# Patient Record
Sex: FEMALE | Race: WHITE | NOT HISPANIC OR LATINO | Employment: FULL TIME | URBAN - METROPOLITAN AREA
[De-identification: names, ages, dates, MRNs, and addresses within clinical notes are randomized per-mention and may not be internally consistent; named-entity substitution may affect disease eponyms.]

---

## 2017-01-26 ENCOUNTER — OFFICE VISIT (OUTPATIENT)
Dept: ORTHOPEDICS | Facility: CLINIC | Age: 42
End: 2017-01-26
Payer: MEDICAID

## 2017-01-26 VITALS — BODY MASS INDEX: 22.45 KG/M2 | WEIGHT: 122 LBS | HEIGHT: 62 IN

## 2017-01-26 DIAGNOSIS — S69.91XA HAND INJURY, RIGHT, INITIAL ENCOUNTER: Primary | ICD-10-CM

## 2017-01-26 DIAGNOSIS — S62.668A: ICD-10-CM

## 2017-01-26 PROCEDURE — 26750 TREAT FINGER FRACTURE EACH: CPT | Mod: PBBFAC,PO,RT | Performed by: ORTHOPAEDIC SURGERY

## 2017-01-26 PROCEDURE — 99214 OFFICE O/P EST MOD 30 MIN: CPT | Mod: 25,S$PBB,, | Performed by: ORTHOPAEDIC SURGERY

## 2017-01-26 PROCEDURE — 99999 PR PBB SHADOW E&M-EST. PATIENT-LVL II: CPT | Mod: PBBFAC,,, | Performed by: ORTHOPAEDIC SURGERY

## 2017-01-26 PROCEDURE — 26750 TREAT FINGER FRACTURE EACH: CPT | Mod: S$PBB,F8,, | Performed by: ORTHOPAEDIC SURGERY

## 2017-01-26 PROCEDURE — 99212 OFFICE O/P EST SF 10 MIN: CPT | Mod: PBBFAC,PO | Performed by: ORTHOPAEDIC SURGERY
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
## 2017-02-17 ENCOUNTER — PATIENT MESSAGE (OUTPATIENT)
Dept: ORTHOPEDICS | Facility: CLINIC | Age: 42
End: 2017-02-17

## 2017-02-17 DIAGNOSIS — Z09 FRACTURE FOLLOW-UP: Primary | ICD-10-CM

## 2017-02-20 ENCOUNTER — HOSPITAL ENCOUNTER (OUTPATIENT)
Dept: RADIOLOGY | Facility: HOSPITAL | Age: 42
Discharge: HOME | End: 2017-02-20
Attending: ORTHOPAEDIC SURGERY
Payer: MEDICAID

## 2017-02-20 ENCOUNTER — OFFICE VISIT (OUTPATIENT)
Dept: ORTHOPEDICS | Facility: CLINIC | Age: 42
End: 2017-02-20
Payer: MEDICAID

## 2017-02-20 VITALS — HEIGHT: 62 IN | WEIGHT: 122 LBS | BODY MASS INDEX: 22.45 KG/M2

## 2017-02-20 DIAGNOSIS — Z09 FRACTURE FOLLOW-UP: ICD-10-CM

## 2017-02-20 DIAGNOSIS — S62.668G: Primary | ICD-10-CM

## 2017-02-20 PROCEDURE — 99212 OFFICE O/P EST SF 10 MIN: CPT | Mod: PBBFAC,PO | Performed by: ORTHOPAEDIC SURGERY

## 2017-02-20 PROCEDURE — 99999 PR PBB SHADOW E&M-EST. PATIENT-LVL II: CPT | Mod: PBBFAC,,, | Performed by: ORTHOPAEDIC SURGERY

## 2017-02-20 PROCEDURE — 73140 X-RAY EXAM OF FINGER(S): CPT | Mod: 26,,, | Performed by: RADIOLOGY

## 2017-02-20 PROCEDURE — 99024 POSTOP FOLLOW-UP VISIT: CPT | Mod: ,,, | Performed by: ORTHOPAEDIC SURGERY
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
## 2017-02-20 NOTE — PROGRESS NOTES
Chey Mcneill, 41 years old for followup of her ring finger fracture, slight   improvement, still has discomfort, tender at the fracture site.    X-rays show acceptable position.    PLAN:  Continue with the AlumaFoam splint, implement gentle daily range of   motion.  Follow up in a month's time as a postoperative visit with x-rays of her   right ring finger.      PBB/PN  dd: 02/20/2017 09:23:28 (CST)  td: 02/20/2017 10:00:29 (CST)  Doc ID   #[id number]  Job ID #[id number]    CC:

## 2017-03-14 ENCOUNTER — PATIENT MESSAGE (OUTPATIENT)
Dept: ORTHOPEDICS | Facility: CLINIC | Age: 42
End: 2017-03-14

## 2017-03-15 DIAGNOSIS — Z09 FRACTURE FOLLOW-UP: Primary | ICD-10-CM

## 2017-03-16 ENCOUNTER — PATIENT MESSAGE (OUTPATIENT)
Dept: SPORTS MEDICINE | Facility: CLINIC | Age: 42
End: 2017-03-16

## 2017-03-17 ENCOUNTER — HOSPITAL ENCOUNTER (OUTPATIENT)
Dept: RADIOLOGY | Facility: HOSPITAL | Age: 42
Discharge: HOME | End: 2017-03-17
Attending: ORTHOPAEDIC SURGERY
Payer: MEDICAID

## 2017-03-17 ENCOUNTER — OFFICE VISIT (OUTPATIENT)
Dept: ORTHOPEDICS | Facility: CLINIC | Age: 42
End: 2017-03-17
Payer: MEDICAID

## 2017-03-17 VITALS — BODY MASS INDEX: 22.45 KG/M2 | HEIGHT: 62 IN | WEIGHT: 122 LBS

## 2017-03-17 DIAGNOSIS — Z09 FRACTURE FOLLOW-UP: ICD-10-CM

## 2017-03-17 DIAGNOSIS — S62.668G: Primary | ICD-10-CM

## 2017-03-17 PROCEDURE — 99212 OFFICE O/P EST SF 10 MIN: CPT | Mod: PBBFAC,PO | Performed by: ORTHOPAEDIC SURGERY

## 2017-03-17 PROCEDURE — 73130 X-RAY EXAM OF HAND: CPT | Mod: 26,LT,, | Performed by: RADIOLOGY

## 2017-03-17 PROCEDURE — 99024 POSTOP FOLLOW-UP VISIT: CPT | Mod: ,,, | Performed by: ORTHOPAEDIC SURGERY

## 2017-03-17 PROCEDURE — 99999 PR PBB SHADOW E&M-EST. PATIENT-LVL II: CPT | Mod: PBBFAC,,, | Performed by: ORTHOPAEDIC SURGERY

## 2017-03-17 RX ORDER — IBUPROFEN 800 MG/1
800 TABLET ORAL 2 TIMES DAILY PRN
Refills: 0 | COMMUNITY
Start: 2017-02-15 | End: 2017-04-24

## 2017-03-17 RX ORDER — HYDROCODONE BITARTRATE AND ACETAMINOPHEN 10; 325 MG/1; MG/1
1 TABLET ORAL EVERY 6 HOURS PRN
Refills: 0 | COMMUNITY
Start: 2017-02-15 | End: 2017-03-18
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
## 2017-03-29 ENCOUNTER — HOSPITAL ENCOUNTER (OUTPATIENT)
Dept: RADIOLOGY | Facility: HOSPITAL | Age: 42
Discharge: HOME | End: 2017-03-29
Attending: ORTHOPAEDIC SURGERY
Payer: COMMERCIAL

## 2017-03-29 ENCOUNTER — OFFICE VISIT (OUTPATIENT)
Dept: SPORTS MEDICINE | Facility: CLINIC | Age: 42
End: 2017-03-29
Payer: COMMERCIAL

## 2017-03-29 VITALS
HEART RATE: 103 BPM | WEIGHT: 125 LBS | HEIGHT: 62 IN | SYSTOLIC BLOOD PRESSURE: 116 MMHG | DIASTOLIC BLOOD PRESSURE: 76 MMHG | BODY MASS INDEX: 23 KG/M2

## 2017-03-29 DIAGNOSIS — M25.561 RIGHT KNEE PAIN, UNSPECIFIED CHRONICITY: ICD-10-CM

## 2017-03-29 DIAGNOSIS — M25.561 RIGHT KNEE PAIN, UNSPECIFIED CHRONICITY: Primary | ICD-10-CM

## 2017-03-29 PROCEDURE — 73564 X-RAY EXAM KNEE 4 OR MORE: CPT | Mod: 26,50,, | Performed by: RADIOLOGY

## 2017-03-29 PROCEDURE — 99999 PR PBB SHADOW E&M-EST. PATIENT-LVL IV: CPT | Mod: PBBFAC,,, | Performed by: PHYSICIAN ASSISTANT

## 2017-03-29 PROCEDURE — 99213 OFFICE O/P EST LOW 20 MIN: CPT | Mod: S$GLB,,, | Performed by: PHYSICIAN ASSISTANT

## 2017-03-29 PROCEDURE — 73564 X-RAY EXAM KNEE 4 OR MORE: CPT | Mod: TC,50,PO

## 2017-03-29 PROCEDURE — 1160F RVW MEDS BY RX/DR IN RCRD: CPT | Mod: S$GLB,,, | Performed by: PHYSICIAN ASSISTANT

## 2017-03-29 RX ORDER — MELOXICAM 15 MG/1
15 TABLET ORAL DAILY
Qty: 30 TABLET | Refills: 0 | Status: SHIPPED | OUTPATIENT
Start: 2017-03-29 | End: 2017-04-24

## 2017-03-29 NOTE — PROGRESS NOTES
Subjective:          Chief Complaint: Chey Mcneill is a 42 y.o. female who had concerns including Follow-up of the Right Knee.    HPI   Patient presents to clinic with right knee pain x 1 month. Pt initially noticed swelling, followed by burning pain at night. No mechanism of injury. Denies any change in activities. She has been taking NSAIDS as needed for pain without relief. Pain is located over medial aspect of knee. Denies mechanical symptoms or instability. Pain is 8/10 today and 9/10 with walking and at its worst.    Hx of multiple bilateral knee surgeries   Left knee- ACI with Dr. Ramos in 1/26/2012  Right knee- multiple scopes, lateral releases, and anterior tibial tubercleplasty by outside physician. Hardware removal by Dr. Ramos in 2010    Review of Systems   Constitution: Negative. Negative for chills, fever, weight gain and weight loss.   HENT: Negative for congestion, headaches and sore throat.    Eyes: Negative for blurred vision and double vision.   Cardiovascular: Negative for chest pain, leg swelling and palpitations.   Respiratory: Negative for cough and shortness of breath.    Hematologic/Lymphatic: Does not bruise/bleed easily.   Skin: Negative for itching, poor wound healing and rash.   Musculoskeletal: Positive for joint pain, joint swelling and stiffness. Negative for back pain, muscle weakness and myalgias.   Gastrointestinal: Negative for abdominal pain, constipation, diarrhea, nausea and vomiting.   Genitourinary: Negative.  Negative for frequency and hematuria.   Neurological: Negative for dizziness, numbness, paresthesias and sensory change.   Psychiatric/Behavioral: Negative for altered mental status and depression. The patient is not nervous/anxious.    Allergic/Immunologic: Negative for hives.       Pain Related Questions  Over the past 3 days, what was your average pain during activity? (I.e. running, jogging, walking, climbing stairs, getting dressed, ect.): 10  Over the past  3 days, what was your highest pain level?: 8  Over the past 3 days, what was your lowest pain level? : 8    Other  How many nights a week are you awakened by your affected body part?: 7  Was the patient's HEIGHT measured or patient reported?: Patient Reported  Was the patient's WEIGHT measured or patient reported?: Measured      Objective:        General: Chey is well-developed, well-nourished, appears stated age, in no acute distress, alert and oriented to time, place and person. Endorses instability    General    Vitals reviewed.  Constitutional: She is oriented to person, place, and time. She appears well-developed and well-nourished. No distress.   Neck: Normal range of motion.   Cardiovascular: Normal rate and regular rhythm.    Pulmonary/Chest: Effort normal. No respiratory distress.   Neurological: She is alert and oriented to person, place, and time.   Psychiatric: She has a normal mood and affect. Her behavior is normal. Thought content normal.     General Musculoskeletal Exam   Gait: antalgic       Right Knee Exam     Inspection   Erythema: absent  Scars: present  Swelling: absent  Effusion: effusion  Deformity: deformity  Bruising: absent    Tenderness   The patient is tender to palpation of the medial joint line and MCL (medial patella).    Range of Motion   Extension: 0   Right knee flexion: 95.     Tests   Meniscus   Leela:  Medial - positive   Ligament Examination Lachman: normal (-1 to 2mm) PCL-Posterior Drawer: normal (0 to 2mm)     MCL - Valgus: abnormal - grade I  LCL - Varus: normalPivot Shift: normal (Equal)Reverse Pivot Shift: normal (Equal)Dial Test at 30 degrees: normal (< 5 degrees)Dial Test at 90 degrees: normal (< 5 degrees)  Patella   Passive Patellar Tilt: neutral  Patellar Tracking: normal  Patellar Glide (quadrants): Lateral - 1   Medial - 1  Patellar Grind: positive    Other   Popliteal (Baker's) Cyst: absent  Sensation: normal    Left Knee Exam     Inspection   Erythema:  absent  Scars: present  Swelling: absent  Effusion: absent  Deformity: deformity  Bruising: absent    Tenderness   The patient is experiencing no tenderness.         Range of Motion   Extension: 0   Left knee flexion: 135.     Tests   Meniscus   Leela:  Medial - negative Lateral - negative  Stability Lachman: normal (-1 to 2mm) PCL-Posterior Drawer: normal (0 to 2mm)  MCL - Valgus: normal (0 to 2mm)  LCL - Varus: normal (0 to 2mm)Pivot Shift: normal (Equal)Reverse Pivot Shift: normal (Equal)Dial Test at 30 degrees: normal (< 5 degrees)Dial Test at 90 degrees: normal (< 5 degrees)  Patella   Passive Patellar Tilt: neutral  Patellar Tracking: normal  Patellar Glide (Quadrants): Lateral - 1 Medial - 2  Patellar Grind: negative    Other   Popliteal (Baker's) Cyst: absent  Sensation: normal    Muscle Strength   Right Lower Extremity   Hip Abduction: 3/5   Quadriceps:  4/5   Hamstrin/5 and 3/5   Left Lower Extremity   Hip Abduction: 5/5   Quadriceps:  5/5   Hamstrin/5     Reflexes     Left Side  Quadriceps:  2+  Achilles:  2+    Right Side   Quadriceps:  2+  Achilles:  2+    Vascular Exam     Right Pulses  Dorsalis Pedis:      2+  Posterior Tibial:      2+        Left Pulses  Dorsalis Pedis:      2+  Posterior Tibial:      2+        Edema  Right Lower Leg: absent  Left Lower Leg: absent      RADIOGRAPHS:  4 views both knees.  Comparison .  Postop changes anterior tibial tubercle regions stable.  No new fracture dislocation, joint effusion.  Additional DJD changes patellofemoral especially right.        Assessment:       Encounter Diagnosis   Name Primary?    Right knee pain, unspecified chronicity Yes          Plan:       1. MRI of right knee to rule out MCL sprain/ medial meniscus tear  2. Mobic 15mg once a day  3. IKDC, SF-12 and KOOS was filled out today in clinic.     RTC in 1 weeks with Dr. Cooper Ramos Patient will not fill out IKDC, SF-12 and KOOS on return.  4. [id number] - I, performed a custom  orthotic / brace adjustment, fitting and training with the patient. The patient demonstrated understanding and proper care. This was performed for 12 minutes. Placed in long runner brace                        Patient questionnaires may have been collected.

## 2017-03-29 NOTE — MR AVS SNAPSHOT
[facility]  [address]  [facility] [address]  Phone: [phone]                  Chey Mcneill   3/29/2017 3:30 PM   Appointment    Description:  Female : 1975   Provider:  Jennifer Dunlap PA-C   Department:  [facility]                To Do List           Future Appointments        Provider Department Dept Phone    4/3/2017 4:15 PM Cooper Ramos MD [facility] [phone]    2017 8:00 AM Felipe Daly MD [facility] [phone]      Goals (5 Years of Data)     None      [facility]     [facility] -  Assistance  Registered nurses in the [facility] Center provide clinical advisement, health education, appointment booking, and other advisory services.  Call for this free service at [phone].             Medications           Message regarding Medications     Verify the changes and/or additions to your medication regime listed below are the same as discussed with your clinician today.  If any of these changes or additions are incorrect, please notify your healthcare provider.             Verify that the below list of medications is an accurate representation of the medications you are currently taking.  If none reported, the list may be blank. If incorrect, please contact your healthcare provider. Carry this list with you in case of emergency.           Current Medications     clonazePAM (KLONOPIN) 1 MG tablet Take 1 mg by mouth 2 (two) times daily as needed for Anxiety.    hydrocodone-acetaminophen 5-325mg (NORCO) 5-325 mg per tablet Take 1 tablet by mouth every 4 (four) hours as needed for Pain.    IBUPROFEN (ADVIL ORAL) Take 800 mg by mouth 3 (three) times daily as needed.    ibuprofen (ADVIL,MOTRIN) 800 MG tablet Take 800 mg by mouth 2 (two) times daily as needed. for pain    NAPROXEN (NAPROSYN ORAL) Take 500 mg by mouth 2 (two) times daily as needed.    ondansetron  (ZOFRAN) 4 MG tablet Take 1 tablet (4 mg total) by mouth every 6 (six) hours as needed for Nausea.           Clinical Reference Information           Allergies as of 3/29/2017     Adhesive    Benzoin Tincture Plain    Morphine    Oxycontin [Oxycodone]    Sulfa (Sulfonamide Antibiotics)      Immunizations Administered on Date of Encounter - 3/29/2017     None      Language Assistance Services     ATTENTION: Language assistance services are available, free of charge. Please call [phone].      ATENCIÓN: Si habla joseph, tiene a bradford disposición servicios gratuitos de asistencia lingüística. Llame al [phone].     CHÚ Ý: N?u b?n nói Ti?ng Vi?t, có các d?ch v? h? tr? ngôn ng? mi?n phí dành cho b?n. G?i s? [phone].         [facility] complies with applicable Federal civil rights laws and does not discriminate on the basis of race, color, national origin, age, disability, or sex.

## 2017-04-04 ENCOUNTER — HOSPITAL ENCOUNTER (OUTPATIENT)
Dept: RADIOLOGY | Facility: HOSPITAL | Age: 42
Discharge: HOME | End: 2017-04-04
Attending: ORTHOPAEDIC SURGERY
Payer: COMMERCIAL

## 2017-04-04 DIAGNOSIS — M25.561 RIGHT KNEE PAIN, UNSPECIFIED CHRONICITY: ICD-10-CM

## 2017-04-04 PROCEDURE — 73721 MRI JNT OF LWR EXTRE W/O DYE: CPT | Mod: TC,PO,RT

## 2017-04-04 PROCEDURE — 73721 MRI JNT OF LWR EXTRE W/O DYE: CPT | Mod: 26,RT,, | Performed by: RADIOLOGY

## 2017-04-05 ENCOUNTER — OFFICE VISIT (OUTPATIENT)
Dept: SPORTS MEDICINE | Facility: CLINIC | Age: 42
End: 2017-04-05
Payer: COMMERCIAL

## 2017-04-05 VITALS
SYSTOLIC BLOOD PRESSURE: 113 MMHG | DIASTOLIC BLOOD PRESSURE: 74 MMHG | WEIGHT: 125 LBS | HEIGHT: 62 IN | HEART RATE: 95 BPM | BODY MASS INDEX: 23 KG/M2

## 2017-04-05 DIAGNOSIS — M22.41 CHONDROMALACIA OF PATELLA, RIGHT: ICD-10-CM

## 2017-04-05 DIAGNOSIS — M94.261 CHONDROMALACIA, KNEE, RIGHT: ICD-10-CM

## 2017-04-05 DIAGNOSIS — M23.91 KNEE INTERNAL DERANGEMENT, RIGHT: Primary | ICD-10-CM

## 2017-04-05 DIAGNOSIS — M23.91 DERANGEMENT, KNEE INTERNAL, RIGHT: Primary | ICD-10-CM

## 2017-04-05 DIAGNOSIS — M94.261 CHONDROMALACIA OF RIGHT KNEE: ICD-10-CM

## 2017-04-05 PROCEDURE — 99999 PR PBB SHADOW E&M-EST. PATIENT-LVL IV: CPT | Mod: PBBFAC,,, | Performed by: ORTHOPAEDIC SURGERY

## 2017-04-05 PROCEDURE — 99214 OFFICE O/P EST MOD 30 MIN: CPT | Mod: S$GLB,,, | Performed by: ORTHOPAEDIC SURGERY

## 2017-04-05 PROCEDURE — 1160F RVW MEDS BY RX/DR IN RCRD: CPT | Mod: S$GLB,,, | Performed by: ORTHOPAEDIC SURGERY

## 2017-04-05 NOTE — PATIENT INSTRUCTIONS
The knee is the most frequently injured joint in the body. Most injuries are due to the extreme stresses during twisting or turning activities or sports. The knee joint is made up of three bones, four major ligaments and two types of cartilage.    FEMUR (Thigh Bone)  The femoral condyles are the two rounded prominences at the end of the femur. The motion of the condyles include rocking, gliding and rotating. Any abnormal surface structure or cartilage damage can lead to cartilage breakdown and arthritis (loss of cartilage padding).    TIBIA (Shin Bone)  The Tibia meets the Femur at the knee in two areas on which the Femur rides. This area is called the Tibial Plateau.    PATELLA (Knee Cap)  The Patella is a bone that lies within the quadriceps tendon. It rides in the shallow groove over the front part if the Femur called the Trochlea. The Patella acts as a lever arm to help the quadriceps muscle extend the knee.    Articular Cartilage covers the ends of these bones at the knee joint. This glistening white substance has the consistency of firm rubber but is actually a mixture of collagen and special large sponge-like molecules all maintained by living cartilage cells (chondrocytes). With normal joint fluid for lubrication, the surface is more slippery than ice on ice and allows smooth and easy knee joint motion.      MENISCUS  The other type of knee cartilage is the Meniscal Cartilage (fibrocartilage). These C-shaped pads are found between the thigh bone and shin bone -- one on each side -- thus called the Medial Meniscus (inner thigh aspect) and Lateral Meniscus (outer aspect). The menisci are attached to the Tibial Plateaus, and serve to cushion and transfer joint force more evenly to the tibia. They accomplish this by distributing joint forces over a larger area of the joint -- transferring force from the curved femoral condylar margins to the flatter tibial plateaus. Damage to the meniscal cushion may result  "in increased stress on the articular cartilage of the tibia and femur and early arthritis.      The smooth, white shiny covering of the bones in the joint is known as Articular Cartilage, and is made of a material called "hyaline cartilage". Damage to this articular cartilage can occur from trauma (such as a fall or car accident), but more often, it is the result of repetitive injuries over a long period of time.    Articular cartilage injuries are common, occurring in 20- 70% of knee injuries. The function of articular cartilage is to optimize joint function by reducing friction and increasing shock absorption. Articular cartilage is similar to the "tread on a car tire".    Injuries to the articular cartilage have a low capacity for healing. Both superficial and full-thickness cartilage injuries may progress to the mechanical wear and breakdown of the cartilage matrix.  The breakdown of articular cartilage will eventually cause osteoarthritis, which is a very serious painful condition. With a full-thickness cartilage injury, continued activity may cause the articular cartilage injury to progress rapidly to traumatic arthritis. The larger the initial cartilage injury, the faster the potential progression of arthritis. Articular cartilage injury or wear leads to joint pain.      Common symptoms include pain when the joint is moved or loaded (like walking or running). Catching, locking, or creaking (crepitation) may occur with joint motion or weight bearing (like twisting or standing  from a seated position). Articular cartilage damage may be superficial (partial), deep (complete full-thickness), or osteochondral (bone and cartilage).    Superficial cartilage injuries extend into the upper 50% of the depth of the cartilage. These injuries typically do not heal, but may not progress to arthritis unless they are large and located in a weight-bearing area of the knee.  Deep or full-thickness lesions extend down to the " "bone, but not through it. These injuries have very little healing potential and tend to progress to osteoarthritis if located in a weight-bearing area.    Osteochondral (bone plus cartilage) injuries extend down through the subchondral bone (deep to the cartilage). These injuries may heal by allowing blood vessel ingrowth with fibrous cells to produce a fibrous (scar-like) tissue repair.      Treatment Options For Smaller Defects  Most studies suggest the repair tissue formed from bone marrow stimulation techniques is fibrocartilage (scar tissue -not hyaline cartilage), which is less resistant to wear with the forces in the knee joint and often deteriorates over time Bone marrow stimulation techniques can be successful in eliminating symptoms in many patients, especially young patients with small lesions.   Without early intervention, cartilage degeneration may proceed, and prevent a chance for successful pain- free function.    Arthroscopic Debridement  This is an arthroscopic procedure, often known as a "knee scope". The surgeon uses minimally invasive techniques with a small fiberoptic light source attached to a video camera to locate damaged cartilage and trim the loose edges away to smooth the surface. The surgeon may trim meniscus tears and remove loose cartilage that causes catching or locking symptoms and attempts to prevent any further flaking off that can irritate the knee joint lining and cause swelling.  Arthroscopic debridement is most effective for small lesions, less than 1 cm2 (3/8 inch). It is not as effective for larger lesions because it does not fill the lesion with any repair tissue, leaving the edges exposed to high forces in the knee and continued wear. Despite relieving some symptoms from cartilage tears or loss, arthroscopic cleaning does not prevent the progression of arthritis, but may relieve mechanical symptoms.    Bone Marrow Stimulation Techniques  Three different techniques are " "available to create bleeding and clot formation at the cartilage defect. Blood vessels and fibrous cells migrate to the area to form a fibrous scar-tissue repair tissue to fill the hole in the articular cartilage. Drilling creates multiple small holes through the subchondral bone to allow bleeding into the defect.   Microfracture or "picking" creates small fractures in the subchondral bone to encourage bleeding into the defect.      Abrasion arthroplasty is a superficial shaving of the subchondral bone surface to create bleeding into the defect. Bone marrow stimulation techniques are successful in eliminating symptoms in many patients, especially younger patients with smaller lesions well contained lesions.       For patients with more extensive cartilage damage there are several techniques available    Osteochondral Autograft  This technique is analogous to a hair-plug transfer. The surgeon removes a small section of the patient's own cartilage along with the underlying bone plug, hence the name osteochondral (bone and cartilage) graft.      Bone and cartilage cylinders are harvested from a minor weight bearing area of the knee joint and transplanted into prepared holes in the damage area. This process works much like a hair transplant. Unfortunately, a limited amount of normal osteochondral tissue is available for harvest. The typical size of defect treatable with this method is between 1 to 2 cm2.      Treatment Options For Larger Lesions    Autologous Chondrocyte Implantation (ACI) Carticel  For cartilage defects greater than 2 square centimeters or if there are multiple defects in the knee, one of the newer techniques for the cartilage regeneration, is ACI. ACI is FDA indicated for full-thickness cartilage or osteochondral lesions located in the knee.    ACI is performed in two stages. The first stage is performed when initially assessing the joint arthroscopically. A small amount of cartilage is harvested and " sent to a laboratory for cell culture and growth.    The patient's own cartilage cells (chondrocytes) are grown in culture increasing the number of cells by 10 fold.    Twelve million chondrocytes are then re-implanted into the cartilage defect and covered with a ceiling of native tissue (periosteum).    The cells then grow to fill the defect and resurface areas of cartilage loss with hyaline-like cartilage.    The long-term outcomes (of 14 years) are encouraging for treating medium and large cartilage lesions. ACI is the only procedure with a formal patient outcomes registry.    Osteochondral Allograft     For larger defects that involve both cartilage and bone loss, surgeons may custom fit the defect with a cadaver implant of donated cartilage and bone. The transplanted tissue is matched to the patient based on size of the knee, but tissue typing (similar to organ transplantation) is not necessary. Osteochondral transplantation may allow restoration of the joint surface. The long-term outcomes with fresh allograft (cadaver) tissue have been very encouraging.      RESTORING THE MENISCUS  Our goal is to maintain normal anatomy, if possible. In the case of meniscal tears, the first line of treatment is attempt at repair. Historically, in the days of open cartilage surgery, the entire meniscus was removed. Unfortunately, long term follow-up studies of these patients after removal of the meniscus have found that many go on to degenerative arthritis. Today, cartilage surgeons recognize the protective value of the meniscal cartilage and make every attempt to conserve this valuable tissue.    To maximally preserve function after a meniscus tear, surgeons may repair the meniscus using a variety of techniques. Options include using special sutures or absorbable implants to staple, francisco, or otherwise fix and secure the torn meniscal cushion.    Replacing The Meniscus  For patients who have had the meniscus removed, an  innovative solution called a meniscal transplant may be an option. The indications for transplant need to be assessed by your cartilage surgeon. Unlike some other forms of tissue transplantation, this procedure does not require patients to be on medications to prevent organ rejection. Intermediate term outcome studies are encouraging.        RESTORING KNEE ALIGNMENT    Osteotomy  When the bones of the knee do not align properly, joint forces are not evenly distributed and may overload one side of the knee causing pain and cartilage degeneration. This overload problem is made worse when there has been a traumatic cartilage injury or the meniscus has been removed.    An osteotomy is designed to shift the stress from the damaged part of the knee to a more normal area in the knee. An osteotomy requires the surgeon to cut the bone in order to remove a wedge of bone in order to adjust the angle of the knee. For individuals with medial compartment narrowing (the most common situation), there are three options for high tibial osteotomy (HTO).      One involves removing a wedge shape piece of bone from the lateral side of the tibia and then closing the remaining surfaces together and holding it with a plate and screws (Fig A).    The second technique involves making one cut partially across the top of the tibia and opening the bone to establish the correct alignment. This is held in place with a plate and screws and a bone graft. (Fig B)    The final technique is called medial hemicallotasis, which means stretching healing bone. This technique requires a single cut partially across the bone. The bone is then gradually opened on the medial side by an external fixation frame. This technique is attractive because it allows adjustments to be made in the angle of correction and the hardware is removed three months after the procedure (Fig C).      Each of these techniques require a period of non-weightbearing or partial  "weightbearing crutch ambulation. These techniques may be necessary at the same time or prior to other reconstructive procedures such as cartilage replacement or meniscus replacement surgery in order to remove excessive forces off the repair site.      OSTEOARTHRITIS    Partial (Unicompartmental) Joint Replacement  This procedure replaces only the damaged portion of the joint with metal and/or plastic, leaving the remaining portion of the joint intact. It is often known as a partial knee replacement. New techniques allow replacement of a portion of the knee joint through smaller incisions with fewer days of hospitalization required.    Total Joint Replacement  If there is extensive damage with bone-on-bone apposition, many of the above procedures are not indicated. In these cases of end-stage arthritis, the entire joint surface is replaced with artificial metal and plastic components, allowing most patients to return to pain-free activities.    Future Trends  Investigators are now examining the potential of using collagen or other biologic tissues to serve as a bridge or scaffold for the body's own healing/repair mechanism to use in reestablishing meniscal form and function. In the future, biological "healing glues" may become available to allow repair without sutures. Even with the newest techniques available, certain tears are not repairable and a portion of the meniscus is removed using the arthroscope (partial meniscectomy).    The term osteoarthritis indicates the degeneration and excessive wear of articular cartilage with gradual changes occurring in the underlying bone.    Initially the articular cartilage softens, the surface becomes uneven and the cartilage frays and develops cracks, which can extend down to bone.  In advanced osteoarthritis the cartilage is worn away to reveal the underlying bone and nerve endings causing pain.  The bone hardens, cysts begin to form, and new cartilage cells laid down around " the worn cartilage ossify and form bony projections (bone spurs).  Untreated articular cartilage defects can progress to osteoarthritis with both mechanical and enzymatic breakdown resulting in permanent dysfunction of the joint. Our goal is to diagnose and prevent or halt the progression of arthritis      Many patients suffer with end-stage arthritis that limits even the simplest activities of daily living, significantly altering the patient's quality of fife. For these patient's, current cartilage surgical options DO NOT apply. There are no curative therapies for end-stage arthritis. A wide range of methods may be used to relieve pain and restore function. Conventional treatment methods include exercise, physical therapy and medications, such as anti-inflammatories. Recently, new biological compounds have been shown to be effective and safe for treating arthritis. These compounds include lubricants (hyaluronic acid) and building blocks of cartilage (Chondroitin Sulfate and Glucosamine).    Medications  Oral medications such as non-steroidal anti-inflammatories (NSAID's) tend to have a beneficial effect on the degenerative conditions described above. Immediately following an injury, these medications help to decrease pain and swelling. On a more long term nature, these medications help to relieve the pain and swelling associated with arthritic joints. Newer specific anti-inflammatories medications have been developed to block the enzymes necessary for production of inflammation (cyclooxygenase-2 or ROSA-2). These medications, such as Ceibrex or Bextra tend to have less adverse effects on the stomach and gastrointestinal tract than older, more traditional NSAID's. These prescription-strength NSAID medications are taken by mouth once or twice per day. Other non-prescription over-the-counter NSAID's are available.    Cartilage Supplements  Other oral medications which can be purchased without a prescription include  Glucosamine and Chondroitin Sulfate. These two compounds are normally found in the substance of articular cartilage. Several recent studies using Cosamin®DS have demonstrated a pain relieving effect with the combination of Glucosamine Hydrochloride, highly purified low molecular weight Chondroitin Sulfate and Manganese Ascorbate available only in this product. The [employer] ([employer]) has selected the exact same Glucosamine and Chondroitin Sulfate used in CosaminDS for a large multi-center clinical trial currently in progress.      Oral administration of these compounds does not have a cartilage building effect, but rather a cartilage sparing effect. Laboratory studies have confirmed a stimulatory action on cartilage cells as well as an inhibition of cartilage degeneration with CosaminDS, which can improve the health of existing cartilage. Few negative side-effects have been demonstrated in patients taking these compounds, and many patients with arthritis benefit from the addition of this supplement to their arthritis treatment regimen.    Cortisone (Steroid) Injections  Injection of steroids into joints have been performed for well over 100 years with good results. Typically, steroid injection is utilized in a patient with degenerative arthritic changes to treat acute inflammation.  Steroids are powerful anti-inflammatory substances. When injected into a joint, the steroid has primarily a local effect, not a whole-body or systemic effect. These medications tend to decrease pain and inflammation when used appropriately. If overused, local steroid injection can have a negative effect on the tissues, such as weakening of bone and tendons. These injections typically are not permanent in their beneficial effect.    Injectable Viscosupplementation  The space between the cartilage surfaces in the knee joint contains synovial fluid that acts as a lubricant for the joint. With the progression of arthritis,  "the synovial fluid becomes thinner and is ineffective as a shock absorber. As a result simple movements become painful. Hyaluronic acid injections supplement the existing synovial fluid and act as a shock absorber and lubricant for the knee.      Synvisc is a hyluronan based, naturally occurring lubricant with similar properties to synovial fluid found in healthy joints. Synvisc or Euflexxa are injected over a 3 week series to provide lubrication to the knee joint. For some patients, Synvisc One may be an option, this is a single-shot injection.    Braces  In some cases of arthritis, only a portion of the knee is degenerative and it may be advantageous to "unload" the affected area and force more weight towards the "good" part of the knee. Special braces called "unloaders" are used for this purpose. Typically the brace is worn for work or activity and tends to decrease the pain caused by single compartment arthritis.        Physical Therapy  Exercise is a vital component of the treatment of cartilage injury. If the knee becomes stiff after an injury or over the course of time, it may be difficult to regain the lost motion. In most cases, early range of motion exercises are instituted in order to prevent this problem. In some cases, a loss of strength in certain muscle groups can lead to increased stress on the already degenerative articular surfaces. If muscles are strong and working properly they will take up some of the stress and divert it away from the cartilage surfaces. As symptoms decrease exercise is increased, but always below the pain threshold. Muscle strength is never harmful to a joint. It is therefore common to have a doctor prescribe strengthening exercises as an integral part of the treatment program for arthritis.    TECHNIQUES  Biologic tissue engineering is continuing to bring exciting new approaches from the lab to the clinical arena. It may be possible to induce primitive cells from the bone " "marrow called pluripotential cells or mesenchymal stem cells to transform into hyaline articular cartilage with the use of a variety of growth factors or hormones. Genetic reprogram¬nereida and tissue engineering may eventually replace surgery - but not at this stage in the new millennium.  Other biological patches may act as a temporary home for chondrocytes or "prechondrocytes." This exciting field will offer many new surgical techniques, which will hopefully lead to opportunities to restore function with less invasive means.      "
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
## 2017-04-05 NOTE — MR AVS SNAPSHOT
[facility]  [address]  [facility] [address]  Phone: [phone]                  Chey Mcneill   2017 7:15 AM   Office Visit    Description:  Female : 1975   Provider:  Cooper Ramos MD   Department:  [facility]           Reason for Visit     Right Knee - Follow-up           Diagnoses this Visit        Comments    Knee internal derangement, right    -  Primary     Chondromalacia of right knee                To Do List           Future Appointments        Provider Department Dept Phone    2017 8:00 AM Felipe Daly MD [facility] [phone]      Goals (5 Years of Data)     None      Follow-Up and Disposition     Return in about 6 weeks (around 2017), or RTC in 6 weeks with Dr. Cooper Ramos for preoperative history and physical. Patient will not fill out, for RTC in 6 weeks with Dr. Cooper Ramos for preoperative history and physical. Patient will not fill out.      [facility]     [facility] Nurse Care Line -  Assistance  Unless otherwise directed by your provider, please contact [facility], our nurse care line that is available for  assistance.     Registered nurses in the [facility] Call Center provide: appointment scheduling, clinical advisement, health education, and other advisory services.  Call: [phone] (toll free)               Medications           Message regarding Medications     Verify the changes and/or additions to your medication regime listed below are the same as discussed with your clinician today.  If any of these changes or additions are incorrect, please notify your healthcare provider.             Verify that the below list of medications is an accurate representation of the medications you are currently taking.  If none reported, the list may be blank. If incorrect, please contact your healthcare provider. Carry this list with you in case of emergency.           Current  "Medications     clonazePAM (KLONOPIN) 1 MG tablet Take 1 mg by mouth 2 (two) times daily as needed for Anxiety.    hydrocodone-acetaminophen 5-325mg (NORCO) 5-325 mg per tablet Take 1 tablet by mouth every 4 (four) hours as needed for Pain.    IBUPROFEN (ADVIL ORAL) Take 800 mg by mouth 3 (three) times daily as needed.    ibuprofen (ADVIL,MOTRIN) 800 MG tablet Take 800 mg by mouth 2 (two) times daily as needed. for pain    meloxicam (MOBIC) 15 MG tablet Take 1 tablet (15 mg total) by mouth once daily.    NAPROXEN (NAPROSYN ORAL) Take 500 mg by mouth 2 (two) times daily as needed.    ondansetron (ZOFRAN) 4 MG tablet Take 1 tablet (4 mg total) by mouth every 6 (six) hours as needed for Nausea.           Clinical Reference Information           Your Vitals Were     BP Pulse Height Weight BMI    113/74 95 5' 2" (1.575 m) 56.7 kg (125 lb) 22.86 kg/m2      Blood Pressure          Most Recent Value    BP  113/74      Allergies as of 4/5/2017     Adhesive    Benzoin Tincture Plain    Morphine    Oxycontin [Oxycodone]    Sulfa (Sulfonamide Antibiotics)      Immunizations Administered on Date of Encounter - 4/5/2017     None      Orders Placed During Today's Visit      Normal Orders This Visit    Ambulatory Referral to Physical/Occupational Therapy       Instructions        The knee is the most frequently injured joint in the body. Most injuries are due to the extreme stresses during twisting or turning activities or sports. The knee joint is made up of three bones, four major ligaments and two types of cartilage.    FEMUR (Thigh Bone)  The femoral condyles are the two rounded prominences at the end of the femur. The motion of the condyles include rocking, gliding and rotating. Any abnormal surface structure or cartilage damage can lead to cartilage breakdown and arthritis (loss of cartilage padding).    TIBIA (Shin Bone)  The Tibia meets the Femur at the knee in two areas on which the Femur rides. This area is called the " "Tibial Plateau.    PATELLA (Knee Cap)  The Patella is a bone that lies within the quadriceps tendon. It rides in the shallow groove over the front part if the Femur called the Trochlea. The Patella acts as a lever arm to help the quadriceps muscle extend the knee.    Articular Cartilage covers the ends of these bones at the knee joint. This glistening white substance has the consistency of firm rubber but is actually a mixture of collagen and special large sponge-like molecules all maintained by living cartilage cells (chondrocytes). With normal joint fluid for lubrication, the surface is more slippery than ice on ice and allows smooth and easy knee joint motion.      MENISCUS  The other type of knee cartilage is the Meniscal Cartilage (fibrocartilage). These C-shaped pads are found between the thigh bone and shin bone -- one on each side -- thus called the Medial Meniscus (inner thigh aspect) and Lateral Meniscus (outer aspect). The menisci are attached to the Tibial Plateaus, and serve to cushion and transfer joint force more evenly to the tibia. They accomplish this by distributing joint forces over a larger area of the joint -- transferring force from the curved femoral condylar margins to the flatter tibial plateaus. Damage to the meniscal cushion may result in increased stress on the articular cartilage of the tibia and femur and early arthritis.      The smooth, white shiny covering of the bones in the joint is known as Articular Cartilage, and is made of a material called "hyaline cartilage". Damage to this articular cartilage can occur from trauma (such as a fall or car accident), but more often, it is the result of repetitive injuries over a long period of time.    Articular cartilage injuries are common, occurring in 20- 70% of knee injuries. The function of articular cartilage is to optimize joint function by reducing friction and increasing shock absorption. Articular cartilage is similar to the "tread " "on a car tire".    Injuries to the articular cartilage have a low capacity for healing. Both superficial and full-thickness cartilage injuries may progress to the mechanical wear and breakdown of the cartilage matrix.  The breakdown of articular cartilage will eventually cause osteoarthritis, which is a very serious painful condition. With a full-thickness cartilage injury, continued activity may cause the articular cartilage injury to progress rapidly to traumatic arthritis. The larger the initial cartilage injury, the faster the potential progression of arthritis. Articular cartilage injury or wear leads to joint pain.      Common symptoms include pain when the joint is moved or loaded (like walking or running). Catching, locking, or creaking (crepitation) may occur with joint motion or weight bearing (like twisting or standing  from a seated position). Articular cartilage damage may be superficial (partial), deep (complete full-thickness), or osteochondral (bone and cartilage).    Superficial cartilage injuries extend into the upper 50% of the depth of the cartilage. These injuries typically do not heal, but may not progress to arthritis unless they are large and located in a weight-bearing area of the knee.  Deep or full-thickness lesions extend down to the bone, but not through it. These injuries have very little healing potential and tend to progress to osteoarthritis if located in a weight-bearing area.    Osteochondral (bone plus cartilage) injuries extend down through the subchondral bone (deep to the cartilage). These injuries may heal by allowing blood vessel ingrowth with fibrous cells to produce a fibrous (scar-like) tissue repair.      Treatment Options For Smaller Defects  Most studies suggest the repair tissue formed from bone marrow stimulation techniques is fibrocartilage (scar tissue -not hyaline cartilage), which is less resistant to wear with the forces in the knee joint and often deteriorates " "over time Bone marrow stimulation techniques can be successful in eliminating symptoms in many patients, especially young patients with small lesions.   Without early intervention, cartilage degeneration may proceed, and prevent a chance for successful pain- free function.    Arthroscopic Debridement  This is an arthroscopic procedure, often known as a "knee scope". The surgeon uses minimally invasive techniques with a small fiberoptic light source attached to a video camera to locate damaged cartilage and trim the loose edges away to smooth the surface. The surgeon may trim meniscus tears and remove loose cartilage that causes catching or locking symptoms and attempts to prevent any further flaking off that can irritate the knee joint lining and cause swelling.  Arthroscopic debridement is most effective for small lesions, less than 1 cm2 (3/8 inch). It is not as effective for larger lesions because it does not fill the lesion with any repair tissue, leaving the edges exposed to high forces in the knee and continued wear. Despite relieving some symptoms from cartilage tears or loss, arthroscopic cleaning does not prevent the progression of arthritis, but may relieve mechanical symptoms.    Bone Marrow Stimulation Techniques  Three different techniques are available to create bleeding and clot formation at the cartilage defect. Blood vessels and fibrous cells migrate to the area to form a fibrous scar-tissue repair tissue to fill the hole in the articular cartilage. Drilling creates multiple small holes through the subchondral bone to allow bleeding into the defect.   Microfracture or "picking" creates small fractures in the subchondral bone to encourage bleeding into the defect.      Abrasion arthroplasty is a superficial shaving of the subchondral bone surface to create bleeding into the defect. Bone marrow stimulation techniques are successful in eliminating symptoms in many patients, especially younger patients " with smaller lesions well contained lesions.       For patients with more extensive cartilage damage there are several techniques available    Osteochondral Autograft  This technique is analogous to a hair-plug transfer. The surgeon removes a small section of the patient's own cartilage along with the underlying bone plug, hence the name osteochondral (bone and cartilage) graft.      Bone and cartilage cylinders are harvested from a minor weight bearing area of the knee joint and transplanted into prepared holes in the damage area. This process works much like a hair transplant. Unfortunately, a limited amount of normal osteochondral tissue is available for harvest. The typical size of defect treatable with this method is between 1 to 2 cm2.      Treatment Options For Larger Lesions    Autologous Chondrocyte Implantation (ACI) Carticel  For cartilage defects greater than 2 square centimeters or if there are multiple defects in the knee, one of the newer techniques for the cartilage regeneration, is ACI. ACI is FDA indicated for full-thickness cartilage or osteochondral lesions located in the knee.    ACI is performed in two stages. The first stage is performed when initially assessing the joint arthroscopically. A small amount of cartilage is harvested and sent to a laboratory for cell culture and growth.    The patient's own cartilage cells (chondrocytes) are grown in culture increasing the number of cells by 10 fold.    Twelve million chondrocytes are then re-implanted into the cartilage defect and covered with a ceiling of native tissue (periosteum).    The cells then grow to fill the defect and resurface areas of cartilage loss with hyaline-like cartilage.    The long-term outcomes (of 14 years) are encouraging for treating medium and large cartilage lesions. ACI is the only procedure with a formal patient outcomes registry.    Osteochondral Allograft     For larger defects that involve both cartilage and bone  loss, surgeons may custom fit the defect with a cadaver implant of donated cartilage and bone. The transplanted tissue is matched to the patient based on size of the knee, but tissue typing (similar to organ transplantation) is not necessary. Osteochondral transplantation may allow restoration of the joint surface. The long-term outcomes with fresh allograft (cadaver) tissue have been very encouraging.      RESTORING THE MENISCUS  Our goal is to maintain normal anatomy, if possible. In the case of meniscal tears, the first line of treatment is attempt at repair. Historically, in the days of open cartilage surgery, the entire meniscus was removed. Unfortunately, long term follow-up studies of these patients after removal of the meniscus have found that many go on to degenerative arthritis. Today, cartilage surgeons recognize the protective value of the meniscal cartilage and make every attempt to conserve this valuable tissue.    To maximally preserve function after a meniscus tear, surgeons may repair the meniscus using a variety of techniques. Options include using special sutures or absorbable implants to staple, francisco, or otherwise fix and secure the torn meniscal cushion.    Replacing The Meniscus  For patients who have had the meniscus removed, an innovative solution called a meniscal transplant may be an option. The indications for transplant need to be assessed by your cartilage surgeon. Unlike some other forms of tissue transplantation, this procedure does not require patients to be on medications to prevent organ rejection. Intermediate term outcome studies are encouraging.        RESTORING KNEE ALIGNMENT    Osteotomy  When the bones of the knee do not align properly, joint forces are not evenly distributed and may overload one side of the knee causing pain and cartilage degeneration. This overload problem is made worse when there has been a traumatic cartilage injury or the meniscus has been removed.    An  osteotomy is designed to shift the stress from the damaged part of the knee to a more normal area in the knee. An osteotomy requires the surgeon to cut the bone in order to remove a wedge of bone in order to adjust the angle of the knee. For individuals with medial compartment narrowing (the most common situation), there are three options for high tibial osteotomy (HTO).      One involves removing a wedge shape piece of bone from the lateral side of the tibia and then closing the remaining surfaces together and holding it with a plate and screws (Fig A).    The second technique involves making one cut partially across the top of the tibia and opening the bone to establish the correct alignment. This is held in place with a plate and screws and a bone graft. (Fig B)    The final technique is called medial hemicallotasis, which means stretching healing bone. This technique requires a single cut partially across the bone. The bone is then gradually opened on the medial side by an external fixation frame. This technique is attractive because it allows adjustments to be made in the angle of correction and the hardware is removed three months after the procedure (Fig C).      Each of these techniques require a period of non-weightbearing or partial weightbearing crutch ambulation. These techniques may be necessary at the same time or prior to other reconstructive procedures such as cartilage replacement or meniscus replacement surgery in order to remove excessive forces off the repair site.      OSTEOARTHRITIS    Partial (Unicompartmental) Joint Replacement  This procedure replaces only the damaged portion of the joint with metal and/or plastic, leaving the remaining portion of the joint intact. It is often known as a partial knee replacement. New techniques allow replacement of a portion of the knee joint through smaller incisions with fewer days of hospitalization required.    Total Joint Replacement  If there is  "extensive damage with bone-on-bone apposition, many of the above procedures are not indicated. In these cases of end-stage arthritis, the entire joint surface is replaced with artificial metal and plastic components, allowing most patients to return to pain-free activities.    Future Trends  Investigators are now examining the potential of using collagen or other biologic tissues to serve as a bridge or scaffold for the body's own healing/repair mechanism to use in reestablishing meniscal form and function. In the future, biological "healing glues" may become available to allow repair without sutures. Even with the newest techniques available, certain tears are not repairable and a portion of the meniscus is removed using the arthroscope (partial meniscectomy).    The term osteoarthritis indicates the degeneration and excessive wear of articular cartilage with gradual changes occurring in the underlying bone.    Initially the articular cartilage softens, the surface becomes uneven and the cartilage frays and develops cracks, which can extend down to bone.  In advanced osteoarthritis the cartilage is worn away to reveal the underlying bone and nerve endings causing pain.  The bone hardens, cysts begin to form, and new cartilage cells laid down around the worn cartilage ossify and form bony projections (bone spurs).  Untreated articular cartilage defects can progress to osteoarthritis with both mechanical and enzymatic breakdown resulting in permanent dysfunction of the joint. Our goal is to diagnose and prevent or halt the progression of arthritis      Many patients suffer with end-stage arthritis that limits even the simplest activities of daily living, significantly altering the patient's quality of fife. For these patient's, current cartilage surgical options DO NOT apply. There are no curative therapies for end-stage arthritis. A wide range of methods may be used to relieve pain and restore function. Conventional " treatment methods include exercise, physical therapy and medications, such as anti-inflammatories. Recently, new biological compounds have been shown to be effective and safe for treating arthritis. These compounds include lubricants (hyaluronic acid) and building blocks of cartilage (Chondroitin Sulfate and Glucosamine).    Medications  Oral medications such as non-steroidal anti-inflammatories (NSAID's) tend to have a beneficial effect on the degenerative conditions described above. Immediately following an injury, these medications help to decrease pain and swelling. On a more long term nature, these medications help to relieve the pain and swelling associated with arthritic joints. Newer specific anti-inflammatories medications have been developed to block the enzymes necessary for production of inflammation (cyclooxygenase-2 or ROSA-2). These medications, such as Ceibrex or Bextra tend to have less adverse effects on the stomach and gastrointestinal tract than older, more traditional NSAID's. These prescription-strength NSAID medications are taken by mouth once or twice per day. Other non-prescription over-the-counter NSAID's are available.    Cartilage Supplements  Other oral medications which can be purchased without a prescription include Glucosamine and Chondroitin Sulfate. These two compounds are normally found in the substance of articular cartilage. Several recent studies using Cosamin®DS have demonstrated a pain relieving effect with the combination of Glucosamine Hydrochloride, highly purified low molecular weight Chondroitin Sulfate and Manganese Ascorbate available only in this product. The National Institutes of Health ([facility]) has selected the exact same Glucosamine and Chondroitin Sulfate used in CosaminDS for a large multi-center clinical trial currently in progress.      Oral administration of these compounds does not have a cartilage building effect, but rather a cartilage sparing effect.  "Laboratory studies have confirmed a stimulatory action on cartilage cells as well as an inhibition of cartilage degeneration with CosaminDS, which can improve the health of existing cartilage. Few negative side-effects have been demonstrated in patients taking these compounds, and many patients with arthritis benefit from the addition of this supplement to their arthritis treatment regimen.    Cortisone (Steroid) Injections  Injection of steroids into joints have been performed for well over 100 years with good results. Typically, steroid injection is utilized in a patient with degenerative arthritic changes to treat acute inflammation.  Steroids are powerful anti-inflammatory substances. When injected into a joint, the steroid has primarily a local effect, not a whole-body or systemic effect. These medications tend to decrease pain and inflammation when used appropriately. If overused, local steroid injection can have a negative effect on the tissues, such as weakening of bone and tendons. These injections typically are not permanent in their beneficial effect.    Injectable Viscosupplementation  The space between the cartilage surfaces in the knee joint contains synovial fluid that acts as a lubricant for the joint. With the progression of arthritis, the synovial fluid becomes thinner and is ineffective as a shock absorber. As a result simple movements become painful. Hyaluronic acid injections supplement the existing synovial fluid and act as a shock absorber and lubricant for the knee.      Synvisc is a hyluronan based, naturally occurring lubricant with similar properties to synovial fluid found in healthy joints. Synvisc or Euflexxa are injected over a 3 week series to provide lubrication to the knee joint. For some patients, Synvisc One may be an option, this is a single-shot injection.    Braces  In some cases of arthritis, only a portion of the knee is degenerative and it may be advantageous to "unload" the " "affected area and force more weight towards the "good" part of the knee. Special braces called "unloaders" are used for this purpose. Typically the brace is worn for work or activity and tends to decrease the pain caused by single compartment arthritis.        Physical Therapy  Exercise is a vital component of the treatment of cartilage injury. If the knee becomes stiff after an injury or over the course of time, it may be difficult to regain the lost motion. In most cases, early range of motion exercises are instituted in order to prevent this problem. In some cases, a loss of strength in certain muscle groups can lead to increased stress on the already degenerative articular surfaces. If muscles are strong and working properly they will take up some of the stress and divert it away from the cartilage surfaces. As symptoms decrease exercise is increased, but always below the pain threshold. Muscle strength is never harmful to a joint. It is therefore common to have a doctor prescribe strengthening exercises as an integral part of the treatment program for arthritis.    TECHNIQUES  Biologic tissue engineering is continuing to bring exciting new approaches from the lab to the clinical arena. It may be possible to induce primitive cells from the bone marrow called pluripotential cells or mesenchymal stem cells to transform into hyaline articular cartilage with the use of a variety of growth factors or hormones. Genetic reprogram¬nereida and tissue engineering may eventually replace surgery - but not at this stage in the new millennium.  Other biological patches may act as a temporary home for chondrocytes or "prechondrocytes." This exciting field will offer many new surgical techniques, which will hopefully lead to opportunities to restore function with less invasive means.           Language Assistance Services     ATTENTION: Language assistance services are available, free of charge. Please call [phone].  "     ATENCIÓN: Si habla español, tiene a bradford disposición servicios gratuitos de asistencia lingüística. Mable al [phone].     CHÚ Ý: N?u b?n nói Ti?ng Vi?t, có các d?ch v? h? tr? ngôn ng? mi?n phí dành cho b?n. G?i s? [phone].         [facility] complies with applicable Federal civil rights laws and does not discriminate on the basis of race, color, national origin, age, disability, or sex.

## 2017-04-11 ENCOUNTER — CLINICAL SUPPORT (OUTPATIENT)
Dept: REHABILITATION | Facility: HOSPITAL | Age: 42
End: 2017-04-11
Attending: ORTHOPAEDIC SURGERY
Payer: MEDICAID

## 2017-04-11 DIAGNOSIS — M25.561 RIGHT ANTERIOR KNEE PAIN: Primary | ICD-10-CM

## 2017-04-11 PROCEDURE — 97162 PT EVAL MOD COMPLEX 30 MIN: CPT | Mod: PN | Performed by: PHYSICAL THERAPIST

## 2017-04-11 PROCEDURE — 97110 THERAPEUTIC EXERCISES: CPT | Mod: PN | Performed by: PHYSICAL THERAPIST

## 2017-04-11 NOTE — PROGRESS NOTES
[facility]   PATIENT EVALUATION    Date: 04/11/2017  Start Time: 7:00  Stop Time: 8:00    Patient Name: Chey Mcneill  Clinic Number: [id number]  Age: 42 y.o.  Gender: female    Diagnosis:   Encounter Diagnosis   Name Primary?    Right anterior knee pain Yes       Referring Physician: Cooper Ramos MD  Treatment Orders: PT Eval and Treat      History     Past Medical History:   Diagnosis Date    Ruptured triceps tendon 7/25/2012       Current Outpatient Prescriptions   Medication Sig    clonazePAM (KLONOPIN) 1 MG tablet Take 1 mg by mouth 2 (two) times daily as needed for Anxiety.    hydrocodone-acetaminophen 5-325mg (NORCO) 5-325 mg per tablet Take 1 tablet by mouth every 4 (four) hours as needed for Pain.    IBUPROFEN (ADVIL ORAL) Take 800 mg by mouth 3 (three) times daily as needed.    ibuprofen (ADVIL,MOTRIN) 800 MG tablet Take 800 mg by mouth 2 (two) times daily as needed. for pain    meloxicam (MOBIC) 15 MG tablet Take 1 tablet (15 mg total) by mouth once daily.    NAPROXEN (NAPROSYN ORAL) Take 500 mg by mouth 2 (two) times daily as needed.    ondansetron (ZOFRAN) 4 MG tablet Take 1 tablet (4 mg total) by mouth every 6 (six) hours as needed for Nausea.     No current facility-administered medications for this visit.        Review of patient's allergies indicates:   Allergen Reactions    Adhesive Dermatitis     dermabond caused severe blistering    Benzoin tincture plain Hives, Itching and Other (See Comments)     Significant blistering    Morphine Nausea And Vomiting and Other (See Comments)     headaches    Oxycontin [oxycodone] Hives and Itching    Sulfa (sulfonamide antibiotics) Hives         Subjective     History of Present Condition: Pt reports the right knee has been in pain recently - I have had 4 surgeries. Valeri had surgeries on both knees with a complicated surgery history on the left knee. I had a history of bilateral knee dislocations. It has  been hurting worse the past 6 months The cartilage is worn out on the medial side with a possible meniscus tear. I will have surgery next month and I cant wait.     Onset Date: 6 mo.  Date of Surgery: pending 5/11/17  Precautions: none, prehab    Mechanism of Injury: gradual    Pain Location: right knee   Pain Description: Aching, Dull and Burning  Current Pain: 6/10  Least Pain: 6/10  Worst Pain: 8/10  Aggravating Factors: movement, walking, bending  Relieving Factors: nothing    Diagnostic Tests: MRI  Prior Therapy: with good success    Occupation: medical billing    Sports/Recreational Activities: working, mom  Extremity Dominance: R    Prior Level of Function: Independent  Functional Deficits Leading to Referral/Nature of Injury: none  Patient Therapy Goals: To get ready for surgery, to have surgery and gain full function  Cultural/Environmental/Spiritual Barriers to Treatment or Learning: none      Objective       Gait: increased stance on right knee, bilateral Trendelenbeg        Palpation: TTP over medial joint line, MPFL    Range of Motion:  Right knee: 0-121  Left knee:0-4-132      Joint Mobility: hypermobile medial patella gliding  Flexibility: increased tension along right hamstring, gastroc/soleus    Strength:   Diminished right quad control    Bilateral hip abd: 3/5    Special Tests:   Right: = McMurrays, + Valgus/varus stress test      Treatment:   Heel slides x 30  Quad set hyperextension 5 sec hold x 30  SL hip abd 3x10      Functional Limitations Reports - G Codes  Category: Mobility  Tool: FOTO  Score: 34    History  Co-morbidities and personal factors that may impact the plan of care Moderate    Evolving Clinical Presentation   Co-morbidities:       Personal Factors:     Body regions    Body systems    Activity Limitations    Participation Restrictons     1- 2  Personal factors/ combordities:         Address  3 + elements:           Assessment     This is a 42 y.o. female referred to outpatient  physical therapy and presents with a medical diagnosis of right knee pain and demonstrates limitations as described in the problem list. Pt demonstrates good rehab potential. Pt will benefit from physcial therapy services in order to maximize pain free and/or functional use of right LE. The following goals were discussed with the patient and patient is in agreement with them as to be addressed in the treatment plan. Pt was given a HEP consisting of functional knee ROM and hip PREs. Pt verbally understood the instructions as they were given and demonstrated proper form and technique during therapy. Pt was advised to perform these exercises free of pain, and to stop performing them if pain occurs.     Medical necessity is demonstrated by the following problem list:   - Pain limits function of effected part for all activities  - Unable to participate in daily activities   - Requires skilled supervision to complete and progress HEP  - Fall risk - impaired balance   - Continued inability to participate in vocational pursuits    Short Term Goals (4 Weeks): pending surgery  - Pt will increase ROM of right knee to 2-0-130  - Pt will increase strength of bilateral hip abd =5/5;  Improved quad firing to hyperextension.  - Decrease Pain to 2/10 with ADLs  - Pt to self correct posture independently.   - Pt independent with HEP with progressions.     Long Term Goals: To be determined after surgery.       Plan     Pt will be treated by physical therapy 1-2 times a week for 12 months  for manual therapy, therapeutic exercise, home exercise program, patient education, and modalities PRN to achieve established goals. Chey may at times be seen by a PTA as part of the Rehab Team.     Therapist: ALEJANDRO MAI, PT    I CERTIFY THE NEED FOR THESE SERVICES FURNISHED UNDER THIS PLAN OF TREATMENT AND WHILE UNDER MY CARE    Physician's comments:  ________________________________________________________________________________________________________________________________________________    Physician's Name: ___________________________________

## 2017-04-13 ENCOUNTER — TELEPHONE (OUTPATIENT)
Dept: SPORTS MEDICINE | Facility: CLINIC | Age: 42
End: 2017-04-13

## 2017-04-13 NOTE — TELEPHONE ENCOUNTER
----- Message from Geri Bustos sent at 4/12/2017  4:46 PM CDT -----  Contact: self   Pt is requesting a call regarding medication prior to surgery and also had questions about her surgery. She stated that she did send a detailed email as well. Please call her at [phone]

## 2017-04-13 NOTE — TELEPHONE ENCOUNTER
s/w the pt and she wanted to verify if she will be getting the TENS unit and CPM machine-told her she will when she leaves the hospital and medications will be giving at her preop. She stated she wanted to know if she can get pain meds now told her I will have to ask Dr. Ramos on Monday since we do not give pain meds before surgery. She stated she doesn't like to take pain meds but she is having pain with walking and going up the stairs.

## 2017-04-20 ENCOUNTER — CLINICAL SUPPORT (OUTPATIENT)
Dept: REHABILITATION | Facility: HOSPITAL | Age: 42
End: 2017-04-20
Attending: ORTHOPAEDIC SURGERY
Payer: COMMERCIAL

## 2017-04-20 DIAGNOSIS — M25.561 RIGHT ANTERIOR KNEE PAIN: Primary | ICD-10-CM

## 2017-04-20 PROCEDURE — 97110 THERAPEUTIC EXERCISES: CPT | Mod: PN | Performed by: PHYSICAL THERAPIST

## 2017-04-20 NOTE — PROGRESS NOTES
Physical Therapy Daily Note     Date: 04/20/2017  Name: Chey Mcneill  Clinic Number: [id number]  Diagnosis:   Encounter Diagnosis   Name Primary?    Right anterior knee pain Yes     Physician: Cooper Ramos MD    Evaluation Date: 4/11/17  Date of Surgery: pending  Visit #: 2   Start Time:  7:00  Stop Time:  8:00  Total Treatment Time: 60    Precautions: none    Subjective     Pt reports the knee has been sore. My back is bothering me as well.     Pain: 2/10    Objective     Measurements taken:  none    Patient received individual therapy to increase strength, endurance and ROM with activities as follows:     Chey received therapeutic exercises to develop strength, endurance and ROM for 40 minutes including:   Quad set towel x 20  Seated extension hangs x 5 min  Quad set towel x 20  Quad set flat x 20   SL hip abd 3x10  6 in step ups 3x10    Chey received the following manual therapy techniques: Joint mobilizations and Soft tissue Mobilization were applied to the: right knee for 5 minutes.   PROM: ext, patellar mobs      Written Home Exercises Provided: updated as per therex list  Pt demo good understanding of the education provided. Chey demonstrated good return demonstration of activities.     Education provided:  Chey verbalized good understanding of education provided.   No spiritual or educational barriers to learning identified.    Assessment     Pt with initial loss of extension improved with patellar mobility and passive extension work. Continue to progress quad firing in preparation for surgery.     This is a 42 y.o. female referred to outpatient physical therapy and presents with a medical diagnosis of right knee pain and demonstrates limitations as described in the problem list Pt prognosis is Good. Pt will continue to benefit from skilled outpatient physical therapy to address the deficits listed below in the problem list, provide  pt/family education and to maximize pt's level of independence in the home and community environment.    Will the patient continue to benefit from skilled PT intervention? Updated as per therex list      Medical necessity is demonstrated by:   - Pain limits function of effected part for all activities  - Unable to participate in daily activities   - Requires skilled supervision to complete and progress HEP  - Fall risk - impaired balance   - Continued inability to participate in vocational pursuits    Short Term Goals (4 Weeks): pending surgery  - Pt will increase ROM of right knee to 2-0-130  - Pt will increase strength of bilateral hip abd =5/5; Improved quad firing to hyperextension.  - Decrease Pain to 2/10 with ADLs  - Pt to self correct posture independently.   - Pt independent with HEP with progressions.      Long Term Goals: To be determined after surgery.        Plan   Continue with established Plan of Care towards PT goals.      Therapist: ALEJANDRO MAI, PT

## 2017-04-24 ENCOUNTER — TELEPHONE (OUTPATIENT)
Dept: REHABILITATION | Facility: HOSPITAL | Age: 42
End: 2017-04-24

## 2017-04-24 ENCOUNTER — OFFICE VISIT (OUTPATIENT)
Dept: SPORTS MEDICINE | Facility: CLINIC | Age: 42
End: 2017-04-24
Payer: COMMERCIAL

## 2017-04-24 VITALS
DIASTOLIC BLOOD PRESSURE: 78 MMHG | BODY MASS INDEX: 23 KG/M2 | WEIGHT: 125 LBS | HEART RATE: 83 BPM | SYSTOLIC BLOOD PRESSURE: 121 MMHG | HEIGHT: 62 IN

## 2017-04-24 DIAGNOSIS — M94.261 CHONDROMALACIA OF RIGHT KNEE: ICD-10-CM

## 2017-04-24 DIAGNOSIS — M23.91 INTERNAL DERANGEMENT OF RIGHT KNEE: Primary | ICD-10-CM

## 2017-04-24 PROCEDURE — 99214 OFFICE O/P EST MOD 30 MIN: CPT | Mod: S$GLB,,, | Performed by: ORTHOPAEDIC SURGERY

## 2017-04-24 PROCEDURE — 99999 PR PBB SHADOW E&M-EST. PATIENT-LVL III: CPT | Mod: PBBFAC,,, | Performed by: ORTHOPAEDIC SURGERY

## 2017-04-24 PROCEDURE — 1160F RVW MEDS BY RX/DR IN RCRD: CPT | Mod: S$GLB,,, | Performed by: ORTHOPAEDIC SURGERY

## 2017-04-24 RX ORDER — INDOMETHACIN 75 MG/1
75 CAPSULE, EXTENDED RELEASE ORAL 2 TIMES DAILY
Qty: 30 CAPSULE | Refills: 0 | Status: SHIPPED | OUTPATIENT
Start: 2017-04-24 | End: 2017-05-05 | Stop reason: SDUPTHER

## 2017-04-24 RX ORDER — ESOMEPRAZOLE MAGNESIUM 40 MG/1
40 CAPSULE, DELAYED RELEASE ORAL
Qty: 30 CAPSULE | Refills: 1 | Status: SHIPPED | OUTPATIENT
Start: 2017-04-24 | End: 2017-08-07

## 2017-04-24 RX ORDER — HYDROCODONE BITARTRATE AND ACETAMINOPHEN 5; 325 MG/1; MG/1
1 TABLET ORAL EVERY 6 HOURS PRN
Qty: 30 TABLET | Refills: 0 | Status: SHIPPED | OUTPATIENT
Start: 2017-04-24 | End: 2017-05-22
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
## 2017-04-24 NOTE — TELEPHONE ENCOUNTER
Contacted Bj Mason via e-mail to let him know of the situation. He responded that he will contact the patient and she should see PA.

## 2017-04-24 NOTE — MR AVS SNAPSHOT
[facility]  [address]  [facility] [address]  Phone: [phone]                  Chey Mcneill   2017 3:45 PM   Office Visit    Description:  Female : 1975   Provider:  Cooper Ramos MD   Department:  [facility]           Reason for Visit     Right Knee - Follow-up                To Do List           Future Appointments        Provider Department Dept Phone    2017 7:00 AM [facility] Castellanos - Outpatient Rehab [phone]    2017 8:00 AM Felipe Daly MD [facility] [phone]    2017 7:00 AM [facility] Cast[facility] - Outpatient Rehab [phone]    2017 8:00 AM Jennifer Dunlap PA-C [facility] [phone]    2017 10:00 AM PRE-ADMIT, [facility] [phone]      Your Future Surgeries/Procedures     May 11, 2017   Surgery with Cooper Ramos MD   [facility]    [address]  [facility] [id number] [phone]              Goals (5 Years of Data)     None      [facility]     [facility] Care [facility] -  Assistance  Unless otherwise directed by your provider, please contact [facility], our nurse care line that is available for  assistance.     Registered nurses in the [facility] provide: appointment scheduling, clinical advisement, health education, and other advisory services.  Call: [phone] (toll free)               Medications           Message regarding Medications     Verify the changes and/or additions to your medication regime listed below are the same as discussed with your clinician today.  If any of these changes or additions are incorrect, please notify your healthcare provider.             Verify that the below list of medications is an accurate representation of the medications you are currently taking.  If none reported,  "the list may be blank. If incorrect, please contact your healthcare provider. Carry this list with you in case of emergency.           Current Medications     clonazePAM (KLONOPIN) 1 MG tablet Take 1 mg by mouth 2 (two) times daily as needed for Anxiety.    hydrocodone-acetaminophen 5-325mg (NORCO) 5-325 mg per tablet Take 1 tablet by mouth every 4 (four) hours as needed for Pain.    IBUPROFEN (ADVIL ORAL) Take 800 mg by mouth 3 (three) times daily as needed.    ibuprofen (ADVIL,MOTRIN) 800 MG tablet Take 800 mg by mouth 2 (two) times daily as needed. for pain    meloxicam (MOBIC) 15 MG tablet Take 1 tablet (15 mg total) by mouth once daily.    NAPROXEN (NAPROSYN ORAL) Take 500 mg by mouth 2 (two) times daily as needed.    ondansetron (ZOFRAN) 4 MG tablet Take 1 tablet (4 mg total) by mouth every 6 (six) hours as needed for Nausea.           Clinical Reference Information           Your Vitals Were     BP Pulse Height Weight BMI    121/78 83 5' 2" (1.575 m) 56.7 kg (125 lb) 22.86 kg/m2      Blood Pressure          Most Recent Value    BP  121/78      Allergies as of 4/24/2017     Adhesive    Benzoin Tincture Plain    Morphine    Oxycontin [Oxycodone]    Sulfa (Sulfonamide Antibiotics)      Immunizations Administered on Date of Encounter - 4/24/2017     None      Language Assistance Services     ATTENTION: Language assistance services are available, free of charge. Please call [phone].      ATENCIÓN: Si habla joseph, tiene a bradford disposición servicios gratuitos de asistencia lingüística. Llame al [phone].     [facility] Ý: N?u b?n nói Ti?ng Vi?t, có các d?ch v? h? tr? ngôn ng? mi?n phí dành cho b?n. G?i s? [phone].         [facility] complies with applicable Federal civil rights laws and does not discriminate on the basis of race, color, national origin, age, disability, or sex.        "

## 2017-04-25 ENCOUNTER — CLINICAL SUPPORT (OUTPATIENT)
Dept: REHABILITATION | Facility: HOSPITAL | Age: 42
End: 2017-04-25
Attending: ORTHOPAEDIC SURGERY
Payer: MEDICAID

## 2017-04-25 DIAGNOSIS — M25.561 RIGHT ANTERIOR KNEE PAIN: Primary | ICD-10-CM

## 2017-04-25 DIAGNOSIS — Z09 FRACTURE FOLLOW-UP: Primary | ICD-10-CM

## 2017-04-25 PROCEDURE — 97140 MANUAL THERAPY 1/> REGIONS: CPT | Mod: PN | Performed by: PHYSICAL THERAPIST

## 2017-04-25 NOTE — PROGRESS NOTES
Physical Therapy Daily Note     Date: 04/25/2017  Name: Chey Mcneill  Clinic Number: [id number]  Diagnosis:   Encounter Diagnosis   Name Primary?    Right anterior knee pain Yes     Physician: Cooper Ramos MD    Evaluation Date: 4/11/17  Date of Surgery: pending  Visit #: 3  Start Time:  7:00  Stop Time:  7:20  Total Treatment Time: 20    Precautions: none    Subjective     Pt reports the knee is hurting. I just want stim and ice.     Pain: 2/10    Objective     Measurements taken:  none    Patient received individual therapy to increase strength, endurance and ROM with activities as follows:     Chey received therapeutic exercises to develop strength, endurance and ROM for 0 minutes including:   Quad set towel x 20  Seated extension hangs x 5 min  Quad set towel x 20  Quad set flat x 20   SL hip abd 3x10  6 in step ups 3x10    Chey received the following manual therapy techniques: Joint mobilizations and Soft tissue Mobilization were applied to the: right knee for 10 minutes.   PROM: ext, patellar mobs  STM quad, IT band    CP seated x 10 min      Written Home Exercises Provided: updated as per therex list  Pt demo good understanding of the education provided. Chey demonstrated good return demonstration of activities.     Education provided:  Chey verbalized good understanding of education provided.   No spiritual or educational barriers to learning identified.    Assessment     Pt with increased tenderness along lateral quad and IT band. Pt wishes not to perform therex. Mild pain relief with soft tissue work and CP.    This is a 42 y.o. female referred to outpatient physical therapy and presents with a medical diagnosis of right knee pain and demonstrates limitations as described in the problem list Pt prognosis is Good. Pt will continue to benefit from skilled outpatient physical therapy to address the deficits listed below in the problem  list, provide pt/family education and to maximize pt's level of independence in the home and community environment.    Will the patient continue to benefit from skilled PT intervention? Updated as per therex list      Medical necessity is demonstrated by:   - Pain limits function of effected part for all activities  - Unable to participate in daily activities   - Requires skilled supervision to complete and progress HEP  - Fall risk - impaired balance   - Continued inability to participate in vocational pursuits    Short Term Goals (4 Weeks): pending surgery  - Pt will increase ROM of right knee to 2-0-130  - Pt will increase strength of bilateral hip abd =5/5; Improved quad firing to hyperextension.  - Decrease Pain to 2/10 with ADLs  - Pt to self correct posture independently.   - Pt independent with HEP with progressions.      Long Term Goals: To be determined after surgery.        Plan   Continue with established Plan of Care towards PT goals.      Therapist: ALEJANDRO MAI, PT

## 2017-05-01 ENCOUNTER — HOSPITAL ENCOUNTER (OUTPATIENT)
Dept: RADIOLOGY | Facility: HOSPITAL | Age: 42
Discharge: HOME | End: 2017-05-01
Attending: ORTHOPAEDIC SURGERY
Payer: MEDICAID

## 2017-05-01 ENCOUNTER — OFFICE VISIT (OUTPATIENT)
Dept: ORTHOPEDICS | Facility: CLINIC | Age: 42
End: 2017-05-01
Payer: MEDICAID

## 2017-05-01 VITALS — WEIGHT: 125 LBS | HEIGHT: 62 IN | BODY MASS INDEX: 23 KG/M2

## 2017-05-01 DIAGNOSIS — Z09 FRACTURE FOLLOW-UP: ICD-10-CM

## 2017-05-01 DIAGNOSIS — S62.609D FINGER FRACTURE, LEFT, WITH ROUTINE HEALING, SUBSEQUENT ENCOUNTER: Primary | ICD-10-CM

## 2017-05-01 PROCEDURE — 99213 OFFICE O/P EST LOW 20 MIN: CPT | Mod: S$PBB,,, | Performed by: ORTHOPAEDIC SURGERY

## 2017-05-01 PROCEDURE — 73140 X-RAY EXAM OF FINGER(S): CPT | Mod: TC,PO

## 2017-05-01 PROCEDURE — 99999 PR PBB SHADOW E&M-EST. PATIENT-LVL II: CPT | Mod: PBBFAC,,, | Performed by: ORTHOPAEDIC SURGERY

## 2017-05-01 PROCEDURE — 99212 OFFICE O/P EST SF 10 MIN: CPT | Mod: PBBFAC,25,PO | Performed by: ORTHOPAEDIC SURGERY

## 2017-05-01 PROCEDURE — 1160F RVW MEDS BY RX/DR IN RCRD: CPT | Mod: ,,, | Performed by: ORTHOPAEDIC SURGERY

## 2017-05-01 PROCEDURE — 73140 X-RAY EXAM OF FINGER(S): CPT | Mod: 26,,, | Performed by: RADIOLOGY

## 2017-05-01 RX ORDER — DEXTROAMPHETAMINE SACCHARATE, AMPHETAMINE ASPARTATE MONOHYDRATE, DEXTROAMPHETAMINE SULFATE AND AMPHETAMINE SULFATE 7.5; 7.5; 7.5; 7.5 MG/1; MG/1; MG/1; MG/1
CAPSULE, EXTENDED RELEASE ORAL
COMMUNITY
Start: 2017-03-16 | End: 2017-05-05 | Stop reason: SDUPTHER

## 2017-05-01 RX ORDER — DEXTROAMPHETAMINE SACCHARATE, AMPHETAMINE ASPARTATE, DEXTROAMPHETAMINE SULFATE AND AMPHETAMINE SULFATE 5; 5; 5; 5 MG/1; MG/1; MG/1; MG/1
TABLET ORAL
COMMUNITY
Start: 2017-04-21 | End: 2017-05-05 | Stop reason: SDUPTHER

## 2017-05-01 RX ORDER — BUPRENORPHINE HYDROCHLORIDE, NALOXONE HYDROCHLORIDE 8; 2 MG/1; MG/1
FILM, SOLUBLE BUCCAL; SUBLINGUAL
Refills: 0 | COMMUNITY
Start: 2017-03-30 | End: 2017-05-05 | Stop reason: SDUPTHER

## 2017-05-01 NOTE — PROGRESS NOTES
Judy Mcneill is about three and a half months out from her ring finger injury.    She is able to extend fully.  No lag is detected.  She still has some   discomfort.    X-rays showed it appears that she has feeling at the seen chondrosis.    At this point, we will treat basically symptomatically, allow her to gradually   return into activities to tolerance and we will wish her well on her upcoming   knee surgeries.  Lucency still remains present.    ASSESSMENT:  Distal phalanx bony mallet injury.    PLAN:  Splint, occasionally gentle range of motion.  Follow up in a few month's   time.      CORWIN/TURNER  dd: 05/01/2017 09:26:21 (CDT)  td: 05/01/2017 11:29:55 (CDT)  Doc ID   #[id number]  Job ID #[id number]    CC:

## 2017-05-02 ENCOUNTER — CLINICAL SUPPORT (OUTPATIENT)
Dept: REHABILITATION | Facility: HOSPITAL | Age: 42
End: 2017-05-02
Attending: ORTHOPAEDIC SURGERY
Payer: MEDICAID

## 2017-05-02 DIAGNOSIS — M25.561 RIGHT ANTERIOR KNEE PAIN: Primary | ICD-10-CM

## 2017-05-02 PROCEDURE — 97110 THERAPEUTIC EXERCISES: CPT | Mod: PN | Performed by: PHYSICAL THERAPIST

## 2017-05-02 PROCEDURE — 97140 MANUAL THERAPY 1/> REGIONS: CPT | Mod: PN | Performed by: PHYSICAL THERAPIST

## 2017-05-02 NOTE — PROGRESS NOTES
Physical Therapy Daily Note     Date: 05/02/2017  Name: Chey Mcneill  Clinic Number: [id number]  Diagnosis:   Encounter Diagnosis   Name Primary?    Right anterior knee pain Yes     Physician: Cooper Ramos MD    Evaluation Date: 4/11/17  Date of Surgery: pending  Visit #: 4  Start Time:  7:00  Stop Time:  7:45  Total Treatment Time: 45    Precautions: none    Subjective     Pt reports the knee is hurting. It is not as bad as last week. I am just ready for surgery.     Pain: 2/10    Objective     Measurements taken:  none    Patient received individual therapy to increase strength, endurance and ROM with activities as follows:     Chey received therapeutic exercises to develop strength, endurance and ROM for 25 minutes including:   Quad set towel x 20  Quad set towel x 20  Quad set flat x 20   SL hip abd 3x10  Bridges 3x10    Chey received the following manual therapy techniques: Joint mobilizations and Soft tissue Mobilization were applied to the: right knee for 10 minutes.   PROM: ext, patellar mobs  STM quad, IT band    CP seated x 10 min      Written Home Exercises Provided: updated as per therex list  Pt demo good understanding of the education provided. Chey demonstrated good return demonstration of activities.     Education provided:  Chey verbalized good understanding of education provided.   No spiritual or educational barriers to learning identified.    Assessment     Improved quad and hip activation with rx. Pt to have surgery at end of next week. Continue to maintain right knee ROM, quad, and hip strength pre-operatively.     This is a 42 y.o. female referred to outpatient physical therapy and presents with a medical diagnosis of right knee pain and demonstrates limitations as described in the problem list Pt prognosis is Good. Pt will continue to benefit from skilled outpatient physical therapy to address the deficits listed below in  the problem list, provide pt/family education and to maximize pt's level of independence in the home and community environment.    Will the patient continue to benefit from skilled PT intervention? Updated as per therex list      Medical necessity is demonstrated by:   - Pain limits function of effected part for all activities  - Unable to participate in daily activities   - Requires skilled supervision to complete and progress HEP  - Fall risk - impaired balance   - Continued inability to participate in vocational pursuits    Short Term Goals (4 Weeks): pending surgery  - Pt will increase ROM of right knee to 2-0-130  - Pt will increase strength of bilateral hip abd =5/5; Improved quad firing to hyperextension.  - Decrease Pain to 2/10 with ADLs  - Pt to self correct posture independently.   - Pt independent with HEP with progressions.      Long Term Goals: To be determined after surgery.        Plan   Continue with established Plan of Care towards PT goals.      Therapist: ALEJANDRO MAI, PT

## 2017-05-03 ENCOUNTER — TELEPHONE (OUTPATIENT)
Dept: SPORTS MEDICINE | Facility: CLINIC | Age: 42
End: 2017-05-03

## 2017-05-03 DIAGNOSIS — M79.642 LEFT HAND PAIN: Primary | ICD-10-CM

## 2017-05-05 ENCOUNTER — ANESTHESIA EVENT (OUTPATIENT)
Dept: SURGERY | Facility: OTHER | Age: 42
End: 2017-05-05
Payer: MEDICAID

## 2017-05-05 ENCOUNTER — HOSPITAL ENCOUNTER (OUTPATIENT)
Dept: PREADMISSION TESTING | Facility: OTHER | Age: 42
Discharge: HOME | End: 2017-05-05
Attending: ORTHOPAEDIC SURGERY
Payer: COMMERCIAL

## 2017-05-05 ENCOUNTER — OFFICE VISIT (OUTPATIENT)
Dept: SPORTS MEDICINE | Facility: CLINIC | Age: 42
End: 2017-05-05
Payer: COMMERCIAL

## 2017-05-05 VITALS
WEIGHT: 123 LBS | HEART RATE: 75 BPM | HEIGHT: 62 IN | BODY MASS INDEX: 22.63 KG/M2 | DIASTOLIC BLOOD PRESSURE: 66 MMHG | SYSTOLIC BLOOD PRESSURE: 109 MMHG | TEMPERATURE: 98 F | OXYGEN SATURATION: 97 %

## 2017-05-05 VITALS
WEIGHT: 125 LBS | HEIGHT: 62 IN | HEART RATE: 75 BPM | BODY MASS INDEX: 23 KG/M2 | SYSTOLIC BLOOD PRESSURE: 105 MMHG | DIASTOLIC BLOOD PRESSURE: 68 MMHG

## 2017-05-05 DIAGNOSIS — M94.261 CHONDROMALACIA, RIGHT KNEE: ICD-10-CM

## 2017-05-05 DIAGNOSIS — M22.41 CHONDROMALACIA OF RIGHT PATELLA: ICD-10-CM

## 2017-05-05 DIAGNOSIS — M23.91 DERANGEMENT, KNEE INTERNAL, RIGHT: Primary | ICD-10-CM

## 2017-05-05 PROCEDURE — 99499 UNLISTED E&M SERVICE: CPT | Mod: S$GLB,,, | Performed by: PHYSICIAN ASSISTANT

## 2017-05-05 PROCEDURE — 99999 PR PBB SHADOW E&M-EST. PATIENT-LVL III: CPT | Mod: PBBFAC,,, | Performed by: PHYSICIAN ASSISTANT

## 2017-05-05 RX ORDER — MIDAZOLAM HYDROCHLORIDE 1 MG/ML
5 INJECTION INTRAMUSCULAR; INTRAVENOUS
Status: ACTIVE | OUTPATIENT
Start: 2017-05-05 | End: 2017-05-06

## 2017-05-05 RX ORDER — OXYCODONE AND ACETAMINOPHEN 10; 325 MG/1; MG/1
1 TABLET ORAL EVERY 6 HOURS PRN
Qty: 60 TABLET | Refills: 0 | Status: SHIPPED | OUTPATIENT
Start: 2017-05-05 | End: 2017-05-22 | Stop reason: SDUPTHER

## 2017-05-05 RX ORDER — SODIUM CHLORIDE, SODIUM LACTATE, POTASSIUM CHLORIDE, CALCIUM CHLORIDE 600; 310; 30; 20 MG/100ML; MG/100ML; MG/100ML; MG/100ML
INJECTION, SOLUTION INTRAVENOUS CONTINUOUS
Status: CANCELLED | OUTPATIENT
Start: 2017-05-05

## 2017-05-05 RX ORDER — TRAMADOL HYDROCHLORIDE 50 MG/1
50 TABLET ORAL
Qty: 40 TABLET | Refills: 0 | Status: SHIPPED | OUTPATIENT
Start: 2017-05-05 | End: 2017-05-22 | Stop reason: SDUPTHER

## 2017-05-05 RX ORDER — PROMETHAZINE HYDROCHLORIDE 25 MG/1
25 TABLET ORAL EVERY 6 HOURS PRN
Qty: 30 TABLET | Refills: 0 | Status: SHIPPED | OUTPATIENT
Start: 2017-05-05 | End: 2017-06-04

## 2017-05-05 RX ORDER — HYDROCODONE BITARTRATE AND ACETAMINOPHEN 5; 325 MG/1; MG/1
1 TABLET ORAL EVERY 6 HOURS PRN
Qty: 20 TABLET | Refills: 0 | Status: SHIPPED | OUTPATIENT
Start: 2017-05-05 | End: 2017-05-22

## 2017-05-05 RX ORDER — LIDOCAINE HYDROCHLORIDE 10 MG/ML
1 INJECTION, SOLUTION EPIDURAL; INFILTRATION; INTRACAUDAL; PERINEURAL ONCE
Status: CANCELLED | OUTPATIENT
Start: 2017-05-05 | End: 2017-05-05

## 2017-05-05 RX ORDER — SCOLOPAMINE TRANSDERMAL SYSTEM 1 MG/1
1 PATCH, EXTENDED RELEASE TRANSDERMAL
Status: CANCELLED | OUTPATIENT
Start: 2017-05-05

## 2017-05-05 NOTE — IP AVS SNAPSHOT
[facility] Location ([facility])  [address] [place], [place] [address]  Phone: [phone]           Patient Discharge Instructions  Our goal is to set you up for success. This packet includes information on your condition, medications, and your home care. It will help you care for yourself to prevent having to return to the hospital.     Please ask your nurse if you have any questions.      There are many details to remember when preparing for your surgery. Here is what you will need to do, please ask your nurse if there are more specific instructions and if you have any questions:    1. Before procedure Do not smoke or drink alcoholic beverages 24 hours prior to your procedure. Do not eat or drink anything 8 hours before your procedure - this includes gum, mints, and candy.     2. Day of procedure Please remove all jewelry for the procedure. If you wear contact lenses, dentures, hearing aids or glasses, bring a container to put them in during your surgery and give to a family member.  If your doctor has scheduled you for an overnight stay, bring a small overnight bag with any personal items that you need.      3. After procedure  Make arrangements in advance for transportation home by a responsible adult. It is not safe to drive a vehicle during the 24 hours following surgery.     PLEASE NOTE: You may be contacted the day before your surgery to confirm your surgery date and arrival time. The Surgery schedule has many variables which may affect the time of your surgery case. Family members should be available if your surgery time changes.           [facility]  Unless otherwise directed by your provider, please   contact [facility], our nurse care line   that is available for 24/7 assistance.     [phone] (toll-free)     Registered nurses in the [facility]   provide: appointment scheduling, clinical advisement, health education, and other advisory services.                  **  Verify the list of medication(s) below is accurate and up to date. Carry this with you in case of emergency. If your medications have changed, please notify your healthcare provider.             Medication List      TAKE these medications        Additional Info                      esomeprazole 40 MG capsule   Commonly known as:  NEXIUM   Quantity:  30 capsule   Refills:  1   Dose:  40 mg    Instructions:  Take 1 capsule (40 mg total) by mouth before breakfast.     Begin Date    AM    Noon    PM    Bedtime       * hydrocodone-acetaminophen 5-325mg 5-325 mg per tablet   Commonly known as:  NORCO   Quantity:  30 tablet   Refills:  0   Dose:  1 tablet    Instructions:  Take 1 tablet by mouth every 6 (six) hours as needed for Pain.     Begin Date    AM    Noon    PM    Bedtime       * hydrocodone-acetaminophen 5-325mg 5-325 mg per tablet   Commonly known as:  NORCO   Quantity:  20 tablet   Refills:  0   Dose:  1 tablet    Instructions:  Take 1 tablet by mouth every 6 (six) hours as needed for Pain.     Begin Date    AM    Noon    PM    Bedtime       oxycodone-acetaminophen  mg per tablet   Commonly known as:  PERCOCET   Quantity:  60 tablet   Refills:  0   Dose:  1 tablet    Instructions:  Take 1 tablet by mouth every 6 (six) hours as needed for Pain.     Begin Date    AM    Noon    PM    Bedtime       promethazine 25 MG tablet   Commonly known as:  PHENERGAN   Quantity:  30 tablet   Refills:  0   Dose:  25 mg    Instructions:  Take 1 tablet (25 mg total) by mouth every 6 (six) hours as needed for Nausea.     Begin Date    AM    Noon    PM    Bedtime       tramadol 50 mg tablet   Commonly known as:  ULTRAM   Quantity:  40 tablet   Refills:  0   Dose:  50 mg    Instructions:  Take 1 tablet (50 mg total) by mouth every 4 to 6 hours as needed.     Begin Date    AM    Noon    PM    Bedtime       * Notice:  This list has 2 medication(s) that are the same as other medications prescribed for you. Read the directions  carefully, and ask your doctor or other care provider to review them with you.               Please bring to all follow up appointments:    1. A copy of your discharge instructions.  2. All medicines you are currently taking in their original bottles.  3. Identification and insurance card.    Please arrive 15 minutes ahead of scheduled appointment time.    Please call 24 hours in advance if you must reschedule your appointment and/or time.        Your Scheduled Appointments     May 09, 2017  7:00 AM CDT   Established Physical Therapy with ODELL Castellanos - Outpatient Rehab ([facility])    [address]  [place] [address]   [phone]            May 09, 2017 12:00 PM CDT   New Occupational Therapy Patient with Paco Langston OT   [place] - Outpatient Rehab ([facility])    [address]  [place] [address]   [phone]            May 17, 2017  4:30 PM CDT   New Physical Therapy Patient with Bj Mason, PT   [place] - Outpatient Rehab ([facility])    [address]  [place] [address]   [phone]            May 22, 2017  1:00 PM CDT   Post OP with Jennifer Dunlap PA-C   [place] [facility] ([facility])    [address]  [place] [address]   [phone]            Jun 19, 2017  1:15 PM CDT   Post OP with Cooper Ramos MD   [facility] ([facility])    [address]  [place] [address]   [phone]              Your Future Surgeries/Procedures     May 11, 2017   Surgery with Cooper Ramos MD   [facility] ([facility])    [address] [place] [address]   [phone]                  Discharge Instructions       PRE-ADMIT TESTING -  [phone]    [address] [place] [address]        OUTPATIENT SURGERY UNIT - [phone]    Your surgery has been scheduled at [facility]. We are pleased to have the opportunity to serve you. For Further Information please call [phone].    On the day of surgery please report to the Information Desk on the 1st floor.    CONTACT YOUR PHYSICIAN'S OFFICE THE DAY PRIOR TO YOUR SURGERY TO OBTAIN YOUR ARRIVAL TIME.     The evening before surgery do not eat anything after 9 p.m. ( this includes hard candy, chewing gum and mints).  You may only have GATORADE, POWERADE AND WATER  from 9 p.m. until you leave your home.   DO NOT DRINK ANY LIQUIDS ON THE WAY TO THE HOSPITAL.      SPECIAL MEDICATION INSTRUCTIONS: TAKE medications checked off by the Anesthesiologist on your Medication List.    Angiogram Patients: Take medications as instructed by your physician, including aspirin.     Surgery Patients:    If you take ASPIRIN - Your PHYSICIAN/SURGEON will need to inform you IF/OR when you need to stop taking aspirin prior to your surgery.     Do Not take any medications containing IBUPROFEN.  Do Not Wear any make-up or dark nail polish   (especially eye make-up) to surgery. If you come to surgery with makeup on you will be required to remove the makeup or nail polish.    Do not shave your surgical area at least 5 days prior to your surgery. The surgical prep will be performed at the hospital according to Infection Control regulations.    Leave all valuables at home.   Do Not wear any jewelry or watches, including any metal in body piercings.  Contact Lens must be removed before surgery. Either do not wear the contact lens or bring a case and solution for storage.  Please bring a container for eyeglasses or dentures as required.  Bring any paperwork your physician has provided, such as consent forms,  history and physicals, doctor's orders, etc.   Bring comfortable clothes that are loose fitting to wear upon discharge. Take into consideration the type of surgery being performed.  Maintain your diet as advised per your physician the day prior to  "surgery.      Adequate rest the night before surgery is advised.   Park in the Parking lot behind the hospital or in the [facility] across the street from the parking lot. Parking is complimentary.  If you will be discharged the same day as your procedure, please arrange for a responsible adult to drive you home or to accompany you if traveling by taxi.   YOU WILL NOT BE PERMITTED TO DRIVE OR TO LEAVE THE HOSPITAL ALONE AFTER SURGERY.   It is strongly recommended that you arrange for someone to remain with you for the first 24 hrs following your surgery.       Thank you for your cooperation.  The Staff of [facility].        Bathing Instructions                                                                 Please shower the evening before and morning of your procedure with    ANTIBACTERIAL SOAP. ( DIAL, etc )  Concentrate on the surgical area   for at least 3 minutes and rinse completely. Dry off as usual.   Do not use any deodorant, powder, body lotions, perfume, after shave or    cologne.                                                Admission Information     Date & Time Provider Department CSN    5/5/2017 10:00 AM Cooper Ramos MD [facility] [id number]      Care Providers     Provider Role Specialty Primary office phone    Cooper Ramos MD Attending Provider Sports Medicine [phone]      Your Vitals Were     BP Pulse Temp Height Weight SpO2    109/66 75 98.3 °F (36.8 °C) (Oral) 5' 2" (1.575 m) 55.8 kg (123 lb) 97%    BMI                22.5 kg/m2          Recent Lab Values     No lab values to display.      Allergies as of 5/5/2017        Reactions    Adhesive Dermatitis    dermabond caused severe blistering    Benzoin Tincture Plain Hives, Itching, Other (See Comments)    Significant blistering    Morphine Nausea And Vomiting, Other (See Comments)    headaches    Oxycontin [Oxycodone] Hives, Itching    Sulfa (Sulfonamide Antibiotics) Hives      Advance " Directives     An advance directive is a document which, in the event you are no longer able to make decisions for yourself, tells your healthcare team what kind of treatment you do or do not want to receive, or who you would like to make those decisions for you.  If you do not currently have an advance directive, [facility] encourages you to create one.  For more information call:  [phone]), [phone] ([phone]),  or log on to [email or web address].        Language Assistance Services     ATTENTION: Language assistance services are available, free of charge. Please call [phone].      ATENCIÓN: Si habla joseph, tiene a bradford disposición servicios gratuitos de asistencia lingüística. Llame al [phone].     CHÚ Ý: N?u b?n nói Ti?ng Vi?t, có các d?ch v? h? tr? ngôn ng? mi?n phí dành cho b?n. G?i s? [phone].         [facility] complies with applicable Federal civil rights laws and does not discriminate on the basis of race, color, national origin, age, disability, or sex.

## 2017-05-05 NOTE — DISCHARGE INSTRUCTIONS
PRE-ADMIT TESTING -  [phone]    [address]  [facility]        OUTPATIENT SURGERY UNIT - [phone]    Your surgery has been scheduled at [facility]. We are pleased to have the opportunity to serve you. For Further Information please call [phone].    On the day of surgery please report to the Information Desk on the 1st floor.    CONTACT YOUR PHYSICIAN'S OFFICE THE DAY PRIOR TO YOUR SURGERY TO OBTAIN YOUR ARRIVAL TIME.     The evening before surgery do not eat anything after 9 p.m. ( this includes hard candy, chewing gum and mints).  You may only have GATORADE, POWERADE AND WATER  from 9 p.m. until you leave your home.   DO NOT DRINK ANY LIQUIDS ON THE WAY TO THE HOSPITAL.      SPECIAL MEDICATION INSTRUCTIONS: TAKE medications checked off by the Anesthesiologist on your Medication List.    Angiogram Patients: Take medications as instructed by your physician, including aspirin.     Surgery Patients:    If you take ASPIRIN - Your PHYSICIAN/SURGEON will need to inform you IF/OR when you need to stop taking aspirin prior to your surgery.     Do Not take any medications containing IBUPROFEN.  Do Not Wear any make-up or dark nail polish   (especially eye make-up) to surgery. If you come to surgery with makeup on you will be required to remove the makeup or nail polish.    Do not shave your surgical area at least 5 days prior to your surgery. The surgical prep will be performed at the hospital according to Infection Control regulations.    Leave all valuables at home.   Do Not wear any jewelry or watches, including any metal in body piercings.  Contact Lens must be removed before surgery. Either do not wear the contact lens or bring a case and solution for storage.  Please bring a container for eyeglasses or dentures as required.  Bring any paperwork your physician has provided, such as consent forms,  history and physicals, doctor's orders, etc.   Bring comfortable clothes that  are loose fitting to wear upon discharge. Take into consideration the type of surgery being performed.  Maintain your diet as advised per your physician the day prior to surgery.      Adequate rest the night before surgery is advised.   Park in the Parking lot behind the hospital or in the [facility] across the street from the parking lot. Parking is complimentary.  If you will be discharged the same day as your procedure, please arrange for a responsible adult to drive you home or to accompany you if traveling by taxi.   YOU WILL NOT BE PERMITTED TO DRIVE OR TO LEAVE THE HOSPITAL ALONE AFTER SURGERY.   It is strongly recommended that you arrange for someone to remain with you for the first 24 hrs following your surgery.       Thank you for your cooperation.  The Staff of [facility].        Bathing Instructions                                                                 Please shower the evening before and morning of your procedure with    ANTIBACTERIAL SOAP. ( DIAL, etc )  Concentrate on the surgical area   for at least 3 minutes and rinse completely. Dry off as usual.   Do not use any deodorant, powder, body lotions, perfume, after shave or    cologne.

## 2017-05-05 NOTE — MR AVS SNAPSHOT
[facility]  [address]  [facility] [address]  Phone: [phone]                  Chey cMneill   2017 8:00 AM   Appointment    Description:  Female : 1975   Provider:  Jennifer Dunlap PA-C   Department:  [facility]                To Do List           Future Appointments        Provider Department Dept Phone    2017 10:00 AM PRE-ADMIT, [facility] [phone]    2017 7:00 AM Bj Mason, PT [facility] [phone]    2017 12:00 PM Paco Langston, OT [facility] [phone]    2017 1:00 PM Jennifer Dunlap PA-C [facility] [phone]    2017 1:15 PM Cooper Ramos MD [facility] [phone]      Your Future Surgeries/Procedures     May 11, 2017   Surgery with Cooper Ramos MD   [facility]    [address] [facility] [id number] [phone]              Goals (5 Years of Data)     None      [facility]     [facility] Nurse Care Line -  Assistance  Unless otherwise directed by your provider, please contact [facility], our nurse care line that is available for  assistance.     Registered nurses in the [facility] provide: appointment scheduling, clinical advisement, health education, and other advisory services.  Call: [phone] (toll free)               Medications           Message regarding Medications     Verify the changes and/or additions to your medication regime listed below are the same as discussed with your clinician today.  If any of these changes or additions are incorrect, please notify your healthcare provider.             Verify that the below list of medications is an accurate representation of the medications you are currently taking.  If none reported, the list may be blank. If incorrect, please  contact your healthcare provider. Carry this list with you in case of emergency.           Current Medications     dextroamphetamine-amphetamine (ADDERALL XR) 30 MG 24 hr capsule     dextroamphetamine-amphetamine (ADDERALL) 20 mg tablet     esomeprazole (NEXIUM) 40 MG capsule Take 1 capsule (40 mg total) by mouth before breakfast.    hydrocodone-acetaminophen 5-325mg (NORCO) 5-325 mg per tablet Take 1 tablet by mouth every 6 (six) hours as needed for Pain.    indomethacin (INDOCIN SR) 75 mg CpSR CR capsule Take 1 capsule (75 mg total) by mouth 2 (two) times daily.    SUBOXONE 8-2 mg Film PLACE 1 UNDER THE TONGUE TWICE DAILY AS DIRECTED.           Clinical Reference Information           Allergies as of 5/5/2017     Adhesive    Benzoin Tincture Plain    Morphine    Oxycontin [Oxycodone]    Sulfa (Sulfonamide Antibiotics)      Immunizations Administered on Date of Encounter - 5/5/2017     None      Language Assistance Services     ATTENTION: Language assistance services are available, free of charge. Please call [phone].      ATENCIÓN: Si mac ruiz, tiene a bradford disposición servicios gratuitos de asistencia lingüística. Llame al [phone].     FLORIDALMA Ý: N?u b?n nói Ti?ng Vi?t, có các d?ch v? h? tr? ngôn ng? mi?n phí dành cho b?n. G?i s? [phone].         [facility] complies with applicable Federal civil rights laws and does not discriminate on the basis of race, color, national origin, age, disability, or sex.

## 2017-05-05 NOTE — H&P
Chey Mcneill  is here for a completion of her perioperative paperwork. she  Is scheduled to undergo 1. Right knee osteochondral allograft Patella (medial facet), MFC and possibly trochlear region  2. Right knee arthroscopic loose body removal  3. Right knee arthroscopic chondroplasty  4. Amniox Arthrocentesis, [id number] on 5/11/2017.  She is a healthy individual and does not need clearance for this procedure.     DO NOT USE DERMABOND PRINEO OVER INCISION! ALLERGY- burn    Risks, indications and benefits of the surgical procedure were discussed with the patient. All questions with regard to surgery, rehab, expected return to functional activities, activities of daily living and recreational endeavors were answered to her satisfaction.    Once no other questions were asked, a brief history and physical exam was then performed.      PHYSICAL EXAM:  GEN: A&Ox3, WD WN NAD  HEENT: WNL  CHEST: CTAB, no W/R/R  HEART: RRR, no M/R/G  ABD: Soft, NT ND, BS x4 QUADS  MS; See Epic  NEURO: CN II-XII intact       The surgical consent was then reviewed with the patient, who agreed with all the contents of the consent form and it was signed. she was then given the [facility] surgery packet to bring with her to [facility] for the anesthesia portion of her perioperative paperwork.     PHYSICAL THERAPY:  She was also instructed regarding physical therapy and will begin on  POD #1 at [facility] with Bj Mason.    POST OP CARE:instructions were reviewed including care of the wound and dressing after surgery and when she can shower.     PAIN MANAGEMENT: Chey Mcneill was also given her pain management regime, which includes the TENS unit given to her by Alex Gudino along with the education required for its use. She was also instructed regarding the Polar ice unit that will be in place after surgery and her postoperative pain medications.     PAIN MEDICATION:  Percocet 10/325mg 1 po q 4-6 hours prn pain  Ultram 50 mg one p.o.  q.4-6 hours p.r.n. breakthrough pain,   Phenergan 25 mg one p.o. q.4-6 hours p.r.n. nausea and vomiting.  ASA 325mg once a day x 6 weeks  Norco 5mg once every 6 hours for pain ( leading up to surgery)    As there were no other questions to be asked, she was given my business card along with Cooper Ramos MD business card if she has any questions or concerns prior to surgery or in the postop period.

## 2017-05-05 NOTE — ANESTHESIA PREPROCEDURE EVALUATION
05/05/2017  Chey Mcneill is a 42 y.o., female.    Anesthesia Evaluation    I have reviewed the Patient Summary Reports.    I have reviewed the Nursing Notes.   I have reviewed the Medications.     Review of Systems  Anesthesia Hx:  Denies Family Hx of Anesthesia complications.  Personal Hx of Anesthesia complications, Post-Operative Nausea/Vomiting   Social:  Non-Smoker    Hematology/Oncology:  Hematology Normal   Oncology Normal     EENT/Dental:EENT/Dental Normal   Cardiovascular:  Cardiovascular Normal     Pulmonary:  Pulmonary Normal    Renal/:  Renal/ Normal     Hepatic/GI:  Hepatic/GI Normal    Musculoskeletal:   Arthritis     Neurological:  Neurology Normal    Endocrine:  Endocrine Normal    Dermatological:  Skin Normal    Psych:  Psychiatric Normal           Physical Exam  General:  Well nourished    Airway/Jaw/Neck:  Airway Findings: Mouth Opening: Small, but > 3cm Tongue: Normal  General Airway Assessment: Adult, Possible difficult intubation  Mallampati: II  TM Distance: Normal, at least 6 cm      Dental:  Dental Findings: In tact         Mental Status:  Mental Status Findings:  Alert and Oriented, Cooperative         Anesthesia Plan  Type of Anesthesia, risks & benefits discussed:  Anesthesia Type:  general  Patient's Preference:   Intra-op Monitoring Plan:   Intra-op Monitoring Plan Comments:   Post Op Pain Control Plan: single-shot nerve block  Post Op Pain Control Plan Comments:   Induction:   IV  Beta Blocker:         Informed Consent: Patient understands risks and agrees with Anesthesia plan.  Questions answered. Anesthesia consent signed with patient.  ASA Score: 1     Day of Surgery Review of History & Physical:    H&P update referred to the surgeon.         Ready For Surgery From Anesthesia Perspective.

## 2017-05-09 ENCOUNTER — CLINICAL SUPPORT (OUTPATIENT)
Dept: REHABILITATION | Facility: HOSPITAL | Age: 42
End: 2017-05-09
Attending: ORTHOPAEDIC SURGERY
Payer: MEDICAID

## 2017-05-09 DIAGNOSIS — M25.649 DECREASED RANGE OF MOTION OF FINGER: ICD-10-CM

## 2017-05-09 DIAGNOSIS — M79.645 PAIN IN FINGER OF LEFT HAND: ICD-10-CM

## 2017-05-09 DIAGNOSIS — M79.89 SWELLING OF LEFT RING FINGER: ICD-10-CM

## 2017-05-09 DIAGNOSIS — M25.561 RIGHT ANTERIOR KNEE PAIN: Primary | ICD-10-CM

## 2017-05-09 PROCEDURE — 97110 THERAPEUTIC EXERCISES: CPT | Mod: PN | Performed by: PHYSICAL THERAPIST

## 2017-05-09 PROCEDURE — 97165 OT EVAL LOW COMPLEX 30 MIN: CPT | Mod: PN

## 2017-05-09 PROCEDURE — 97110 THERAPEUTIC EXERCISES: CPT | Mod: PN
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
## 2017-05-09 NOTE — PROGRESS NOTES
Physical Therapy Daily Note     Date: 05/09/2017  Name: Chey Mcneill  Clinic Number: [id number]  Diagnosis:   Encounter Diagnosis   Name Primary?    Right anterior knee pain Yes     Physician: Cooper Ramos MD    Evaluation Date: 4/11/17  Date of Surgery: pending  Visit #: 5  Start Time:  7:00  Stop Time:  7:25  Total Treatment Time: 25    Precautions: none    Subjective     Pt reports the knee is the same. I am ready to have the surgery.    Pain: 2/10    Objective     Measurements taken:  none    Patient received individual therapy to increase strength, endurance and ROM with activities as follows:     Chey received therapeutic exercises to develop strength, endurance and ROM for 25 minutes including:   Quad set towel x 20  Seated hangs 5# x 5 min  Quad set towel x 20  Quad set flat x 20       Chey received the following manual therapy techniques: Joint mobilizations and Soft tissue Mobilization were applied to the: right knee for 5 minutes.   PROM: ext, patellar mobs  STM quad, IT band    CP seated x 10 min      Written Home Exercises Provided: updated as per therex list  Pt demo good understanding of the education provided. Chey demonstrated good return demonstration of activities.     Education provided:  Chey verbalized good understanding of education provided.   No spiritual or educational barriers to learning identified.    Assessment     Reviewed therex as HEP for immediately after surgery (unless Dr. Ramos changes therex/surgery). Improved quad strength and end range extension.     This is a 42 y.o. female referred to outpatient physical therapy and presents with a medical diagnosis of right knee pain and demonstrates limitations as described in the problem list Pt prognosis is Good. Pt will continue to benefit from skilled outpatient physical therapy to address the deficits listed below in the problem list, provide pt/family education  and to maximize pt's level of independence in the home and community environment.    Will the patient continue to benefit from skilled PT intervention? Updated as per therex list      Medical necessity is demonstrated by:   - Pain limits function of effected part for all activities  - Unable to participate in daily activities   - Requires skilled supervision to complete and progress HEP  - Fall risk - impaired balance   - Continued inability to participate in vocational pursuits    Short Term Goals (4 Weeks): pending surgery  - Pt will increase ROM of right knee to 2-0-130  - Pt will increase strength of bilateral hip abd =5/5; Improved quad firing to hyperextension.  - Decrease Pain to 2/10 with ADLs  - Pt to self correct posture independently.   - Pt independent with HEP with progressions.      Long Term Goals: To be determined after surgery.        Plan   Continue with established Plan of Care towards PT goals.      Therapist: ALEJANDRO MAI, PT

## 2017-05-10 PROBLEM — M79.645 PAIN IN FINGER OF LEFT HAND: Status: ACTIVE | Noted: 2017-05-10

## 2017-05-10 PROBLEM — M25.649 DECREASED RANGE OF MOTION OF FINGER: Status: ACTIVE | Noted: 2017-05-10

## 2017-05-10 PROBLEM — M79.89 SWELLING OF LEFT RING FINGER: Status: ACTIVE | Noted: 2017-05-10
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
## 2017-05-11 ENCOUNTER — ANESTHESIA (OUTPATIENT)
Dept: SURGERY | Facility: OTHER | Age: 42
End: 2017-05-11
Payer: MEDICAID

## 2017-05-11 ENCOUNTER — SURGERY (OUTPATIENT)
Age: 42
End: 2017-05-11

## 2017-05-11 ENCOUNTER — HOSPITAL ENCOUNTER (OUTPATIENT)
Facility: OTHER | Age: 42
Discharge: HOME | End: 2017-05-12
Attending: ORTHOPAEDIC SURGERY | Admitting: ORTHOPAEDIC SURGERY
Payer: MEDICAID

## 2017-05-11 DIAGNOSIS — M23.91 DERANGEMENT, KNEE INTERNAL, RIGHT: ICD-10-CM

## 2017-05-11 DIAGNOSIS — M94.261 CHONDROMALACIA, RIGHT KNEE: ICD-10-CM

## 2017-05-11 DIAGNOSIS — M23.90 DERANGEMENT, KNEE INTERNAL, UNSPECIFIED LATERALITY: Primary | ICD-10-CM

## 2017-05-11 DIAGNOSIS — M23.90: ICD-10-CM

## 2017-05-11 DIAGNOSIS — M22.41 CHONDROMALACIA OF RIGHT PATELLA: ICD-10-CM

## 2017-05-11 PROCEDURE — 25000003 PHARM REV CODE 250: Performed by: ORTHOPAEDIC SURGERY

## 2017-05-11 PROCEDURE — 63600175 PHARM REV CODE 636 W HCPCS: Performed by: ANESTHESIOLOGY

## 2017-05-11 PROCEDURE — 25000003 PHARM REV CODE 250: Performed by: PHYSICIAN ASSISTANT

## 2017-05-11 PROCEDURE — 63600175 PHARM REV CODE 636 W HCPCS: Performed by: PHYSICIAN ASSISTANT

## 2017-05-11 PROCEDURE — 37000008 HC ANESTHESIA 1ST 15 MINUTES: Performed by: ORTHOPAEDIC SURGERY

## 2017-05-11 PROCEDURE — 37000009 HC ANESTHESIA EA ADD 15 MINS: Performed by: ORTHOPAEDIC SURGERY

## 2017-05-11 PROCEDURE — 36000711: Performed by: ORTHOPAEDIC SURGERY

## 2017-05-11 PROCEDURE — 25000003 PHARM REV CODE 250: Performed by: ANESTHESIOLOGY

## 2017-05-11 PROCEDURE — 29881 ARTHRS KNE SRG MNISECTMY M/L: CPT | Mod: 51,RT,, | Performed by: ORTHOPAEDIC SURGERY

## 2017-05-11 PROCEDURE — 99900035 HC TECH TIME PER 15 MIN (STAT)

## 2017-05-11 PROCEDURE — 63600175 PHARM REV CODE 636 W HCPCS: Performed by: NURSE ANESTHETIST, CERTIFIED REGISTERED

## 2017-05-11 PROCEDURE — V2790 AMNIOTIC MEMBRANE: HCPCS | Performed by: ORTHOPAEDIC SURGERY

## 2017-05-11 PROCEDURE — 27415 OSTEOCHONDRAL KNEE ALLOGRAFT: CPT | Mod: 22,RT,, | Performed by: ORTHOPAEDIC SURGERY

## 2017-05-11 PROCEDURE — 71000039 HC RECOVERY, EACH ADD'L HOUR: Performed by: ORTHOPAEDIC SURGERY

## 2017-05-11 PROCEDURE — 27422 REVISION OF UNSTABLE KNEECAP: CPT | Mod: 51,22,RT, | Performed by: ORTHOPAEDIC SURGERY

## 2017-05-11 PROCEDURE — 36000710: Performed by: ORTHOPAEDIC SURGERY

## 2017-05-11 PROCEDURE — 27800903 OPTIME MED/SURG SUP & DEVICES OTHER IMPLANTS: Performed by: ORTHOPAEDIC SURGERY

## 2017-05-11 PROCEDURE — 71000033 HC RECOVERY, INTIAL HOUR: Performed by: ORTHOPAEDIC SURGERY

## 2017-05-11 PROCEDURE — 25000003 PHARM REV CODE 250: Performed by: NURSE ANESTHETIST, CERTIFIED REGISTERED

## 2017-05-11 PROCEDURE — 27201423 OPTIME MED/SURG SUP & DEVICES STERILE SUPPLY: Performed by: ORTHOPAEDIC SURGERY

## 2017-05-11 DEVICE — PATELLA LEFT OC: Type: IMPLANTABLE DEVICE | Site: KNEE | Status: FUNCTIONAL

## 2017-05-11 DEVICE — MATRIX REGENERATIVE CLARIX FLO: Type: IMPLANTABLE DEVICE | Site: KNEE | Status: FUNCTIONAL

## 2017-05-11 DEVICE — IMPLANTABLE DEVICE: Type: IMPLANTABLE DEVICE | Site: KNEE | Status: FUNCTIONAL

## 2017-05-11 RX ORDER — FENTANYL CITRATE 50 UG/ML
INJECTION, SOLUTION INTRAMUSCULAR; INTRAVENOUS
Status: DISCONTINUED | OUTPATIENT
Start: 2017-05-11 | End: 2017-05-11

## 2017-05-11 RX ORDER — HYDROMORPHONE HYDROCHLORIDE 2 MG/ML
0.4 INJECTION, SOLUTION INTRAMUSCULAR; INTRAVENOUS; SUBCUTANEOUS EVERY 5 MIN PRN
Status: COMPLETED | OUTPATIENT
Start: 2017-05-11 | End: 2017-05-11

## 2017-05-11 RX ORDER — PROPOFOL 10 MG/ML
VIAL (ML) INTRAVENOUS
Status: DISCONTINUED | OUTPATIENT
Start: 2017-05-11 | End: 2017-05-11

## 2017-05-11 RX ORDER — HYDROCODONE BITARTRATE AND ACETAMINOPHEN 10; 325 MG/1; MG/1
1 TABLET ORAL EVERY 4 HOURS PRN
Status: DISCONTINUED | OUTPATIENT
Start: 2017-05-11 | End: 2017-05-11 | Stop reason: HOSPADM

## 2017-05-11 RX ORDER — ONDANSETRON 2 MG/ML
4 INJECTION INTRAMUSCULAR; INTRAVENOUS ONCE AS NEEDED
Status: DISCONTINUED | OUTPATIENT
Start: 2017-05-11 | End: 2017-05-11 | Stop reason: HOSPADM

## 2017-05-11 RX ORDER — MIDAZOLAM HYDROCHLORIDE 1 MG/ML
INJECTION INTRAMUSCULAR; INTRAVENOUS
Status: DISCONTINUED | OUTPATIENT
Start: 2017-05-11 | End: 2017-05-11

## 2017-05-11 RX ORDER — KETAMINE HYDROCHLORIDE 50 MG/ML
INJECTION, SOLUTION INTRAMUSCULAR; INTRAVENOUS
Status: DISCONTINUED | OUTPATIENT
Start: 2017-05-11 | End: 2017-05-11

## 2017-05-11 RX ORDER — CEFAZOLIN SODIUM 2 G/50ML
2 SOLUTION INTRAVENOUS
Status: COMPLETED | OUTPATIENT
Start: 2017-05-11 | End: 2017-05-11

## 2017-05-11 RX ORDER — FENTANYL CITRATE 50 UG/ML
25 INJECTION, SOLUTION INTRAMUSCULAR; INTRAVENOUS EVERY 5 MIN PRN
Status: DISCONTINUED | OUTPATIENT
Start: 2017-05-11 | End: 2017-05-11 | Stop reason: HOSPADM

## 2017-05-11 RX ORDER — HYDROCODONE BITARTRATE AND ACETAMINOPHEN 10; 325 MG/1; MG/1
2 TABLET ORAL EVERY 6 HOURS PRN
Status: DISCONTINUED | OUTPATIENT
Start: 2017-05-11 | End: 2017-05-12 | Stop reason: HOSPADM

## 2017-05-11 RX ORDER — MIDAZOLAM HYDROCHLORIDE 1 MG/ML
2 INJECTION INTRAMUSCULAR; INTRAVENOUS ONCE
Status: DISCONTINUED | OUTPATIENT
Start: 2017-05-11 | End: 2017-05-11

## 2017-05-11 RX ORDER — SODIUM CHLORIDE 9 MG/ML
INJECTION, SOLUTION INTRAVENOUS ONCE
Status: COMPLETED | OUTPATIENT
Start: 2017-05-11 | End: 2017-05-11

## 2017-05-11 RX ORDER — MEPERIDINE HYDROCHLORIDE 50 MG/ML
12.5 INJECTION INTRAMUSCULAR; INTRAVENOUS; SUBCUTANEOUS ONCE AS NEEDED
Status: DISCONTINUED | OUTPATIENT
Start: 2017-05-11 | End: 2017-05-11 | Stop reason: HOSPADM

## 2017-05-11 RX ORDER — OXYCODONE HYDROCHLORIDE 5 MG/1
5 TABLET ORAL
Status: DISCONTINUED | OUTPATIENT
Start: 2017-05-11 | End: 2017-05-11 | Stop reason: HOSPADM

## 2017-05-11 RX ORDER — DEXAMETHASONE SODIUM PHOSPHATE 4 MG/ML
INJECTION, SOLUTION INTRA-ARTICULAR; INTRALESIONAL; INTRAMUSCULAR; INTRAVENOUS; SOFT TISSUE
Status: DISCONTINUED | OUTPATIENT
Start: 2017-05-11 | End: 2017-05-11

## 2017-05-11 RX ORDER — FENTANYL CITRATE 50 UG/ML
100 INJECTION, SOLUTION INTRAMUSCULAR; INTRAVENOUS EVERY 5 MIN PRN
Status: DISCONTINUED | OUTPATIENT
Start: 2017-05-11 | End: 2017-05-11

## 2017-05-11 RX ORDER — CEFAZOLIN SODIUM 2 G/50ML
2 SOLUTION INTRAVENOUS
Status: DISCONTINUED | OUTPATIENT
Start: 2017-05-11 | End: 2017-05-11 | Stop reason: HOSPADM

## 2017-05-11 RX ORDER — ONDANSETRON 2 MG/ML
INJECTION INTRAMUSCULAR; INTRAVENOUS
Status: DISCONTINUED | OUTPATIENT
Start: 2017-05-11 | End: 2017-05-11

## 2017-05-11 RX ORDER — SODIUM CHLORIDE, SODIUM LACTATE, POTASSIUM CHLORIDE, CALCIUM CHLORIDE 600; 310; 30; 20 MG/100ML; MG/100ML; MG/100ML; MG/100ML
INJECTION, SOLUTION INTRAVENOUS CONTINUOUS
Status: DISCONTINUED | OUTPATIENT
Start: 2017-05-11 | End: 2017-05-11

## 2017-05-11 RX ORDER — ROPIVACAINE HYDROCHLORIDE 5 MG/ML
INJECTION, SOLUTION EPIDURAL; INFILTRATION; PERINEURAL
Status: DISCONTINUED | OUTPATIENT
Start: 2017-05-11 | End: 2017-05-11

## 2017-05-11 RX ORDER — GLYCOPYRROLATE 0.2 MG/ML
INJECTION INTRAMUSCULAR; INTRAVENOUS
Status: DISCONTINUED | OUTPATIENT
Start: 2017-05-11 | End: 2017-05-11

## 2017-05-11 RX ORDER — DOCUSATE SODIUM 100 MG/1
100 CAPSULE, LIQUID FILLED ORAL 2 TIMES DAILY
Status: DISCONTINUED | OUTPATIENT
Start: 2017-05-11 | End: 2017-05-12 | Stop reason: HOSPADM

## 2017-05-11 RX ORDER — OXYCODONE AND ACETAMINOPHEN 10; 325 MG/1; MG/1
2 TABLET ORAL EVERY 6 HOURS PRN
Status: CANCELLED | OUTPATIENT
Start: 2017-05-11

## 2017-05-11 RX ORDER — BUPIVACAINE HYDROCHLORIDE AND EPINEPHRINE 5; 5 MG/ML; UG/ML
INJECTION, SOLUTION EPIDURAL; INTRACAUDAL; PERINEURAL
Status: DISCONTINUED | OUTPATIENT
Start: 2017-05-11 | End: 2017-05-11 | Stop reason: HOSPADM

## 2017-05-11 RX ORDER — SODIUM CHLORIDE 0.9 % (FLUSH) 0.9 %
3 SYRINGE (ML) INJECTION EVERY 8 HOURS
Status: DISCONTINUED | OUTPATIENT
Start: 2017-05-11 | End: 2017-05-11 | Stop reason: HOSPADM

## 2017-05-11 RX ORDER — LIDOCAINE HYDROCHLORIDE 10 MG/ML
1 INJECTION, SOLUTION EPIDURAL; INFILTRATION; INTRACAUDAL; PERINEURAL ONCE
Status: DISCONTINUED | OUTPATIENT
Start: 2017-05-11 | End: 2017-05-11 | Stop reason: HOSPADM

## 2017-05-11 RX ORDER — SCOLOPAMINE TRANSDERMAL SYSTEM 1 MG/1
1 PATCH, EXTENDED RELEASE TRANSDERMAL
Status: DISCONTINUED | OUTPATIENT
Start: 2017-05-11 | End: 2017-05-11 | Stop reason: HOSPADM

## 2017-05-11 RX ORDER — HYDROMORPHONE HYDROCHLORIDE 1 MG/ML
0.5 INJECTION, SOLUTION INTRAMUSCULAR; INTRAVENOUS; SUBCUTANEOUS
Status: DISCONTINUED | OUTPATIENT
Start: 2017-05-11 | End: 2017-05-12 | Stop reason: HOSPADM

## 2017-05-11 RX ORDER — HYDROMORPHONE HYDROCHLORIDE 1 MG/ML
1 INJECTION, SOLUTION INTRAMUSCULAR; INTRAVENOUS; SUBCUTANEOUS
Status: DISCONTINUED | OUTPATIENT
Start: 2017-05-11 | End: 2017-05-12 | Stop reason: HOSPADM

## 2017-05-11 RX ORDER — LIDOCAINE HCL/PF 100 MG/5ML
SYRINGE (ML) INTRAVENOUS
Status: DISCONTINUED | OUTPATIENT
Start: 2017-05-11 | End: 2017-05-11

## 2017-05-11 RX ORDER — SODIUM CHLORIDE 0.9 % (FLUSH) 0.9 %
3 SYRINGE (ML) INJECTION
Status: DISCONTINUED | OUTPATIENT
Start: 2017-05-11 | End: 2017-05-11

## 2017-05-11 RX ORDER — PROMETHAZINE HYDROCHLORIDE 25 MG/1
25 TABLET ORAL EVERY 6 HOURS PRN
Status: DISCONTINUED | OUTPATIENT
Start: 2017-05-11 | End: 2017-05-12 | Stop reason: HOSPADM

## 2017-05-11 RX ADMIN — HYDROMORPHONE HYDROCHLORIDE 0.4 MG: 2 INJECTION, SOLUTION INTRAMUSCULAR; INTRAVENOUS; SUBCUTANEOUS at 01:05

## 2017-05-11 RX ADMIN — ROPIVACAINE HYDROCHLORIDE 25 ML: 5 INJECTION, SOLUTION EPIDURAL; INFILTRATION; PERINEURAL at 06:05

## 2017-05-11 RX ADMIN — ROPIVACAINE HYDROCHLORIDE: 5 INJECTION, SOLUTION EPIDURAL; INFILTRATION; PERINEURAL at 10:05

## 2017-05-11 RX ADMIN — CEFAZOLIN SODIUM 2 G: 2 SOLUTION INTRAVENOUS at 02:05

## 2017-05-11 RX ADMIN — FENTANYL CITRATE 50 MCG: 50 INJECTION, SOLUTION INTRAMUSCULAR; INTRAVENOUS at 10:05

## 2017-05-11 RX ADMIN — GLYCOPYRROLATE 0.2 MG: 0.2 INJECTION, SOLUTION INTRAMUSCULAR; INTRAVENOUS at 07:05

## 2017-05-11 RX ADMIN — SODIUM CHLORIDE, SODIUM LACTATE, POTASSIUM CHLORIDE, AND CALCIUM CHLORIDE: 600; 310; 30; 20 INJECTION, SOLUTION INTRAVENOUS at 06:05

## 2017-05-11 RX ADMIN — CEFAZOLIN SODIUM 2 G: 2 SOLUTION INTRAVENOUS at 07:05

## 2017-05-11 RX ADMIN — PROPOFOL 200 MG: 10 INJECTION, EMULSION INTRAVENOUS at 07:05

## 2017-05-11 RX ADMIN — SODIUM CHLORIDE, SODIUM LACTATE, POTASSIUM CHLORIDE, AND CALCIUM CHLORIDE: 600; 310; 30; 20 INJECTION, SOLUTION INTRAVENOUS at 08:05

## 2017-05-11 RX ADMIN — VANCOMYCIN HYDROCHLORIDE 1000 MG: 1 INJECTION, POWDER, LYOPHILIZED, FOR SOLUTION INTRAVENOUS at 06:05

## 2017-05-11 RX ADMIN — HYDROMORPHONE HYDROCHLORIDE 0.4 MG: 2 INJECTION, SOLUTION INTRAMUSCULAR; INTRAVENOUS; SUBCUTANEOUS at 11:05

## 2017-05-11 RX ADMIN — SCOPALAMINE 1.5 MG: 1 PATCH, EXTENDED RELEASE TRANSDERMAL at 06:05

## 2017-05-11 RX ADMIN — LIDOCAINE HYDROCHLORIDE 30 MG: 20 INJECTION, SOLUTION INTRAVENOUS at 07:05

## 2017-05-11 RX ADMIN — CEFAZOLIN SODIUM 2 G: 2 SOLUTION INTRAVENOUS at 09:05

## 2017-05-11 RX ADMIN — HYDROMORPHONE HYDROCHLORIDE 0.4 MG: 2 INJECTION, SOLUTION INTRAMUSCULAR; INTRAVENOUS; SUBCUTANEOUS at 12:05

## 2017-05-11 RX ADMIN — DOCUSATE SODIUM 100 MG: 100 CAPSULE, LIQUID FILLED ORAL at 09:05

## 2017-05-11 RX ADMIN — ONDANSETRON 4 MG: 2 INJECTION INTRAMUSCULAR; INTRAVENOUS at 10:05

## 2017-05-11 RX ADMIN — HYDROMORPHONE HYDROCHLORIDE 1 MG: 1 INJECTION, SOLUTION INTRAMUSCULAR; INTRAVENOUS; SUBCUTANEOUS at 05:05

## 2017-05-11 RX ADMIN — BUPIVACAINE HYDROCHLORIDE AND EPINEPHRINE BITARTRATE 30 ML: 5; .005 INJECTION, SOLUTION EPIDURAL; INTRACAUDAL; PERINEURAL at 10:05

## 2017-05-11 RX ADMIN — DEXAMETHASONE SODIUM PHOSPHATE 8 MG: 4 INJECTION, SOLUTION INTRAMUSCULAR; INTRAVENOUS at 07:05

## 2017-05-11 RX ADMIN — HYDROCODONE BITARTRATE AND ACETAMINOPHEN 2 TABLET: 10; 325 TABLET ORAL at 09:05

## 2017-05-11 RX ADMIN — HYDROCODONE BITARTRATE AND ACETAMINOPHEN 2 TABLET: 10; 325 TABLET ORAL at 05:05

## 2017-05-11 RX ADMIN — FENTANYL CITRATE 100 MCG: 50 INJECTION, SOLUTION INTRAMUSCULAR; INTRAVENOUS at 06:05

## 2017-05-11 RX ADMIN — BUPIVACAINE 20 ML: 13.3 INJECTION, SUSPENSION, LIPOSOMAL INFILTRATION at 10:05

## 2017-05-11 RX ADMIN — FENTANYL CITRATE 100 MCG: 50 INJECTION, SOLUTION INTRAMUSCULAR; INTRAVENOUS at 07:05

## 2017-05-11 RX ADMIN — CARBOXYMETHYLCELLULOSE SODIUM 2 DROP: 2.5 SOLUTION/ DROPS OPHTHALMIC at 07:05

## 2017-05-11 RX ADMIN — SODIUM CHLORIDE: 0.9 INJECTION, SOLUTION INTRAVENOUS at 02:05

## 2017-05-11 RX ADMIN — MIDAZOLAM HYDROCHLORIDE 2 MG: 1 INJECTION, SOLUTION INTRAMUSCULAR; INTRAVENOUS at 06:05

## 2017-05-11 RX ADMIN — KETAMINE HYDROCHLORIDE 15 MG: 50 INJECTION INTRAMUSCULAR; INTRAVENOUS at 08:05

## 2017-05-11 RX ADMIN — HYDROCODONE BITARTRATE AND ACETAMINOPHEN 1 TABLET: 10; 325 TABLET ORAL at 11:05

## 2017-05-11 NOTE — DISCHARGE SUMMARY
[facility]    Brief Operative Note     SUMMARY     Surgery Date: 5/11/2017     Surgeon(s) and Role:     * Cooper Ramos MD - Primary    Assisting Surgeon: None    Pre-op Diagnosis:  Chondromalacia of patella, right [M22.41]  Chondromalacia, knee, right [M94.261]  Derangement, knee internal, right [M23.91]    Post-op Diagnosis:  Post-Op Diagnosis Codes:     * Chondromalacia of patella, right [M22.41]     * Chondromalacia, knee, right [M94.261]     * Derangement, knee internal, right [M23.91]    Procedure: Procedure(s) (LRB):  GRAFT; OSTEOCHONDRAL ALLOGRAFT (Right)  ARTHROSCOPY-KNEE (Right)  SYNOVECTOMY-KNEE (Right)  ARTHROCENTESIS; AMNIOX (Right)    Anesthesia: General    Description of the findings of the procedure: See Dictation    Findings/Key Components: see op note    Estimated Blood Loss: 50 mL         Specimens:   Specimen     None          Disposition: Patient tolerated the procedure well and was transferred to PACU in stable condition.      Discharge Note    SUMMARY     Admit Date: 5/11/2017    Discharge Date and Time:   05/11/2017 10:32 AM    Pre-op Diagnosis:  Chondromalacia of patella, right [M22.41]  Chondromalacia, knee, right [M94.261]  Derangement, knee internal, right [M23.91]    Post-op Diagnosis:  Post-Op Diagnosis Codes:     * Chondromalacia of patella, right [M22.41]     * Chondromalacia, knee, right [M94.261]     * Derangement, knee internal, right [M23.91]    Procedure: Procedure(s) (LRB):  GRAFT; OSTEOCHONDRAL ALLOGRAFT (Right)  ARTHROSCOPY-KNEE (Right)  SYNOVECTOMY-KNEE (Right)  ARTHROCENTESIS; AMNIOX (Right)    Hospital Course (synopsis of major diagnoses, care, treatment, and services provided during the course of the hospital stay): Patient underwent outpatient knee surgery and was transferred to PACU in stable condition.  In PACU, patient received appropriate post-operative care and discharged home with plans for physical therapy and follow-up with the operative  surgeon.    Diet: Regular       Final Diagnosis: Post-Op Diagnosis Codes:     * Chondromalacia of patella, right [M22.41]     * Chondromalacia, knee, right [M94.261]     * Derangement, knee internal, right [M23.91]    Disposition: Home or Self Care    Follow Up/Patient Instructions:     Medications:  Reconciled Home Medications:   Current Discharge Medication List      CONTINUE these medications which have NOT CHANGED    Details   esomeprazole (NEXIUM) 40 MG capsule Take 1 capsule (40 mg total) by mouth before breakfast.  Qty: 30 capsule, Refills: 1      !! hydrocodone-acetaminophen 5-325mg (NORCO) 5-325 mg per tablet Take 1 tablet by mouth every 6 (six) hours as needed for Pain.  Qty: 30 tablet, Refills: 0      !! hydrocodone-acetaminophen 5-325mg (NORCO) 5-325 mg per tablet Take 1 tablet by mouth every 6 (six) hours as needed for Pain.  Qty: 20 tablet, Refills: 0    Associated Diagnoses: Derangement, knee internal, right; Chondromalacia, right knee; Chondromalacia of right patella      oxycodone-acetaminophen (PERCOCET)  mg per tablet Take 1 tablet by mouth every 6 (six) hours as needed for Pain.  Qty: 60 tablet, Refills: 0    Associated Diagnoses: Derangement, knee internal, right; Chondromalacia, right knee; Chondromalacia of right patella      promethazine (PHENERGAN) 25 MG tablet Take 1 tablet (25 mg total) by mouth every 6 (six) hours as needed for Nausea.  Qty: 30 tablet, Refills: 0    Associated Diagnoses: Derangement, knee internal, right; Chondromalacia, right knee; Chondromalacia of right patella      tramadol (ULTRAM) 50 mg tablet Take 1 tablet (50 mg total) by mouth every 4 to 6 hours as needed.  Qty: 40 tablet, Refills: 0    Associated Diagnoses: Derangement, knee internal, right; Chondromalacia, right knee; Chondromalacia of right patella       !! - Potential duplicate medications found. Please discuss with provider.          Discharge Procedure Orders  CRUTCHES FOR HOME USE   Order Specific  "Question Answer Comments   Type: Axillary    Height: 5' 2" (1.575 m)    Weight: 55.8 kg (123 lb)    Length of need (1-99 months): 2      Diet general     Weight bearing restrictions (specify)   Order Comments: NWB RLE     Call MD for:  temperature >100.4     Call MD for:  persistent nausea and vomiting or diarrhea     Call MD for:  severe uncontrolled pain     Call MD for:  redness, tenderness, or signs of infection (pain, swelling, redness, odor or green/yellow discharge around incision site)     Remove dressing in 72 hours       Follow-up Information     Follow up with Cooper Ramos MD In 2 weeks.    Specialties:  Sports Medicine, Orthopedic Surgery    Contact information:    [address]  [facility] [address]  [phone]            "

## 2017-05-11 NOTE — ANESTHESIA POSTPROCEDURE EVALUATION
"Anesthesia Post Evaluation    Patient: Chey Mcneill    Procedure(s) Performed: Procedure(s) (LRB):  GRAFT; OSTEOCHONDRAL ALLOGRAFT (Right)  ARTHROSCOPY-KNEE (Right)  SYNOVECTOMY-KNEE (Right)  ARTHROCENTESIS; AMNIOX (Right)    Final Anesthesia Type: general                Visit Vitals    /66    Pulse 75    Temp 36.5 °C (97.7 °F) (Oral)    Resp 16    Ht 5' 2" (1.575 m)    Wt 55.8 kg (123 lb)    SpO2 97%    Breastfeeding No    BMI 22.5 kg/m2       Pain/Annie Score: Pain Assessment Performed: Yes (5/11/2017 11:50 AM)  Presence of Pain: complains of pain/discomfort (5/11/2017 11:50 AM)  Pain Rating Prior to Med Admin: 6 (5/11/2017 11:50 AM)  Annie Score: 10 (5/11/2017 11:50 AM)      "

## 2017-05-11 NOTE — INTERVAL H&P NOTE
The patient has been examined and the H&P has been reviewed:    I concur with the findings and no changes have occurred since H&P was written.    Anesthesia/Surgery risks, benefits and alternative options discussed and understood by patient/family.          Active Hospital Problems    Diagnosis  POA    Derangement, knee internal [M23.90]  Yes      Resolved Hospital Problems    Diagnosis Date Resolved POA   No resolved problems to display.

## 2017-05-11 NOTE — H&P (VIEW-ONLY)
Chey Mcneill  is here for a completion of her perioperative paperwork. she  Is scheduled to undergo 1. Right knee osteochondral allograft Patella (medial facet), MFC and possibly trochlear region  2. Right knee arthroscopic loose body removal  3. Right knee arthroscopic chondroplasty  4. Amniox Arthrocentesis, [id number] on 5/11/2017.  She is a healthy individual and does not need clearance for this procedure.     DO NOT USE DERMABOND PRINEO OVER INCISION! ALLERGY- burn    Risks, indications and benefits of the surgical procedure were discussed with the patient. All questions with regard to surgery, rehab, expected return to functional activities, activities of daily living and recreational endeavors were answered to her satisfaction.    Once no other questions were asked, a brief history and physical exam was then performed.      PHYSICAL EXAM:  GEN: A&Ox3, WD WN NAD  HEENT: WNL  CHEST: CTAB, no W/R/R  HEART: RRR, no M/R/G  ABD: Soft, NT ND, BS x4 QUADS  MS; See Epic  NEURO: CN II-XII intact       The surgical consent was then reviewed with the patient, who agreed with all the contents of the consent form and it was signed. she was then given the [facility] surgery packet to bring with her to [facility] for the anesthesia portion of her perioperative paperwork.     PHYSICAL THERAPY:  She was also instructed regarding physical therapy and will begin on  POD #1 at [facility] with Bj Mason.    POST OP CARE:instructions were reviewed including care of the wound and dressing after surgery and when she can shower.     PAIN MANAGEMENT: Chey Mcneill was also given her pain management regime, which includes the TENS unit given to her by Alex Gudino along with the education required for its use. She was also instructed regarding the Polar ice unit that will be in place after surgery and her postoperative pain medications.     PAIN MEDICATION:  Percocet 10/325mg 1 po q 4-6 hours prn pain  Ultram 50 mg one p.o.  q.4-6 hours p.r.n. breakthrough pain,   Phenergan 25 mg one p.o. q.4-6 hours p.r.n. nausea and vomiting.  ASA 325mg once a day x 6 weeks  Norco 5mg once every 6 hours for pain ( leading up to surgery)    As there were no other questions to be asked, she was given my business card along with Cooper Ramos MD business card if she has any questions or concerns prior to surgery or in the postop period.

## 2017-05-11 NOTE — TRANSFER OF CARE
"Anesthesia Transfer of Care Note    Patient: Chey Mcneill    Procedure(s) Performed: Procedure(s) (LRB):  GRAFT; OSTEOCHONDRAL ALLOGRAFT (Right)  ARTHROSCOPY-KNEE (Right)  SYNOVECTOMY-KNEE (Right)  ARTHROCENTESIS; AMNIOX (Right)    Patient location: PACU    Anesthesia Type: general    Transport from OR: Transported from OR on 2-3 L/min O2 by NC with adequate spontaneous ventilation    Post pain: adequate analgesia    Post assessment: no apparent anesthetic complications    Post vital signs: stable    Level of consciousness: awake    Nausea/Vomiting: no nausea/vomiting    Complications: none    Transfer of care protocol was followed      Last vitals:   Visit Vitals    /80    Pulse 72    Temp 36.5 °C (97.7 °F) (Oral)    Resp 18    Ht 5' 2" (1.575 m)    Wt 55.8 kg (123 lb)    SpO2 100%    Breastfeeding No    BMI 22.5 kg/m2     "

## 2017-05-11 NOTE — IP AVS SNAPSHOT
[facility] Location ([facility])  [address] [place] [address]  Phone: [phone]           Patient Discharge Instructions   Our goal is to set you up for success. This packet includes information on your condition, medications, and your home care.  It will help you care for yourself to prevent having to return to the hospital.     Please ask your nurse if you have any questions.      There are many details to remember when preparing to leave the hospital. Here is what you will need to do:    1. Take your medicine. If you are prescribed medications, review your Medication List on the following pages. You may have new medications to  at the pharmacy and others that you'll need to stop taking. Review the instructions for how and when to take your medications. Talk with your doctor or nurses if you are unsure of what to do.     2. Go to your follow-up appointments. Specific follow-up information is listed in the following pages. Your may be contacted by a nurse or clinical provider about future appointments. Be sure we have all of the phone numbers to reach you. Please contact your provider's office if you are unable to make an appointment.     3. Watch for warning signs. Your doctor or nurse will give you detailed warning signs to watch for and when to call for assistance. These instructions may also include educational information about your condition. If you experience any of warning signs to your health, call your doctor.               ** Verify the list of medication(s) below is accurate and up to date. Carry this with you in case of emergency. If your medications have changed, please notify your healthcare provider.             Medication List      CONTINUE taking these medications        Additional Info                      esomeprazole 40 MG capsule   Commonly known as:  NEXIUM   Quantity:  30 capsule   Refills:  1   Dose:  40 mg    Instructions:  Take 1 capsule (40 mg total) by mouth  before breakfast.     Begin Date    AM    Noon    PM    Bedtime       * hydrocodone-acetaminophen 5-325mg 5-325 mg per tablet   Commonly known as:  NORCO   Quantity:  30 tablet   Refills:  0   Dose:  1 tablet    Instructions:  Take 1 tablet by mouth every 6 (six) hours as needed for Pain.     Begin Date    AM    Noon    PM    Bedtime       * hydrocodone-acetaminophen 5-325mg 5-325 mg per tablet   Commonly known as:  NORCO   Quantity:  20 tablet   Refills:  0   Dose:  1 tablet    Instructions:  Take 1 tablet by mouth every 6 (six) hours as needed for Pain.     Begin Date    AM    Noon    PM    Bedtime       oxycodone-acetaminophen  mg per tablet   Commonly known as:  PERCOCET   Quantity:  60 tablet   Refills:  0   Dose:  1 tablet    Instructions:  Take 1 tablet by mouth every 6 (six) hours as needed for Pain.     Begin Date    AM    Noon    PM    Bedtime       promethazine 25 MG tablet   Commonly known as:  PHENERGAN   Quantity:  30 tablet   Refills:  0   Dose:  25 mg    Instructions:  Take 1 tablet (25 mg total) by mouth every 6 (six) hours as needed for Nausea.     Begin Date    AM    Noon    PM    Bedtime       tramadol 50 mg tablet   Commonly known as:  ULTRAM   Quantity:  40 tablet   Refills:  0   Dose:  50 mg    Instructions:  Take 1 tablet (50 mg total) by mouth every 4 to 6 hours as needed.     Begin Date    AM    Noon    PM    Bedtime       * Notice:  This list has 2 medication(s) that are the same as other medications prescribed for you. Read the directions carefully, and ask your doctor or other care provider to review them with you.               Please bring to all follow up appointments:    1. A copy of your discharge instructions.  2. All medicines you are currently taking in their original bottles.  3. Identification and insurance card.    Please arrive 15 minutes ahead of scheduled appointment time.    Please call 24 hours in advance if you must reschedule your appointment and/or time.       "  Your Scheduled Appointments     May 17, 2017  4:30 PM CDT   Established Physical Therapy with Bj Mason PT   [place] [facility] ([facility])    [address]  [place] [address]   [phone]            May 22, 2017 11:15 AM CDT   Post OP with Cooper Ramos MD   [facility] ([facility])    [address]  [facility] [id number] [phone]            Jun 19, 2017  1:15 PM CDT   Post OP with Cooper Ramos MD   [facility] ([facility])    [address]  [facility] [id number] [phone]              Follow-up Information     Follow up with Cooper Ramos MD In 2 weeks.    Specialties:  Sports Medicine, Orthopedic Surgery    Contact information:    [address]  [facility] [id number] [phone]          Discharge Instructions     Future Orders    Call MD for:  persistent nausea and vomiting or diarrhea     Call MD for:  redness, tenderness, or signs of infection (pain, swelling, redness, odor or green/yellow discharge around incision site)     Call MD for:  severe uncontrolled pain     Call MD for:  temperature >100.4     CRUTCHES FOR HOME USE     Questions:    Type:  Axillary    Height:  5' 2" (1.575 m)    Weight:  55.8 kg (123 lb)    Does patient have medical equipment at home?:      Other:      Length of need (1-99 months):  2    Diet general     Questions:    Total calories:      Fat restriction, if any:      Protein restriction, if any:      Na restriction, if any:      Fluid restriction:      Additional restrictions:      Weight bearing restrictions (specify)     Comments:    NWB RLE        Discharge Instructions       Knee Athroscopy Discharge Instructions    1) Pain: After surgery your knee will be sore. The knee will likely have been injected with a numbing medicine (Exparel) prior to completion of surgery for pain control. This is indicated on a green bracelet that you will continue to " wear for 4 days after surgery. You will   also get a prescription for pain control before you leave the hospital. Ice and elevation will assist with pain control.    a) Apply ice as much as possible for the first 72 hours. After 72 hours, apply ice for 20minutes after therapy,after exercising or whenever experiencing pain. Avoid direct skin contact with ice to prevent frostbit.          b) Elevate the affected leg with the pillow the length of the leg higher than your heart to assist with swelling and pain.  2) Incision Care:  a) Some drainage from the incision in the first 72 hours is normal. If drainage is excessive,remove bandage, clean with mild soap and water, pat dry, cover with sterile gauze and secure with tape. Notify physician about excessive drainage. Staples will be removed 14-21 days after surgery   3) Activity:  a) Perform exercises 2-3 x day.  b) You may shower 48 hours after surgery providing the dressing is waterproof. No tub or hot tub usage. Support help is mandatory during showering. If the dressing becomes wet, replace with a new dressing.   c) Wear thigh high arlene hose stockings for 3 weeks after surgery .You may remove stockings for 1- 2 hours during the day only.  d) If your physician orders the CPM machine you are to use it for 2 hours in the am and 2 hours in the pm.Increase the flexion 5 degrees each session if tolerated. This is not to replace your exercise program.  4) For lifetime after your replacement surgery, you may need antibiotic coverage before dental or minor surgical procedures.  5) Possible Complication:  a) Infection: Report these signs and symptoms to your surgeon.  i) Unexpected redness around incision   ii) Persistent drainage from wound after 72 hours.  iii) Temperature greater than 101 degrees F  iv) Additional swelling  v) Pain not controlled with current pain medication  b) Blood Clot: Report theses signs and symptoms to your surgeon  i) Unusual pain  ii) Red or  discolored skin  iii) Swelling in the leg  iv) Unusual warm skin         [address], [place], [place]                                                                                          [phone]                   Postoperative Instructions for Knee Surgery                 Your Surgery Included:   Open               Arthroscopic   [] Ligament Repair       [] Diagnostic           [] ACL     [] PCL     [] MCL     [] PLLC      [] Synovectomy / Plica Removal [] Meniscal Cartilage Repair / Transplantation      [] Lysis of Adhesions / Manipulation [] Articular Cartilage Repair      [] Interval Release           [] Microfracture       [] OATS   [] ACI      [] Meniscectomy           [x] Osteochondral Allograft      [] Meniscal Cartilage Repair  [] Patellar Realignment       [x] Debridement / Chondroplasty         [] Lateral Release   [] Ligament Repair      [] Articular Cartilage Repair          [] Extensor Mechanism             []   Microfracture  []  OATS [] Tendon Repair          [] Ligament Reconstruction          [] Patella                  [] Quadriceps             []   ACL    []   PCL  [] High Tibial Osteotomy       [] PRP Arthrocentesis  [] Joint Replacement                    [] Unicompartmental   [] Patellofemoral                    [] Total Knee                  Call our office ([phone]) immediately if you experience any of the following:       Excessive bleeding or pus like drainage at the incision site       Uncontrollable pain not relieved by pain medication       Excessive swelling or redness at the incision site       Fever above 101.5 degrees not controlled with Tylenol or Motrin       Shortness of Breath       Any foul odor or blistering from the surgery site    FOR EMERGENCIES: If any unusual problems or difficulties occur, call our office at [phone], or page the  at [phone] who will direct your call appropriately    1.   Pain Management: A cold  therapy cuff, pain medications, local injections, and in some cases, regional anesthesia injections are used to manage your post-operative pain. The decision to use each of these options is based on their risks and benefits.     Medications: You were given one or more of the following medication prescriptions before leaving the hospital. Have the prescriptions filled at a pharmacy on your way home and follow the instructions on the bottles. If you need a refill, please call your pharmacy.      Narcotic Medication (usually Vicodin ES, Lortab, Percocet or Nucynta): Begin taking the medication before your knee starts to hurt. Some patients do not like to take any medication, but if you wait until your pain is severe before taking, you will be very uncomfortable for several hours waiting for the narcotic to work. Always take with food.     Nausea / Vomiting: For this issue, we prescribe Phenergan, use this medication as directed.     Cold Therapy: You may have been sent home with a [facility] cold therapy unit and wrap for your knee. Fill with ice and water to the indicated fill line and use throughout the day for the first two days and then as needed to help relieve pain and control swelling.      Regional Anesthesia Injections (Blocks): You may have been given a regional nerve block either before or after surgery. This may make your entire leg numb for 24-36 hours.                            * Proceed with caution when bearing weight on your leg.     2.   Diet: Eat a bland diet for the first day after surgery. Progress your diet as tolerated. Constipation may occur with Narcotic usage, contact our office if you are experiencing constipation.    3.   Activity: Limit your activity during the first 48 hours, keep your leg elevated with pillows under your heel. After the first 48 hours at home, increase your activity level based on your symptoms.    4.   Dressing Change: Remove the dressing on the 3rd day. It is normal  for some blood to be seen on the dressings. It is also normal for you to see apparent bruising on the skin around your knee when you remove the dressing. If present, leave the steri-strip tape across the incisions. If you are concerned by the drainage or the appearance of your knee, please call one of the numbers listed below.    5.   Showering: You may shower on the 10th day after surgery if the wound is dry and clean, but do not let the wound soak in water until sutures are removed. Do not submerge in any water until after your postoperative appointment in clinic.    6.   Knee Brace: You may have been sent home in a hinged knee brace. Your brace is set at 0 to 45 degrees of motion. Wear the brace for 6 weeks, LOCKED in full extension when walking, you will need to wear this brace at all time unless instructed otherwise. You may unlock at rest or for exercises.    7.   CPM Device: You may have been sent home with a Continuous Passive Motion (CPM) machine. This device helps relieve pain, improve motion, and heal cartilage. Use the machine for 6 weeks (Settings: Extension 0, Flexion 45). Try to use at night if able. Use 8 hours per day. Call the phone number on the device for return instructions.    8.   Weight Bearing: You may have been sent home with crutches. If instructed (see below), use these crutches at all times unless at complete rest.      [x] Toe touch-weight bearing for     6 weeks (you may touch your toes to the floor)      [] Partial weight bearing for  {NUMBER:64204} weeks    [] 25% Body Weight   [] 50% Body Weight      [] Full weight bearing            []  NOW    []  after {NUMBER:13268} weeks     9.  Knee Exercises: Begin these exercises the first day after surgery in order to help you regain your motion and strength. You may do the following marked exercises:     [x] Quad Sets - Begin activating your quadriceps muscle by driving your          knee downward into full knee extension while seated on a  "table or bed   with a towel rolled and propped under your heel     [x] Straight Leg Raise (SLR) - While ramón your quadriceps muscle, lift     your fully extended leg to the level of your non-operative knee (as shown)     [x] Heel Slides - With the knee straight, slide your heel slowly toward your   buttocks, hold at the endpoint for 10-15 seconds, then slowly straighten     [x] Ankle pumps - With your knee straight, move your ankle in a "pumping"    fashion to activate your calf and leg muscles      10.  Physical Therapy: Physical therapy is an essential component to your recovery from surgery. Your physical therapy will start in 1 days.    FIRST POSTOPERATIVE VISIT: As scheduled.           Primary Diagnosis     Your primary diagnosis was:  Internal Knee Problem      Admission Information     Date & Time Provider Department CSN    5/11/2017  5:30 AM Cooper Ramos MD [facility] [id number]      Care Providers     Provider Role Specialty Primary office phone    Cooper Ramos MD Attending Provider Sports Medicine [phone]    Cooper Ramos MD Surgeon  Sports Medicine [phone]      Your Vitals Were     BP Pulse Temp Resp Height Weight    93/54 69 98.4 °F (36.9 °C) 16 5' 2" (1.575 m) 55.8 kg (123 lb)    SpO2 BMI             99% 22.5 kg/m2         Recent Lab Values     No lab values to display.      Allergies as of 5/12/2017        Reactions    Adhesive Dermatitis    dermabond caused severe blistering    Benzoin Tincture Plain Hives, Itching, Other (See Comments)    Significant blistering    Morphine Nausea And Vomiting, Other (See Comments)    headaches    Oxycontin [Oxycodone] Hives, Itching    Sulfa (Sulfonamide Antibiotics) Hives    Dermabond [Tissue Glues] Blisters    Dermabond prineo [facility]     [facility] Nurse Care Line - 24/7 Assistance  Unless otherwise directed by your provider, please contact [facility], our nurse care line that is available for 24/7 " assistance.     Registered nurses in the [facility] provide clinical advisement, health education, appointment booking, and other advisory services.  Call for this free service at [phone].        Advance Directives     An advance directive is a document which, in the event you are no longer able to make decisions for yourself, tells your healthcare team what kind of treatment you do or do not want to receive, or who you would like to make those decisions for you.  If you do not currently have an advance directive, [facility] encourages you to create one.  For more information call:  [phone]), [phone] ([phone]),  or log on to [email or web address].        Language Assistance Services     ATTENTION: Language assistance services are available, free of charge. Please call [phone].      ATENCIÓN: Si habla español, tiene a bradford disposición servicios gratuitos de asistencia lingüística. Llame al [phone].     CHÚ Ý: N?u b?n nói Ti?ng Vi?t, có các d?ch v? h? tr? ngôn ng? mi?n phí dành cho b?n. G?i s? [phone].         [facility] complies with applicable Federal civil rights laws and does not discriminate on the basis of race, color, national origin, age, disability, or sex.

## 2017-05-11 NOTE — ANESTHESIA POSTPROCEDURE EVALUATION
"Anesthesia Post Evaluation    Patient: Chey Mcneill    Procedure(s) Performed: Procedure(s) (LRB):  GRAFT; OSTEOCHONDRAL ALLOGRAFT (Right)  ARTHROSCOPY-KNEE (Right)  SYNOVECTOMY-KNEE (Right)  ARTHROCENTESIS; AMNIOX (Right)    Final Anesthesia Type: general  Patient location during evaluation: PACU  Patient participation: Yes- Able to Participate  Level of consciousness: oriented and awake  Post-procedure vital signs: reviewed and stable  Pain management: adequate  Airway patency: patent  PONV status at discharge: No PONV  Anesthetic complications: no      Cardiovascular status: hemodynamically stable  Respiratory status: unassisted, spontaneous ventilation and room air  Hydration status: euvolemic  Follow-up not needed.        Visit Vitals    /66    Pulse 75    Temp 36.5 °C (97.7 °F) (Oral)    Resp 16    Ht 5' 2" (1.575 m)    Wt 55.8 kg (123 lb)    SpO2 97%    Breastfeeding No    BMI 22.5 kg/m2       Pain/Annie Score: Pain Assessment Performed: Yes (5/11/2017 11:50 AM)  Presence of Pain: complains of pain/discomfort (5/11/2017 11:50 AM)  Pain Rating Prior to Med Admin: 6 (5/11/2017 11:50 AM)  Annie Score: 10 (5/11/2017 11:50 AM)      "

## 2017-05-11 NOTE — PLAN OF CARE
Patient prefers to have Wai Patrick, boyfriend, present for discharge teaching. Please contact them @ [phone].

## 2017-05-11 NOTE — PLAN OF CARE
Problem: Patient Care Overview  Goal: Plan of Care Review  Outcome: Ongoing (interventions implemented as appropriate)  2:07 PM              Received to room from PACU, RN. VSS on RA & afebrile. AOx4. Oriented to room, POC, & all equipment. Surgical dressing CDI secure. Bowel sounds active, Regular diet initiated. Fall precautions implemented & call light in reach. Resting comfortably in low bed, in NAD.      3:08 PM              Requests CPM, immobilizer maintained notes reviewed.        6:48 PM   Resting comfortably in bed at this time.  VSS on RA and afebrile this shift.  Tolerating PRN meds Per MAR. Surgical dressing in place, CDI, and post op limb neurovascularly intact this shift.  Teds, compression devices, and cold treatment in use and tolerated well.  Did not tolerated CPM well this shift, as ordered.  Good appetite and good UOP this shift,  BP assist.  Repositions self independently in bed and ambulating around room x1 person assist.  Knee precautions maintained.  Free from injury or skin breakdown; Fall precautions maintained and call light in reach.  POC updated questions answered and comments acknowledged.

## 2017-05-11 NOTE — ANESTHESIA PROCEDURE NOTES
Right adductor canal block    Patient location during procedure: holding area   Block not for primary anesthetic.  Reason for block: at surgeon's request and post-op pain management   Post-op Pain Location: Knee pain  Start time: 5/11/2017 6:51 AM  Timeout: 5/11/2017 6:50 AM   End time: 5/11/2017 6:59 AM  Surgery related to: Knee surgery  Staffing  Anesthesiologist: JAIME ZHOU  Performed by: anesthesiologist   Preanesthetic Checklist  Completed: patient identified, site marked, surgical consent, pre-op evaluation, timeout performed, IV checked, risks and benefits discussed and monitors and equipment checked  Peripheral Block  Patient position: supine  Prep: ChloraPrep and site prepped and draped  Patient monitoring: heart rate, cardiac monitor, continuous pulse ox and frequent blood pressure checks  Block type: adductor canal  Laterality: right  Injection technique: single shot  Needle  Needle type: Stimuplex   Needle gauge: 21 G  Needle length: 3.5 in  Needle localization: anatomical landmarks, nerve stimulator and ultrasound guidance   -ultrasound image captured on disc.  Assessment  Injection assessment: negative aspiration, negative parasthesia and local visualized surrounding nerve  Paresthesia pain: none  Heart rate change: no  Slow fractionated injection: yes  Medications:  Bolus administered: 25 mL of 0.5 ropivacaine  Epinephrine added: none

## 2017-05-12 VITALS
OXYGEN SATURATION: 99 % | BODY MASS INDEX: 22.63 KG/M2 | SYSTOLIC BLOOD PRESSURE: 93 MMHG | TEMPERATURE: 98 F | HEART RATE: 69 BPM | DIASTOLIC BLOOD PRESSURE: 54 MMHG | HEIGHT: 62 IN | WEIGHT: 123 LBS | RESPIRATION RATE: 16 BRPM

## 2017-05-12 PROCEDURE — 97162 PT EVAL MOD COMPLEX 30 MIN: CPT

## 2017-05-12 PROCEDURE — 63600175 PHARM REV CODE 636 W HCPCS: Performed by: PHYSICIAN ASSISTANT

## 2017-05-12 PROCEDURE — 25000003 PHARM REV CODE 250: Performed by: PHYSICIAN ASSISTANT

## 2017-05-12 RX ADMIN — HYDROCODONE BITARTRATE AND ACETAMINOPHEN 2 TABLET: 10; 325 TABLET ORAL at 05:05

## 2017-05-12 RX ADMIN — DOCUSATE SODIUM 100 MG: 100 CAPSULE, LIQUID FILLED ORAL at 09:05

## 2017-05-12 RX ADMIN — HYDROCODONE BITARTRATE AND ACETAMINOPHEN 2 TABLET: 10; 325 TABLET ORAL at 09:05

## 2017-05-12 RX ADMIN — HYDROMORPHONE HYDROCHLORIDE 1 MG: 1 INJECTION, SOLUTION INTRAMUSCULAR; INTRAVENOUS; SUBCUTANEOUS at 05:05

## 2017-05-12 RX ADMIN — HYDROMORPHONE HYDROCHLORIDE 1 MG: 1 INJECTION, SOLUTION INTRAMUSCULAR; INTRAVENOUS; SUBCUTANEOUS at 11:05

## 2017-05-12 RX ADMIN — HYDROMORPHONE HYDROCHLORIDE 1 MG: 1 INJECTION, SOLUTION INTRAMUSCULAR; INTRAVENOUS; SUBCUTANEOUS at 02:05

## 2017-05-12 RX ADMIN — HYDROCODONE BITARTRATE AND ACETAMINOPHEN 2 TABLET: 10; 325 TABLET ORAL at 02:05

## 2017-05-12 NOTE — PT/OT/SLP EVAL
"Physical Therapy  Evaluation/Discharge    Chey Mcneill   MRN: [id number]   Admitting Diagnosis: Derangement, knee internal    PT Received On: 17  PT Start Time: 1029     PT Stop Time: 1051    PT Total Time (min): 22 min       Billable Minutes:  Evaluation 22    Diagnosis: Derangement, knee internal    Past Medical History:   Diagnosis Date    PONV (postoperative nausea and vomiting)     Ruptured triceps tendon 2012      Past Surgical History:   Procedure Laterality Date    ABDOMINAL SURGERY      lap band    BACK SURGERY      ELBOW FRACTURE SURGERY      HYSTERECTOMY      KNEE ARTHROSCOPY Left     16 sx's     Referring physician: Richard  Date referred to PT: 2017    General Precautions: Standard,  (fall risk)  Orthopedic Precautions: RLE partial weight bearing   Braces: Hinged knee brace (-10 to 45 )       Do you have any cultural, spiritual, Christianity conflicts, given your current situation?: None specified    Patient History:  Living Environment Comment: Pt lives with her boyfriend in a house with 3 flights of stairs to enter and bilateral HR present. She reports being (I) PTA with all mobility and ADLs  Equipment Currently Used at Home: none  DME owned (not currently used): none    Previous Level of Function:  Ambulation Skills: independent  Transfer Skills: independent  ADL Skills: independent  Work/Leisure Activity: independent    Subjective:  Communicated with RN prior to session.    Chief Complaint: "burning knee pain"   Patient goals: To return to PLOF     Pain Ratin/10   Location - Side: Right  Location - Orientation: generalized  Location: knee  Pain Addressed: Nurse notified  Pain Rating Post-Intervention: 9/10    Objective:   Patient found with: peripheral IV     Cognitive Exam:  Oriented to: Person, Place, Time and Situation    Follows Commands/attention: Follows one-step commands  Communication: clear/fluent  Safety awareness/insight to disability: intact    Physical " "Exam:  Postural examination/scapula alignment: No postural abnormalities identified    Skin integrity: Visible skin intact  Edema: None noted     Sensation:   Intact    Lower Extremity Range of Motion:  Right Lower Extremity: WFL except knee NT  Left Lower Extremity: WFL    Lower Extremity Strength:  Right Lower Extremity: ankle DF: 3; hip and knee NT  Left Lower Extremity: WFL    Gross motor coordination: WFL    Functional Mobility:  Bed Mobility:  Supine to Sit: Modified Independent  Sit to Supine: Modified Independent    Transfers:  Sit <> Stand Assistance: Modified Independent  Sit <> Stand Assistive Device: Axillary crutches    Gait:   Gait Distance: x 150'; pt required verbal cues for weightbearing precautions   Assistance 1: Supervision  Gait Assistive Device: Axillary crutches  Gait Pattern: partial weightbearing  Gait Deviation(s): decreased weight-shifting ability     Pt ascended and descended 4 6" curb steps with bilateral handrails and demonstrated NWB status to RLE    Balance:   Static Sit: NORMAL: No deviations seen in posture held statically  Dynamic Sit: NORMAL: No deviations seen in posture held dynamically  Static Stand: NORMAL: No deviations seen in posture held statically  Dynamic stand: NORMAL: No deviations seen in posture held dynamically    Therapeutic Activities and Exercises:  Pt axillary crutches adjusted to patient height     AM-PAC 6 CLICK MOBILITY  How much help from another person does this patient currently need?   1 = Unable, Total/Dependent Assistance  2 = A lot, Maximum/Moderate Assistance  3 = A little, Minimum/Contact Guard/Supervision  4 = None, Modified [place]/Independent    Turning over in bed (including adjusting bedclothes, sheets and blankets)?: 4  Sitting down on and standing up from a chair with arms (e.g., wheelchair, bedside commode, etc.): 4  Moving from lying on back to sitting on the side of the bed?: 4  Moving to and from a bed to a chair (including a " wheelchair)?: 3  Need to walk in hospital room?: 3  Climbing 3-5 steps with a railing?: 3  Total Score: 21     AM-PAC Raw Score CMS G-Code Modifier Level of Impairment Assistance   6 % Total / Unable   7 - 9 CM 80 - 100% Maximal Assist   10 - 14 CL 60 - 80% Moderate Assist   15 - 19 CK 40 - 60% Moderate Assist   20 - 22 CJ 20 - 40% Minimal Assist   23 CI 1-20% SBA / CGA   24 CH 0% Independent/ Mod I     Patient left supine with all lines intact, call button in reach and RN notified.    Assessment:   Chey Mcneill is a 42 y.o. female with a medical diagnosis of Derangement, knee internal and presents with s/p OATS procedure. Pt reports this is her 17th knee surgery. She presents with good safety awareness with crutches and stair training. No further acute PT needs at this time.     Rehab identified problem list/impairments: Rehab identified problem list/impairments: gait instability, decreased lower extremity function, decreased ROM, weakness, pain    Rehab potential is good.    Activity tolerance: Good    Discharge recommendations: Discharge Facility/Level Of Care Needs: outpatient PT     Barriers to discharge: Barriers to Discharge: None    Equipment recommendations: Equipment Needed After Discharge: none     Carlyn Guthrie, PT  05/12/2017

## 2017-05-12 NOTE — PLAN OF CARE
ATTN: TEAM DC PLANNING     OUTPT PROCEDURE - NO NEEDS PER MD TEAM      PENDING PT EVAL ( POSSIBLE DME NEEDS )     Miley Otero RN  Case management 5/12/201710:14 AM  # [phone] (FAX) [phone]     05/12/17 1014   Discharge Assessment   Assessment Type Discharge Planning Assessment

## 2017-05-12 NOTE — PLAN OF CARE
ATTN: TEAM DC PLANNING     OUTPT PROCEDURE - NO NEEDS PER MD TEAM        HAS CRUTCHES NO DME NEEDS     Miley Otero RN  Case management 5/12/201710:14 AM  # [phone] (FAX) [phone]     05/12/17 1623   Discharge Assessment   Assessment Type Final Discharge Note

## 2017-05-12 NOTE — PLAN OF CARE
Problem: Physical Therapy Goal  Goal: Physical Therapy Goal  Outcome: Outcome(s) achieved Date Met:  05/12/17  PT evaluation completed. Please see progress note for details, POC, and recommendations.

## 2017-05-12 NOTE — DISCHARGE INSTRUCTIONS
Knee Athroscopy Discharge Instructions    1) Pain: After surgery your knee will be sore. The knee will likely have been injected with a numbing medicine (Exparel) prior to completion of surgery for pain control. This is indicated on a green bracelet that you will continue to wear for 4 days after surgery. You will   also get a prescription for pain control before you leave the hospital. Ice and elevation will assist with pain control.    a) Apply ice as much as possible for the first 72 hours. After 72 hours, apply ice for 20minutes after therapy,after exercising or whenever experiencing pain. Avoid direct skin contact with ice to prevent frostbit.          b) Elevate the affected leg with the pillow the length of the leg higher than your heart to assist with swelling and pain.  2) Incision Care:  a) Some drainage from the incision in the first 72 hours is normal. If drainage is excessive,remove bandage, clean with mild soap and water, pat dry, cover with sterile gauze and secure with tape. Notify physician about excessive drainage. Staples will be removed 14-21 days after surgery   3) Activity:  a) Perform exercises 2-3 x day.  b) You may shower 48 hours after surgery providing the dressing is waterproof. No tub or hot tub usage. Support help is mandatory during showering. If the dressing becomes wet, replace with a new dressing.   c) Wear thigh high arlene hose stockings for 3 weeks after surgery .You may remove stockings for 1- 2 hours during the day only.  d) If your physician orders the CPM machine you are to use it for 2 hours in the am and 2 hours in the pm.Increase the flexion 5 degrees each session if tolerated. This is not to replace your exercise program.  4) For lifetime after your replacement surgery, you may need antibiotic coverage before dental or minor surgical procedures.  5) Possible Complication:  a) Infection: Report these signs and symptoms to your surgeon.  i) Unexpected redness around  incision   ii) Persistent drainage from wound after 72 hours.  iii) Temperature greater than 101 degrees F  iv) Additional swelling  v) Pain not controlled with current pain medication  b) Blood Clot: Report theses signs and symptoms to your surgeon  i) Unusual pain  ii) Red or discolored skin  iii) Swelling in the leg  iv) Unusual warm skin         [address], [place]                                                                                          [phone]                   Postoperative Instructions for Knee Surgery                 Your Surgery Included:   Open               Arthroscopic   [] Ligament Repair       [] Diagnostic           [] ACL     [] PCL     [] MCL     [] PLLC      [] Synovectomy / Plica Removal [] Meniscal Cartilage Repair / Transplantation      [] Lysis of Adhesions / Manipulation [] Articular Cartilage Repair      [] Interval Release           [] Microfracture       [] OATS   [] ACI      [] Meniscectomy           [x] Osteochondral Allograft      [] Meniscal Cartilage Repair  [] Patellar Realignment       [x] Debridement / Chondroplasty         [] Lateral Release   [] Ligament Repair      [] Articular Cartilage Repair          [] Extensor Mechanism             []   Microfracture  []  OATS [] Tendon Repair          [] Ligament Reconstruction          [] Patella                  [] Quadriceps             []   ACL    []   PCL  [] High Tibial Osteotomy       [] PRP Arthrocentesis  [] Joint Replacement                    [] Unicompartmental   [] Patellofemoral                    [] Total Knee                  Call our office ([phone]) immediately if you experience any of the following:       Excessive bleeding or pus like drainage at the incision site       Uncontrollable pain not relieved by pain medication       Excessive swelling or redness at the incision site       Fever above 101.5 degrees not controlled with Tylenol or Motrin        Shortness of Breath       Any foul odor or blistering from the surgery site    FOR EMERGENCIES: If any unusual problems or difficulties occur, call our office at [phone], or page the  at [phone] who will direct your call appropriately    1.   Pain Management: A cold therapy cuff, pain medications, local injections, and in some cases, regional anesthesia injections are used to manage your post-operative pain. The decision to use each of these options is based on their risks and benefits.     Medications: You were given one or more of the following medication prescriptions before leaving the hospital. Have the prescriptions filled at a pharmacy on your way home and follow the instructions on the bottles. If you need a refill, please call your pharmacy.      Narcotic Medication (usually Vicodin ES, Lortab, Percocet or Nucynta): Begin taking the medication before your knee starts to hurt. Some patients do not like to take any medication, but if you wait until your pain is severe before taking, you will be very uncomfortable for several hours waiting for the narcotic to work. Always take with food.     Nausea / Vomiting: For this issue, we prescribe Phenergan, use this medication as directed.     Cold Therapy: You may have been sent home with a [facility]® cold therapy unit and wrap for your knee. Fill with ice and water to the indicated fill line and use throughout the day for the first two days and then as needed to help relieve pain and control swelling.      Regional Anesthesia Injections (Blocks): You may have been given a regional nerve block either before or after surgery. This may make your entire leg numb for 24-36 hours.                            * Proceed with caution when bearing weight on your leg.     2.   Diet: Eat a bland diet for the first day after surgery. Progress your diet as tolerated. Constipation may occur with Narcotic usage, contact our office if you are experiencing  constipation.    3.   Activity: Limit your activity during the first 48 hours, keep your leg elevated with pillows under your heel. After the first 48 hours at home, increase your activity level based on your symptoms.    4.   Dressing Change: Remove the dressing on the 3rd day. It is normal for some blood to be seen on the dressings. It is also normal for you to see apparent bruising on the skin around your knee when you remove the dressing. If present, leave the steri-strip tape across the incisions. If you are concerned by the drainage or the appearance of your knee, please call one of the numbers listed below.    5.   Showering: You may shower on the 10th day after surgery if the wound is dry and clean, but do not let the wound soak in water until sutures are removed. Do not submerge in any water until after your postoperative appointment in clinic.    6.   Knee Brace: You may have been sent home in a hinged knee brace. Your brace is set at 0 to 45 degrees of motion. Wear the brace for 6 weeks, LOCKED in full extension when walking, you will need to wear this brace at all time unless instructed otherwise. You may unlock at rest or for exercises.    7.   CPM Device: You may have been sent home with a Continuous Passive Motion (CPM) machine. This device helps relieve pain, improve motion, and heal cartilage. Use the machine for 6 weeks (Settings: Extension 0, Flexion 45). Try to use at night if able. Use 8 hours per day. Call the phone number on the device for return instructions.    8.   Weight Bearing: You may have been sent home with crutches. If instructed (see below), use these crutches at all times unless at complete rest.      [x] Toe touch-weight bearing for     6 weeks (you may touch your toes to the floor)      [] Partial weight bearing for  {NUMBER:33515} weeks    [] 25% Body Weight   [] 50% Body Weight      [] Full weight bearing            []  NOW    []  after {NUMBER:63437} weeks     9.  Knee  "Exercises: Begin these exercises the first day after surgery in order to help you regain your motion and strength. You may do the following marked exercises:     [x] Quad Sets - Begin activating your quadriceps muscle by driving your          knee downward into full knee extension while seated on a table or bed   with a towel rolled and propped under your heel     [x] Straight Leg Raise (SLR) - While ramón your quadriceps muscle, lift     your fully extended leg to the level of your non-operative knee (as shown)     [x] Heel Slides - With the knee straight, slide your heel slowly toward your   buttocks, hold at the endpoint for 10-15 seconds, then slowly straighten     [x] Ankle pumps - With your knee straight, move your ankle in a "pumping"    fashion to activate your calf and leg muscles      10.  Physical Therapy: Physical therapy is an essential component to your recovery from surgery. Your physical therapy will start in 1 days.    FIRST POSTOPERATIVE VISIT: As scheduled.       "

## 2017-05-12 NOTE — PROGRESS NOTES
Patient seen and examined in am. Pt denies chest pain or shortness of breath     gen awake alert   rle  Dp/spn/s sensation grossly intact  Cap refill <2 sec  Ehl/gs/ta motor grossly intact  Dressing c/d/i        A/p  43 yo female s/p osteochondral allograft  Pain control  dvt ppx  Rehab  Dc home

## 2017-05-12 NOTE — PLAN OF CARE
Problem: Patient Care Overview  Goal: Plan of Care Review  Outcome: Ongoing (interventions implemented as appropriate)  Resting comfortably in a low lying bed on room air.  Pain controlled with prn medication.  Will try CPM in the morning.  Repositions herself in bed independently.  No falls or injuries.  Safety precautions ongoing and call light within reach.  Will continue to monitor.

## 2017-05-12 NOTE — OP NOTE
DATE OF PROCEDURE:  05/11/2017    ATTENDING SURGEON:  Cooper Ramos M.D.    FIRST ASSISTANT:  Antione Henao M.D.(RES) - Fellow.    SECOND ASSISTANT:  SMA Chance -- assistant.    PREOPERATIVE DIAGNOSIS:  Right knee chondromalacia.    POSTOPERATIVE DIAGNOSES:  1.  Right knee chondromalacia.  2.  Right knee patellar chondromalacia.  3.  Right knee patellar malalignment syndrome.  4.  Right knee medial meniscus tear.    OPERATIVE PROCEDURES PERFORMED:  1.  Right knee complex osteochondral allograft transplantation, medial femoral   condyle and patella.  2.  Right knee complex patella realignment.  3.  Right knee arthroscopic medial meniscectomy.  4.  Right knee arthroscopic chondroplasty.  5.  Right knee arthroscopic loose body removal.  6.  Right knee Amniox arthrocentesis.    ANESTHESIA:  Adductor block with concomitant general endotracheal intubation and   local anesthetics using 120 mL of Exparel mixture.    FLUIDS IN THE CASE:  1300 mL LR.    ESTIMATED BLOOD LOSS:  Less than 50 mL.    URINE OUTPUT:  None.    COMPLICATIONS:  None.    CONDITION ON RETURN TO RECOVERY ROOM:  Stable.    INDICATIONS FOR OPERATIVE PROCEDURE:  Chey Mcneill is a 42-year-old female with   history of right knee pain and pathology, status post previous realignment   surgery numerous years ago.  She had significant continuing symptoms in the   right knee with MRI revealing evidence of cartilage damage to the patella and   medial aspect of the knee.  The patient was noted to have patella malalignment   syndrome as well and in conjunction with the underlying previous treatments, was   deemed to be an appropriate candidate for operative intervention.    Complexity of case was increased based on the revision nature of procedure with   change in overall bony anatomy and soft tissue structures as well as   neurovascular structures secondary to previous operative interventions.  The   patient has significant scarring in the area of  concern with change in overall   alignment at the patellar joint secondary to previous treatment procedures.    IMPLANTS UTILIZED:  [employer] MOPS patellar left OC   graft, [employer] MOPS distal femur right OC graft,   [employer] cartilage allograft matrix 1 mL, Clarix   KEON 100 mL injectable amniotic fluid.    DESCRIPTION OF PROCEDURE:  The patient was brought into the Operating Room,   placed in supine position.  Upon application of the adductor block in the   preoperative hold area, the patient underwent general sedation and placement of   an LMA to stabilize the airway.  The patient was given the appropriate dose of   antibiotics based on body weight.  Time-out was utilized to verify right side as   the operative site.  Both upper extremities were placed in comfortable   position.  Left leg was carefully padded along the heel and peroneal nerve   regions.  Both upper extremities were placed in comfortable position once again.    Right leg was examined under anesthesia revealing full extension and flexion   to 140 degrees.  The patient demonstrated patellar glide to the third quadrant   laterally and second quadrant medially with the knee at 30 degrees flexion.    Right leg was prepped and draped in a sterile fashion with ChloraPrep material.    Knee was taken into flexion and we instilled approximately 10 mL of 0.5%   ropivacaine mixture into the anteromedial arthroscopic portal site.  A #11 blade   was used to make this portal.  Blunt trocar and sheath were inserted into the   intercondylar notch of the suprapatellar pouch.  The area was visualized,   demonstrating evidence of significant lateral patellar tracking and tilt.    Subluxation of the patella was noted to be approximately 2 quadrants laterally   in resting position.  The patella demonstrated evidence of grade III cartilage   damage of the central eminence and extending  medially to the medial facet of the   patella.    Trochlear groove was found to be intact, although somewhat hypoplastic.  The   intercondylar notch area was assessed demonstrating intact anterior and   posterior cruciate ligament structures.  The medial compartment was then   assessed demonstrating evidence of a medial meniscus tear.  We can achieve   medial meniscectomy by creating an anterolateral portal with the knee in valgus   load.  We created the anterolateral portal localizing with a spinal needle.  A   #11 blade was used to make this portal.  The blunt trocar and sheath was   inserted into this area.  We then placed the upbiting punch in the area.  We   removed approximately 10% of the medial meniscus, which was torn centrally of   the body of the meniscus.  Remaining meniscus structure was then probed, was   found to be intact.    Articular cartilage along the medial femoral condyle demonstrated evidence of   grade IV change in an area of approximately 15 mm in length and a width of   approximately 25 mm.  Probe analysis revealed irregular segments.  There were   loose cartilage fragments within the joint, which were removed with the 4.2   shaver for an effective removal of loose bodies.  The knee was taken into   figure-of-four position.  The lateral compartment was assessed demonstrating   intact lateral meniscus and intact lateral articular cartilage structures.    There were loose cartilage fragments floating within this area as well, which   were removed with a 4.2 shaver.  Final probe analysis of all areas and removal   of all cartilage fragments was performed.  We then debrided the irregular   segments along the patellofemoral joint and medial compartments of the knee with   a 4.2 shaver for an effective chondroplasty.  Fluid was extravasated from the   joint.  We used 4-0 nylon sutures to close the arthroscopic portals.  We then   created a lateral based incision incorporating the previous lateral  incision for   this patient and was carried proximally and distally down to skin, down the fat   and fascia.  A lateral subvastus approach was performed of the knee.  The   patella was subluxed medially.  We were then able to take the knee in   hyperflexion, fully exposing the medial femoral chondral area.    We exposed the area demonstrating a cartilage lesion, width of 22 mm and length   of approximately 45 mm.  As a result, we placed a 22 mm guide from the MTF ACT   set, centrally within the more posterior and distal lesion with the knee in   flexion.  It was pinned centrally, then reamed centrally to a depth of 8 mm   proximally and distally at its deepest margins and 6 mm to 5 mm medially and   laterally as deepest margins respectively.  We then drilled the base of the   lesion to allow for appropriate blood flow.  Attention was then turned to   preparation of the graft.    The MOPS graft to the patella and medial femoral condyle as well as trochlear   regions was taken out of sterile packet.  We then placed using the normal saline   at room temperature without antibiotics.  We then created a graft in the medial   femoral condylar lesion by taking this and taking this graft and placing it on   the MTF ACT set and created the graft at 22 mm in diameter with the thickness of   6 to 8 mm based on the contours and measurements previously obtained.  We then   pulsavaced the base of the cartilage graft created.  This removed the donor   elements.  We then hand impacted this into position and fit in excellent   fashion.  Pictures were obtained.  The process was repeated for the more   proximal 20 mm area of involvement.  We drilled centrally within the area of   concern to a width of 20 mm as described and a depth of approximately 7 mm at   the proximal and distal edges and in this case 6 mm of the medial and lateral   edges of the lesion.  We drilled the base of the lesion to create appropriate   blood flow.  We then  created a graft of same contour, measuring 20 mm in   diameter with the appropriate measurements as described.  We pulsavaced the base   of the graft created.  The graft was then hand impacted in position and fit in   excellent fashion.  Pictures were obtained.    Knee was taken in extension.  We turned our attention to the patella.  Patella   was everted with sharp reduction clamps.  We then sized the area and   demonstrated that a 30 mm graft would most normalize the patient's anatomy.  As   a result, we then placed a central pin using 30 mm guide from the MTF ACT set.    We then reamed centrally to a depth of approximately 10 mm at the proximal and   distal edges and approximately 6 mm at the medial and lateral edges of the area   of concern.  We then drilled the base of the patellar lesion to create   appropriate blood flow.  We made measurement as described.    The patellar graft was placed into the MTF ACT set.  We then created a graft at   30 mm in diameter with appropriate depth and width as described.  We then took   the graft to the operative field, pulsavaced the base of the graft to remove any   donor elements.  We then were able to hand impact this graft in position, with   excellent contour and fit and filling the area of concern demonstrated.    The knee was taken back into hyperflexion, exposing the previous medial femoral   chondral lesion.  We removed scar and granulation tissue in the anterior   intervening space between the proximal and distal grafts.  We then used   microfracture awl to create a small blood flow in these areas.  We then mixed   and created the cartilage space from the MTF set using the cartilage allograft   matrix.  With this in place and mixing this using Amniox material, we then   placed this in position and then sealed this with fibrin glue to create   appropriate space between the graft and appropriate flow without any significant   edge effect.  Pictures were obtained.  The  knee was taken gently into 30   degrees flexion.  The patella was gently everted and positioned and stability   assessed.  At this point, we felt there was some continuing lateral and medial   patellar subluxation ability.  As a result, with the knee in 30 degrees flexion,   we created a closure and stability by closing the vastus lateralis approach and   lateral subvastus approach.  With closure using a series of #1 Vicryl sutures   placed in figure-of-eight fashion and using a pants-over-vest method to further   imbricate the area.  It demonstrated good stability with glide to the first   quadrant laterally and second quadrant medially along the area of concern.  The   irrigation was performed gently along the area of concern following closure of   the deep vastus lateralis and subvastus regions.  Parapatellar tendon region was   closed with a series of #1 Vicryl sutures placed in figure-of-eight fashion as   well.  We then anesthetized the patient's knee by application of 120 mL of the   Exparel mixture using a 21-gauge needle, applying small amounts along the soft   tissue structures in multiple locations.  Subcutaneous tissues were closed with   3-0 Vicryl sutures placed in inverted fashion.  Skin was closed with running 4-0   Monocryl suture placed in subcuticular fashion along with the application of   Dermabond ointment and Prineo tape.  We applied sterile electrode pads   proximally and distally along with application of Xeroform gauze, ABD pads, cast   padding, a long-leg MEMO hose stocking and cooling unit.  The patient's knee was   placed into a hinged knee immobilizer, which was locked in extension.  The   patient was allowed to recover from the anesthetic with the base set the knee at   -10 degrees hyperextension and allowed to flex at rest at 45 degrees flexion.    The patient was allowed to recover from the anesthetic, was extubated and was   taken to Recovery Room in stable condition.  At the  completion of the case, all   instrument and sponge counts were correct.    NOTE:  Dr. Cooper Ramos was present for the key portions of the procedure and did   perform the key parts of the procedure.    PHYSICAL THERAPY:  The patient should begin physical therapy on postoperative   day #3.    WEIGHTBEARING STATUS:  Toe-touch at 25% weightbearing along the right leg with   the immobilizer locked in extension with gait for the first 6 weeks.    RANGE OF MOTION:  -10 degrees hyperextension and flexion to 45 degrees and   starting CPM machine on postoperative day #1 following approximately 12 to 24   hours of rest.    Range of motion should be maintained -10 degrees to 45 degrees flexion for 2   weeks; then from 2 or 3 weeks, she can begin flexion to 60 degrees; at 34 weeks   she can begin flexion to 90 degrees; after 4 weeks, she can begin flexion past   90 degrees to 120 degrees as tolerated.    Immobilizer should be discontinued at 6 weeks' time allowing for full range of   motion.  The patient should avoid open chain rehabilitation for the first 4   months following the surgery.    NOTE:  Amniox arthrocentesis was performed, intraoperatively following closure   of the deep soft tissue structures, applying 3 mL of amniotic fluid for   postoperative healing purposes.      SHAY/  dd: 05/11/2017 19:52:43 (CDT)  td: 05/12/2017 00:52:45 (CDT)  Doc ID   #[id number]  Job ID #[id number]    CC: [facility]

## 2017-05-12 NOTE — NURSING
Discharge instructions reviewed with pt at length and verbalized 100% understanding. Pt. Instructed to administer 325mg ASA daily per Dr. Ramos.Extra dsg sent home with pt.

## 2017-05-17 ENCOUNTER — CLINICAL SUPPORT (OUTPATIENT)
Dept: REHABILITATION | Facility: HOSPITAL | Age: 42
End: 2017-05-17
Attending: ORTHOPAEDIC SURGERY
Payer: MEDICAID

## 2017-05-17 DIAGNOSIS — M25.561 RIGHT ANTERIOR KNEE PAIN: Primary | ICD-10-CM

## 2017-05-17 PROCEDURE — 97164 PT RE-EVAL EST PLAN CARE: CPT | Mod: PN | Performed by: PHYSICAL THERAPIST

## 2017-05-17 PROCEDURE — 97110 THERAPEUTIC EXERCISES: CPT | Mod: PN | Performed by: PHYSICAL THERAPIST

## 2017-05-17 NOTE — PROGRESS NOTES
Physical Therapy Daily Note/Re-eval     Date: 05/17/2017  Name: Chey Mcneill  Clinic Number: [id number]  Diagnosis:   Encounter Diagnosis   Name Primary?    Right anterior knee pain Yes     OPERATIVE PROCEDURES PERFORMED:  1. Right knee complex osteochondral allograft transplantation, medial femoral   condyle and patella.  2. Right knee complex patella realignment.  3. Right knee arthroscopic medial meniscectomy.  4. Right knee arthroscopic chondroplasty.  5. Right knee arthroscopic loose body removal.  6. Right knee Amniox arthrocentesis.      Physician: Cooper Ramos MD    Evaluation Date: 4/11/17  Date of Surgery: pending  Visit #: 6  Start Time:  4:30  Stop Time:  5:30  Total Treatment Time: 60    Precautions:   PHYSICAL THERAPY: The patient should begin physical therapy on postoperative   day #3.     WEIGHTBEARING STATUS: Toe-touch at 25% weightbearing along the right leg with   the immobilizer locked in extension with gait for the first 6 weeks.     RANGE OF MOTION: -10 degrees hyperextension and flexion to 45 degrees and   starting CPM machine on postoperative day #1 following approximately 12 to 24   hours of rest.     Range of motion should be maintained -10 degrees to 45 degrees flexion for 2   weeks; then from 2 or 3 weeks, she can begin flexion to 60 degrees; at 34 weeks   she can begin flexion to 90 degrees; after 4 weeks, she can begin flexion past   90 degrees to 120 degrees as tolerated.     Immobilizer should be discontinued at 6 weeks' time allowing for full range of   motion. The patient should avoid open chain rehabilitation for the first 4   months following the surgery.     NOTE: Amniox arthrocentesis was performed, intraoperatively following closure   of the deep soft tissue structures, applying 3 mL of amniotic fluid for   postoperative healing purposes.    Subjective     Pt reports the knee is sore but does not feel as bad as the last  surgery.     Pain: 1/10    Objective     Measurements taken:    Right knee ROM: 0-35  diminshed right quad control  Right hip abd = 3/5    Patient received individual therapy to increase strength, endurance and ROM with activities as follows:     Chey received therapeutic exercises to develop strength, endurance and ROM for 25 minutes including:   Quad set towel x 20  Seated hangs 5# x 5 min  Quad set towel x 20  Quad set flat x 20       Chey received the following manual therapy techniques: Joint mobilizations and Soft tissue Mobilization were applied to the: right knee for 5 minutes.   PROM: ext, patellar mobs  STM quad, IT band        Written Home Exercises Provided: updated as per therex list  Pt demo good understanding of the education provided. Chey demonstrated good return demonstration of activities.     Education provided:  Chey verbalized good understanding of education provided.   No spiritual or educational barriers to learning identified.    Assessment     See measurements/precautions above as per post-op status. Pt with improving quad control and extension with rx today. Continue to progress within post-op precautions.     This is a 42 y.o. female referred to outpatient physical therapy and presents with a medical diagnosis of right knee pain and demonstrates limitations as described in the problem list Pt prognosis is Good. Pt will continue to benefit from skilled outpatient physical therapy to address the deficits listed below in the problem list, provide pt/family education and to maximize pt's level of independence in the home and community environment.    Will the patient continue to benefit from skilled PT intervention? Updated as per therex list      Medical necessity is demonstrated by:   - Pain limits function of effected part for all activities  - Unable to participate in daily activities   - Requires skilled supervision to complete and progress HEP  - Fall risk - impaired balance    - Continued inability to participate in vocational pursuits    Short Term Goals (10-12 Weeks):   - Pt will increase ROM of right knee to left knee  - Pt will increase strength of bilateral hip abd =5/5; Improved quad firing to hyperextension.  - Decrease Pain to 2/10 with ADLs  - Pt to safely amb stairs without valgus instability.  - Pt to self correct posture independently.   - Pt independent with HEP with progressions.      Long Term Goals: 6-8 mo  - Pt able to SLS x 2 min without valgus  - Pt with normalized loading mechanics with Ybalance.  - Pt with painfree tolerance to ADLs/ full rx x 45 min  - Pt to DC with independence with HEP.          Plan   Continue with established Plan of Care towards PT goals.      Therapist: ALEJANDRO MAI, PT

## 2017-05-22 ENCOUNTER — HOSPITAL ENCOUNTER (OUTPATIENT)
Dept: RADIOLOGY | Facility: HOSPITAL | Age: 42
Discharge: HOME | End: 2017-05-22
Attending: ORTHOPAEDIC SURGERY
Payer: MEDICAID

## 2017-05-22 ENCOUNTER — OFFICE VISIT (OUTPATIENT)
Dept: SPORTS MEDICINE | Facility: CLINIC | Age: 42
End: 2017-05-22
Payer: COMMERCIAL

## 2017-05-22 VITALS
SYSTOLIC BLOOD PRESSURE: 104 MMHG | WEIGHT: 125 LBS | BODY MASS INDEX: 23 KG/M2 | HEART RATE: 77 BPM | HEIGHT: 62 IN | DIASTOLIC BLOOD PRESSURE: 69 MMHG

## 2017-05-22 DIAGNOSIS — M22.41 CHONDROMALACIA OF RIGHT PATELLA: ICD-10-CM

## 2017-05-22 DIAGNOSIS — M94.261 CHONDROMALACIA, RIGHT KNEE: ICD-10-CM

## 2017-05-22 DIAGNOSIS — M23.91 DERANGEMENT, KNEE INTERNAL, RIGHT: ICD-10-CM

## 2017-05-22 DIAGNOSIS — M25.561 RIGHT KNEE PAIN, UNSPECIFIED CHRONICITY: ICD-10-CM

## 2017-05-22 DIAGNOSIS — M25.561 RIGHT KNEE PAIN, UNSPECIFIED CHRONICITY: Primary | ICD-10-CM

## 2017-05-22 PROCEDURE — 73560 X-RAY EXAM OF KNEE 1 OR 2: CPT | Mod: 26,RT,, | Performed by: RADIOLOGY

## 2017-05-22 PROCEDURE — 73560 X-RAY EXAM OF KNEE 1 OR 2: CPT | Mod: TC,PO,RT

## 2017-05-22 PROCEDURE — 99999 PR PBB SHADOW E&M-EST. PATIENT-LVL IV: CPT | Mod: PBBFAC,,, | Performed by: ORTHOPAEDIC SURGERY

## 2017-05-22 PROCEDURE — 99024 POSTOP FOLLOW-UP VISIT: CPT | Mod: S$GLB,,, | Performed by: ORTHOPAEDIC SURGERY

## 2017-05-22 RX ORDER — TRAMADOL HYDROCHLORIDE 50 MG/1
50 TABLET ORAL EVERY 12 HOURS PRN
Qty: 60 TABLET | Refills: 0 | Status: SHIPPED | OUTPATIENT
Start: 2017-05-22 | End: 2017-08-07 | Stop reason: SDUPTHER

## 2017-05-22 RX ORDER — OXYCODONE AND ACETAMINOPHEN 10; 325 MG/1; MG/1
1 TABLET ORAL EVERY 12 HOURS PRN
Qty: 60 TABLET | Refills: 0 | Status: SHIPPED | OUTPATIENT
Start: 2017-05-22 | End: 2017-08-07

## 2017-05-22 NOTE — PATIENT INSTRUCTIONS
PHYSICAL THERAPY:  The patient should begin physical therapy on postoperative day #3.     WEIGHTBEARING STATUS:  Toe-touch at 25% weightbearing along the right leg with the immobilizer locked in extension with gait for the first 6 weeks.     RANGE OF MOTION:  -10 degrees hyperextension and flexion to 45 degrees and starting CPM machine on postoperative day #1 following approximately 12 to 24 hours of rest.     Range of motion should be maintained -10 degrees to 45 degrees flexion for 2 weeks; then from 2 or 3 weeks, she can begin flexion to 60 degrees; at 3-4 weeks she can begin flexion to 90 degrees; after 4 weeks, she can begin flexion past 90 degrees to 120 degrees as tolerated.     Immobilizer should be discontinued at 6 weeks' time allowing for full range of motion.  The patient should avoid open chain rehabilitation for the first 4   months following the surgery.
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
## 2017-06-01 ENCOUNTER — CLINICAL SUPPORT (OUTPATIENT)
Dept: REHABILITATION | Facility: HOSPITAL | Age: 42
End: 2017-06-01
Attending: ORTHOPAEDIC SURGERY
Payer: MEDICAID

## 2017-06-01 DIAGNOSIS — M25.561 RIGHT ANTERIOR KNEE PAIN: Primary | ICD-10-CM

## 2017-06-01 PROCEDURE — 97140 MANUAL THERAPY 1/> REGIONS: CPT | Mod: PN | Performed by: PHYSICAL THERAPIST

## 2017-06-01 PROCEDURE — 97110 THERAPEUTIC EXERCISES: CPT | Mod: PN | Performed by: PHYSICAL THERAPIST

## 2017-06-01 NOTE — PROGRESS NOTES
Physical Therapy Daily Note/Re-eval     Date: 06/01/2017  Name: Chey Mcneill  Clinic Number: [id number]  Diagnosis:   Encounter Diagnosis   Name Primary?    Right anterior knee pain Yes     OPERATIVE PROCEDURES PERFORMED:  1. Right knee complex osteochondral allograft transplantation, medial femoral   condyle and patella.  2. Right knee complex patella realignment.  3. Right knee arthroscopic medial meniscectomy.  4. Right knee arthroscopic chondroplasty.  5. Right knee arthroscopic loose body removal.  6. Right knee Amniox arthrocentesis.      Physician: Antione Henao*    Evaluation Date: 4/11/17  Date of Surgery: pending  Visit #: 7  Start Time:  1:30  Stop Time:  2:30  Total Treatment Time: 60    Precautions:   PHYSICAL THERAPY: The patient should begin physical therapy on postoperative   day #3.     WEIGHTBEARING STATUS: Toe-touch at 25% weightbearing along the right leg with   the immobilizer locked in extension with gait for the first 6 weeks.     RANGE OF MOTION: -10 degrees hyperextension and flexion to 45 degrees and   starting CPM machine on postoperative day #1 following approximately 12 to 24   hours of rest.     Range of motion should be maintained -10 degrees to 45 degrees flexion for 2   weeks; then from 2 or 3 weeks, she can begin flexion to 60 degrees; at 34 weeks   she can begin flexion to 90 degrees; after 4 weeks, she can begin flexion past   90 degrees to 120 degrees as tolerated.     Immobilizer should be discontinued at 6 weeks' time allowing for full range of   motion. The patient should avoid open chain rehabilitation for the first 4   months following the surgery.     NOTE: Amniox arthrocentesis was performed, intraoperatively following closure   of the deep soft tissue structures, applying 3 mL of amniotic fluid for   postoperative healing purposes.    Subjective     Pt reports the knee is sore. It is straight though.      Pain: 1/10    Objective     Measurements taken:    Right knee ROM: 0-35  diminshed right quad control  Right hip abd = 3/5    Patient received individual therapy to increase strength, endurance and ROM with activities as follows:     Chey received therapeutic exercises to develop strength, endurance and ROM for 25 minutes including:   Quad set towel x 20  Seated hangs 5# x 5 min  Quad set towel x 20  Quad set flat x 20   Seated flexion x 3 min  SLR flexion 3x10    Chey received the following manual therapy techniques: Joint mobilizations and Soft tissue Mobilization were applied to the: right knee for 8 minutes.   PROM: ext, patellar mobs  STM quad, IT band        Written Home Exercises Provided: updated as per therex list  Pt demo good understanding of the education provided. Chey demonstrated good return demonstration of activities.     Education provided:  Chey verbalized good understanding of education provided.   No spiritual or educational barriers to learning identified.    Assessment     Eccentric quad control and flexion ROM improved to 60. Continue to progress as per protocol.     This is a 42 y.o. female referred to outpatient physical therapy and presents with a medical diagnosis of right knee pain and demonstrates limitations as described in the problem list Pt prognosis is Good. Pt will continue to benefit from skilled outpatient physical therapy to address the deficits listed below in the problem list, provide pt/family education and to maximize pt's level of independence in the home and community environment.    Will the patient continue to benefit from skilled PT intervention? Updated as per therex list      Medical necessity is demonstrated by:   - Pain limits function of effected part for all activities  - Unable to participate in daily activities   - Requires skilled supervision to complete and progress HEP  - Fall risk - impaired balance   - Continued inability to participate in  vocational pursuits    Short Term Goals (10-12 Weeks):   - Pt will increase ROM of right knee to left knee  - Pt will increase strength of bilateral hip abd =5/5; Improved quad firing to hyperextension.  - Decrease Pain to 2/10 with ADLs  - Pt to safely amb stairs without valgus instability.  - Pt to self correct posture independently.   - Pt independent with HEP with progressions.      Long Term Goals: 6-8 mo  - Pt able to SLS x 2 min without valgus  - Pt with normalized loading mechanics with Ybalance.  - Pt with painfree tolerance to ADLs/ full rx x 45 min  - Pt to DC with independence with HEP.          Plan   Continue with established Plan of Care towards PT goals.      Therapist: ALEJANDRO MAI, PT

## 2017-06-05 ENCOUNTER — TELEPHONE (OUTPATIENT)
Dept: SPORTS MEDICINE | Facility: CLINIC | Age: 42
End: 2017-06-05

## 2017-06-05 DIAGNOSIS — M25.511 ACUTE PAIN OF RIGHT SHOULDER: Primary | ICD-10-CM

## 2017-06-05 NOTE — TELEPHONE ENCOUNTER
----- Message from Amber Zheng MA sent at 6/5/2017 10:38 AM CDT -----  I talked with the patient and she asked if we could order PT for her right shoulder. It is the one that her and Dr. Ramos discussed in the past that she needs a SLAP repair

## 2017-06-06 ENCOUNTER — CLINICAL SUPPORT (OUTPATIENT)
Dept: REHABILITATION | Facility: HOSPITAL | Age: 42
End: 2017-06-06
Attending: ORTHOPAEDIC SURGERY
Payer: MEDICAID

## 2017-06-06 DIAGNOSIS — M25.561 RIGHT ANTERIOR KNEE PAIN: Primary | ICD-10-CM

## 2017-06-06 PROCEDURE — 97110 THERAPEUTIC EXERCISES: CPT | Mod: PN | Performed by: PHYSICAL THERAPIST

## 2017-06-06 PROCEDURE — 97140 MANUAL THERAPY 1/> REGIONS: CPT | Mod: PN | Performed by: PHYSICAL THERAPIST
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
## 2017-06-09 ENCOUNTER — CLINICAL SUPPORT (OUTPATIENT)
Dept: REHABILITATION | Facility: HOSPITAL | Age: 42
End: 2017-06-09
Attending: ORTHOPAEDIC SURGERY
Payer: MEDICAID

## 2017-06-09 DIAGNOSIS — M22.41 CHONDROMALACIA OF RIGHT PATELLA: ICD-10-CM

## 2017-06-09 DIAGNOSIS — M23.91 DERANGEMENT, KNEE INTERNAL, RIGHT: ICD-10-CM

## 2017-06-09 DIAGNOSIS — M94.261 CHONDROMALACIA, RIGHT KNEE: ICD-10-CM

## 2017-06-09 DIAGNOSIS — M25.561 RIGHT ANTERIOR KNEE PAIN: Primary | ICD-10-CM

## 2017-06-09 PROCEDURE — 97140 MANUAL THERAPY 1/> REGIONS: CPT | Mod: PN | Performed by: PHYSICAL THERAPIST

## 2017-06-09 PROCEDURE — 97110 THERAPEUTIC EXERCISES: CPT | Mod: PN | Performed by: PHYSICAL THERAPIST
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
## 2017-06-13 ENCOUNTER — CLINICAL SUPPORT (OUTPATIENT)
Dept: REHABILITATION | Facility: HOSPITAL | Age: 42
End: 2017-06-13
Attending: ORTHOPAEDIC SURGERY
Payer: MEDICAID

## 2017-06-13 DIAGNOSIS — M25.561 RIGHT ANTERIOR KNEE PAIN: Primary | ICD-10-CM

## 2017-06-13 PROCEDURE — 97110 THERAPEUTIC EXERCISES: CPT | Mod: PN | Performed by: PHYSICAL THERAPIST

## 2017-06-13 NOTE — PROGRESS NOTES
Physical Therapy Daily Note/Re-eval     Date: 06/13/2017  Name: Chey Mcneill  Clinic Number: [id number]  Diagnosis:   Encounter Diagnosis   Name Primary?    Right anterior knee pain Yes     OPERATIVE PROCEDURES PERFORMED:  1. Right knee complex osteochondral allograft transplantation, medial femoral   condyle and patella.  2. Right knee complex patella realignment.  3. Right knee arthroscopic medial meniscectomy.  4. Right knee arthroscopic chondroplasty.  5. Right knee arthroscopic loose body removal.  6. Right knee Amniox arthrocentesis.      Physician: Cooper Ramos MD    Evaluation Date: 4/11/17  Date of Surgery: pending  Visit #: 10  Start Time:  8:00  Stop Time:  9:00  Total Treatment Time: 60    Precautions:   PHYSICAL THERAPY: The patient should begin physical therapy on postoperative   day #3.     WEIGHTBEARING STATUS: Toe-touch at 25% weightbearing along the right leg with   the immobilizer locked in extension with gait for the first 6 weeks.     RANGE OF MOTION: -10 degrees hyperextension and flexion to 45 degrees and   starting CPM machine on postoperative day #1 following approximately 12 to 24   hours of rest.     Range of motion should be maintained -10 degrees to 45 degrees flexion for 2   weeks; then from 2 or 3 weeks, she can begin flexion to 60 degrees; at 34 weeks   she can begin flexion to 90 degrees; after 4 weeks, she can begin flexion past   90 degrees to 120 degrees as tolerated.     Immobilizer should be discontinued at 6 weeks' time allowing for full range of   motion. The patient should avoid open chain rehabilitation for the first 4   months following the surgery.     NOTE: Amniox arthrocentesis was performed, intraoperatively following closure   of the deep soft tissue structures, applying 3 mL of amniotic fluid for   postoperative healing purposes.    Subjective     Pt reports the knee is ok. My shoulder is sore.   Pain:  1/10    Objective     Measurements taken:    Right knee ROM = 0-90    Patient received individual therapy to increase strength, endurance and ROM with activities as follows:     Chey received therapeutic exercises to develop strength, endurance and ROM for 40 minutes including:   Quad set towel x 20  Seated hangs 5# x 5 min  Quad set towel x 20  Quad set flat x 20   Seated flexion x 3 min  SLR flexion 3x10  Bike x 5 min  Heel slides x 30  Seated extension hangs 5# x 5 min  SLR 3x10  Quad sets 5 sec hold x 20  Bike x 5 min      CP in extension x 10 min    Chey received the following manual therapy techniques: Joint mobilizations and Soft tissue Mobilization were applied to the: right knee for 5 minutes.   PROM: ext, patellar mobs  STM quad, IT band        Written Home Exercises Provided: updated as per therex list  Pt demo good understanding of the education provided. Chey demonstrated good return demonstration of activities.     Education provided:  Chey verbalized good understanding of education provided.   No spiritual or educational barriers to learning identified.    Assessment     Patellar mobility and global right knee ROM continue to improve gradually. Continue with brace in extension until 6 wk dequan.     This is a 42 y.o. female referred to outpatient physical therapy and presents with a medical diagnosis of right knee pain and demonstrates limitations as described in the problem list Pt prognosis is Good. Pt will continue to benefit from skilled outpatient physical therapy to address the deficits listed below in the problem list, provide pt/family education and to maximize pt's level of independence in the home and community environment.    Will the patient continue to benefit from skilled PT intervention? Updated as per therex list      Medical necessity is demonstrated by:   - Pain limits function of effected part for all activities  - Unable to participate in daily activities   - Requires  skilled supervision to complete and progress HEP  - Fall risk - impaired balance   - Continued inability to participate in vocational pursuits    Short Term Goals (10-12 Weeks):   - Pt will increase ROM of right knee to left knee  - Pt will increase strength of bilateral hip abd =5/5; Improved quad firing to hyperextension.  - Decrease Pain to 2/10 with ADLs  - Pt to safely amb stairs without valgus instability.  - Pt to self correct posture independently.   - Pt independent with HEP with progressions.      Long Term Goals: 6-8 mo  - Pt able to SLS x 2 min without valgus  - Pt with normalized loading mechanics with Ybalance.  - Pt with painfree tolerance to ADLs/ full rx x 45 min  - Pt to DC with independence with HEP.          Plan   Continue with established Plan of Care towards PT goals.      Therapist: ALEJANDRO MAI, PT

## 2017-06-15 ENCOUNTER — CLINICAL SUPPORT (OUTPATIENT)
Dept: REHABILITATION | Facility: HOSPITAL | Age: 42
End: 2017-06-15
Attending: ORTHOPAEDIC SURGERY
Payer: MEDICAID

## 2017-06-15 DIAGNOSIS — M25.561 RIGHT ANTERIOR KNEE PAIN: Primary | ICD-10-CM

## 2017-06-15 PROCEDURE — 97110 THERAPEUTIC EXERCISES: CPT | Mod: PN | Performed by: PHYSICAL THERAPIST

## 2017-06-15 NOTE — PROGRESS NOTES
Physical Therapy Daily Note/Re-eval     Date: 06/15/2017  Name: Chey Mcneill  Clinic Number: [id number]  Diagnosis:   Encounter Diagnosis   Name Primary?    Right anterior knee pain Yes     OPERATIVE PROCEDURES PERFORMED:  1. Right knee complex osteochondral allograft transplantation, medial femoral   condyle and patella.  2. Right knee complex patella realignment.  3. Right knee arthroscopic medial meniscectomy.  4. Right knee arthroscopic chondroplasty.  5. Right knee arthroscopic loose body removal.  6. Right knee Amniox arthrocentesis.      Physician: Cooper Ramos MD    Evaluation Date: 4/11/17  Date of Surgery: pending  Visit #: 11  Start Time:  2:30  Stop Time:  3:30  Total Treatment Time: 60    Precautions:   PHYSICAL THERAPY: The patient should begin physical therapy on postoperative   day #3.     WEIGHTBEARING STATUS: Toe-touch at 25% weightbearing along the right leg with   the immobilizer locked in extension with gait for the first 6 weeks.     RANGE OF MOTION: -10 degrees hyperextension and flexion to 45 degrees and   starting CPM machine on postoperative day #1 following approximately 12 to 24   hours of rest.     Range of motion should be maintained -10 degrees to 45 degrees flexion for 2   weeks; then from 2 or 3 weeks, she can begin flexion to 60 degrees; at 34 weeks   she can begin flexion to 90 degrees; after 4 weeks, she can begin flexion past   90 degrees to 120 degrees as tolerated.     Immobilizer should be discontinued at 6 weeks' time allowing for full range of   motion. The patient should avoid open chain rehabilitation for the first 4   months following the surgery.     NOTE: Amniox arthrocentesis was performed, intraoperatively following closure   of the deep soft tissue structures, applying 3 mL of amniotic fluid for   postoperative healing purposes.    Subjective     Pt reports the knee is sore today.  Pain: 1/10    Objective      Measurements taken:    Right knee ROM = 0-90    Patient received individual therapy to increase strength, endurance and ROM with activities as follows:     Chey received therapeutic exercises to develop strength, endurance and ROM for 40 minutes including:   Quad set towel x 20  Seated hangs 5# x 5 min  Quad set towel x 20  Quad set flat x 20   Seated flexion x 3 min  SLR 3x10  SL hip abd 3x10  Bike x 5 min  Heel slides x 30  Seated extension hangs 5# x 5 min  Quad sets 5 sec hold x 20  Bike x 5 min      CP in extension x 10 min    Chey received the following manual therapy techniques: Joint mobilizations and Soft tissue Mobilization were applied to the: right knee for 5 minutes.   PROM: ext, patellar mobs  STM quad, IT band        Written Home Exercises Provided: updated as per therex list  Pt demo good understanding of the education provided. Chey demonstrated good return demonstration of activities.     Education provided:  Chey verbalized good understanding of education provided.   No spiritual or educational barriers to learning identified.    Assessment     Quad control and functional hip strength continue to improve with rx. Continue to progress as per protocol.      This is a 42 y.o. female referred to outpatient physical therapy and presents with a medical diagnosis of right knee pain and demonstrates limitations as described in the problem list Pt prognosis is Good. Pt will continue to benefit from skilled outpatient physical therapy to address the deficits listed below in the problem list, provide pt/family education and to maximize pt's level of independence in the home and community environment.    Will the patient continue to benefit from skilled PT intervention? Updated as per therex list      Medical necessity is demonstrated by:   - Pain limits function of effected part for all activities  - Unable to participate in daily activities   - Requires skilled supervision to complete and  progress HEP  - Fall risk - impaired balance   - Continued inability to participate in vocational pursuits    Short Term Goals (10-12 Weeks):   - Pt will increase ROM of right knee to left knee  - Pt will increase strength of bilateral hip abd =5/5; Improved quad firing to hyperextension.  - Decrease Pain to 2/10 with ADLs  - Pt to safely amb stairs without valgus instability.  - Pt to self correct posture independently.   - Pt independent with HEP with progressions.      Long Term Goals: 6-8 mo  - Pt able to SLS x 2 min without valgus  - Pt with normalized loading mechanics with Ybalance.  - Pt with painfree tolerance to ADLs/ full rx x 45 min  - Pt to DC with independence with HEP.          Plan   Continue with established Plan of Care towards PT goals.      Therapist: ALEJANDRO MAI, PT

## 2017-06-22 ENCOUNTER — CLINICAL SUPPORT (OUTPATIENT)
Dept: REHABILITATION | Facility: HOSPITAL | Age: 42
End: 2017-06-22
Attending: ORTHOPAEDIC SURGERY
Payer: MEDICAID

## 2017-06-22 DIAGNOSIS — G89.29 CHRONIC PAIN OF RIGHT KNEE: Primary | ICD-10-CM

## 2017-06-22 DIAGNOSIS — M25.561 CHRONIC PAIN OF RIGHT KNEE: Primary | ICD-10-CM

## 2017-06-22 PROCEDURE — 97110 THERAPEUTIC EXERCISES: CPT | Mod: PN

## 2017-06-27 ENCOUNTER — CLINICAL SUPPORT (OUTPATIENT)
Dept: REHABILITATION | Facility: HOSPITAL | Age: 42
End: 2017-06-27
Attending: ORTHOPAEDIC SURGERY
Payer: MEDICAID

## 2017-06-27 DIAGNOSIS — M25.561 RIGHT ANTERIOR KNEE PAIN: Primary | ICD-10-CM

## 2017-06-27 PROCEDURE — 97110 THERAPEUTIC EXERCISES: CPT | Mod: PN | Performed by: PHYSICAL THERAPIST

## 2017-06-27 PROCEDURE — 97140 MANUAL THERAPY 1/> REGIONS: CPT | Mod: PN | Performed by: PHYSICAL THERAPIST

## 2017-06-27 NOTE — PROGRESS NOTES
Physical Therapy Daily Note     Date: 06/27/2017  Name: Chey Mcneill  [facility] Number: [id number]  Diagnosis:   Encounter Diagnosis   Name Primary?    Right anterior knee pain Yes     OPERATIVE PROCEDURES PERFORMED:  1. Right knee complex osteochondral allograft transplantation, medial femoral   condyle and patella.  2. Right knee complex patella realignment.  3. Right knee arthroscopic medial meniscectomy.  4. Right knee arthroscopic chondroplasty.  5. Right knee arthroscopic loose body removal.  6. Right knee Amniox arthrocentesis.      Physician: Cooper Ramos MD    Evaluation Date: 4/11/17  Date of Surgery: 5/11/17  Visit #: 14  Start Time:  8:00  Stop Time:  8:40  Total Treatment Time: 40    Precautions:   PHYSICAL THERAPY: The patient should begin physical therapy on postoperative   day #3.     WEIGHTBEARING STATUS: Toe-touch at 25% weightbearing along the right leg with   the immobilizer locked in extension with gait for the first 6 weeks.     RANGE OF MOTION: -10 degrees hyperextension and flexion to 45 degrees and   starting CPM machine on postoperative day #1 following approximately 12 to 24   hours of rest.     Range of motion should be maintained -10 degrees to 45 degrees flexion for 2   weeks; then from 2 or 3 weeks, she can begin flexion to 60 degrees; at 34 weeks   she can begin flexion to 90 degrees; after 4 weeks, she can begin flexion past   90 degrees to 120 degrees as tolerated.     Immobilizer should be discontinued at 6 weeks' time allowing for full range of   motion. The patient should avoid open chain rehabilitation for the first 4   months following the surgery.     NOTE: Amniox arthrocentesis was performed, intraoperatively following closure   of the deep soft tissue structures, applying 3 mL of amniotic fluid for   postoperative healing purposes.    Subjective     Pt reports states I have been painful and swollen since last week. I  dont know what has happened and I have had not trauma.    Pain: 2-3/10    Objective     Measurements taken:  none    Patient received individual therapy to increase strength, endurance and ROM with activities as follows:     Chey received therapeutic exercises to develop strength, endurance and ROM for 20 minutes including:   Quad set towel x 20  Quad set flat x 20   Seated flexion x 3 min  SLR 3x10  SL hip abd 3x10    Hold:  Bike x 5 min  Heel slides x 30  Seated extension hangs 5# x 5 min  TKE with orange Tband 3 x 10    CP in extension x 10 min    Chey received the following manual therapy techniques: Joint mobilizations and Soft tissue Mobilization were applied to the: right knee for 20 minutes.   PROM: ext, patellar mobs  STM quad, IT band        Written Home Exercises Provided: edu on continuing with current HEP.   Pt demo good understanding of the education provided. Chey demonstrated good return demonstration of activities.     Education provided:  Chey verbalized good understanding of education provided.   No spiritual or educational barriers to learning identified.    Assessment   Pt with increased tenderness over medial fat pad and medial retinaculum. Pt states she was swollen more last week. Pt to see Dr Ramos tomorrow - may be inflammed secondary to increased amb last week as per timeline.     This is a 42 y.o. female referred to outpatient physical therapy and presents with a medical diagnosis of right knee pain and demonstrates limitations as described in the problem list Pt prognosis is Good. Pt will continue to benefit from skilled outpatient physical therapy to address the deficits listed below in the problem list, provide pt/family education and to maximize pt's level of independence in the home and community environment.    Will the patient continue to benefit from skilled PT intervention? Updated as per therex list      Medical necessity is demonstrated by:   - Pain limits function  of effected part for all activities  - Unable to participate in daily activities   - Requires skilled supervision to complete and progress HEP  - Fall risk - impaired balance   - Continued inability to participate in vocational pursuits    Short Term Goals (10-12 Weeks):   - Pt will increase ROM of right knee to left knee  - Pt will increase strength of bilateral hip abd =5/5; Improved quad firing to hyperextension.  - Decrease Pain to 2/10 with ADLs  - Pt to safely amb stairs without valgus instability.  - Pt to self correct posture independently.   - Pt independent with HEP with progressions.      Long Term Goals: 6-8 mo  - Pt able to SLS x 2 min without valgus  - Pt with normalized loading mechanics with Ybalance.  - Pt with painfree tolerance to ADLs/ full rx x 45 min  - Pt to DC with independence with HEP.          Plan   Continue with established Plan of Care towards PT goals.      Therapist: ALEJANDRO MAI, PT

## 2017-06-28 ENCOUNTER — HOSPITAL ENCOUNTER (OUTPATIENT)
Dept: RADIOLOGY | Facility: HOSPITAL | Age: 42
Discharge: HOME | End: 2017-06-28
Attending: ORTHOPAEDIC SURGERY
Payer: MEDICAID

## 2017-06-28 ENCOUNTER — OFFICE VISIT (OUTPATIENT)
Dept: SPORTS MEDICINE | Facility: CLINIC | Age: 42
End: 2017-06-28
Payer: MEDICAID

## 2017-06-28 VITALS
DIASTOLIC BLOOD PRESSURE: 72 MMHG | HEART RATE: 88 BPM | HEIGHT: 62 IN | BODY MASS INDEX: 23 KG/M2 | SYSTOLIC BLOOD PRESSURE: 111 MMHG | WEIGHT: 125 LBS

## 2017-06-28 DIAGNOSIS — M94.261 CHONDROMALACIA, RIGHT KNEE: ICD-10-CM

## 2017-06-28 DIAGNOSIS — M25.561 RIGHT KNEE PAIN, UNSPECIFIED CHRONICITY: Primary | ICD-10-CM

## 2017-06-28 DIAGNOSIS — M25.649 DECREASED RANGE OF MOTION OF FINGER: ICD-10-CM

## 2017-06-28 DIAGNOSIS — M94.261 CHONDROMALACIA OF RIGHT KNEE: ICD-10-CM

## 2017-06-28 DIAGNOSIS — M23.91 DERANGEMENT, KNEE INTERNAL, RIGHT: ICD-10-CM

## 2017-06-28 DIAGNOSIS — M25.561 RIGHT KNEE PAIN, UNSPECIFIED CHRONICITY: ICD-10-CM

## 2017-06-28 PROCEDURE — 99999 PR PBB SHADOW E&M-EST. PATIENT-LVL IV: CPT | Mod: PBBFAC,,, | Performed by: ORTHOPAEDIC SURGERY

## 2017-06-28 PROCEDURE — 99024 POSTOP FOLLOW-UP VISIT: CPT | Mod: ,,, | Performed by: ORTHOPAEDIC SURGERY

## 2017-06-28 PROCEDURE — 73560 X-RAY EXAM OF KNEE 1 OR 2: CPT | Mod: 26,RT,, | Performed by: RADIOLOGY

## 2017-06-28 PROCEDURE — 73560 X-RAY EXAM OF KNEE 1 OR 2: CPT | Mod: TC,PO,RT

## 2017-06-28 PROCEDURE — 97110 THERAPEUTIC EXERCISES: CPT | Mod: ,,, | Performed by: ORTHOPAEDIC SURGERY

## 2017-06-28 RX ORDER — MELOXICAM 15 MG/1
15 TABLET ORAL DAILY
Qty: 30 TABLET | Refills: 2 | Status: SHIPPED | OUTPATIENT
Start: 2017-06-28 | End: 2017-07-28

## 2017-06-28 RX ORDER — OXYCODONE AND ACETAMINOPHEN 10; 325 MG/1; MG/1
1 TABLET ORAL EVERY 12 HOURS PRN
Qty: 60 TABLET | Refills: 0 | Status: SHIPPED | OUTPATIENT
Start: 2017-06-28 | End: 2017-08-07 | Stop reason: SDUPTHER
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
## 2017-06-28 NOTE — PATIENT INSTRUCTIONS
Would go back to 50 %  PWB with crutches until we obtain the  brace; with  brace (medial) can increase as tolerated; ( must follow the medial and patellofemoral symptoms)    This may take an additional 3 months of low impact and light rehabilitation prior to increased loads and exercise - SLOW process    No OPEN-CHAIN for additional 3 months

## 2017-06-29 ENCOUNTER — CLINICAL SUPPORT (OUTPATIENT)
Dept: REHABILITATION | Facility: HOSPITAL | Age: 42
End: 2017-06-29
Attending: ORTHOPAEDIC SURGERY
Payer: MEDICAID

## 2017-06-29 DIAGNOSIS — M25.561 RIGHT ANTERIOR KNEE PAIN: Primary | ICD-10-CM

## 2017-06-29 PROCEDURE — 97140 MANUAL THERAPY 1/> REGIONS: CPT | Mod: PN | Performed by: PHYSICAL THERAPIST

## 2017-06-29 PROCEDURE — 97110 THERAPEUTIC EXERCISES: CPT | Mod: PN | Performed by: PHYSICAL THERAPIST

## 2017-06-29 NOTE — PROGRESS NOTES
Physical Therapy Daily Note     Date: 06/29/2017  Name: Chey Mcneill  [facility] Number: [id number]  Diagnosis:   Encounter Diagnosis   Name Primary?    Right anterior knee pain Yes     OPERATIVE PROCEDURES PERFORMED:  1. Right knee complex osteochondral allograft transplantation, medial femoral   condyle and patella.  2. Right knee complex patella realignment.  3. Right knee arthroscopic medial meniscectomy.  4. Right knee arthroscopic chondroplasty.  5. Right knee arthroscopic loose body removal.  6. Right knee Amniox arthrocentesis.      Physician: Cooper Ramos MD    Evaluation Date: 4/11/17  Date of Surgery: 5/11/17  Visit #: 15  Start Time:  8:30  Stop Time:  9:30  Total Treatment Time: 60    Precautions:   PHYSICAL THERAPY: The patient should begin physical therapy on postoperative   day #3.     WEIGHTBEARING STATUS: Toe-touch at 25% weightbearing along the right leg with   the immobilizer locked in extension with gait for the first 6 weeks.     RANGE OF MOTION: -10 degrees hyperextension and flexion to 45 degrees and   starting CPM machine on postoperative day #1 following approximately 12 to 24   hours of rest.     Range of motion should be maintained -10 degrees to 45 degrees flexion for 2   weeks; then from 2 or 3 weeks, she can begin flexion to 60 degrees; at 34 weeks   she can begin flexion to 90 degrees; after 4 weeks, she can begin flexion past   90 degrees to 120 degrees as tolerated.     Immobilizer should be discontinued at 6 weeks' time allowing for full range of   motion. The patient should avoid open chain rehabilitation for the first 4   months following the surgery.     NOTE: Amniox arthrocentesis was performed, intraoperatively following closure   of the deep soft tissue structures, applying 3 mL of amniotic fluid for   postoperative healing purposes.    Subjective     Pt reports states the knee feels much better. The swelling has gone  "down. .    Pain: 2-3/10    Objective     Measurements taken:  none    Patient received individual therapy to increase strength, endurance and ROM with activities as follows:     Chey received therapeutic exercises to develop strength, endurance and ROM for 30 minutes including:   Quad set towel x 20  Quad set flat x 20   Seated flexion x 3 min  SLR 3x10  SL hip abd 3x10  Bike level 2 x 8  min  Heel slides x 30  Seated extension hangs 5# x 5 min  Prone quad stretch 3x30"  TKE with orange Tband 3 x 10    CP in extension x 10 min    Chey received the following manual therapy techniques: Joint mobilizations and Soft tissue Mobilization were applied to the: right knee for 20 minutes.   PROM: ext, patellar mobs  STM quad, IT band        Written Home Exercises Provided: edu on continuing with current HEP.   Pt demo good understanding of the education provided. Chey demonstrated good return demonstration of activities.     Education provided:  Chey verbalized good understanding of education provided.   No spiritual or educational barriers to learning identified.    Assessment   Pt with improving quad control and global knee ROM. Flexion improved to 100 with maintenance of extension. Continue to progress asymptomatically.     This is a 42 y.o. female referred to outpatient physical therapy and presents with a medical diagnosis of right knee pain and demonstrates limitations as described in the problem list Pt prognosis is Good. Pt will continue to benefit from skilled outpatient physical therapy to address the deficits listed below in the problem list, provide pt/family education and to maximize pt's level of independence in the home and community environment.    Will the patient continue to benefit from skilled PT intervention? Updated as per therex list      Medical necessity is demonstrated by:   - Pain limits function of effected part for all activities  - Unable to participate in daily activities   - " Requires skilled supervision to complete and progress HEP  - Fall risk - impaired balance   - Continued inability to participate in vocational pursuits    Short Term Goals (10-12 Weeks):   - Pt will increase ROM of right knee to left knee  - Pt will increase strength of bilateral hip abd =5/5; Improved quad firing to hyperextension.  - Decrease Pain to 2/10 with ADLs  - Pt to safely amb stairs without valgus instability.  - Pt to self correct posture independently.   - Pt independent with HEP with progressions.      Long Term Goals: 6-8 mo  - Pt able to SLS x 2 min without valgus  - Pt with normalized loading mechanics with Ybalance.  - Pt with painfree tolerance to ADLs/ full rx x 45 min  - Pt to DC with independence with HEP.          Plan   Continue with established Plan of Care towards PT goals.      Therapist: ALEJANDRO MAI, PT

## 2017-07-13 ENCOUNTER — CLINICAL SUPPORT (OUTPATIENT)
Dept: REHABILITATION | Facility: HOSPITAL | Age: 42
End: 2017-07-13
Attending: ORTHOPAEDIC SURGERY
Payer: MEDICAID

## 2017-07-13 DIAGNOSIS — M25.561 RIGHT ANTERIOR KNEE PAIN: Primary | ICD-10-CM

## 2017-07-13 PROCEDURE — 97110 THERAPEUTIC EXERCISES: CPT | Mod: PN | Performed by: PHYSICAL THERAPIST

## 2017-07-13 PROCEDURE — 97140 MANUAL THERAPY 1/> REGIONS: CPT | Mod: PN | Performed by: PHYSICAL THERAPIST

## 2017-07-14 NOTE — PROGRESS NOTES
Physical Therapy Daily Note     Date: 07/14/2017  Name: Chey Mcneill  [facility] Number: [id number]  Diagnosis:   Encounter Diagnosis   Name Primary?    Right anterior knee pain Yes     OPERATIVE PROCEDURES PERFORMED:  1. Right knee complex osteochondral allograft transplantation, medial femoral   condyle and patella.  2. Right knee complex patella realignment.  3. Right knee arthroscopic medial meniscectomy.  4. Right knee arthroscopic chondroplasty.  5. Right knee arthroscopic loose body removal.  6. Right knee Amniox arthrocentesis.      Physician: Antione Henao*    Evaluation Date: 4/11/17  Date of Surgery: 5/11/17  Visit #: 16  Start Time:  8:30  Stop Time:  9:30  Total Treatment Time: 60    Precautions:   PHYSICAL THERAPY: The patient should begin physical therapy on postoperative   day #3.     WEIGHTBEARING STATUS: Toe-touch at 25% weightbearing along the right leg with   the immobilizer locked in extension with gait for the first 6 weeks.     RANGE OF MOTION: -10 degrees hyperextension and flexion to 45 degrees and   starting CPM machine on postoperative day #1 following approximately 12 to 24   hours of rest.     Range of motion should be maintained -10 degrees to 45 degrees flexion for 2   weeks; then from 2 or 3 weeks, she can begin flexion to 60 degrees; at 34 weeks   she can begin flexion to 90 degrees; after 4 weeks, she can begin flexion past   90 degrees to 120 degrees as tolerated.     Immobilizer should be discontinued at 6 weeks' time allowing for full range of   motion. The patient should avoid open chain rehabilitation for the first 4   months following the surgery.     NOTE: Amniox arthrocentesis was performed, intraoperatively following closure   of the deep soft tissue structures, applying 3 mL of amniotic fluid for   postoperative healing purposes.    Subjective     Pt reports states the knee is feeling much better.     Pain:  "1/10    Objective     Measurements taken:  none    Patient received individual therapy to increase strength, endurance and ROM with activities as follows:     Chey received therapeutic exercises to develop strength, endurance and ROM for 30 minutes including:   Quad set towel x 20  Quad set flat x 20   Seated flexion x 3 min  SLR 3x10  SL hip abd 3x10  Bike level 2 x 8  min  Heel slides x 30  Seated extension hangs 5# x 5 min  Prone quad stretch 3x30"  TKE with orange Tband 3 x 10  Shuttle DL 2c 3x10  Shuttle SL 1c 3x10    CP in extension x 10 min    Chey received the following manual therapy techniques: Joint mobilizations and Soft tissue Mobilization were applied to the: right knee for 20 minutes.   PROM: ext, patellar mobs  STM quad, IT band        Written Home Exercises Provided: edu on continuing with current HEP.   Pt demo good understanding of the education provided. Chey demonstrated good return demonstration of activities.     Education provided:  Chey verbalized good understanding of education provided.   No spiritual or educational barriers to learning identified.    Assessment   Improved right knee flexion ROM with maintenance of extension. Excellent tolerance to closed chain loading. Advised to amb at home without crutches and with crutches in community.     This is a 42 y.o. female referred to outpatient physical therapy and presents with a medical diagnosis of right knee pain and demonstrates limitations as described in the problem list Pt prognosis is Good. Pt will continue to benefit from skilled outpatient physical therapy to address the deficits listed below in the problem list, provide pt/family education and to maximize pt's level of independence in the home and community environment.    Will the patient continue to benefit from skilled PT intervention? Updated as per therex list      Medical necessity is demonstrated by:   - Pain limits function of effected part for all " activities  - Unable to participate in daily activities   - Requires skilled supervision to complete and progress HEP  - Fall risk - impaired balance   - Continued inability to participate in vocational pursuits    Short Term Goals (10-12 Weeks):   - Pt will increase ROM of right knee to left knee  - Pt will increase strength of bilateral hip abd =5/5; Improved quad firing to hyperextension.  - Decrease Pain to 2/10 with ADLs  - Pt to safely amb stairs without valgus instability.  - Pt to self correct posture independently.   - Pt independent with HEP with progressions.      Long Term Goals: 6-8 mo  - Pt able to SLS x 2 min without valgus  - Pt with normalized loading mechanics with Ybalance.  - Pt with painfree tolerance to ADLs/ full rx x 45 min  - Pt to DC with independence with HEP.          Plan   Continue with established Plan of Care towards PT goals.      Therapist: ALEJANDRO MAI, PT

## 2017-07-20 ENCOUNTER — CLINICAL SUPPORT (OUTPATIENT)
Dept: REHABILITATION | Facility: HOSPITAL | Age: 42
End: 2017-07-20
Attending: ORTHOPAEDIC SURGERY
Payer: MEDICAID

## 2017-07-20 DIAGNOSIS — M25.561 RIGHT ANTERIOR KNEE PAIN: Primary | ICD-10-CM

## 2017-07-20 PROCEDURE — 97140 MANUAL THERAPY 1/> REGIONS: CPT | Mod: PN | Performed by: PHYSICAL THERAPIST

## 2017-07-20 PROCEDURE — 97110 THERAPEUTIC EXERCISES: CPT | Mod: PN | Performed by: PHYSICAL THERAPIST

## 2017-07-20 NOTE — PROGRESS NOTES
Physical Therapy Daily Note     Date: 07/20/2017  Name: Chey Mcneill  [facility] Number: [id number]  Diagnosis:   Encounter Diagnosis   Name Primary?    Right anterior knee pain Yes     OPERATIVE PROCEDURES PERFORMED:  1. Right knee complex osteochondral allograft transplantation, medial femoral   condyle and patella.  2. Right knee complex patella realignment.  3. Right knee arthroscopic medial meniscectomy.  4. Right knee arthroscopic chondroplasty.  5. Right knee arthroscopic loose body removal.  6. Right knee Amniox arthrocentesis.      Physician: Antione Henao*    Evaluation Date: 4/11/17  Date of Surgery: 5/11/17  Visit #: 17  Start Time:  8:30  Stop Time:  9:30  Total Treatment Time: 60    Precautions:   PHYSICAL THERAPY: The patient should begin physical therapy on postoperative   day #3.     WEIGHTBEARING STATUS: Toe-touch at 25% weightbearing along the right leg with   the immobilizer locked in extension with gait for the first 6 weeks.     RANGE OF MOTION: -10 degrees hyperextension and flexion to 45 degrees and   starting CPM machine on postoperative day #1 following approximately 12 to 24   hours of rest.     Range of motion should be maintained -10 degrees to 45 degrees flexion for 2   weeks; then from 2 or 3 weeks, she can begin flexion to 60 degrees; at 34 weeks   she can begin flexion to 90 degrees; after 4 weeks, she can begin flexion past   90 degrees to 120 degrees as tolerated.     Immobilizer should be discontinued at 6 weeks' time allowing for full range of   motion. The patient should avoid open chain rehabilitation for the first 4   months following the surgery.     NOTE: Amniox arthrocentesis was performed, intraoperatively following closure   of the deep soft tissue structures, applying 3 mL of amniotic fluid for   postoperative healing purposes.    Subjective     Pt reports states the knee is good but the calf is sore.     Pain:  "1/10    Objective     Measurements taken:  none    Patient received individual therapy to increase strength, endurance and ROM with activities as follows:     Chey received therapeutic exercises to develop strength, endurance and ROM for 30 minutes including:   Quad set towel x 20  Quad set flat x 20   Seated flexion x 3 min  SLR 3x10  SL hip abd 3x10  Bike level 2 x 8  min  Heel slides x 30  Seated extension hangs 5# x 5 min  Prone quad stretch 3x30"  TKE with orange Tband 3 x 10  Shuttle DL 2c 3x10  Shuttle SL 1c 3x10  Incline board 3x1 min  Quad set strap x 20     CP in extension x 10 min    Chey received the following manual therapy techniques: Joint mobilizations and Soft tissue Mobilization were applied to the: right knee for 20 minutes.   PROM: ext, patellar mobs  STM quad, IT band    Dry needling performed by Fabian Stallings        Written Home Exercises Provided: edu on continuing with current HEP.   Pt demo good understanding of the education provided. Chey demonstrated good return demonstration of activities.     Education provided:  Chey verbalized good understanding of education provided.   No spiritual or educational barriers to learning identified.    Assessment     Knee ROM and quad control continue to improve. Improved tolerance to calf tension with dry needling and soft tissue work. Continue to progress as tolerated.     This is a 42 y.o. female referred to outpatient physical therapy and presents with a medical diagnosis of right knee pain and demonstrates limitations as described in the problem list Pt prognosis is Good. Pt will continue to benefit from skilled outpatient physical therapy to address the deficits listed below in the problem list, provide pt/family education and to maximize pt's level of independence in the home and community environment.    Will the patient continue to benefit from skilled PT intervention? Updated as per therex list      Medical necessity is demonstrated " by:   - Pain limits function of effected part for all activities  - Unable to participate in daily activities   - Requires skilled supervision to complete and progress HEP  - Fall risk - impaired balance   - Continued inability to participate in vocational pursuits    Short Term Goals (10-12 Weeks):   - Pt will increase ROM of right knee to left knee  - Pt will increase strength of bilateral hip abd =5/5; Improved quad firing to hyperextension.  - Decrease Pain to 2/10 with ADLs  - Pt to safely amb stairs without valgus instability.  - Pt to self correct posture independently.   - Pt independent with HEP with progressions.      Long Term Goals: 6-8 mo  - Pt able to SLS x 2 min without valgus  - Pt with normalized loading mechanics with Ybalance.  - Pt with painfree tolerance to ADLs/ full rx x 45 min  - Pt to DC with independence with HEP.          Plan   Continue with established Plan of Care towards PT goals.      Therapist: ALEJANDRO MAI, PT

## 2017-07-25 ENCOUNTER — CLINICAL SUPPORT (OUTPATIENT)
Dept: REHABILITATION | Facility: HOSPITAL | Age: 42
End: 2017-07-25
Attending: ORTHOPAEDIC SURGERY
Payer: MEDICAID

## 2017-07-25 DIAGNOSIS — M25.561 RIGHT ANTERIOR KNEE PAIN: Primary | ICD-10-CM

## 2017-07-25 PROCEDURE — 97140 MANUAL THERAPY 1/> REGIONS: CPT | Mod: PN | Performed by: PHYSICAL THERAPIST

## 2017-07-25 PROCEDURE — 97110 THERAPEUTIC EXERCISES: CPT | Mod: PN | Performed by: PHYSICAL THERAPIST

## 2017-07-25 NOTE — PROGRESS NOTES
Physical Therapy Daily Note     Date: 07/25/2017  Name: Chey Mcneill  [facility] Number: [id number]  Diagnosis:   Encounter Diagnosis   Name Primary?    Right anterior knee pain Yes     OPERATIVE PROCEDURES PERFORMED:  1. Right knee complex osteochondral allograft transplantation, medial femoral   condyle and patella.  2. Right knee complex patella realignment.  3. Right knee arthroscopic medial meniscectomy.  4. Right knee arthroscopic chondroplasty.  5. Right knee arthroscopic loose body removal.  6. Right knee Amniox arthrocentesis.      Physician: Antione Henao*    Evaluation Date: 4/11/17  Date of Surgery: 5/11/17  Visit #: 18  Start Time:  7:00  Stop Time:  8:00  Total Treatment Time: 60    Precautions:   PHYSICAL THERAPY: The patient should begin physical therapy on postoperative   day #3.     WEIGHTBEARING STATUS: Toe-touch at 25% weightbearing along the right leg with   the immobilizer locked in extension with gait for the first 6 weeks.     RANGE OF MOTION: -10 degrees hyperextension and flexion to 45 degrees and   starting CPM machine on postoperative day #1 following approximately 12 to 24   hours of rest.     Range of motion should be maintained -10 degrees to 45 degrees flexion for 2   weeks; then from 2 or 3 weeks, she can begin flexion to 60 degrees; at 34 weeks   she can begin flexion to 90 degrees; after 4 weeks, she can begin flexion past   90 degrees to 120 degrees as tolerated.     Immobilizer should be discontinued at 6 weeks' time allowing for full range of   motion. The patient should avoid open chain rehabilitation for the first 4   months following the surgery.     NOTE: Amniox arthrocentesis was performed, intraoperatively following closure   of the deep soft tissue structures, applying 3 mL of amniotic fluid for   postoperative healing purposes.    Subjective     Pt reports states the knee is ok but my back is hurting, its been  "hurting bad since yesterday.   Pain: 1/10    Objective     Measurements taken:  none    Patient received individual therapy to increase strength, endurance and ROM with activities as follows:     Chey received therapeutic exercises to develop strength, endurance and ROM for 25 minutes including:   Quad set towel x 20  Quad set flat x 20   Seated flexion x 3 min  SLR 3x10  SL hip abd 3x10  Bike level 2 x 8  min  Bike x 5 min  Seated extension hangs 5# x 5 min  Hold:  Prone quad stretch 3x30"  TKE with orange Tband 3 x 10  Shuttle DL 2c 3x10  Shuttle SL 1c 3x10  Incline board 3x1 min  Quad set strap x 20     CP in extension x 10 min    Chey received the following manual therapy techniques: Joint mobilizations and Soft tissue Mobilization were applied to the: right knee for 10 minutes.   PROM: ext, patellar mobs  STM quad, IT band    Dry needling performed by Fabian Stallings        Written Home Exercises Provided: edu on continuing with current HEP.   Pt demo good understanding of the education provided. Chey demonstrated good return demonstration of activities.     Education provided:  Chey verbalized good understanding of education provided.   No spiritual or educational barriers to learning identified.    Assessment     Improving quad control and maintenance of knee ROM. Hold progressions secondary to LBP. Resume rx as low back pain subsides.    This is a 42 y.o. female referred to outpatient physical therapy and presents with a medical diagnosis of right knee pain and demonstrates limitations as described in the problem list Pt prognosis is Good. Pt will continue to benefit from skilled outpatient physical therapy to address the deficits listed below in the problem list, provide pt/family education and to maximize pt's level of independence in the home and community environment.    Will the patient continue to benefit from skilled PT intervention? Updated as per therex list      Medical necessity is " demonstrated by:   - Pain limits function of effected part for all activities  - Unable to participate in daily activities   - Requires skilled supervision to complete and progress HEP  - Fall risk - impaired balance   - Continued inability to participate in vocational pursuits    Short Term Goals (10-12 Weeks):   - Pt will increase ROM of right knee to left knee  - Pt will increase strength of bilateral hip abd =5/5; Improved quad firing to hyperextension.  - Decrease Pain to 2/10 with ADLs  - Pt to safely amb stairs without valgus instability.  - Pt to self correct posture independently.   - Pt independent with HEP with progressions.      Long Term Goals: 6-8 mo  - Pt able to SLS x 2 min without valgus  - Pt with normalized loading mechanics with Ybalance.  - Pt with painfree tolerance to ADLs/ full rx x 45 min  - Pt to DC with independence with HEP.          Plan   Continue with established Plan of Care towards PT goals.      Therapist: ALEJANDRO MAI, PT

## 2017-07-27 ENCOUNTER — CLINICAL SUPPORT (OUTPATIENT)
Dept: REHABILITATION | Facility: HOSPITAL | Age: 42
End: 2017-07-27
Attending: ORTHOPAEDIC SURGERY
Payer: MEDICAID

## 2017-07-27 DIAGNOSIS — M25.561 RIGHT ANTERIOR KNEE PAIN: Primary | ICD-10-CM

## 2017-07-27 PROCEDURE — 97110 THERAPEUTIC EXERCISES: CPT | Mod: PN | Performed by: PHYSICAL THERAPIST

## 2017-07-27 PROCEDURE — 97140 MANUAL THERAPY 1/> REGIONS: CPT | Mod: PN | Performed by: PHYSICAL THERAPIST

## 2017-07-27 NOTE — PROGRESS NOTES
Physical Therapy Daily Note     Date: 07/27/2017  Name: Chey Mcneill  [facility] Number: [id number]  Diagnosis:   Encounter Diagnosis   Name Primary?    Right anterior knee pain Yes     OPERATIVE PROCEDURES PERFORMED:  1. Right knee complex osteochondral allograft transplantation, medial femoral   condyle and patella.  2. Right knee complex patella realignment.  3. Right knee arthroscopic medial meniscectomy.  4. Right knee arthroscopic chondroplasty.  5. Right knee arthroscopic loose body removal.  6. Right knee Amniox arthrocentesis.      Physician: Antione Henao*    Evaluation Date: 4/11/17  Date of Surgery: 5/11/17  Visit #: 19  Start Time:  3:30  Stop Time:  4:30  Total Treatment Time: 60    Precautions:   PHYSICAL THERAPY: The patient should begin physical therapy on postoperative   day #3.     WEIGHTBEARING STATUS: Toe-touch at 25% weightbearing along the right leg with   the immobilizer locked in extension with gait for the first 6 weeks.     RANGE OF MOTION: -10 degrees hyperextension and flexion to 45 degrees and   starting CPM machine on postoperative day #1 following approximately 12 to 24   hours of rest.     Range of motion should be maintained -10 degrees to 45 degrees flexion for 2   weeks; then from 2 or 3 weeks, she can begin flexion to 60 degrees; at 34 weeks   she can begin flexion to 90 degrees; after 4 weeks, she can begin flexion past   90 degrees to 120 degrees as tolerated.     Immobilizer should be discontinued at 6 weeks' time allowing for full range of   motion. The patient should avoid open chain rehabilitation for the first 4   months following the surgery.     NOTE: Amniox arthrocentesis was performed, intraoperatively following closure   of the deep soft tissue structures, applying 3 mL of amniotic fluid for   postoperative healing purposes.    Subjective     Pt reports states the knee is better but my back is bothering me.   "  Pain: 1/10    Objective     Measurements taken:  none    Patient received individual therapy to increase strength, endurance and ROM with activities as follows:     Chey received therapeutic exercises to develop strength, endurance and ROM for 25 minutes including:   Quad set towel x 20  Quad set flat x 20   Seated flexion x 3 min  SLR 3x10  SL hip abd 3x10  Bike level 2 x 8  min  Bike x 5 min  Seated extension hangs 5# x 5 min  Hold:  Prone quad stretch 3x30"  TKE with orange Tband 3 x 10  Shuttle DL 2c 3x10  Shuttle SL 1c 3x10  Incline board 3x1 min  Quad set strap x 20     CP in extension x 10 min    Chey received the following manual therapy techniques: Joint mobilizations and Soft tissue Mobilization were applied to the: right knee for 10 minutes.   PROM: ext, patellar mobs  STM quad, IT band    Dry needling performed by Fabian Stallings        Written Home Exercises Provided: edu on continuing with current HEP.   Pt demo good understanding of the education provided. Chey demonstrated good return demonstration of activities.     Education provided:  Chey verbalized good understanding of education provided.   No spiritual or educational barriers to learning identified.    Assessment     Hold progressions with knee due to LBP. Excellent maintenance of right knee extension and quad control.     This is a 42 y.o. female referred to outpatient physical therapy and presents with a medical diagnosis of right knee pain and demonstrates limitations as described in the problem list Pt prognosis is Good. Pt will continue to benefit from skilled outpatient physical therapy to address the deficits listed below in the problem list, provide pt/family education and to maximize pt's level of independence in the home and community environment.    Will the patient continue to benefit from skilled PT intervention? Updated as per therex list      Medical necessity is demonstrated by:   - Pain limits function of effected " part for all activities  - Unable to participate in daily activities   - Requires skilled supervision to complete and progress HEP  - Fall risk - impaired balance   - Continued inability to participate in vocational pursuits    Short Term Goals (10-12 Weeks):   - Pt will increase ROM of right knee to left knee  - Pt will increase strength of bilateral hip abd =5/5; Improved quad firing to hyperextension.  - Decrease Pain to 2/10 with ADLs  - Pt to safely amb stairs without valgus instability.  - Pt to self correct posture independently.   - Pt independent with HEP with progressions.      Long Term Goals: 6-8 mo  - Pt able to SLS x 2 min without valgus  - Pt with normalized loading mechanics with Ybalance.  - Pt with painfree tolerance to ADLs/ full rx x 45 min  - Pt to DC with independence with HEP.          Plan   Continue with established Plan of Care towards PT goals.      Therapist: ALEJANDRO MAI, PT

## 2017-08-03 ENCOUNTER — CLINICAL SUPPORT (OUTPATIENT)
Dept: REHABILITATION | Facility: HOSPITAL | Age: 42
End: 2017-08-03
Attending: ORTHOPAEDIC SURGERY
Payer: MEDICAID

## 2017-08-03 DIAGNOSIS — M25.561 RIGHT ANTERIOR KNEE PAIN: Primary | ICD-10-CM

## 2017-08-03 PROCEDURE — 97110 THERAPEUTIC EXERCISES: CPT | Mod: PN | Performed by: PHYSICAL THERAPIST

## 2017-08-03 PROCEDURE — 97140 MANUAL THERAPY 1/> REGIONS: CPT | Mod: PN | Performed by: PHYSICAL THERAPIST

## 2017-08-03 NOTE — PROGRESS NOTES
Physical Therapy Daily Note     Date: 08/03/2017  Name: Chey Mcneill  [facility] Number: [id number]  Diagnosis:   Encounter Diagnosis   Name Primary?    Right anterior knee pain Yes     OPERATIVE PROCEDURES PERFORMED:  1. Right knee complex osteochondral allograft transplantation, medial femoral   condyle and patella.  2. Right knee complex patella realignment.  3. Right knee arthroscopic medial meniscectomy.  4. Right knee arthroscopic chondroplasty.  5. Right knee arthroscopic loose body removal.  6. Right knee Amniox arthrocentesis.      Physician: Cooper Ramos MD    Evaluation Date: 4/11/17  Date of Surgery: 5/11/17  Visit #: 20  Start Time:  4:00  Stop Time:  5:00  Total Treatment Time: 60    Precautions:   PHYSICAL THERAPY: The patient should begin physical therapy on postoperative   day #3.     WEIGHTBEARING STATUS: Toe-touch at 25% weightbearing along the right leg with   the immobilizer locked in extension with gait for the first 6 weeks.     RANGE OF MOTION: -10 degrees hyperextension and flexion to 45 degrees and   starting CPM machine on postoperative day #1 following approximately 12 to 24   hours of rest.     Range of motion should be maintained -10 degrees to 45 degrees flexion for 2   weeks; then from 2 or 3 weeks, she can begin flexion to 60 degrees; at 34 weeks   she can begin flexion to 90 degrees; after 4 weeks, she can begin flexion past   90 degrees to 120 degrees as tolerated.     Immobilizer should be discontinued at 6 weeks' time allowing for full range of   motion. The patient should avoid open chain rehabilitation for the first 4   months following the surgery.     NOTE: Amniox arthrocentesis was performed, intraoperatively following closure   of the deep soft tissue structures, applying 3 mL of amniotic fluid for   postoperative healing purposes.    Subjective     Pt reports states the knee is ok. It is a little more swollen but my  "back is killing me still. I see Dr. Ramos on Monday.   Pain: 1/10    Objective     Measurements taken:  none    Patient received individual therapy to increase strength, endurance and ROM with activities as follows:     Chey received therapeutic exercises to develop strength, endurance and ROM for 20 minutes including:   Quad set towel x 20  Quad set flat x 20   Seated flexion x 3 min  SLR 3x10  SL hip abd 3x10  Bike level 2 x 8  min    Hold:  Prone quad stretch 3x30"  TKE with orange Tband 3 x 10  Shuttle DL 2c 3x10  Shuttle SL 1c 3x10  Incline board 3x1 min  Quad set strap x 20     CP in extension x 10 min    Chey received the following manual therapy techniques: Joint mobilizations and Soft tissue Mobilization were applied to the: right knee for 10 minutes.   PROM: ext, patellar mobs  STM quad, IT band    Dry needling performed by Fabian Stallings        Written Home Exercises Provided: edu on continuing with current HEP.   Pt demo good understanding of the education provided. Chey demonstrated good return demonstration of activities.     Education provided:  Chey verbalized good understanding of education provided.   No spiritual or educational barriers to learning identified.    Assessment     Continue to hold progressions secondary to LBP. Pt advised to mention LBP to Dr. Ramso for appropriate referral. Well maintained knee ROM despite slight increases in fluid.      This is a 42 y.o. female referred to outpatient physical therapy and presents with a medical diagnosis of right knee pain and demonstrates limitations as described in the problem list Pt prognosis is Good. Pt will continue to benefit from skilled outpatient physical therapy to address the deficits listed below in the problem list, provide pt/family education and to maximize pt's level of independence in the home and community environment.    Will the patient continue to benefit from skilled PT intervention? Updated as per therex list  "     Medical necessity is demonstrated by:   - Pain limits function of effected part for all activities  - Unable to participate in daily activities   - Requires skilled supervision to complete and progress HEP  - Fall risk - impaired balance   - Continued inability to participate in vocational pursuits    Short Term Goals (10-12 Weeks):   - Pt will increase ROM of right knee to left knee  - Pt will increase strength of bilateral hip abd =5/5; Improved quad firing to hyperextension.  - Decrease Pain to 2/10 with ADLs  - Pt to safely amb stairs without valgus instability.  - Pt to self correct posture independently.   - Pt independent with HEP with progressions.      Long Term Goals: 6-8 mo  - Pt able to SLS x 2 min without valgus  - Pt with normalized loading mechanics with Ybalance.  - Pt with painfree tolerance to ADLs/ full rx x 45 min  - Pt to DC with independence with HEP.          Plan   Continue with established Plan of Care towards PT goals.      Therapist: ALEJANDRO MAI, PT

## 2017-08-07 ENCOUNTER — OFFICE VISIT (OUTPATIENT)
Dept: SPORTS MEDICINE | Facility: CLINIC | Age: 42
End: 2017-08-07
Payer: MEDICAID

## 2017-08-07 ENCOUNTER — HOSPITAL ENCOUNTER (OUTPATIENT)
Dept: RADIOLOGY | Facility: HOSPITAL | Age: 42
Discharge: HOME | End: 2017-08-07
Attending: ORTHOPAEDIC SURGERY
Payer: MEDICAID

## 2017-08-07 VITALS
BODY MASS INDEX: 22.82 KG/M2 | SYSTOLIC BLOOD PRESSURE: 117 MMHG | WEIGHT: 124 LBS | DIASTOLIC BLOOD PRESSURE: 72 MMHG | HEART RATE: 76 BPM | HEIGHT: 62 IN

## 2017-08-07 DIAGNOSIS — M94.261 CHONDROMALACIA, RIGHT KNEE: ICD-10-CM

## 2017-08-07 DIAGNOSIS — M25.561 RIGHT KNEE PAIN, UNSPECIFIED CHRONICITY: ICD-10-CM

## 2017-08-07 DIAGNOSIS — M54.40 LOW BACK PAIN WITH SCIATICA, SCIATICA LATERALITY UNSPECIFIED, UNSPECIFIED BACK PAIN LATERALITY, UNSPECIFIED CHRONICITY: ICD-10-CM

## 2017-08-07 DIAGNOSIS — M22.41 CHONDROMALACIA OF RIGHT PATELLA: ICD-10-CM

## 2017-08-07 DIAGNOSIS — M25.569 KNEE PAIN, UNSPECIFIED CHRONICITY, UNSPECIFIED LATERALITY: Primary | ICD-10-CM

## 2017-08-07 DIAGNOSIS — M94.261 CHONDROMALACIA OF RIGHT KNEE: ICD-10-CM

## 2017-08-07 DIAGNOSIS — M25.649 DECREASED RANGE OF MOTION OF FINGER: ICD-10-CM

## 2017-08-07 DIAGNOSIS — M23.91 DERANGEMENT, KNEE INTERNAL, RIGHT: ICD-10-CM

## 2017-08-07 DIAGNOSIS — M25.569 KNEE PAIN, UNSPECIFIED CHRONICITY, UNSPECIFIED LATERALITY: ICD-10-CM

## 2017-08-07 PROCEDURE — 73564 X-RAY EXAM KNEE 4 OR MORE: CPT | Mod: 26,50,, | Performed by: RADIOLOGY

## 2017-08-07 PROCEDURE — 99999 PR PBB SHADOW E&M-EST. PATIENT-LVL V: CPT | Mod: PBBFAC,,, | Performed by: ORTHOPAEDIC SURGERY

## 2017-08-07 PROCEDURE — 99024 POSTOP FOLLOW-UP VISIT: CPT | Mod: ,,, | Performed by: ORTHOPAEDIC SURGERY

## 2017-08-07 PROCEDURE — 73564 X-RAY EXAM KNEE 4 OR MORE: CPT | Mod: TC,50,PO

## 2017-08-07 RX ORDER — OXYCODONE AND ACETAMINOPHEN 10; 325 MG/1; MG/1
1 TABLET ORAL EVERY 12 HOURS PRN
Qty: 60 TABLET | Refills: 0 | Status: SHIPPED | OUTPATIENT
Start: 2017-08-07 | End: 2017-11-09 | Stop reason: SDUPTHER

## 2017-08-07 RX ORDER — TRAMADOL HYDROCHLORIDE 50 MG/1
50 TABLET ORAL EVERY 12 HOURS PRN
Qty: 60 TABLET | Refills: 0 | Status: SHIPPED | OUTPATIENT
Start: 2017-08-07 | End: 2018-02-08 | Stop reason: SDUPTHER
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
## 2017-08-08 ENCOUNTER — CLINICAL SUPPORT (OUTPATIENT)
Dept: REHABILITATION | Facility: HOSPITAL | Age: 42
End: 2017-08-08
Attending: ORTHOPAEDIC SURGERY
Payer: MEDICAID

## 2017-08-08 DIAGNOSIS — M25.561 RIGHT ANTERIOR KNEE PAIN: Primary | ICD-10-CM

## 2017-08-08 PROCEDURE — 97110 THERAPEUTIC EXERCISES: CPT | Mod: PN | Performed by: PHYSICAL THERAPIST

## 2017-08-08 NOTE — PROGRESS NOTES
Physical Therapy Daily Note     Date: 08/08/2017  Name: Chey Mcneill  [facility] Number: [id number]  Diagnosis:   Encounter Diagnosis   Name Primary?    Right anterior knee pain Yes     OPERATIVE PROCEDURES PERFORMED:  1. Right knee complex osteochondral allograft transplantation, medial femoral   condyle and patella.  2. Right knee complex patella realignment.  3. Right knee arthroscopic medial meniscectomy.  4. Right knee arthroscopic chondroplasty.  5. Right knee arthroscopic loose body removal.  6. Right knee Amniox arthrocentesis.      Physician: Cooper Ramos MD    Evaluation Date: 4/11/17  Date of Surgery: 5/11/17  Visit #: 21  Start Time:  7:00  Stop Time:  8:00  Total Treatment Time: 60    Precautions:   PHYSICAL THERAPY: The patient should begin physical therapy on postoperative   day #3.     WEIGHTBEARING STATUS: Toe-touch at 25% weightbearing along the right leg with   the immobilizer locked in extension with gait for the first 6 weeks.     RANGE OF MOTION: -10 degrees hyperextension and flexion to 45 degrees and   starting CPM machine on postoperative day #1 following approximately 12 to 24   hours of rest.     Range of motion should be maintained -10 degrees to 45 degrees flexion for 2   weeks; then from 2 or 3 weeks, she can begin flexion to 60 degrees; at 34 weeks   she can begin flexion to 90 degrees; after 4 weeks, she can begin flexion past   90 degrees to 120 degrees as tolerated.     Immobilizer should be discontinued at 6 weeks' time allowing for full range of   motion. The patient should avoid open chain rehabilitation for the first 4   months following the surgery.     NOTE: Amniox arthrocentesis was performed, intraoperatively following closure   of the deep soft tissue structures, applying 3 mL of amniotic fluid for   postoperative healing purposes.    Subjective     Pt reports states the knee is doing ok. Its just the back that is  "bothering me now.    Pain: 1/10    Objective     Measurements taken:  none    Patient received individual therapy to increase strength, endurance and ROM with activities as follows:     Chey received therapeutic exercises to develop strength, endurance and ROM for 40 minutes including:   Quad set towel x 20  Quad set flat x 20   Seated flexion x 3 min  SLR 3x10  SL hip abd 3x10  Bike level 2 x 8  Min  Side steps x 2 laps  Clamshells 3x10    Hold:  Prone quad stretch 3x30"  TKE with orange Tband 3 x 10  Shuttle DL 2c 3x10  Shuttle SL 1c 3x10  Incline board 3x1 min  Quad set strap x 20     CP in extension x 10 min    Chey received the following manual therapy techniques: Joint mobilizations and Soft tissue Mobilization were applied to the: right knee for 10 minutes.   PROM: ext, patellar mobs  STM quad, IT band    Dry needling performed by Fabian Stallings        Written Home Exercises Provided: edu on continuing with current HEP.   Pt demo good understanding of the education provided. Chey demonstrated good return demonstration of activities.     Education provided:  Chey verbalized good understanding of education provided.   No spiritual or educational barriers to learning identified.    Assessment     Able to progress loading beyond table work as LBP is not as limiting. Pt to undergo MRI for low back on Friday.     This is a 42 y.o. female referred to outpatient physical therapy and presents with a medical diagnosis of right knee pain and demonstrates limitations as described in the problem list Pt prognosis is Good. Pt will continue to benefit from skilled outpatient physical therapy to address the deficits listed below in the problem list, provide pt/family education and to maximize pt's level of independence in the home and community environment.    Will the patient continue to benefit from skilled PT intervention? Updated as per therex list      Medical necessity is demonstrated by:   - Pain limits " function of effected part for all activities  - Unable to participate in daily activities   - Requires skilled supervision to complete and progress HEP  - Fall risk - impaired balance   - Continued inability to participate in vocational pursuits    Short Term Goals (10-12 Weeks):   - Pt will increase ROM of right knee to left knee  - Pt will increase strength of bilateral hip abd =5/5; Improved quad firing to hyperextension.  - Decrease Pain to 2/10 with ADLs  - Pt to safely amb stairs without valgus instability.  - Pt to self correct posture independently.   - Pt independent with HEP with progressions.      Long Term Goals: 6-8 mo  - Pt able to SLS x 2 min without valgus  - Pt with normalized loading mechanics with Ybalance.  - Pt with painfree tolerance to ADLs/ full rx x 45 min  - Pt to DC with independence with HEP.          Plan   Continue with established Plan of Care towards PT goals.      Therapist: ALEJANDRO MAI, PT

## 2017-08-11 ENCOUNTER — HOSPITAL ENCOUNTER (OUTPATIENT)
Dept: RADIOLOGY | Facility: HOSPITAL | Age: 42
Discharge: HOME | End: 2017-08-11
Attending: ORTHOPAEDIC SURGERY
Payer: MEDICAID

## 2017-08-11 DIAGNOSIS — M54.40 LOW BACK PAIN WITH SCIATICA, SCIATICA LATERALITY UNSPECIFIED, UNSPECIFIED BACK PAIN LATERALITY, UNSPECIFIED CHRONICITY: ICD-10-CM

## 2017-08-11 PROCEDURE — 72148 MRI LUMBAR SPINE W/O DYE: CPT | Mod: 26,,, | Performed by: RADIOLOGY

## 2017-08-11 PROCEDURE — 72148 MRI LUMBAR SPINE W/O DYE: CPT | Mod: TC,PO

## 2017-08-16 ENCOUNTER — CLINICAL SUPPORT (OUTPATIENT)
Dept: REHABILITATION | Facility: HOSPITAL | Age: 42
End: 2017-08-16
Attending: ORTHOPAEDIC SURGERY
Payer: MEDICAID

## 2017-08-16 DIAGNOSIS — M25.561 RIGHT ANTERIOR KNEE PAIN: Primary | ICD-10-CM

## 2017-08-16 PROCEDURE — 97110 THERAPEUTIC EXERCISES: CPT | Mod: PN | Performed by: PHYSICAL THERAPIST

## 2017-08-16 NOTE — PROGRESS NOTES
Physical Therapy Daily Note     Date: 08/16/2017  Name: Chey Mcneill  [facility] Number: [id number]  Diagnosis:   Encounter Diagnosis   Name Primary?    Right anterior knee pain Yes     OPERATIVE PROCEDURES PERFORMED:  1. Right knee complex osteochondral allograft transplantation, medial femoral   condyle and patella.  2. Right knee complex patella realignment.  3. Right knee arthroscopic medial meniscectomy.  4. Right knee arthroscopic chondroplasty.  5. Right knee arthroscopic loose body removal.  6. Right knee Amniox arthrocentesis.      Physician: Cooper Ramos MD    Evaluation Date: 4/11/17  Date of Surgery: 5/11/17  Visit #: 22  Start Time:  7:00  Stop Time:  8:00  Total Treatment Time: 60    Precautions:   PHYSICAL THERAPY: The patient should begin physical therapy on postoperative   day #3.     WEIGHTBEARING STATUS: Toe-touch at 25% weightbearing along the right leg with   the immobilizer locked in extension with gait for the first 6 weeks.     RANGE OF MOTION: -10 degrees hyperextension and flexion to 45 degrees and   starting CPM machine on postoperative day #1 following approximately 12 to 24   hours of rest.     Range of motion should be maintained -10 degrees to 45 degrees flexion for 2   weeks; then from 2 or 3 weeks, she can begin flexion to 60 degrees; at 34 weeks   she can begin flexion to 90 degrees; after 4 weeks, she can begin flexion past   90 degrees to 120 degrees as tolerated.     Immobilizer should be discontinued at 6 weeks' time allowing for full range of   motion. The patient should avoid open chain rehabilitation for the first 4   months following the surgery.     NOTE: Amniox arthrocentesis was performed, intraoperatively following closure   of the deep soft tissue structures, applying 3 mL of amniotic fluid for   postoperative healing purposes.    Subjective     Pt reports states the knee is moving better.    Pain: 1/10    Objective  "    Measurements taken:  none    Patient received individual therapy to increase strength, endurance and ROM with activities as follows:     Chey received therapeutic exercises to develop strength, endurance and ROM for 40 minutes including:   Quad set towel x 20  Quad set flat x 20   Seated flexion x 3 min  SLR 3x10  SL hip abd 3x10  Bike level 2 x 8  Min  Side steps x 2 laps  Clamshells 3x10  Shuttle 2c 3x10 DL  6 in step ups 3x10    Hold:  Prone quad stretch 3x30"  TKE with orange Tband 3 x 10  Shuttle DL 2c 3x10  Shuttle SL 1c 3x10  Incline board 3x1 min  Quad set strap x 20     CP in extension x 10 min    Chey received the following manual therapy techniques: Joint mobilizations and Soft tissue Mobilization were applied to the: right knee for 5 minutes.   PROM: ext, patellar mobs  STM quad, IT band    Dry needling performed by Fabian Stallings        Written Home Exercises Provided: edu on continuing with current HEP.   Pt demo good understanding of the education provided. Chey demonstrated good return demonstration of activities.     Education provided:  Chey verbalized good understanding of education provided.   No spiritual or educational barriers to learning identified.    Assessment     Improving quad control and functional hip strength. Continue to progress single leg stability.    This is a 42 y.o. female referred to outpatient physical therapy and presents with a medical diagnosis of right knee pain and demonstrates limitations as described in the problem list Pt prognosis is Good. Pt will continue to benefit from skilled outpatient physical therapy to address the deficits listed below in the problem list, provide pt/family education and to maximize pt's level of independence in the home and community environment.    Will the patient continue to benefit from skilled PT intervention? Updated as per therex list      Medical necessity is demonstrated by:   - Pain limits function of effected part for " all activities  - Unable to participate in daily activities   - Requires skilled supervision to complete and progress HEP  - Fall risk - impaired balance   - Continued inability to participate in vocational pursuits    Short Term Goals (10-12 Weeks):   - Pt will increase ROM of right knee to left knee  - Pt will increase strength of bilateral hip abd =5/5; Improved quad firing to hyperextension.  - Decrease Pain to 2/10 with ADLs  - Pt to safely amb stairs without valgus instability.  - Pt to self correct posture independently.   - Pt independent with HEP with progressions.      Long Term Goals: 6-8 mo  - Pt able to SLS x 2 min without valgus  - Pt with normalized loading mechanics with Ybalance.  - Pt with painfree tolerance to ADLs/ full rx x 45 min  - Pt to DC with independence with HEP.          Plan   Continue with established Plan of Care towards PT goals.      Therapist: ALEJADNRO MAI, PT

## 2017-08-18 ENCOUNTER — CLINICAL SUPPORT (OUTPATIENT)
Dept: REHABILITATION | Facility: HOSPITAL | Age: 42
End: 2017-08-18
Attending: ORTHOPAEDIC SURGERY
Payer: MEDICAID

## 2017-08-18 DIAGNOSIS — M25.561 RIGHT ANTERIOR KNEE PAIN: Primary | ICD-10-CM

## 2017-08-18 PROCEDURE — 97110 THERAPEUTIC EXERCISES: CPT | Mod: PN | Performed by: PHYSICAL THERAPIST

## 2017-08-18 NOTE — PROGRESS NOTES
Physical Therapy Daily Note     Date: 08/18/2017  Name: Chey Mcneill  [facility] Number: [id number]  Diagnosis:   Encounter Diagnosis   Name Primary?    Right anterior knee pain Yes     OPERATIVE PROCEDURES PERFORMED:  1. Right knee complex osteochondral allograft transplantation, medial femoral   condyle and patella.  2. Right knee complex patella realignment.  3. Right knee arthroscopic medial meniscectomy.  4. Right knee arthroscopic chondroplasty.  5. Right knee arthroscopic loose body removal.  6. Right knee Amniox arthrocentesis.      Physician: Cooper Ramos MD    Evaluation Date: 4/11/17  Date of Surgery: 5/11/17  Visit #: 23  Start Time:  7:15  Stop Time:  8:00  Total Treatment Time: 45    Precautions:   PHYSICAL THERAPY: The patient should begin physical therapy on postoperative   day #3.     WEIGHTBEARING STATUS: Toe-touch at 25% weightbearing along the right leg with   the immobilizer locked in extension with gait for the first 6 weeks.     RANGE OF MOTION: -10 degrees hyperextension and flexion to 45 degrees and   starting CPM machine on postoperative day #1 following approximately 12 to 24   hours of rest.     Range of motion should be maintained -10 degrees to 45 degrees flexion for 2   weeks; then from 2 or 3 weeks, she can begin flexion to 60 degrees; at 34 weeks   she can begin flexion to 90 degrees; after 4 weeks, she can begin flexion past   90 degrees to 120 degrees as tolerated.     Immobilizer should be discontinued at 6 weeks' time allowing for full range of   motion. The patient should avoid open chain rehabilitation for the first 4   months following the surgery.     NOTE: Amniox arthrocentesis was performed, intraoperatively following closure   of the deep soft tissue structures, applying 3 mL of amniotic fluid for   postoperative healing purposes.    Subjective     Pt reports states the knee is doing better.   Pain: 1/10    Objective  "    Measurements taken:  none    Patient received individual therapy to increase strength, endurance and ROM with activities as follows:     Chey received therapeutic exercises to develop strength, endurance and ROM for 40 minutes including:   Quad set towel x 20  Quad set flat x 20   Seated flexion x 3 min  SLR 3x10  SL hip abd 3x10  Bike level 2 x 8  Min  Side steps x 2 laps  Clamshells 3x10  Shuttle 2c 3x10 DL  Shuttle SL 2c 3x10  Shuttle 2-1 3c 3x10  6 in step downs 3x10    Hold:  Prone quad stretch 3x30"  TKE with orange Tband 3 x 10  Shuttle DL 2c 3x10  Shuttle SL 1c 3x10  Incline board 3x1 min  Quad set strap x 20     CP in extension x 10 min    Chey received the following manual therapy techniques: Joint mobilizations and Soft tissue Mobilization were applied to the: right knee for 5 minutes.   PROM: ext, patellar mobs  STM quad, IT band    Dry needling performed by Fabian Stallings        Written Home Exercises Provided: edu on continuing with current HEP.   Pt demo good understanding of the education provided. Chey demonstrated good return demonstration of activities.     Education provided:  Chey verbalized good understanding of education provided.   No spiritual or educational barriers to learning identified.    Assessment     Eccentric quad control continues to improve along with single leg stabilization. Continue to progress as tolerated.     This is a 42 y.o. female referred to outpatient physical therapy and presents with a medical diagnosis of right knee pain and demonstrates limitations as described in the problem list Pt prognosis is Good. Pt will continue to benefit from skilled outpatient physical therapy to address the deficits listed below in the problem list, provide pt/family education and to maximize pt's level of independence in the home and community environment.    Will the patient continue to benefit from skilled PT intervention? Updated as per therex list      Medical necessity " is demonstrated by:   - Pain limits function of effected part for all activities  - Unable to participate in daily activities   - Requires skilled supervision to complete and progress HEP  - Fall risk - impaired balance   - Continued inability to participate in vocational pursuits    Short Term Goals (10-12 Weeks):   - Pt will increase ROM of right knee to left knee  - Pt will increase strength of bilateral hip abd =5/5; Improved quad firing to hyperextension.  - Decrease Pain to 2/10 with ADLs  - Pt to safely amb stairs without valgus instability.  - Pt to self correct posture independently.   - Pt independent with HEP with progressions.      Long Term Goals: 6-8 mo  - Pt able to SLS x 2 min without valgus  - Pt with normalized loading mechanics with Ybalance.  - Pt with painfree tolerance to ADLs/ full rx x 45 min  - Pt to DC with independence with HEP.          Plan   Continue with established Plan of Care towards PT goals.      Therapist: ALEJANDRO MAI, PT

## 2017-08-24 ENCOUNTER — CLINICAL SUPPORT (OUTPATIENT)
Dept: REHABILITATION | Facility: HOSPITAL | Age: 42
End: 2017-08-24
Attending: ORTHOPAEDIC SURGERY
Payer: MEDICAID

## 2017-08-24 DIAGNOSIS — M25.561 RIGHT ANTERIOR KNEE PAIN: Primary | ICD-10-CM

## 2017-08-24 PROCEDURE — 97110 THERAPEUTIC EXERCISES: CPT | Mod: PN | Performed by: PHYSICAL THERAPIST

## 2017-08-24 NOTE — PROGRESS NOTES
Physical Therapy Daily Note     Date: 08/24/2017  Name: Chey Mcneill  [facility] Number: [id number]  Diagnosis:   Encounter Diagnosis   Name Primary?    Right anterior knee pain Yes     OPERATIVE PROCEDURES PERFORMED:  1. Right knee complex osteochondral allograft transplantation, medial femoral   condyle and patella.  2. Right knee complex patella realignment.  3. Right knee arthroscopic medial meniscectomy.  4. Right knee arthroscopic chondroplasty.  5. Right knee arthroscopic loose body removal.  6. Right knee Amniox arthrocentesis.      Physician: Cooper Ramos MD    Evaluation Date: 4/11/17  Date of Surgery: 5/11/17  Visit #: 24  Start Time:  7:00  Stop Time:  8:00  Total Treatment Time: 60    Precautions:   PHYSICAL THERAPY: The patient should begin physical therapy on postoperative   day #3.     WEIGHTBEARING STATUS: Toe-touch at 25% weightbearing along the right leg with   the immobilizer locked in extension with gait for the first 6 weeks.     RANGE OF MOTION: -10 degrees hyperextension and flexion to 45 degrees and   starting CPM machine on postoperative day #1 following approximately 12 to 24   hours of rest.     Range of motion should be maintained -10 degrees to 45 degrees flexion for 2   weeks; then from 2 or 3 weeks, she can begin flexion to 60 degrees; at 34 weeks   she can begin flexion to 90 degrees; after 4 weeks, she can begin flexion past   90 degrees to 120 degrees as tolerated.     Immobilizer should be discontinued at 6 weeks' time allowing for full range of   motion. The patient should avoid open chain rehabilitation for the first 4   months following the surgery.     NOTE: Amniox arthrocentesis was performed, intraoperatively following closure   of the deep soft tissue structures, applying 3 mL of amniotic fluid for   postoperative healing purposes.    Subjective     Pt reports states the knee is doing ok. My shoulder is sore.   Pain:  "1/10    Objective     Measurements taken:  none    Patient received individual therapy to increase strength, endurance and ROM with activities as follows:     Chey received therapeutic exercises to develop strength, endurance and ROM for 40 minutes including:   Quad set towel x 20  Quad set flat x 20   Seated flexion x 3 min  SLR 3x10  SL hip abd 3x10  Bike level 2 x 8  Min  Side steps x 2 laps  Clamshells 3x10  Shuttle 2c 3x10 DL  Shuttle SL 2c 3x10  Shuttle 2-1 3c 3x10  6 in step downs 3x10  EOT split squats 3x10  SL bridges 3x10      Hold:  Prone quad stretch 3x30"  TKE with orange Tband 3 x 10  Shuttle DL 2c 3x10  Shuttle SL 1c 3x10  Incline board 3x1 min  Quad set strap x 20     CP in extension x 10 min    Chey received the following manual therapy techniques: Joint mobilizations and Soft tissue Mobilization were applied to the: right knee for 5 minutes.   PROM: ext, patellar mobs  STM quad, IT band    Dry needling performed by Fabian Stallings        Written Home Exercises Provided: edu on continuing with current HEP.   Pt demo good understanding of the education provided. Chey demonstrated good return demonstration of activities.     Education provided:  Chey verbalized good understanding of education provided.   No spiritual or educational barriers to learning identified.    Assessment     Single leg stability and eccentric quad control continue to improve. Fatigued with split squats.     This is a 42 y.o. female referred to outpatient physical therapy and presents with a medical diagnosis of right knee pain and demonstrates limitations as described in the problem list Pt prognosis is Good. Pt will continue to benefit from skilled outpatient physical therapy to address the deficits listed below in the problem list, provide pt/family education and to maximize pt's level of independence in the home and community environment.    Will the patient continue to benefit from skilled PT intervention? Updated as " per therex list      Medical necessity is demonstrated by:   - Pain limits function of effected part for all activities  - Unable to participate in daily activities   - Requires skilled supervision to complete and progress HEP  - Fall risk - impaired balance   - Continued inability to participate in vocational pursuits    Short Term Goals (10-12 Weeks):   - Pt will increase ROM of right knee to left knee  - Pt will increase strength of bilateral hip abd =5/5; Improved quad firing to hyperextension.  - Decrease Pain to 2/10 with ADLs  - Pt to safely amb stairs without valgus instability.  - Pt to self correct posture independently.   - Pt independent with HEP with progressions.      Long Term Goals: 6-8 mo  - Pt able to SLS x 2 min without valgus  - Pt with normalized loading mechanics with Ybalance.  - Pt with painfree tolerance to ADLs/ full rx x 45 min  - Pt to DC with independence with HEP.          Plan   Continue with established Plan of Care towards PT goals.      Therapist: ALEJANDRO MAI, PT

## 2017-08-30 ENCOUNTER — CLINICAL SUPPORT (OUTPATIENT)
Dept: REHABILITATION | Facility: HOSPITAL | Age: 42
End: 2017-08-30
Attending: ORTHOPAEDIC SURGERY
Payer: MEDICAID

## 2017-08-30 DIAGNOSIS — M25.561 RIGHT ANTERIOR KNEE PAIN: Primary | ICD-10-CM

## 2017-08-30 PROCEDURE — 97110 THERAPEUTIC EXERCISES: CPT | Mod: PN | Performed by: PHYSICAL THERAPIST

## 2017-08-30 NOTE — PROGRESS NOTES
Physical Therapy Daily Note     Date: 08/30/2017  Name: Chye Mcneill  [facility] Number: [id number]  Diagnosis:   Encounter Diagnosis   Name Primary?    Right anterior knee pain Yes     OPERATIVE PROCEDURES PERFORMED:  1. Right knee complex osteochondral allograft transplantation, medial femoral   condyle and patella.  2. Right knee complex patella realignment.  3. Right knee arthroscopic medial meniscectomy.  4. Right knee arthroscopic chondroplasty.  5. Right knee arthroscopic loose body removal.  6. Right knee Amniox arthrocentesis.      Physician: Cooper Ramos MD    Evaluation Date: 4/11/17  Date of Surgery: 5/11/17  Visit #: 25  Start Time:  7:30  Stop Time:  8:30  Total Treatment Time: 60    Precautions:   PHYSICAL THERAPY: The patient should begin physical therapy on postoperative   day #3.     WEIGHTBEARING STATUS: Toe-touch at 25% weightbearing along the right leg with   the immobilizer locked in extension with gait for the first 6 weeks.     RANGE OF MOTION: -10 degrees hyperextension and flexion to 45 degrees and   starting CPM machine on postoperative day #1 following approximately 12 to 24   hours of rest.     Range of motion should be maintained -10 degrees to 45 degrees flexion for 2   weeks; then from 2 or 3 weeks, she can begin flexion to 60 degrees; at 34 weeks   she can begin flexion to 90 degrees; after 4 weeks, she can begin flexion past   90 degrees to 120 degrees as tolerated.     Immobilizer should be discontinued at 6 weeks' time allowing for full range of   motion. The patient should avoid open chain rehabilitation for the first 4   months following the surgery.     NOTE: Amniox arthrocentesis was performed, intraoperatively following closure   of the deep soft tissue structures, applying 3 mL of amniotic fluid for   postoperative healing purposes.    Subjective     Pt reports states the knee is a little sore.   Pain: 1/10    Objective  "    Measurements taken:  none    Patient received individual therapy to increase strength, endurance and ROM with activities as follows:     Chey received therapeutic exercises to develop strength, endurance and ROM for 40 minutes including:   Quad set towel x 20  Quad set flat x 20   Seated flexion x 3 min  SLR 3x10  SL hip abd 3x10  Bike level 2 x 8  Min  Side steps x 2 laps  Clamshells 3x10  Shuttle 2c 3x10 DL  Shuttle SL 2c 3x10  Shuttle 2-1 3c 3x10  6 in step downs 3x10  EOT split squats 3x10  SL bridges 3x10  FMS squats 3x10  Inline squats 3x10      Hold:  Prone quad stretch 3x30"  TKE with orange Tband 3 x 10  Shuttle DL 2c 3x10  Shuttle SL 1c 3x10  Incline board 3x1 min  Quad set strap x 20     CP in extension x 10 min    Chey received the following manual therapy techniques: Joint mobilizations and Soft tissue Mobilization were applied to the: right knee for 5 minutes.   PROM: ext, patellar mobs  STM quad, IT band    Dry needling performed by Fabian Stallings        Written Home Exercises Provided: edu on continuing with current HEP.   Pt demo good understanding of the education provided. Chey demonstrated good return demonstration of activities.     Education provided:  Chey verbalized good understanding of education provided.   No spiritual or educational barriers to learning identified.    Assessment     Excellent intial DL squat loading. Continue to progress DL and SL loading without symptoms and with appropriate mechanics.     This is a 42 y.o. female referred to outpatient physical therapy and presents with a medical diagnosis of right knee pain and demonstrates limitations as described in the problem list Pt prognosis is Good. Pt will continue to benefit from skilled outpatient physical therapy to address the deficits listed below in the problem list, provide pt/family education and to maximize pt's level of independence in the home and community environment.    Will the patient continue to " benefit from skilled PT intervention? Updated as per therex list      Medical necessity is demonstrated by:   - Pain limits function of effected part for all activities  - Unable to participate in daily activities   - Requires skilled supervision to complete and progress HEP  - Fall risk - impaired balance   - Continued inability to participate in vocational pursuits    Short Term Goals (10-12 Weeks):   - Pt will increase ROM of right knee to left knee  - Pt will increase strength of bilateral hip abd =5/5; Improved quad firing to hyperextension.  - Decrease Pain to 2/10 with ADLs  - Pt to safely amb stairs without valgus instability.  - Pt to self correct posture independently.   - Pt independent with HEP with progressions.      Long Term Goals: 6-8 mo  - Pt able to SLS x 2 min without valgus  - Pt with normalized loading mechanics with Ybalance.  - Pt with painfree tolerance to ADLs/ full rx x 45 min  - Pt to DC with independence with HEP.          Plan   Continue with established Plan of Care towards PT goals.      Therapist: ALEJANDRO MAI, PT

## 2017-09-01 ENCOUNTER — CLINICAL SUPPORT (OUTPATIENT)
Dept: REHABILITATION | Facility: HOSPITAL | Age: 42
End: 2017-09-01
Attending: ORTHOPAEDIC SURGERY
Payer: MEDICAID

## 2017-09-01 DIAGNOSIS — M25.561 RIGHT ANTERIOR KNEE PAIN: Primary | ICD-10-CM

## 2017-09-01 PROCEDURE — 97110 THERAPEUTIC EXERCISES: CPT | Mod: PN | Performed by: PHYSICAL THERAPIST

## 2017-09-01 NOTE — PROGRESS NOTES
Physical Therapy Daily Note     Date: 09/01/2017  Name: Chey Mcneill  [facility] Number: [id number]  Diagnosis:   Encounter Diagnosis   Name Primary?    Right anterior knee pain Yes     OPERATIVE PROCEDURES PERFORMED:  1. Right knee complex osteochondral allograft transplantation, medial femoral   condyle and patella.  2. Right knee complex patella realignment.  3. Right knee arthroscopic medial meniscectomy.  4. Right knee arthroscopic chondroplasty.  5. Right knee arthroscopic loose body removal.  6. Right knee Amniox arthrocentesis.      Physician: Cooper Ramos MD    Evaluation Date: 4/11/17  Date of Surgery: 5/11/17  Visit #: 27  Start Time:  7:00  Stop Time:  8:00  Total Treatment Time: 60    Precautions:   PHYSICAL THERAPY: The patient should begin physical therapy on postoperative   day #3.     WEIGHTBEARING STATUS: Toe-touch at 25% weightbearing along the right leg with   the immobilizer locked in extension with gait for the first 6 weeks.     RANGE OF MOTION: -10 degrees hyperextension and flexion to 45 degrees and   starting CPM machine on postoperative day #1 following approximately 12 to 24   hours of rest.     Range of motion should be maintained -10 degrees to 45 degrees flexion for 2   weeks; then from 2 or 3 weeks, she can begin flexion to 60 degrees; at 34 weeks   she can begin flexion to 90 degrees; after 4 weeks, she can begin flexion past   90 degrees to 120 degrees as tolerated.     Immobilizer should be discontinued at 6 weeks' time allowing for full range of   motion. The patient should avoid open chain rehabilitation for the first 4   months following the surgery.     NOTE: Amniox arthrocentesis was performed, intraoperatively following closure   of the deep soft tissue structures, applying 3 mL of amniotic fluid for   postoperative healing purposes.    Subjective     Pt reports states the knee is feeling better but a little sore.  Pain:  "1/10    Objective     Measurements taken:  none    Patient received individual therapy to increase strength, endurance and ROM with activities as follows:     Chey received therapeutic exercises to develop strength, endurance and ROM for 40 minutes including:   Quad set towel x 20  Quad set flat x 20   Seated flexion x 3 min  SLR 3x10  SL hip abd 3x10  Bike level 2 x 8  Min  Side steps x 2 laps  Clamshells 3x10  Shuttle 2c 3x10 DL  Shuttle SL 2c 3x10  Shuttle 2-1 3c 3x10  6 in step downs 3x10  EOT split squats 3x10  SL bridges 3x10  FMS squats 3x10  Inline squats 3x10  Split squats 2x10    Hold:  Prone quad stretch 3x30"  TKE with orange Tband 3 x 10  Shuttle DL 2c 3x10  Shuttle SL 1c 3x10  Incline board 3x1 min  Quad set strap x 20     CP in extension x 10 min    Chey received the following manual therapy techniques: Joint mobilizations and Soft tissue Mobilization were applied to the: right knee for 5 minutes.   PROM: ext, patellar mobs  STM quad, IT band    Dry needling performed by Fabian Stallings        Written Home Exercises Provided: edu on continuing with current HEP.   Pt demo good understanding of the education provided. Chey demonstrated good return demonstration of activities.     Education provided:  Chey verbalized good understanding of education provided.   No spiritual or educational barriers to learning identified.    Assessment     Eccentric control and single leg stability continue to gradually improve with rx. Continue to progress functional quad and hip stability for appropriate single leg loading.     This is a 42 y.o. female referred to outpatient physical therapy and presents with a medical diagnosis of right knee pain and demonstrates limitations as described in the problem list Pt prognosis is Good. Pt will continue to benefit from skilled outpatient physical therapy to address the deficits listed below in the problem list, provide pt/family education and to maximize pt's level of " independence in the home and community environment.    Will the patient continue to benefit from skilled PT intervention? Updated as per therex list      Medical necessity is demonstrated by:   - Pain limits function of effected part for all activities  - Unable to participate in daily activities   - Requires skilled supervision to complete and progress HEP  - Fall risk - impaired balance   - Continued inability to participate in vocational pursuits    Short Term Goals (10-12 Weeks):   - Pt will increase ROM of right knee to left knee  - Pt will increase strength of bilateral hip abd =5/5; Improved quad firing to hyperextension.  - Decrease Pain to 2/10 with ADLs  - Pt to safely amb stairs without valgus instability.  - Pt to self correct posture independently.   - Pt independent with HEP with progressions.      Long Term Goals: 6-8 mo  - Pt able to SLS x 2 min without valgus  - Pt with normalized loading mechanics with Ybalance.  - Pt with painfree tolerance to ADLs/ full rx x 45 min  - Pt to DC with independence with HEP.          Plan   Continue with established Plan of Care towards PT goals.      Therapist: ALEJANDRO MAI, PT

## 2017-09-07 ENCOUNTER — CLINICAL SUPPORT (OUTPATIENT)
Dept: REHABILITATION | Facility: HOSPITAL | Age: 42
End: 2017-09-07
Attending: ORTHOPAEDIC SURGERY
Payer: MEDICAID

## 2017-09-07 DIAGNOSIS — M25.561 RIGHT ANTERIOR KNEE PAIN: Primary | ICD-10-CM

## 2017-09-07 PROCEDURE — 97110 THERAPEUTIC EXERCISES: CPT | Mod: PN | Performed by: PHYSICAL THERAPIST

## 2017-09-07 NOTE — PROGRESS NOTES
Physical Therapy Daily Note     Date: 09/07/2017  Name: Chey Mcneill  [facility] Number: [id number]  Diagnosis:   Encounter Diagnosis   Name Primary?    Right anterior knee pain Yes     OPERATIVE PROCEDURES PERFORMED:  1. Right knee complex osteochondral allograft transplantation, medial femoral   condyle and patella.  2. Right knee complex patella realignment.  3. Right knee arthroscopic medial meniscectomy.  4. Right knee arthroscopic chondroplasty.  5. Right knee arthroscopic loose body removal.  6. Right knee Amniox arthrocentesis.      Physician: Cooper Ramos MD    Evaluation Date: 4/11/17  Date of Surgery: 5/11/17  Visit #: 28  Start Time:  7:00  Stop Time:  8:00  Total Treatment Time: 60    Precautions:   PHYSICAL THERAPY: The patient should begin physical therapy on postoperative   day #3.     WEIGHTBEARING STATUS: Toe-touch at 25% weightbearing along the right leg with   the immobilizer locked in extension with gait for the first 6 weeks.     RANGE OF MOTION: -10 degrees hyperextension and flexion to 45 degrees and   starting CPM machine on postoperative day #1 following approximately 12 to 24   hours of rest.     Range of motion should be maintained -10 degrees to 45 degrees flexion for 2   weeks; then from 2 or 3 weeks, she can begin flexion to 60 degrees; at 34 weeks   she can begin flexion to 90 degrees; after 4 weeks, she can begin flexion past   90 degrees to 120 degrees as tolerated.     Immobilizer should be discontinued at 6 weeks' time allowing for full range of   motion. The patient should avoid open chain rehabilitation for the first 4   months following the surgery.     NOTE: Amniox arthrocentesis was performed, intraoperatively following closure   of the deep soft tissue structures, applying 3 mL of amniotic fluid for   postoperative healing purposes.    Subjective     Pt reports states the knee is ok.   Pain: 1/10    Objective  "    Measurements taken:  none    Patient received individual therapy to increase strength, endurance and ROM with activities as follows:     Chey received therapeutic exercises to develop strength, endurance and ROM for 40 minutes including:   Quad set towel x 20  Quad set flat x 20   Seated flexion x 3 min  SLR 3x10  SL hip abd 3x10  Bike level 2 x 8  Min  Side steps x 2 laps  Clamshells 3x10  Shuttle 2c 3x10 DL  Shuttle SL 2c 3x10  Shuttle 2-1 3c 3x10  6 in step downs 3x10  EOT split squats 3x10  SL bridges 3x10  FMS squats 3x10  Inline squats 3x10  Split squats 2x10    Hold:  Prone quad stretch 3x30"  TKE with orange Tband 3 x 10  Shuttle DL 2c 3x10  Shuttle SL 1c 3x10  Incline board 3x1 min  Quad set strap x 20     CP in extension x 10 min    Chey received the following manual therapy techniques: Joint mobilizations and Soft tissue Mobilization were applied to the: right knee for 5 minutes.   PROM: ext, patellar mobs  STM quad, IT band    Dry needling performed by Fabian Stallings        Written Home Exercises Provided: edu on continuing with current HEP.   Pt demo good understanding of the education provided. Chey demonstrated good return demonstration of activities.     Education provided:  Chey verbalized good understanding of education provided.   No spiritual or educational barriers to learning identified.    Assessment     Eccentric control and single leg stability continue to improve with rx.     This is a 42 y.o. female referred to outpatient physical therapy and presents with a medical diagnosis of right knee pain and demonstrates limitations as described in the problem list Pt prognosis is Good. Pt will continue to benefit from skilled outpatient physical therapy to address the deficits listed below in the problem list, provide pt/family education and to maximize pt's level of independence in the home and community environment.    Will the patient continue to benefit from skilled PT intervention? " Updated as per therex list      Medical necessity is demonstrated by:   - Pain limits function of effected part for all activities  - Unable to participate in daily activities   - Requires skilled supervision to complete and progress HEP  - Fall risk - impaired balance   - Continued inability to participate in vocational pursuits    Short Term Goals (10-12 Weeks):   - Pt will increase ROM of right knee to left knee  - Pt will increase strength of bilateral hip abd =5/5; Improved quad firing to hyperextension.  - Decrease Pain to 2/10 with ADLs  - Pt to safely amb stairs without valgus instability.  - Pt to self correct posture independently.   - Pt independent with HEP with progressions.      Long Term Goals: 6-8 mo  - Pt able to SLS x 2 min without valgus  - Pt with normalized loading mechanics with Ybalance.  - Pt with painfree tolerance to ADLs/ full rx x 45 min  - Pt to DC with independence with HEP.          Plan   Continue with established Plan of Care towards PT goals.      Therapist: ALEJANDRO MAI, PT

## 2017-09-14 ENCOUNTER — CLINICAL SUPPORT (OUTPATIENT)
Dept: REHABILITATION | Facility: HOSPITAL | Age: 42
End: 2017-09-14
Attending: ORTHOPAEDIC SURGERY
Payer: MEDICAID

## 2017-09-14 DIAGNOSIS — M25.561 RIGHT ANTERIOR KNEE PAIN: Primary | ICD-10-CM

## 2017-09-14 PROCEDURE — 97110 THERAPEUTIC EXERCISES: CPT | Mod: PN | Performed by: PHYSICAL THERAPIST

## 2017-09-14 PROCEDURE — 97140 MANUAL THERAPY 1/> REGIONS: CPT | Mod: PN | Performed by: PHYSICAL THERAPIST

## 2017-09-14 NOTE — PROGRESS NOTES
Physical Therapy Daily Note     Date: 09/14/2017  Name: Chey Mcneill  [facility] Number: [id number]  Diagnosis:   Encounter Diagnosis   Name Primary?    Right anterior knee pain Yes     OPERATIVE PROCEDURES PERFORMED:  1. Right knee complex osteochondral allograft transplantation, medial femoral   condyle and patella.  2. Right knee complex patella realignment.  3. Right knee arthroscopic medial meniscectomy.  4. Right knee arthroscopic chondroplasty.  5. Right knee arthroscopic loose body removal.  6. Right knee Amniox arthrocentesis.      Physician: Cooper Ramos MD    Evaluation Date: 4/11/17  Date of Surgery: 5/11/17  Visit #: 29  Start Time:  7:00  Stop Time:  8:00  Total Treatment Time: 60    Precautions:   PHYSICAL THERAPY: The patient should begin physical therapy on postoperative   day #3.     WEIGHTBEARING STATUS: Toe-touch at 25% weightbearing along the right leg with   the immobilizer locked in extension with gait for the first 6 weeks.     RANGE OF MOTION: -10 degrees hyperextension and flexion to 45 degrees and   starting CPM machine on postoperative day #1 following approximately 12 to 24   hours of rest.     Range of motion should be maintained -10 degrees to 45 degrees flexion for 2   weeks; then from 2 or 3 weeks, she can begin flexion to 60 degrees; at 34 weeks   she can begin flexion to 90 degrees; after 4 weeks, she can begin flexion past   90 degrees to 120 degrees as tolerated.     Immobilizer should be discontinued at 6 weeks' time allowing for full range of   motion. The patient should avoid open chain rehabilitation for the first 4   months following the surgery.     NOTE: Amniox arthrocentesis was performed, intraoperatively following closure   of the deep soft tissue structures, applying 3 mL of amniotic fluid for   postoperative healing purposes.    Subjective     Pt reports states the knee is doing well.   Pain: 1/10    Objective  "    Measurements taken:  none    Patient received individual therapy to increase strength, endurance and ROM with activities as follows:     Chey received therapeutic exercises to develop strength, endurance and ROM for 40 minutes including:   Quad set towel x 20  Quad set flat x 20   Seated flexion x 3 min  SLR 3x10  SL hip abd 3x10  Bike level 2 x 8  Min  Side steps x 2 laps  Clamshells 3x10  Shuttle 2c 3x10 DL  Shuttle SL 2c 3x10  Shuttle 2-1 3c 3x10  6 in step downs 3x10  EOT split squats 3x10  SL bridges 3x10  FMS squats 3x10  Inline squats 3x10  Split squats 2x10    Hold:  Prone quad stretch 3x30"  TKE with orange Tband 3 x 10  Shuttle DL 2c 3x10  Shuttle SL 1c 3x10  Incline board 3x1 min  Quad set strap x 20     CP in extension x 10 min    Chey received the following manual therapy techniques: Joint mobilizations and Soft tissue Mobilization were applied to the: right knee for 5 minutes.   PROM: ext, patellar mobs  STM quad, IT band    Dry needling performed by Fabian Stallings        Written Home Exercises Provided: edu on continuing with current HEP.   Pt demo good understanding of the education provided. Chey demonstrated good return demonstration of activities.     Education provided:  Chey verbalized good understanding of education provided.   No spiritual or educational barriers to learning identified.    Assessment     Eccentric control and single leg stability continue to improve. Continue to work on kinetic chain stability.    This is a 42 y.o. female referred to outpatient physical therapy and presents with a medical diagnosis of right knee pain and demonstrates limitations as described in the problem list Pt prognosis is Good. Pt will continue to benefit from skilled outpatient physical therapy to address the deficits listed below in the problem list, provide pt/family education and to maximize pt's level of independence in the home and community environment.    Will the patient continue to " benefit from skilled PT intervention? Updated as per therex list      Medical necessity is demonstrated by:   - Pain limits function of effected part for all activities  - Unable to participate in daily activities   - Requires skilled supervision to complete and progress HEP  - Fall risk - impaired balance   - Continued inability to participate in vocational pursuits    Short Term Goals (10-12 Weeks):   - Pt will increase ROM of right knee to left knee  - Pt will increase strength of bilateral hip abd =5/5; Improved quad firing to hyperextension.  - Decrease Pain to 2/10 with ADLs  - Pt to safely amb stairs without valgus instability.  - Pt to self correct posture independently.   - Pt independent with HEP with progressions.      Long Term Goals: 6-8 mo  - Pt able to SLS x 2 min without valgus  - Pt with normalized loading mechanics with Ybalance.  - Pt with painfree tolerance to ADLs/ full rx x 45 min  - Pt to DC with independence with HEP.          Plan   Continue with established Plan of Care towards PT goals.      Therapist: ALEJANDRO MAI, PT

## 2017-09-19 ENCOUNTER — CLINICAL SUPPORT (OUTPATIENT)
Dept: REHABILITATION | Facility: HOSPITAL | Age: 42
End: 2017-09-19
Attending: ORTHOPAEDIC SURGERY
Payer: MEDICAID

## 2017-09-19 DIAGNOSIS — M25.561 RIGHT ANTERIOR KNEE PAIN: Primary | ICD-10-CM

## 2017-09-19 PROCEDURE — 97110 THERAPEUTIC EXERCISES: CPT | Mod: PN | Performed by: PHYSICAL THERAPIST

## 2017-09-19 NOTE — PROGRESS NOTES
Physical Therapy Daily Note     Date: 09/19/2017  Name: Chey Mcneill  [facility] Number: [id number]  Diagnosis:   Encounter Diagnosis   Name Primary?    Right anterior knee pain Yes     OPERATIVE PROCEDURES PERFORMED:  1. Right knee complex osteochondral allograft transplantation, medial femoral   condyle and patella.  2. Right knee complex patella realignment.  3. Right knee arthroscopic medial meniscectomy.  4. Right knee arthroscopic chondroplasty.  5. Right knee arthroscopic loose body removal.  6. Right knee Amniox arthrocentesis.      Physician: Cooper Ramos MD    Evaluation Date: 4/11/17  Date of Surgery: 5/11/17  Visit #: 30  Start Time:  7:00  Stop Time:  8:00  Total Treatment Time: 60    Precautions:   PHYSICAL THERAPY: The patient should begin physical therapy on postoperative   day #3.     WEIGHTBEARING STATUS: Toe-touch at 25% weightbearing along the right leg with   the immobilizer locked in extension with gait for the first 6 weeks.     RANGE OF MOTION: -10 degrees hyperextension and flexion to 45 degrees and   starting CPM machine on postoperative day #1 following approximately 12 to 24   hours of rest.     Range of motion should be maintained -10 degrees to 45 degrees flexion for 2   weeks; then from 2 or 3 weeks, she can begin flexion to 60 degrees; at 34 weeks   she can begin flexion to 90 degrees; after 4 weeks, she can begin flexion past   90 degrees to 120 degrees as tolerated.     Immobilizer should be discontinued at 6 weeks' time allowing for full range of   motion. The patient should avoid open chain rehabilitation for the first 4   months following the surgery.     NOTE: Amniox arthrocentesis was performed, intraoperatively following closure   of the deep soft tissue structures, applying 3 mL of amniotic fluid for   postoperative healing purposes.    Subjective     Pt reports states the knee is getting stronger. Its just tender on and  "off.   Pain: 1/10    Objective     Measurements taken:  none    Patient received individual therapy to increase strength, endurance and ROM with activities as follows:     Chey received therapeutic exercises to develop strength, endurance and ROM for 40 minutes including:   Quad set towel x 20  Quad set flat x 20   Seated flexion x 3 min  SLR 3x10  SL hip abd 3x10  Bike level 2 x 8  Min  Side steps x 2 laps  Clamshells 3x10  Shuttle 2c 3x10 DL  Shuttle SL 2c 3x10  Shuttle 2-1 3c 3x10  6 in step downs 3x10  EOT split squats 3x10  SL bridges 3x10  FMS squats 3x10  Inline squats 3x10  Split squats 2x10    Hold:  Prone quad stretch 3x30"  TKE with orange Tband 3 x 10  Shuttle DL 2c 3x10  Shuttle SL 1c 3x10  Incline board 3x1 min  Quad set strap x 20     CP in extension x 10 min    Chey received the following manual therapy techniques: Joint mobilizations and Soft tissue Mobilization were applied to the: right knee for 5 minutes.   PROM: ext, patellar mobs  STM quad, IT band    Dry needling performed by Fabian Stallings        Written Home Exercises Provided: edu on continuing with current HEP.   Pt demo good understanding of the education provided. hCey demonstrated good return demonstration of activities.     Education provided:  Chey verbalized good understanding of education provided.   No spiritual or educational barriers to learning identified.    Assessment     Tender along lateral quad.Otherwise excellent single leg loading mechanics with improving in-line squatting.     This is a 42 y.o. female referred to outpatient physical therapy and presents with a medical diagnosis of right knee pain and demonstrates limitations as described in the problem list Pt prognosis is Good. Pt will continue to benefit from skilled outpatient physical therapy to address the deficits listed below in the problem list, provide pt/family education and to maximize pt's level of independence in the home and community " environment.    Will the patient continue to benefit from skilled PT intervention? Updated as per therex list      Medical necessity is demonstrated by:   - Pain limits function of effected part for all activities  - Unable to participate in daily activities   - Requires skilled supervision to complete and progress HEP  - Fall risk - impaired balance   - Continued inability to participate in vocational pursuits    Short Term Goals (10-12 Weeks):   - Pt will increase ROM of right knee to left knee  - Pt will increase strength of bilateral hip abd =5/5; Improved quad firing to hyperextension.  - Decrease Pain to 2/10 with ADLs  - Pt to safely amb stairs without valgus instability.  - Pt to self correct posture independently.   - Pt independent with HEP with progressions.      Long Term Goals: 6-8 mo  - Pt able to SLS x 2 min without valgus  - Pt with normalized loading mechanics with Ybalance.  - Pt with painfree tolerance to ADLs/ full rx x 45 min  - Pt to DC with independence with HEP.          Plan   Continue with established Plan of Care towards PT goals.      Therapist: ALEJANDRO MAI, PT

## 2017-09-28 ENCOUNTER — CLINICAL SUPPORT (OUTPATIENT)
Dept: REHABILITATION | Facility: HOSPITAL | Age: 42
End: 2017-09-28
Attending: ORTHOPAEDIC SURGERY
Payer: MEDICAID

## 2017-09-28 DIAGNOSIS — M25.561 RIGHT ANTERIOR KNEE PAIN: Primary | ICD-10-CM

## 2017-09-28 PROCEDURE — 97110 THERAPEUTIC EXERCISES: CPT | Mod: PN | Performed by: PHYSICAL THERAPIST

## 2017-09-28 NOTE — PROGRESS NOTES
Physical Therapy Daily Note     Date: 09/28/2017  Name: Chey Mcneill  [facility] Number: [id number]  Diagnosis:   Encounter Diagnosis   Name Primary?    Right anterior knee pain Yes     OPERATIVE PROCEDURES PERFORMED:  1. Right knee complex osteochondral allograft transplantation, medial femoral   condyle and patella.  2. Right knee complex patella realignment.  3. Right knee arthroscopic medial meniscectomy.  4. Right knee arthroscopic chondroplasty.  5. Right knee arthroscopic loose body removal.  6. Right knee Amniox arthrocentesis.      Physician: Cooper Ramos MD    Evaluation Date: 4/11/17  Date of Surgery: 5/11/17  Visit #: 31  Start Time:  7:00  Stop Time:  8:00  Total Treatment Time: 60    Precautions:   PHYSICAL THERAPY: The patient should begin physical therapy on postoperative   day #3.     WEIGHTBEARING STATUS: Toe-touch at 25% weightbearing along the right leg with   the immobilizer locked in extension with gait for the first 6 weeks.     RANGE OF MOTION: -10 degrees hyperextension and flexion to 45 degrees and   starting CPM machine on postoperative day #1 following approximately 12 to 24   hours of rest.     Range of motion should be maintained -10 degrees to 45 degrees flexion for 2   weeks; then from 2 or 3 weeks, she can begin flexion to 60 degrees; at 34 weeks   she can begin flexion to 90 degrees; after 4 weeks, she can begin flexion past   90 degrees to 120 degrees as tolerated.     Immobilizer should be discontinued at 6 weeks' time allowing for full range of   motion. The patient should avoid open chain rehabilitation for the first 4   months following the surgery.     NOTE: Amniox arthrocentesis was performed, intraoperatively following closure   of the deep soft tissue structures, applying 3 mL of amniotic fluid for   postoperative healing purposes.    Subjective     Pt reports states the knee is ok, a little sore. I missed Tuesday  because I had a migraine.   Pain: 1/10    Objective     Measurements taken:  none    Patient received individual therapy to increase strength, endurance and ROM with activities as follows:     Chey received therapeutic exercises to develop strength, endurance and ROM for 40 minutes including:     Quad set flat x 20   Seated flexion x 3 min  SLR 3x10  SL hip abd 3x10  Bike level 2 x 8  Min  Side steps x 2 laps  Clamshells 3x10  Shuttle 2c 3x10 DL  Shuttle SL 2c 3x10  Shuttle 2-1 3c 3x10  6 in step downs 3x10  EOT split squats 3x10  SL bridges 3x10          CP in extension x 10 min    Chey received the following manual therapy techniques: Joint mobilizations and Soft tissue Mobilization were applied to the: right knee for 5 minutes.   PROM: ext, patellar mobs  STM quad, IT band    Dry needling performed by Fabian Stallings        Written Home Exercises Provided: edu on continuing with current HEP.   Pt demo good understanding of the education provided. Chey demonstrated good return demonstration of activities.     Education provided:  Chey verbalized good understanding of education provided.   No spiritual or educational barriers to learning identified.    Assessment     Single leg stability and eccentric quad control continue to improve with rx. Continue to progress as tolerated.     This is a 42 y.o. female referred to outpatient physical therapy and presents with a medical diagnosis of right knee pain and demonstrates limitations as described in the problem list Pt prognosis is Good. Pt will continue to benefit from skilled outpatient physical therapy to address the deficits listed below in the problem list, provide pt/family education and to maximize pt's level of independence in the home and community environment.    Will the patient continue to benefit from skilled PT intervention? Updated as per therex list      Medical necessity is demonstrated by:   - Pain limits function of effected part for all  activities  - Unable to participate in daily activities   - Requires skilled supervision to complete and progress HEP  - Fall risk - impaired balance   - Continued inability to participate in vocational pursuits    Short Term Goals (10-12 Weeks):   - Pt will increase ROM of right knee to left knee  - Pt will increase strength of bilateral hip abd =5/5; Improved quad firing to hyperextension.  - Decrease Pain to 2/10 with ADLs  - Pt to safely amb stairs without valgus instability.  - Pt to self correct posture independently.   - Pt independent with HEP with progressions.      Long Term Goals: 6-8 mo  - Pt able to SLS x 2 min without valgus  - Pt with normalized loading mechanics with Ybalance.  - Pt with painfree tolerance to ADLs/ full rx x 45 min  - Pt to DC with independence with HEP.          Plan   Continue with established Plan of Care towards PT goals.      Therapist: ALEJANDRO MAI, PT

## 2017-10-05 ENCOUNTER — CLINICAL SUPPORT (OUTPATIENT)
Dept: REHABILITATION | Facility: HOSPITAL | Age: 42
End: 2017-10-05
Attending: ORTHOPAEDIC SURGERY
Payer: MEDICAID

## 2017-10-05 DIAGNOSIS — M25.561 RIGHT ANTERIOR KNEE PAIN: ICD-10-CM

## 2017-10-05 PROBLEM — M25.569 ANTERIOR KNEE PAIN: Status: ACTIVE | Noted: 2017-10-05

## 2017-10-05 PROCEDURE — 97140 MANUAL THERAPY 1/> REGIONS: CPT | Mod: PN | Performed by: PHYSICAL THERAPIST

## 2017-10-05 PROCEDURE — 97110 THERAPEUTIC EXERCISES: CPT | Mod: PN | Performed by: PHYSICAL THERAPIST

## 2017-10-05 NOTE — PLAN OF CARE
Physical Therapy Daily Note     Date: 10/05/2017  Name: Chey Mcneill  [facility] Number: [id number]  Diagnosis:   Encounter Diagnosis   Name Primary?    Right anterior knee pain      OPERATIVE PROCEDURES PERFORMED:  1. Right knee complex osteochondral allograft transplantation, medial femoral   condyle and patella.  2. Right knee complex patella realignment.  3. Right knee arthroscopic medial meniscectomy.  4. Right knee arthroscopic chondroplasty.  5. Right knee arthroscopic loose body removal.  6. Right knee Amniox arthrocentesis.      Physician: Cooper Ramos MD    Evaluation Date: 4/11/17  Date of Surgery: 5/11/17  Visit #: [id number]  Start Time:  7:15  Stop Time:  8:00  Total Treatment Time: 45    Precautions:   PHYSICAL THERAPY: The patient should begin physical therapy on postoperative   day #3.     WEIGHTBEARING STATUS: Toe-touch at 25% weightbearing along the right leg with   the immobilizer locked in extension with gait for the first 6 weeks.     RANGE OF MOTION: -10 degrees hyperextension and flexion to 45 degrees and   starting CPM machine on postoperative day #1 following approximately 12 to 24   hours of rest.     Range of motion should be maintained -10 degrees to 45 degrees flexion for 2   weeks; then from 2 or 3 weeks, she can begin flexion to 60 degrees; at 34 weeks   she can begin flexion to 90 degrees; after 4 weeks, she can begin flexion past   90 degrees to 120 degrees as tolerated.     Immobilizer should be discontinued at 6 weeks' time allowing for full range of   motion. The patient should avoid open chain rehabilitation for the first 4   months following the surgery.     NOTE: Amniox arthrocentesis was performed, intraoperatively following closure   of the deep soft tissue structures, applying 3 mL of amniotic fluid for   postoperative healing purposes.    Subjective     Pt reports states the knee is a little sore. I had a headache this AM.    Pain: 1/10    Objective     Measurements taken:  none    Patient received individual therapy to increase strength, endurance and ROM with activities as follows:     Chey received therapeutic exercises to develop strength, endurance and ROM for 30 minutes including:     SLR 3x10 3#  SL hip abd 3x10  Side steps x 2 laps  Clamshells 3x10  Shuttle 2-1 3c 3x10  6 in step downs 3x10   hold EOT split squats 3x10  SL bridges 3x10  SLS 3x10  Ybalance x 10          CP in extension x 10 min    Chey received the following manual therapy techniques: Joint mobilizations and Soft tissue Mobilization were applied to the: right knee for 8 minutes.   PROM: ext, patellar mobs  STM quad, IT band, fat pad    Dry needling performed by Fabian Stallings        Written Home Exercises Provided: edu on continuing with current HEP.   Pt demo good understanding of the education provided. Chey demonstrated good return demonstration of activities.     Education provided:  Chey verbalized good understanding of education provided.   No spiritual or educational barriers to learning identified.    Assessment     Fatigued with progressive single leg stabilization work (SLS and Ybalance). Mild relief with tenderness with STM to medial and lateral fat pad.     This is a 42 y.o. female referred to outpatient physical therapy and presents with a medical diagnosis of right knee pain and demonstrates limitations as described in the problem list Pt prognosis is Good. Pt will continue to benefit from skilled outpatient physical therapy to address the deficits listed below in the problem list, provide pt/family education and to maximize pt's level of independence in the home and community environment.    Will the patient continue to benefit from skilled PT intervention? Updated as per therex list      Medical necessity is demonstrated by:   - Pain limits function of effected part for all activities  - Unable to participate in daily activities   -  Requires skilled supervision to complete and progress HEP  - Fall risk - impaired balance   - Continued inability to participate in vocational pursuits    Short Term Goals (10-12 Weeks):   - Pt will increase ROM of right knee to left knee  - Pt will increase strength of bilateral hip abd =5/5; Improved quad firing to hyperextension.  - Decrease Pain to 2/10 with ADLs  - Pt to safely amb stairs without valgus instability.  - Pt to self correct posture independently.   - Pt independent with HEP with progressions.      Long Term Goals: 6-8 mo  - Pt able to SLS x 2 min without valgus  - Pt with normalized loading mechanics with Ybalance.  - Pt with painfree tolerance to ADLs/ full rx x 45 min  - Pt to DC with independence with HEP.          Plan   Continue with established Plan of Care towards PT goals.      Therapist: ALEJANDRO MAI, PT

## 2017-10-10 ENCOUNTER — TELEPHONE (OUTPATIENT)
Dept: SPORTS MEDICINE | Facility: CLINIC | Age: 42
End: 2017-10-10

## 2017-10-10 NOTE — TELEPHONE ENCOUNTER
S/w the pt and she said that she recently fell and re-injured her right knee on Saturday. She said she felt a pop but thought she tweaked in and it would get better. She said it has been four days and she is in a lot of pain and can not really straighten her knee b/c it hurts when she tries to. I scheduled her to come in tomorrow at 4pm to see Dr. Ramos.

## 2017-10-16 ENCOUNTER — OFFICE VISIT (OUTPATIENT)
Dept: SPORTS MEDICINE | Facility: CLINIC | Age: 42
End: 2017-10-16
Payer: MEDICAID

## 2017-10-16 ENCOUNTER — HOSPITAL ENCOUNTER (OUTPATIENT)
Dept: RADIOLOGY | Facility: HOSPITAL | Age: 42
Discharge: HOME | End: 2017-10-16
Attending: ORTHOPAEDIC SURGERY
Payer: MEDICAID

## 2017-10-16 VITALS
HEART RATE: 81 BPM | WEIGHT: 123 LBS | DIASTOLIC BLOOD PRESSURE: 79 MMHG | BODY MASS INDEX: 22.63 KG/M2 | SYSTOLIC BLOOD PRESSURE: 123 MMHG | HEIGHT: 62 IN

## 2017-10-16 DIAGNOSIS — M75.41 ROTATOR CUFF IMPINGEMENT SYNDROME OF RIGHT SHOULDER: ICD-10-CM

## 2017-10-16 DIAGNOSIS — M23.92 INTERNAL DERANGEMENT OF LEFT KNEE: ICD-10-CM

## 2017-10-16 DIAGNOSIS — M94.261 CHONDROMALACIA, RIGHT KNEE: ICD-10-CM

## 2017-10-16 DIAGNOSIS — M25.569 KNEE PAIN, UNSPECIFIED CHRONICITY, UNSPECIFIED LATERALITY: ICD-10-CM

## 2017-10-16 DIAGNOSIS — S83.411A TEAR OF MCL (MEDIAL COLLATERAL LIGAMENT) OF KNEE, RIGHT, INITIAL ENCOUNTER: ICD-10-CM

## 2017-10-16 DIAGNOSIS — M23.91 INTERNAL DERANGEMENT OF RIGHT KNEE: ICD-10-CM

## 2017-10-16 DIAGNOSIS — M25.569 KNEE PAIN, UNSPECIFIED CHRONICITY, UNSPECIFIED LATERALITY: Primary | ICD-10-CM

## 2017-10-16 DIAGNOSIS — S43.432D TYPE 2 SUPERIOR LABRUM EXTENDING FROM ANTERIOR TO POSTERIOR (SLAP) LESION OF LEFT SHOULDER, SUBSEQUENT ENCOUNTER: ICD-10-CM

## 2017-10-16 PROCEDURE — 99999 PR PBB SHADOW E&M-EST. PATIENT-LVL IV: CPT | Mod: PBBFAC,,, | Performed by: ORTHOPAEDIC SURGERY

## 2017-10-16 PROCEDURE — 73564 X-RAY EXAM KNEE 4 OR MORE: CPT | Mod: 26,50,, | Performed by: RADIOLOGY

## 2017-10-16 PROCEDURE — 73564 X-RAY EXAM KNEE 4 OR MORE: CPT | Mod: TC,50,PO

## 2017-10-16 PROCEDURE — 99214 OFFICE O/P EST MOD 30 MIN: CPT | Mod: S$PBB,,, | Performed by: ORTHOPAEDIC SURGERY

## 2017-10-16 PROCEDURE — 99214 OFFICE O/P EST MOD 30 MIN: CPT | Mod: PBBFAC,25,PO | Performed by: ORTHOPAEDIC SURGERY
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
## 2017-10-16 NOTE — PATIENT INSTRUCTIONS
Treating Medial Collateral Ligament (MCL) Problems  There are 2 options for treating an MCL injury: nonsurgical and surgical. Nonsurgical treatment is used much more often. With either option, rehabilitation will be part of your treatment.  Pre-op checklist  · Stop taking aspirin and other medicines as advised by your doctor 7 days before surgery.  · Arrange to get properly sized crutches to use during recovery.  · Dont eat or drink 10 to 12 hours before surgery (or advised by your doctor).  · Arrange for someone to drive you home after surgery.   Nonsurgical treatment  This treatment starts with rest, ice, and elevation. This relieves pain and swelling. In the next stage, you begin exercises designed to increase your knees range of motion, strength, and flexibility. You may need a brace for weeks after your injury. Using crutches or a brace rests your joint, helping it to heal.     Your doctor may secure the ligament to the bone with screws.         Your doctor may stitch or staple your ligament together.    Surgery  Surgery is seldom used to repair an MCL injury, however, sometimes it is advised, especially if another part of your knee is damaged. Open surgery is used to screw or stitch the MCL back into place. If repair of the original MCL is not possible, an MCL graft may be used. Depending on their location, other knee injuries may be repaired using arthroscopy. With arthroscopy, a tiny camera lets your doctor see inside the joint. Tools inserted through small incisions are used to repair the joint.  After surgery  Right after surgery, youll spend a few hours in a recovery unit. Your knee will be bandaged, ice will be applied,  and your leg elevated. Depending on the surgery performed, physical therapy may begin shortly after. A brace and crutches are typically used after surgery. You may have restrictions on weight bearing and activity during your healing process.  Date Last Reviewed: 9/8/2015  ©  8690-7251 [employer]. [address] [place] [address], [place] [address]. All rights reserved. This information is not intended as a substitute for professional medical care. Always follow your healthcare professional's instructions.        Understanding Medial Collateral Ligament Sprain    The knee is a complex joint where the thighbone (femur) meets the shinbone (tibia). Strong tissues called ligaments connect these bones together. Ligaments also keep the bones aligned, so the knee only bends how it is supposed to. The medial collateral ligament (MCL) runs across the knee joint on the medial side of the leg. Injury to this ligament may be very painful. The knee may also not work the way it should.  Causes of an MCL sprain  An MCL sprain often happens when the knee joint is pushed beyond its normal range of motion. It is most common during a blow to the knee from the outside, pushing the knee inward. It may also happen if the knee is forced into a twist. These movements stretch and tear the MCL. Other parts of the knee may be damaged along with the MCL.  Symptoms of an MCL sprain  These include:  · Knee pain  · Knee swelling  · Locking of the joint  · Wobbly or unstable feeling in the joint  Treatment for an MCL sprain  Treatment will depend on the severity of the sprain and whether there is damage to other parts of the knee. Options often include:  · Rest. This allows the knee to heal. Activities that stress the knee should be avoided. Crutches, a knee brace, or both may also be recommended for a short time.  · Cold packs and elevation of the knee. These help reduce swelling and relieve pain.  · Compression. The knee may be wrapped with a bandage to help reduce swelling.  · Medicines. These help relieve pain and swelling.  · Exercises. These help improve the knees stability, strength, and range of motion.  If the injury is severe or several parts of the joint are involved, surgery may be an option. Surgery  repairs the MCL and any other damaged structures.     When to call your healthcare provider  Call your healthcare provider right away if you have any of these:  · Pain, swelling, or instability that doesnt get better with treatment or gets worse  · New symptoms   Date Last Reviewed: 3/10/2016  © 8393-9879 [employer]. [address] [place] [address], [place] [address]. All rights reserved. This information is not intended as a substitute for professional medical care. Always follow your healthcare professional's instructions.

## 2017-10-17 ENCOUNTER — CLINICAL SUPPORT (OUTPATIENT)
Dept: REHABILITATION | Facility: HOSPITAL | Age: 42
End: 2017-10-17
Attending: ORTHOPAEDIC SURGERY
Payer: MEDICAID

## 2017-10-17 DIAGNOSIS — M25.561 RIGHT ANTERIOR KNEE PAIN: Primary | ICD-10-CM

## 2017-10-17 PROCEDURE — 97110 THERAPEUTIC EXERCISES: CPT | Mod: PN | Performed by: PHYSICAL THERAPIST

## 2017-10-17 NOTE — PLAN OF CARE
Physical Therapy Daily Note     Date: 10/05/2017  Name: Chey Mcneill  [facility] Number: [id number]  Diagnosis:   Encounter Diagnosis   Name Primary?    Right anterior knee pain      OPERATIVE PROCEDURES PERFORMED:  1. Right knee complex osteochondral allograft transplantation, medial femoral   condyle and patella.  2. Right knee complex patella realignment.  3. Right knee arthroscopic medial meniscectomy.  4. Right knee arthroscopic chondroplasty.  5. Right knee arthroscopic loose body removal.  6. Right knee Amniox arthrocentesis.      Physician: Cooper Ramos MD    Evaluation Date: 4/11/17  Date of Surgery: 5/11/17  Visit #: 33  Start Time:  8:15  Stop Time:  8:45  Total Treatment Time: 30    Precautions:   PHYSICAL THERAPY: The patient should begin physical therapy on postoperative   day #3.     WEIGHTBEARING STATUS: Toe-touch at 25% weightbearing along the right leg with   the immobilizer locked in extension with gait for the first 6 weeks.     RANGE OF MOTION: -10 degrees hyperextension and flexion to 45 degrees and   starting CPM machine on postoperative day #1 following approximately 12 to 24   hours of rest.     Range of motion should be maintained -10 degrees to 45 degrees flexion for 2   weeks; then from 2 or 3 weeks, she can begin flexion to 60 degrees; at 34 weeks   she can begin flexion to 90 degrees; after 4 weeks, she can begin flexion past   90 degrees to 120 degrees as tolerated.     Immobilizer should be discontinued at 6 weeks' time allowing for full range of   motion. The patient should avoid open chain rehabilitation for the first 4   months following the surgery.     NOTE: Amniox arthrocentesis was performed, intraoperatively following closure   of the deep soft tissue structures, applying 3 mL of amniotic fluid for   postoperative healing purposes.    Subjective     Pt reports I have a torn MCL and the knee is sore. Dr. Ramos is going to  MRI next week.   Pain: 1/10    Objective     Measurements taken:  none    Patient received individual therapy to increase strength, endurance and ROM with activities as follows:     Chey received therapeutic exercises to develop strength, endurance and ROM for 30 minutes including:   Quad set towel x 20  Quad set flat x 20  SLR 3x10  Seated leg extension x 20  SL hip abd 3x10  Passive extension x 5 min seated  Ice massage x 5 min              Chey received the following manual therapy techniques: Joint mobilizations and Soft tissue Mobilization were applied to the: right knee for 5 minutes.   PROM: ext, patellar mobs  STM quad, IT band, fat pad    Dry needling performed by Fabian Stallings        Written Home Exercises Provided: edu on continuing with current HEP.   Pt demo good understanding of the education provided. Chey demonstrated good return demonstration of activities.     Education provided:  Chey verbalized good understanding of education provided.   No spiritual or educational barriers to learning identified.    Assessment     Tender over medial joint line. Added passive extension and quad firing therex to HEP. Continue to progress as tolerated.      This is a 42 y.o. female referred to outpatient physical therapy and presents with a medical diagnosis of right knee pain and demonstrates limitations as described in the problem list Pt prognosis is Good. Pt will continue to benefit from skilled outpatient physical therapy to address the deficits listed below in the problem list, provide pt/family education and to maximize pt's level of independence in the home and community environment.    Will the patient continue to benefit from skilled PT intervention? Updated as per therex list      Medical necessity is demonstrated by:   - Pain limits function of effected part for all activities  - Unable to participate in daily activities   - Requires skilled supervision to complete and progress HEP  - Fall risk  - impaired balance   - Continued inability to participate in vocational pursuits    Short Term Goals (10-12 Weeks):   - Pt will increase ROM of right knee to left knee  - Pt will increase strength of bilateral hip abd =5/5; Improved quad firing to hyperextension.  - Decrease Pain to 2/10 with ADLs  - Pt to safely amb stairs without valgus instability.  - Pt to self correct posture independently.   - Pt independent with HEP with progressions.      Long Term Goals: 6-8 mo  - Pt able to SLS x 2 min without valgus  - Pt with normalized loading mechanics with Ybalance.  - Pt with painfree tolerance to ADLs/ full rx x 45 min  - Pt to DC with independence with HEP.          Plan   Continue with established Plan of Care towards PT goals.      Therapist: ALEJANDRO MAI, PT

## 2017-10-31 ENCOUNTER — CLINICAL SUPPORT (OUTPATIENT)
Dept: REHABILITATION | Facility: HOSPITAL | Age: 42
End: 2017-10-31
Attending: ORTHOPAEDIC SURGERY
Payer: MEDICAID

## 2017-10-31 DIAGNOSIS — M25.561 RIGHT ANTERIOR KNEE PAIN: Primary | ICD-10-CM

## 2017-10-31 PROCEDURE — 97110 THERAPEUTIC EXERCISES: CPT | Mod: PN | Performed by: PHYSICAL THERAPIST

## 2017-10-31 NOTE — PLAN OF CARE
Physical Therapy Daily Note     Date: 10/05/2017  Name: Chey Mcneill  [facility] Number: [id number]  Diagnosis:   Encounter Diagnosis   Name Primary?    Right anterior knee pain      OPERATIVE PROCEDURES PERFORMED:  1. Right knee complex osteochondral allograft transplantation, medial femoral   condyle and patella.  2. Right knee complex patella realignment.  3. Right knee arthroscopic medial meniscectomy.  4. Right knee arthroscopic chondroplasty.  5. Right knee arthroscopic loose body removal.  6. Right knee Amniox arthrocentesis.      Physician: Cooper Ramos MD    Evaluation Date: 4/11/17  Date of Surgery: 5/11/17  Visit #: 34  Start Time:  800  Stop Time:  8:30   Total Treatment Time: 30    Precautions:   PHYSICAL THERAPY: The patient should begin physical therapy on postoperative   day #3.     WEIGHTBEARING STATUS: Toe-touch at 25% weightbearing along the right leg with   the immobilizer locked in extension with gait for the first 6 weeks.     RANGE OF MOTION: -10 degrees hyperextension and flexion to 45 degrees and   starting CPM machine on postoperative day #1 following approximately 12 to 24   hours of rest.     Range of motion should be maintained -10 degrees to 45 degrees flexion for 2   weeks; then from 2 or 3 weeks, she can begin flexion to 60 degrees; at 34 weeks   she can begin flexion to 90 degrees; after 4 weeks, she can begin flexion past   90 degrees to 120 degrees as tolerated.     Immobilizer should be discontinued at 6 weeks' time allowing for full range of   motion. The patient should avoid open chain rehabilitation for the first 4   months following the surgery.     NOTE: Amniox arthrocentesis was performed, intraoperatively following closure   of the deep soft tissue structures, applying 3 mL of amniotic fluid for   postoperative healing purposes.    Subjective     Pt reports my knee is still hurting bad. I didn't get the MRI yet. I have  it Monday with a visit with Dr. Ramos after.    Pain: 1/10    Objective     Measurements taken:  none    Patient received individual therapy to increase strength, endurance and ROM with activities as follows:     Chey received therapeutic exercises to develop strength, endurance and ROM for 30 minutes including:   Quad set towel x 20  Quad set flat x 20  SLR 3x10  Seated leg extension x 20  SL hip abd 3x10  Passive extension x 5 min seated  Ice massage x 5 min              Chey received the following manual therapy techniques: Joint mobilizations and Soft tissue Mobilization were applied to the: right knee for 5 minutes.   PROM: ext, patellar mobs  STM quad, IT band, fat pad    Dry needling performed by Fabian Stallings        Written Home Exercises Provided: edu on continuing with current HEP.   Pt demo good understanding of the education provided. Chey demonstrated good return demonstration of activities.     Education provided:  Chey verbalized good understanding of education provided.   No spiritual or educational barriers to learning identified.    Assessment     Hold progressions secondary to pain and swelling. Awaiting MRI results and follow up direction from Dr. Ramos on Monday. Advised to continue ice and stocking along with HEP to mitigate swelling.     This is a 42 y.o. female referred to outpatient physical therapy and presents with a medical diagnosis of right knee pain and demonstrates limitations as described in the problem list Pt prognosis is Good. Pt will continue to benefit from skilled outpatient physical therapy to address the deficits listed below in the problem list, provide pt/family education and to maximize pt's level of independence in the home and community environment.    Will the patient continue to benefit from skilled PT intervention? Updated as per therex list      Medical necessity is demonstrated by:   - Pain limits function of effected part for all activities  - Unable to  participate in daily activities   - Requires skilled supervision to complete and progress HEP  - Fall risk - impaired balance   - Continued inability to participate in vocational pursuits    Short Term Goals (10-12 Weeks):   - Pt will increase ROM of right knee to left knee  - Pt will increase strength of bilateral hip abd =5/5; Improved quad firing to hyperextension.  - Decrease Pain to 2/10 with ADLs  - Pt to safely amb stairs without valgus instability.  - Pt to self correct posture independently.   - Pt independent with HEP with progressions.      Long Term Goals: 6-8 mo  - Pt able to SLS x 2 min without valgus  - Pt with normalized loading mechanics with Ybalance.  - Pt with painfree tolerance to ADLs/ full rx x 45 min  - Pt to DC with independence with HEP.          Plan   Continue with established Plan of Care towards PT goals.      Therapist: ALEJANDRO MAI, PT

## 2017-11-06 ENCOUNTER — OFFICE VISIT (OUTPATIENT)
Dept: SPORTS MEDICINE | Facility: CLINIC | Age: 42
End: 2017-11-06
Payer: MEDICAID

## 2017-11-06 ENCOUNTER — HOSPITAL ENCOUNTER (OUTPATIENT)
Dept: RADIOLOGY | Facility: HOSPITAL | Age: 42
Discharge: HOME | End: 2017-11-06
Attending: ORTHOPAEDIC SURGERY
Payer: MEDICAID

## 2017-11-06 ENCOUNTER — TELEPHONE (OUTPATIENT)
Dept: SPORTS MEDICINE | Facility: CLINIC | Age: 42
End: 2017-11-06

## 2017-11-06 DIAGNOSIS — M25.561 CHRONIC PAIN OF RIGHT KNEE: ICD-10-CM

## 2017-11-06 DIAGNOSIS — S43.432D TYPE 2 SUPERIOR LABRUM EXTENDING FROM ANTERIOR TO POSTERIOR (SLAP) LESION OF LEFT SHOULDER, SUBSEQUENT ENCOUNTER: ICD-10-CM

## 2017-11-06 DIAGNOSIS — M75.42 ROTATOR CUFF IMPINGEMENT SYNDROME OF LEFT SHOULDER: ICD-10-CM

## 2017-11-06 DIAGNOSIS — S83.411A TEAR OF MCL (MEDIAL COLLATERAL LIGAMENT) OF KNEE, RIGHT, INITIAL ENCOUNTER: Primary | ICD-10-CM

## 2017-11-06 DIAGNOSIS — M23.91 INTERNAL DERANGEMENT OF RIGHT KNEE: ICD-10-CM

## 2017-11-06 DIAGNOSIS — M25.569 KNEE PAIN, UNSPECIFIED CHRONICITY, UNSPECIFIED LATERALITY: ICD-10-CM

## 2017-11-06 DIAGNOSIS — G89.29 CHRONIC PAIN OF RIGHT KNEE: ICD-10-CM

## 2017-11-06 PROCEDURE — 99213 OFFICE O/P EST LOW 20 MIN: CPT | Mod: PBBFAC,25,PO | Performed by: ORTHOPAEDIC SURGERY

## 2017-11-06 PROCEDURE — 99214 OFFICE O/P EST MOD 30 MIN: CPT | Mod: 25,S$PBB,, | Performed by: ORTHOPAEDIC SURGERY

## 2017-11-06 PROCEDURE — 20611 DRAIN/INJ JOINT/BURSA W/US: CPT | Mod: PBBFAC,PO,LT | Performed by: ORTHOPAEDIC SURGERY

## 2017-11-06 PROCEDURE — 20611 DRAIN/INJ JOINT/BURSA W/US: CPT | Mod: S$PBB,LT,, | Performed by: ORTHOPAEDIC SURGERY

## 2017-11-06 PROCEDURE — 73721 MRI JNT OF LWR EXTRE W/O DYE: CPT | Mod: TC,RT

## 2017-11-06 PROCEDURE — 99999 PR PBB SHADOW E&M-EST. PATIENT-LVL III: CPT | Mod: PBBFAC,,, | Performed by: ORTHOPAEDIC SURGERY

## 2017-11-06 PROCEDURE — 20550 NJX 1 TENDON SHEATH/LIGAMENT: CPT | Mod: 59,S$PBB,LT, | Performed by: ORTHOPAEDIC SURGERY

## 2017-11-06 PROCEDURE — 73721 MRI JNT OF LWR EXTRE W/O DYE: CPT | Mod: 26,RT,, | Performed by: RADIOLOGY

## 2017-11-06 PROCEDURE — 20550 NJX 1 TENDON SHEATH/LIGAMENT: CPT | Mod: PBBFAC,PO | Performed by: ORTHOPAEDIC SURGERY

## 2017-11-06 RX ORDER — TRIAMCINOLONE ACETONIDE 40 MG/ML
80 INJECTION, SUSPENSION INTRA-ARTICULAR; INTRAMUSCULAR
Status: COMPLETED | OUTPATIENT
Start: 2017-11-06 | End: 2017-11-06

## 2017-11-06 RX ORDER — TRIAMCINOLONE ACETONIDE 40 MG/ML
40 INJECTION, SUSPENSION INTRA-ARTICULAR; INTRAMUSCULAR
Status: COMPLETED | OUTPATIENT
Start: 2017-11-06 | End: 2017-11-06

## 2017-11-06 RX ADMIN — TRIAMCINOLONE ACETONIDE 40 MG: 40 INJECTION, SUSPENSION INTRA-ARTICULAR; INTRAMUSCULAR at 05:11

## 2017-11-06 RX ADMIN — TRIAMCINOLONE ACETONIDE 80 MG: 40 INJECTION, SUSPENSION INTRA-ARTICULAR; INTRAMUSCULAR at 05:11
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
## 2017-11-06 NOTE — TELEPHONE ENCOUNTER
----- Message from Edel Rico sent at 11/6/2017  8:26 AM CST -----  Contact: [email or web address]  Pt called in asking if she can get an injection in her shoulder when she comes to get her MRI results. She is asking to please call and advise    Thank you

## 2017-11-09 DIAGNOSIS — M25.561 RIGHT KNEE PAIN, UNSPECIFIED CHRONICITY: ICD-10-CM

## 2017-11-09 DIAGNOSIS — M94.261 CHONDROMALACIA, RIGHT KNEE: ICD-10-CM

## 2017-11-09 DIAGNOSIS — M25.649 DECREASED RANGE OF MOTION OF FINGER: ICD-10-CM

## 2017-11-09 DIAGNOSIS — S43.431S SUPERIOR GLENOID LABRUM LESION OF RIGHT SHOULDER, SEQUELA: Primary | ICD-10-CM

## 2017-11-09 DIAGNOSIS — M23.91 DERANGEMENT, KNEE INTERNAL, RIGHT: ICD-10-CM

## 2017-11-09 DIAGNOSIS — M94.261 CHONDROMALACIA OF RIGHT KNEE: ICD-10-CM

## 2017-11-09 NOTE — TELEPHONE ENCOUNTER
----- Message from Amber Zheng MA sent at 11/9/2017  4:21 PM CST -----  Contact: self   She said that she is in a lot pain. She is taking the anti-inflammatory and tramadol and it is not helping. She had two percocet's left and she said it helped a little. She said she does not know what to do anymore about the pain she is having in both her shoulder and knee. I told her I would send you this information and have you call her back.    Thanks  Amber  ----- Message -----  From: Nu Damon MA  Sent: 11/9/2017   4:13 PM  To: [facility] Staff    Pt is calling in regards to knee and shoulder pain level is at 8/9. Pt also has questions. Pt can be reached at [phone].

## 2017-11-10 RX ORDER — OXYCODONE AND ACETAMINOPHEN 10; 325 MG/1; MG/1
1 TABLET ORAL EVERY 12 HOURS PRN
Qty: 60 TABLET | Refills: 0 | Status: SHIPPED | OUTPATIENT
Start: 2017-11-10 | End: 2018-04-17

## 2017-11-20 ENCOUNTER — OFFICE VISIT (OUTPATIENT)
Dept: URGENT CARE | Facility: CLINIC | Age: 42
End: 2017-11-20
Payer: MEDICAID

## 2017-11-20 VITALS
SYSTOLIC BLOOD PRESSURE: 107 MMHG | RESPIRATION RATE: 16 BRPM | OXYGEN SATURATION: 100 % | BODY MASS INDEX: 22.63 KG/M2 | WEIGHT: 123 LBS | DIASTOLIC BLOOD PRESSURE: 72 MMHG | HEIGHT: 62 IN | TEMPERATURE: 99 F | HEART RATE: 84 BPM

## 2017-11-20 DIAGNOSIS — R52 BODY ACHES: Primary | ICD-10-CM

## 2017-11-20 DIAGNOSIS — J10.1 INFLUENZA A: ICD-10-CM

## 2017-11-20 LAB
CTP QC/QA: YES
FLUAV AG NPH QL: POSITIVE
FLUBV AG NPH QL: NEGATIVE

## 2017-11-20 PROCEDURE — 87804 INFLUENZA ASSAY W/OPTIC: CPT | Mod: QW,S$GLB,, | Performed by: FAMILY MEDICINE

## 2017-11-20 PROCEDURE — 99214 OFFICE O/P EST MOD 30 MIN: CPT | Mod: S$GLB,,, | Performed by: FAMILY MEDICINE

## 2017-11-20 RX ORDER — MELOXICAM 7.5 MG/1
7.5 TABLET ORAL DAILY
COMMUNITY
End: 2017-11-20

## 2017-11-20 RX ORDER — OSELTAMIVIR PHOSPHATE 75 MG/1
75 CAPSULE ORAL 2 TIMES DAILY
Qty: 10 CAPSULE | Refills: 0 | Status: SHIPPED | OUTPATIENT
Start: 2017-11-20 | End: 2017-11-30

## 2017-11-20 RX ORDER — MELOXICAM 15 MG/1
TABLET ORAL
Refills: 2 | COMMUNITY
Start: 2017-11-10 | End: 2019-06-06

## 2017-11-20 NOTE — PROGRESS NOTES
"Subjective:       Patient ID: Chey Mcneill is a 42 y.o. female.    Vitals:  height is 5' 2" (1.575 m) and weight is 55.8 kg (123 lb). Her temperature is 98.6 °F (37 °C). Her blood pressure is 107/72 and her pulse is 84. Her respiration is 16 and oxygen saturation is 100%.     Chief Complaint: Sinus Problem    Sinus Problem   This is a new problem. The current episode started yesterday. The problem is unchanged. Associated symptoms include chills, congestion, coughing, ear pain, a hoarse voice, shortness of breath, sinus pressure and a sore throat. Pertinent negatives include no headaches.     Review of Systems   Constitution: Positive for chills and malaise/fatigue. Negative for fever.   HENT: Positive for congestion, ear pain, hoarse voice, sinus pressure and sore throat.    Eyes: Negative for discharge and redness.   Cardiovascular: Negative for chest pain, dyspnea on exertion and leg swelling.   Respiratory: Positive for cough, shortness of breath, sputum production and wheezing.    Musculoskeletal: Negative for myalgias.   Gastrointestinal: Negative for abdominal pain and nausea.   Neurological: Negative for headaches.       Objective:      Physical Exam   Constitutional: She is oriented to person, place, and time. She appears well-developed and well-nourished. She is cooperative.  Non-toxic appearance. She does not appear ill. She appears distressed (appears ill).   HENT:   Head: Normocephalic and atraumatic.   Right Ear: Hearing, tympanic membrane, external ear and ear canal normal.   Left Ear: Hearing, tympanic membrane, external ear and ear canal normal.   Nose: Nose normal. No mucosal edema, rhinorrhea or nasal deformity. No epistaxis. Right sinus exhibits no maxillary sinus tenderness and no frontal sinus tenderness. Left sinus exhibits no maxillary sinus tenderness and no frontal sinus tenderness.   Mouth/Throat: Uvula is midline, oropharynx is clear and moist and mucous membranes are normal. No " trismus in the jaw. Normal dentition. No uvula swelling. No posterior oropharyngeal erythema.   Eyes: Conjunctivae and lids are normal. No scleral icterus.   Sclera clear bilat   Neck: Trachea normal, full passive range of motion without pain and phonation normal. Neck supple.   Cardiovascular: Normal rate, regular rhythm, normal heart sounds, intact distal pulses and normal pulses.    Pulmonary/Chest: Effort normal and breath sounds normal. No respiratory distress.   Abdominal: Soft. Normal appearance and bowel sounds are normal. She exhibits no distension. There is no tenderness.   Musculoskeletal: Normal range of motion. She exhibits no edema or deformity.   Neurological: She is alert and oriented to person, place, and time. She exhibits normal muscle tone. Coordination normal.   Skin: Skin is warm, dry and intact. She is not diaphoretic. No pallor.   Psychiatric: She has a normal mood and affect. Her speech is normal and behavior is normal. Judgment and thought content normal. Cognition and memory are normal.   Nursing note and vitals reviewed.      Assessment:       1. Body aches    2. Influenza A        Plan:         Body aches  -     POCT Influenza A/B    Influenza A    Other orders  -     oseltamivir (TAMIFLU) 75 MG capsule; Take 1 capsule (75 mg total) by mouth 2 (two) times daily.  Dispense: 10 capsule; Refill: 0

## 2017-11-30 ENCOUNTER — HOSPITAL ENCOUNTER (OUTPATIENT)
Dept: RADIOLOGY | Facility: HOSPITAL | Age: 42
Discharge: HOME | End: 2017-11-30
Attending: ORTHOPAEDIC SURGERY
Payer: MEDICAID

## 2017-11-30 DIAGNOSIS — S43.431S SUPERIOR GLENOID LABRUM LESION OF RIGHT SHOULDER, SEQUELA: ICD-10-CM

## 2017-11-30 PROCEDURE — 23350 INJECTION FOR SHOULDER X-RAY: CPT | Mod: TC,PO

## 2017-11-30 PROCEDURE — 73040 CONTRAST X-RAY OF SHOULDER: CPT | Mod: 26,LT,, | Performed by: RADIOLOGY

## 2017-11-30 PROCEDURE — 73222 MRI JOINT UPR EXTREM W/DYE: CPT | Mod: 26,LT,, | Performed by: RADIOLOGY

## 2017-11-30 PROCEDURE — 73222 MRI JOINT UPR EXTREM W/DYE: CPT | Mod: TC,PO,LT

## 2017-11-30 PROCEDURE — 23350 INJECTION FOR SHOULDER X-RAY: CPT | Mod: LT,,, | Performed by: RADIOLOGY

## 2017-11-30 PROCEDURE — A9577 INJ MULTIHANCE: HCPCS | Mod: PO | Performed by: ORTHOPAEDIC SURGERY

## 2017-11-30 PROCEDURE — 25500020 PHARM REV CODE 255: Mod: PO | Performed by: ORTHOPAEDIC SURGERY

## 2017-11-30 RX ADMIN — GADOBENATE DIMEGLUMINE 1 ML: 529 INJECTION, SOLUTION INTRAVENOUS at 02:11

## 2017-11-30 RX ADMIN — IOHEXOL 6 ML: 240 INJECTION, SOLUTION INTRATHECAL; INTRAVASCULAR; INTRAVENOUS; ORAL at 01:11

## 2017-12-12 ENCOUNTER — TELEPHONE (OUTPATIENT)
Dept: SPORTS MEDICINE | Facility: CLINIC | Age: 42
End: 2017-12-12

## 2017-12-12 NOTE — TELEPHONE ENCOUNTER
The patient was contacted regarding their call to the office earlier and a voice mail was left to call the office back at their convenience.  Re: MRI results

## 2017-12-18 ENCOUNTER — TELEPHONE (OUTPATIENT)
Dept: SPORTS MEDICINE | Facility: CLINIC | Age: 42
End: 2017-12-18

## 2017-12-18 NOTE — TELEPHONE ENCOUNTER
----- Message from Amber Zheng MA sent at 12/18/2017  8:22 AM CST -----  Contact: [email or web address]      ----- Message -----  From: Esther Coleman  Sent: 12/18/2017   8:12 AM  To: Richard CUADRA Staff    Patient was returning phone call.

## 2017-12-18 NOTE — TELEPHONE ENCOUNTER
Spoke with the patient regarding MRI results. She would like to proceed next year. Will fill out paperwork at [facility] on Wednesday and pick a date for surgery.

## 2017-12-20 ENCOUNTER — OFFICE VISIT (OUTPATIENT)
Dept: SPORTS MEDICINE | Facility: CLINIC | Age: 42
End: 2017-12-20
Payer: MEDICAID

## 2017-12-20 VITALS
HEIGHT: 62 IN | BODY MASS INDEX: 22.63 KG/M2 | DIASTOLIC BLOOD PRESSURE: 69 MMHG | SYSTOLIC BLOOD PRESSURE: 113 MMHG | HEART RATE: 70 BPM | WEIGHT: 123 LBS

## 2017-12-20 DIAGNOSIS — S43.432D TYPE 2 SUPERIOR LABRUM EXTENDING FROM ANTERIOR TO POSTERIOR (SLAP) LESION OF LEFT SHOULDER, SUBSEQUENT ENCOUNTER: Primary | ICD-10-CM

## 2017-12-20 DIAGNOSIS — M23.91 INTERNAL DERANGEMENT OF RIGHT KNEE: ICD-10-CM

## 2017-12-20 DIAGNOSIS — M94.261 CHONDROMALACIA OF RIGHT KNEE: ICD-10-CM

## 2017-12-20 DIAGNOSIS — M75.102 ROTATOR CUFF SYNDROME OF LEFT SHOULDER: ICD-10-CM

## 2017-12-20 DIAGNOSIS — M22.41 CHONDROMALACIA OF RIGHT PATELLA: ICD-10-CM

## 2017-12-20 DIAGNOSIS — S43.432A SUPERIOR GLENOID LABRUM LESION OF LEFT SHOULDER, INITIAL ENCOUNTER: Primary | ICD-10-CM

## 2017-12-20 DIAGNOSIS — M75.22 BICEPS TENDINITIS OF LEFT UPPER EXTREMITY: ICD-10-CM

## 2017-12-20 DIAGNOSIS — M75.22 LEFT BICIPITAL TENOSYNOVITIS: ICD-10-CM

## 2017-12-20 DIAGNOSIS — M75.42 ROTATOR CUFF IMPINGEMENT SYNDROME OF LEFT SHOULDER: ICD-10-CM

## 2017-12-20 PROCEDURE — 99999 PR PBB SHADOW E&M-EST. PATIENT-LVL III: CPT | Mod: PBBFAC,,, | Performed by: ORTHOPAEDIC SURGERY

## 2017-12-20 PROCEDURE — 99213 OFFICE O/P EST LOW 20 MIN: CPT | Mod: PBBFAC,PO | Performed by: ORTHOPAEDIC SURGERY

## 2017-12-20 PROCEDURE — 99214 OFFICE O/P EST MOD 30 MIN: CPT | Mod: S$PBB,,, | Performed by: ORTHOPAEDIC SURGERY
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
## 2018-02-08 DIAGNOSIS — M23.91 DERANGEMENT, KNEE INTERNAL, RIGHT: ICD-10-CM

## 2018-02-08 DIAGNOSIS — M22.41 CHONDROMALACIA OF RIGHT PATELLA: ICD-10-CM

## 2018-02-08 DIAGNOSIS — M94.261 CHONDROMALACIA, RIGHT KNEE: ICD-10-CM

## 2018-02-08 RX ORDER — TRAMADOL HYDROCHLORIDE 50 MG/1
50 TABLET ORAL EVERY 12 HOURS PRN
Qty: 60 TABLET | Refills: 0 | Status: SHIPPED | OUTPATIENT
Start: 2018-02-08 | End: 2018-04-17

## 2018-03-21 ENCOUNTER — OFFICE VISIT (OUTPATIENT)
Dept: SPORTS MEDICINE | Facility: CLINIC | Age: 43
End: 2018-03-21
Payer: MEDICAID

## 2018-03-21 ENCOUNTER — HOSPITAL ENCOUNTER (OUTPATIENT)
Dept: RADIOLOGY | Facility: HOSPITAL | Age: 43
Discharge: HOME | End: 2018-03-21
Attending: ORTHOPAEDIC SURGERY | Admitting: ORTHOPAEDIC SURGERY
Payer: MEDICAID

## 2018-03-21 VITALS
BODY MASS INDEX: 22.78 KG/M2 | WEIGHT: 123.81 LBS | SYSTOLIC BLOOD PRESSURE: 114 MMHG | HEIGHT: 62 IN | HEART RATE: 76 BPM | DIASTOLIC BLOOD PRESSURE: 70 MMHG

## 2018-03-21 DIAGNOSIS — R93.89 ABNORMAL X-RAY: ICD-10-CM

## 2018-03-21 DIAGNOSIS — M23.91 INTERNAL DERANGEMENT OF RIGHT KNEE: ICD-10-CM

## 2018-03-21 DIAGNOSIS — G89.29 CHRONIC PAIN OF RIGHT KNEE: Primary | ICD-10-CM

## 2018-03-21 DIAGNOSIS — M94.261 CHONDROMALACIA OF RIGHT KNEE: ICD-10-CM

## 2018-03-21 DIAGNOSIS — M75.42 ROTATOR CUFF IMPINGEMENT SYNDROME OF LEFT SHOULDER: ICD-10-CM

## 2018-03-21 DIAGNOSIS — S43.432D TYPE 2 SUPERIOR LABRUM EXTENDING FROM ANTERIOR TO POSTERIOR (SLAP) LESION OF LEFT SHOULDER, SUBSEQUENT ENCOUNTER: ICD-10-CM

## 2018-03-21 DIAGNOSIS — M22.41 CHONDROMALACIA OF RIGHT PATELLA: ICD-10-CM

## 2018-03-21 DIAGNOSIS — M25.561 CHRONIC PAIN OF RIGHT KNEE: ICD-10-CM

## 2018-03-21 DIAGNOSIS — G89.29 CHRONIC PAIN OF RIGHT KNEE: ICD-10-CM

## 2018-03-21 DIAGNOSIS — M75.22 LEFT BICIPITAL TENOSYNOVITIS: ICD-10-CM

## 2018-03-21 DIAGNOSIS — M25.561 CHRONIC PAIN OF RIGHT KNEE: Primary | ICD-10-CM

## 2018-03-21 DIAGNOSIS — M94.261 CHONDROMALACIA, RIGHT KNEE: ICD-10-CM

## 2018-03-21 DIAGNOSIS — M25.512 LEFT SHOULDER PAIN, UNSPECIFIED CHRONICITY: ICD-10-CM

## 2018-03-21 DIAGNOSIS — M75.02 ADHESIVE CAPSULITIS OF LEFT SHOULDER: ICD-10-CM

## 2018-03-21 PROCEDURE — 99214 OFFICE O/P EST MOD 30 MIN: CPT | Mod: S$PBB,,, | Performed by: ORTHOPAEDIC SURGERY

## 2018-03-21 PROCEDURE — 73564 X-RAY EXAM KNEE 4 OR MORE: CPT | Mod: 26,50,, | Performed by: RADIOLOGY

## 2018-03-21 PROCEDURE — 73564 X-RAY EXAM KNEE 4 OR MORE: CPT | Mod: TC,50,FY,PO

## 2018-03-21 PROCEDURE — 99999 PR PBB SHADOW E&M-EST. PATIENT-LVL III: CPT | Mod: PBBFAC,,, | Performed by: ORTHOPAEDIC SURGERY

## 2018-03-21 PROCEDURE — 99213 OFFICE O/P EST LOW 20 MIN: CPT | Mod: PBBFAC,25,PO | Performed by: ORTHOPAEDIC SURGERY
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
## 2018-04-02 ENCOUNTER — HOSPITAL ENCOUNTER (OUTPATIENT)
Dept: RADIOLOGY | Facility: HOSPITAL | Age: 43
Discharge: HOME | End: 2018-04-02
Attending: ORTHOPAEDIC SURGERY | Admitting: ORTHOPAEDIC SURGERY
Payer: MEDICAID

## 2018-04-02 ENCOUNTER — TELEPHONE (OUTPATIENT)
Dept: SPORTS MEDICINE | Facility: CLINIC | Age: 43
End: 2018-04-02

## 2018-04-02 DIAGNOSIS — M25.561 CHRONIC PAIN OF RIGHT KNEE: ICD-10-CM

## 2018-04-02 DIAGNOSIS — M94.261 CHONDROMALACIA, RIGHT KNEE: ICD-10-CM

## 2018-04-02 DIAGNOSIS — G89.29 CHRONIC PAIN OF RIGHT KNEE: ICD-10-CM

## 2018-04-02 DIAGNOSIS — R93.89 ABNORMAL X-RAY: ICD-10-CM

## 2018-04-02 DIAGNOSIS — M23.91 INTERNAL DERANGEMENT OF RIGHT KNEE: ICD-10-CM

## 2018-04-02 DIAGNOSIS — M75.42 ROTATOR CUFF IMPINGEMENT SYNDROME OF LEFT SHOULDER: ICD-10-CM

## 2018-04-02 DIAGNOSIS — M75.02 ADHESIVE CAPSULITIS OF LEFT SHOULDER: ICD-10-CM

## 2018-04-02 DIAGNOSIS — M75.22 LEFT BICIPITAL TENOSYNOVITIS: ICD-10-CM

## 2018-04-02 DIAGNOSIS — S43.432D TYPE 2 SUPERIOR LABRUM EXTENDING FROM ANTERIOR TO POSTERIOR (SLAP) LESION OF LEFT SHOULDER, SUBSEQUENT ENCOUNTER: ICD-10-CM

## 2018-04-02 DIAGNOSIS — M94.261 CHONDROMALACIA OF RIGHT KNEE: ICD-10-CM

## 2018-04-02 DIAGNOSIS — M22.41 CHONDROMALACIA OF RIGHT PATELLA: ICD-10-CM

## 2018-04-02 DIAGNOSIS — M25.512 LEFT SHOULDER PAIN, UNSPECIFIED CHRONICITY: ICD-10-CM

## 2018-04-02 PROCEDURE — 73721 MRI JNT OF LWR EXTRE W/O DYE: CPT | Mod: TC,RT

## 2018-04-02 PROCEDURE — 73721 MRI JNT OF LWR EXTRE W/O DYE: CPT | Mod: 26,RT,, | Performed by: RADIOLOGY

## 2018-04-02 NOTE — TELEPHONE ENCOUNTER
Spoke with the patient. She is very upset that they did not do the t2 mapping at her MRI this morning. They called her after she left stating that she needs to come back for further imaging.

## 2018-04-02 NOTE — TELEPHONE ENCOUNTER
----- Message from Vilma Garcia sent at 4/2/2018  8:38 AM CDT -----  Contact: self  Pt would like to speak with the nurse regarding her MRI.  She stated that the MRI was done incorrectly and she is not happy.    Pt can be reached at [phone]

## 2018-04-17 ENCOUNTER — ANESTHESIA EVENT (OUTPATIENT)
Dept: SURGERY | Facility: OTHER | Age: 43
End: 2018-04-17
Payer: MEDICAID

## 2018-04-17 ENCOUNTER — OFFICE VISIT (OUTPATIENT)
Dept: SPORTS MEDICINE | Facility: CLINIC | Age: 43
End: 2018-04-17
Payer: MEDICAID

## 2018-04-17 ENCOUNTER — HOSPITAL ENCOUNTER (OUTPATIENT)
Dept: PREADMISSION TESTING | Facility: OTHER | Age: 43
Discharge: HOME | End: 2018-04-17
Attending: ORTHOPAEDIC SURGERY
Payer: MEDICAID

## 2018-04-17 VITALS
HEIGHT: 62 IN | HEART RATE: 66 BPM | DIASTOLIC BLOOD PRESSURE: 70 MMHG | BODY MASS INDEX: 22.63 KG/M2 | SYSTOLIC BLOOD PRESSURE: 111 MMHG | WEIGHT: 123 LBS

## 2018-04-17 VITALS
DIASTOLIC BLOOD PRESSURE: 75 MMHG | HEART RATE: 62 BPM | HEIGHT: 62 IN | TEMPERATURE: 98 F | WEIGHT: 123 LBS | OXYGEN SATURATION: 100 % | SYSTOLIC BLOOD PRESSURE: 116 MMHG | BODY MASS INDEX: 22.63 KG/M2

## 2018-04-17 DIAGNOSIS — M75.102 ROTATOR CUFF SYNDROME OF LEFT SHOULDER: Primary | ICD-10-CM

## 2018-04-17 DIAGNOSIS — M75.22 LEFT BICIPITAL TENOSYNOVITIS: ICD-10-CM

## 2018-04-17 DIAGNOSIS — M75.02 ADHESIVE CAPSULITIS OF LEFT SHOULDER: ICD-10-CM

## 2018-04-17 PROCEDURE — 99213 OFFICE O/P EST LOW 20 MIN: CPT | Mod: PBBFAC,PO | Performed by: PHYSICIAN ASSISTANT

## 2018-04-17 PROCEDURE — 99999 PR PBB SHADOW E&M-EST. PATIENT-LVL III: CPT | Mod: PBBFAC,,, | Performed by: PHYSICIAN ASSISTANT

## 2018-04-17 PROCEDURE — 99499 UNLISTED E&M SERVICE: CPT | Mod: S$PBB,,, | Performed by: PHYSICIAN ASSISTANT

## 2018-04-17 RX ORDER — SODIUM CHLORIDE 0.9 % (FLUSH) 0.9 %
5 SYRINGE (ML) INJECTION
Status: CANCELLED | OUTPATIENT
Start: 2018-04-17

## 2018-04-17 RX ORDER — OXYCODONE AND ACETAMINOPHEN 10; 325 MG/1; MG/1
1 TABLET ORAL EVERY 4 HOURS PRN
Qty: 42 TABLET | Refills: 0 | Status: SHIPPED | OUTPATIENT
Start: 2018-04-17 | End: 2019-06-06

## 2018-04-17 RX ORDER — MIDAZOLAM HYDROCHLORIDE 5 MG/ML
2 INJECTION INTRAMUSCULAR; INTRAVENOUS
Status: CANCELLED | OUTPATIENT
Start: 2018-04-17 | End: 2018-04-17

## 2018-04-17 RX ORDER — TRAMADOL HYDROCHLORIDE 50 MG/1
50 TABLET ORAL EVERY 6 HOURS PRN
Qty: 30 TABLET | Refills: 0 | Status: SHIPPED | OUTPATIENT
Start: 2018-04-17 | End: 2018-04-27

## 2018-04-17 RX ORDER — PROMETHAZINE HYDROCHLORIDE 25 MG/1
25 TABLET ORAL EVERY 4 HOURS PRN
Qty: 30 TABLET | Refills: 0 | Status: SHIPPED | OUTPATIENT
Start: 2018-04-17 | End: 2019-06-06

## 2018-04-17 RX ORDER — LIDOCAINE HYDROCHLORIDE 10 MG/ML
0.5 INJECTION, SOLUTION EPIDURAL; INFILTRATION; INTRACAUDAL; PERINEURAL ONCE
Status: CANCELLED | OUTPATIENT
Start: 2018-04-17 | End: 2018-04-17

## 2018-04-17 RX ORDER — SODIUM CHLORIDE, SODIUM LACTATE, POTASSIUM CHLORIDE, CALCIUM CHLORIDE 600; 310; 30; 20 MG/100ML; MG/100ML; MG/100ML; MG/100ML
INJECTION, SOLUTION INTRAVENOUS CONTINUOUS
Status: CANCELLED | OUTPATIENT
Start: 2018-04-17

## 2018-04-17 RX ORDER — PREGABALIN 75 MG/1
150 CAPSULE ORAL
Status: ACTIVE | OUTPATIENT
Start: 2018-04-17 | End: 2018-04-17

## 2018-04-17 RX ORDER — MUPIROCIN 20 MG/G
OINTMENT TOPICAL
Status: CANCELLED | OUTPATIENT
Start: 2018-04-17

## 2018-04-17 NOTE — ANESTHESIA PREPROCEDURE EVALUATION
04/17/2018  Chey Mcneill is a 43 y.o., female.    Anesthesia Evaluation    I have reviewed the Patient Summary Reports.    I have reviewed the Nursing Notes.   I have reviewed the Medications.     Review of Systems  Anesthesia Hx:  No previous Anesthesia No problems History of prior surgery of interest to airway management or planning: Previous anesthesia: General May 2017 Dr Ramos [facility] with general anesthesia.  Procedure performed at an [facility]. Denies Family Hx of Anesthesia complications.   Denies Personal Hx of Anesthesia complications.   Social:  Non-Smoker    Hematology/Oncology:  Hematology Normal   Oncology Normal     EENT/Dental:EENT/Dental Normal   Cardiovascular:  Cardiovascular Normal     Pulmonary:  Pulmonary Normal    Renal/:  Renal/ Normal     Hepatic/GI:  Hepatic/GI Normal    Musculoskeletal:  Musculoskeletal Normal    Neurological:  Neurology Normal    Endocrine:  Endocrine Normal    Dermatological:  Skin Normal    Psych:  Psychiatric Normal           Physical Exam  General:  Well nourished      Dental:  Dental Findings: molar caps        Mental Status:  Mental Status Findings:  Cooperative, Alert and Oriented         Anesthesia Plan  Type of Anesthesia, risks & benefits discussed:  Anesthesia Type:  general  Patient's Preference:   Intra-op Monitoring Plan:   Intra-op Monitoring Plan Comments:   Post Op Pain Control Plan: multimodal analgesia and peripheral nerve block  Post Op Pain Control Plan Comments:   Induction:   IV  Beta Blocker:         Informed Consent: Patient understands risks and agrees with Anesthesia plan.  Questions answered. Anesthesia consent signed with patient.  ASA Score: 1     Day of Surgery Review of History & Physical:    H&P update referred to the surgeon.     Anesthesia Plan Notes: No labs ,ISB discussed        Ready For Surgery From Anesthesia  Perspective.

## 2018-04-17 NOTE — DISCHARGE INSTRUCTIONS
PRE-ADMIT TESTING -  [phone]    [address]  [place] [facility]          Your surgery has been scheduled at [facility]. We are pleased to have the opportunity to serve you. For Further Information please call [phone].    On the day of surgery please report to the Information Desk on the 1st floor.    · CONTACT YOUR PHYSICIAN'S OFFICE THE DAY PRIOR TO YOUR SURGERY TO OBTAIN YOUR ARRIVAL TIME.     · The evening before surgery do not eat anything after 9 p.m. ( this includes hard candy, chewing gum and mints).  You may only have GATORADE, POWERADE AND WATER  from 9 p.m. until you leave your home.   DO NOT DRINK ANY LIQUIDS ON THE WAY TO THE HOSPITAL.      SPECIAL MEDICATION INSTRUCTIONS: TAKE medications checked off by the Anesthesiologist on your Medication List.    Angiogram Patients: Take medications as instructed by your physician, including aspirin.     Surgery Patients:    If you take ASPIRIN - Your PHYSICIAN/SURGEON will need to inform you IF/OR when you need to stop taking aspirin prior to your surgery.     Do Not take any medications containing IBUPROFEN.  Do Not Wear any make-up or dark nail polish   (especially eye make-up) to surgery. If you come to surgery with makeup on you will be required to remove the makeup or nail polish.    Do not shave your surgical area at least 5 days prior to your surgery. The surgical prep will be performed at the hospital according to Infection Control regulations.    Leave all valuables at home.   Do Not wear any jewelry or watches, including any metal in body piercings.  Contact Lens must be removed before surgery. Either do not wear the contact lens or bring a case and solution for storage.  Please bring a container for eyeglasses or dentures as required.  Bring any paperwork your physician has provided, such as consent forms,  history and physicals, doctor's orders, etc.   Bring comfortable clothes that are loose fitting to wear upon  discharge. Take into consideration the type of surgery being performed.  Maintain your diet as advised per your physician the day prior to surgery.      Adequate rest the night before surgery is advised.   Park in the Parking lot behind the hospital or in the [facility] across the street from the parking lot. Parking is complimentary.  If you will be discharged the same day as your procedure, please arrange for a responsible adult to drive you home or to accompany you if traveling by taxi.   YOU WILL NOT BE PERMITTED TO DRIVE OR TO LEAVE THE HOSPITAL ALONE AFTER SURGERY.   It is strongly recommended that you arrange for someone to remain with you for the first 24 hrs following your surgery.       Thank you for your cooperation.  The Staff of [facility].        Bathing Instructions                                                                 Please shower the evening before and morning of your procedure with    ANTIBACTERIAL SOAP. ( DIAL, etc )  Concentrate on the surgical area   for at least 3 minutes and rinse completely. Dry off as usual.   Do not use any deodorant, powder, body lotions, perfume, after shave or    cologne.

## 2018-04-17 NOTE — H&P
Chey Mcneill  is here for a completion of her perioperative paperwork. she  Is scheduled to undergo Stage 1:  1. Left shoulder arthroscopic rotator cuff repair  2. Left shoulder arthroscopic biceps tenodesis,  3. Possible SLAP repair  4. Amniox arthrocentesis 17120 on 4/19/2018.  She is a healthy individual and does not need clearance for this procedure.     Risks, indications and benefits of the surgical procedure were discussed with the patient. All questions with regard to surgery, rehab, expected return to functional activities, activities of daily living and recreational endeavors were answered to her satisfaction.    Patient was informed and understands the risks of surgery are greater for patients with a current condition or history of heart disease, obesity, clotting disorders, recurrent infections, steroid use, current or past smoking, and factors such as sedentary lifestyle and noncompliance with medications, therapy or follow-up. The degree of the increased risk is hard to estimate with any degree of precision.    Once no other questions were asked, a brief history and physical exam was then performed.    PAST MEDICAL HISTORY:   Past Medical History:   Diagnosis Date    PONV (postoperative nausea and vomiting)     Ruptured triceps tendon 7/25/2012     PAST SURGICAL HISTORY:   Past Surgical History:   Procedure Laterality Date    ABDOMINAL SURGERY      lap band    BACK SURGERY      ELBOW FRACTURE SURGERY      HYSTERECTOMY  2001    KNEE ARTHROSCOPY Left     16 sx's     FAMILY HISTORY:   Family History   Problem Relation Age of Onset    Lupus Mother     No Known Problems Father      SOCIAL HISTORY:   Social History     Social History    Marital status:      Spouse name: N/A    Number of children: N/A    Years of education: N/A     Occupational History    Not on file.     Social History Main Topics    Smoking status: Never Smoker    Smokeless tobacco: Never Used    Alcohol use  Yes      Comment: occas    Drug use: No    Sexual activity: Not on file     Other Topics Concern    Not on file     Social History Narrative    No narrative on file       MEDICATIONS:   Current Outpatient Prescriptions:     meloxicam (MOBIC) 15 MG tablet, Take 1 tablet (15 mg total) by mouth once daily., Disp: , Rfl: 2    oxyCODONE-acetaminophen (PERCOCET)  mg per tablet, Take 1 tablet by mouth every 12 (twelve) hours as needed for Pain., Disp: 60 tablet, Rfl: 0    traMADol (ULTRAM) 50 mg tablet, Take 1 tablet (50 mg total) by mouth every 12 (twelve) hours as needed., Disp: 60 tablet, Rfl: 0  ALLERGIES:   Review of patient's allergies indicates:   Allergen Reactions    Adhesive Dermatitis     dermabond caused severe blistering    Benzoin tincture plain Hives, Itching and Other (See Comments)     Significant blistering    Morphine Nausea And Vomiting and Other (See Comments)     headaches    Oxycontin [oxycodone] Hives and Itching    Sulfa (sulfonamide antibiotics) Hives    Dermabond [tissue glues] Blisters     Dermabond peterson       Review of Systems   Constitution: Negative. Negative for chills, fever and night sweats.   HENT: Negative for congestion and headaches.    Eyes: Negative for blurred vision, left vision loss and right vision loss.   Cardiovascular: Negative for chest pain and syncope.   Respiratory: Negative for cough and shortness of breath.    Endocrine: Negative for polydipsia, polyphagia and polyuria.   Hematologic/Lymphatic: Negative for bleeding problem. Does not bruise/bleed easily.   Skin: Negative for dry skin, itching and rash.   Musculoskeletal: Negative for falls and muscle weakness.   Gastrointestinal: Negative for abdominal pain and bowel incontinence.   Genitourinary: Negative for bladder incontinence and nocturia.   Neurological: Negative for disturbances in coordination, loss of balance and seizures.   Psychiatric/Behavioral: Negative for depression. The patient does  not have insomnia.    Allergic/Immunologic: Negative for hives and persistent infections.     PHYSICAL EXAM:  GEN: A&Ox3, WD WN NAD  HEENT: WNL  CHEST: CTAB, no W/R/R  HEART: RRR, no M/R/G   ABD: Soft, NT ND, BS x4 QUADS  MS: Refer to previous note for detailed MS exam  NEURO: CN II-XII intact       The surgical consent was then reviewed with the patient, who agreed with all the contents of the consent form and it was signed. she was then given the [facility] surgery packet to bring with her to [facility] for the anesthesia portion of her perioperative paperwork.     PHYSICAL THERAPY:  She was also instructed regarding physical therapy and will begin POD # 1-3. She is doing physical therapy at [facility] with Bj Mason DPT.      POST OP CARE:instructions were reviewed including care of the wound and dressing after surgery and when she can shower.     PAIN MANAGEMENT: Chey Mcneill was also given a pain management regime, which includes the TENS unit given to her by Reji Canseco along with the education required for its use. She was also instructed regarding the Polar ice unit that will be in place after surgery and her postoperative pain medications.     PAIN MEDICATION:  Percocet 10/325mg 1 po q 4-6 hours prn pain  Ultram 50 mg one p.o. q.4-6 hours p.r.n. breakthrough pain,   Phenergan 25 mg one p.o. q.4-6 hours p.r.n. nausea and vomiting.    Patient denies history of seizures.     Patient was instructed to buy and take:  Aspirin 325mg daily x 6 weeks for DVT prophylaxis starting on the evening after surgery.  Patient will also use bilateral TEDs on lower extremities, SCDs during surgery, and early ambulation post-op. If the patient was previously taking 81mg baby aspirin, they were told to not take it will using the above stated aspirin and to restart the 81mg aspirin after completion of the aspirin dose.       Patient was also told to buy over the counter Prilosec medication and  take it once daily for GI protection as long as they are taking NSAIDs or Aspirin.    DVT prophylaxis was discussed with the patient today including risk factors for developing DVTs and history of DVTs. The patient was asked if any specific recommendations were given from the doctor/s that did pre-operative surgical clearance.      Patient was asked if they were taking or using OCP pills or devices. If they answered yes, then they were instructed to stop using OCPs at this pre-operative appointment until 2 months post-op to help prevent DVT development. They understand that there are other forms of birth control that do not involve hormones. They expressed understanding that ignoring/not following this instruction could result in a DVT which could turn into a deadly pulmonary embolism.      The patient was told that narcotic pain medications may make them drowsy and instructions were given to not sign legal documents, drive or operate heavy machinery, cars, or equipment while under the influence of narcotic medications.     As there were no other questions to be asked, she was given my business card along with Cooper Ramos MD business card if she has any questions or concerns prior to surgery or in the postop period.

## 2018-04-19 ENCOUNTER — HOSPITAL ENCOUNTER (OUTPATIENT)
Facility: OTHER | Age: 43
Discharge: HOME | End: 2018-04-19
Attending: ORTHOPAEDIC SURGERY | Admitting: ORTHOPAEDIC SURGERY
Payer: MEDICAID

## 2018-04-19 ENCOUNTER — ANESTHESIA (OUTPATIENT)
Dept: SURGERY | Facility: OTHER | Age: 43
End: 2018-04-19
Payer: MEDICAID

## 2018-04-19 VITALS
HEIGHT: 62 IN | RESPIRATION RATE: 16 BRPM | TEMPERATURE: 98 F | OXYGEN SATURATION: 98 % | HEART RATE: 106 BPM | SYSTOLIC BLOOD PRESSURE: 124 MMHG | WEIGHT: 123 LBS | BODY MASS INDEX: 22.63 KG/M2 | DIASTOLIC BLOOD PRESSURE: 71 MMHG

## 2018-04-19 DIAGNOSIS — M75.22 LEFT BICIPITAL TENOSYNOVITIS: ICD-10-CM

## 2018-04-19 DIAGNOSIS — M75.02 ADHESIVE CAPSULITIS OF LEFT SHOULDER: ICD-10-CM

## 2018-04-19 DIAGNOSIS — M75.102 ROTATOR CUFF SYNDROME OF LEFT SHOULDER: ICD-10-CM

## 2018-04-19 PROCEDURE — 37000009 HC ANESTHESIA EA ADD 15 MINS: Performed by: ORTHOPAEDIC SURGERY

## 2018-04-19 PROCEDURE — 63600175 PHARM REV CODE 636 W HCPCS: Performed by: PHYSICIAN ASSISTANT

## 2018-04-19 PROCEDURE — 71000033 HC RECOVERY, INTIAL HOUR: Performed by: ORTHOPAEDIC SURGERY

## 2018-04-19 PROCEDURE — 63600175 PHARM REV CODE 636 W HCPCS: Performed by: NURSE ANESTHETIST, CERTIFIED REGISTERED

## 2018-04-19 PROCEDURE — 25000003 PHARM REV CODE 250: Performed by: ANESTHESIOLOGY

## 2018-04-19 PROCEDURE — 25000003 PHARM REV CODE 250: Performed by: NURSE ANESTHETIST, CERTIFIED REGISTERED

## 2018-04-19 PROCEDURE — 25000003 PHARM REV CODE 250: Performed by: PHYSICIAN ASSISTANT

## 2018-04-19 PROCEDURE — 36000710: Performed by: ORTHOPAEDIC SURGERY

## 2018-04-19 PROCEDURE — 71000016 HC POSTOP RECOV ADDL HR: Performed by: ORTHOPAEDIC SURGERY

## 2018-04-19 PROCEDURE — 29826 SHO ARTHRS SRG DECOMPRESSION: CPT | Mod: LT,,, | Performed by: ORTHOPAEDIC SURGERY

## 2018-04-19 PROCEDURE — 01630 ANES OPN/ARTHR PX SHO JT NOS: CPT | Performed by: ORTHOPAEDIC SURGERY

## 2018-04-19 PROCEDURE — 29822 SHO ARTHRS SRG LMTD DBRDMT: CPT | Mod: LT,,, | Performed by: ORTHOPAEDIC SURGERY

## 2018-04-19 PROCEDURE — 71000015 HC POSTOP RECOV 1ST HR: Performed by: ORTHOPAEDIC SURGERY

## 2018-04-19 PROCEDURE — 27201423 OPTIME MED/SURG SUP & DEVICES STERILE SUPPLY: Performed by: ORTHOPAEDIC SURGERY

## 2018-04-19 PROCEDURE — 63600175 PHARM REV CODE 636 W HCPCS: Performed by: ORTHOPAEDIC SURGERY

## 2018-04-19 PROCEDURE — V2790 AMNIOTIC MEMBRANE: HCPCS | Performed by: ORTHOPAEDIC SURGERY

## 2018-04-19 PROCEDURE — 63600175 PHARM REV CODE 636 W HCPCS: Performed by: ANESTHESIOLOGY

## 2018-04-19 PROCEDURE — 36000711: Performed by: ORTHOPAEDIC SURGERY

## 2018-04-19 PROCEDURE — 37000008 HC ANESTHESIA 1ST 15 MINUTES: Performed by: ORTHOPAEDIC SURGERY

## 2018-04-19 DEVICE — MATRIX REGENERATIVE CLARIX FLO: Type: IMPLANTABLE DEVICE | Site: SHOULDER | Status: FUNCTIONAL

## 2018-04-19 RX ORDER — GLYCOPYRROLATE 0.2 MG/ML
INJECTION INTRAMUSCULAR; INTRAVENOUS
Status: DISCONTINUED | OUTPATIENT
Start: 2018-04-19 | End: 2018-04-19

## 2018-04-19 RX ORDER — ACETAMINOPHEN 10 MG/ML
INJECTION, SOLUTION INTRAVENOUS
Status: DISCONTINUED | OUTPATIENT
Start: 2018-04-19 | End: 2018-04-19

## 2018-04-19 RX ORDER — DIPHENHYDRAMINE HYDROCHLORIDE 50 MG/ML
INJECTION INTRAMUSCULAR; INTRAVENOUS
Status: DISCONTINUED | OUTPATIENT
Start: 2018-04-19 | End: 2018-04-19

## 2018-04-19 RX ORDER — MEPERIDINE HYDROCHLORIDE 50 MG/ML
12.5 INJECTION INTRAMUSCULAR; INTRAVENOUS; SUBCUTANEOUS ONCE AS NEEDED
Status: DISCONTINUED | OUTPATIENT
Start: 2018-04-19 | End: 2018-04-19 | Stop reason: HOSPADM

## 2018-04-19 RX ORDER — ONDANSETRON 2 MG/ML
4 INJECTION INTRAMUSCULAR; INTRAVENOUS DAILY PRN
Status: DISCONTINUED | OUTPATIENT
Start: 2018-04-19 | End: 2018-04-19 | Stop reason: HOSPADM

## 2018-04-19 RX ORDER — HYDROMORPHONE HYDROCHLORIDE 2 MG/ML
0.4 INJECTION, SOLUTION INTRAMUSCULAR; INTRAVENOUS; SUBCUTANEOUS EVERY 5 MIN PRN
Status: DISCONTINUED | OUTPATIENT
Start: 2018-04-19 | End: 2018-04-19 | Stop reason: HOSPADM

## 2018-04-19 RX ORDER — FENTANYL CITRATE 50 UG/ML
100 INJECTION, SOLUTION INTRAMUSCULAR; INTRAVENOUS EVERY 5 MIN PRN
Status: COMPLETED | OUTPATIENT
Start: 2018-04-19 | End: 2018-04-19

## 2018-04-19 RX ORDER — LIDOCAINE HCL/PF 100 MG/5ML
SYRINGE (ML) INTRAVENOUS
Status: DISCONTINUED | OUTPATIENT
Start: 2018-04-19 | End: 2018-04-19

## 2018-04-19 RX ORDER — SODIUM CHLORIDE 0.9 % (FLUSH) 0.9 %
3 SYRINGE (ML) INJECTION
Status: DISCONTINUED | OUTPATIENT
Start: 2018-04-19 | End: 2018-04-19 | Stop reason: HOSPADM

## 2018-04-19 RX ORDER — FENTANYL CITRATE 50 UG/ML
25 INJECTION, SOLUTION INTRAMUSCULAR; INTRAVENOUS EVERY 5 MIN PRN
Status: DISCONTINUED | OUTPATIENT
Start: 2018-04-19 | End: 2018-04-19 | Stop reason: HOSPADM

## 2018-04-19 RX ORDER — EPINEPHRINE 1 MG/ML
INJECTION INTRAMUSCULAR; INTRAVENOUS; SUBCUTANEOUS
Status: DISCONTINUED | OUTPATIENT
Start: 2018-04-19 | End: 2018-04-19 | Stop reason: HOSPADM

## 2018-04-19 RX ORDER — OXYCODONE HYDROCHLORIDE 5 MG/1
5 TABLET ORAL
Status: DISCONTINUED | OUTPATIENT
Start: 2018-04-19 | End: 2018-04-19 | Stop reason: HOSPADM

## 2018-04-19 RX ORDER — MUPIROCIN 20 MG/G
OINTMENT TOPICAL
Status: DISCONTINUED | OUTPATIENT
Start: 2018-04-19 | End: 2018-04-19 | Stop reason: HOSPADM

## 2018-04-19 RX ORDER — ROCURONIUM BROMIDE 10 MG/ML
INJECTION, SOLUTION INTRAVENOUS
Status: DISCONTINUED | OUTPATIENT
Start: 2018-04-19 | End: 2018-04-19

## 2018-04-19 RX ORDER — ONDANSETRON HYDROCHLORIDE 2 MG/ML
INJECTION, SOLUTION INTRAMUSCULAR; INTRAVENOUS
Status: DISCONTINUED | OUTPATIENT
Start: 2018-04-19 | End: 2018-04-19

## 2018-04-19 RX ORDER — KETOROLAC TROMETHAMINE 30 MG/ML
INJECTION, SOLUTION INTRAMUSCULAR; INTRAVENOUS
Status: DISCONTINUED | OUTPATIENT
Start: 2018-04-19 | End: 2018-04-19

## 2018-04-19 RX ORDER — DEXAMETHASONE SODIUM PHOSPHATE 4 MG/ML
INJECTION, SOLUTION INTRA-ARTICULAR; INTRALESIONAL; INTRAMUSCULAR; INTRAVENOUS; SOFT TISSUE
Status: DISCONTINUED | OUTPATIENT
Start: 2018-04-19 | End: 2018-04-19

## 2018-04-19 RX ORDER — CEFAZOLIN SODIUM 1 G/3ML
2 INJECTION, POWDER, FOR SOLUTION INTRAMUSCULAR; INTRAVENOUS
Status: DISCONTINUED | OUTPATIENT
Start: 2018-04-19 | End: 2018-04-19 | Stop reason: HOSPADM

## 2018-04-19 RX ORDER — PROPOFOL 10 MG/ML
VIAL (ML) INTRAVENOUS
Status: DISCONTINUED | OUTPATIENT
Start: 2018-04-19 | End: 2018-04-19

## 2018-04-19 RX ORDER — NEOSTIGMINE METHYLSULFATE 1 MG/ML
INJECTION, SOLUTION INTRAVENOUS
Status: DISCONTINUED | OUTPATIENT
Start: 2018-04-19 | End: 2018-04-19

## 2018-04-19 RX ORDER — LIDOCAINE HYDROCHLORIDE 10 MG/ML
0.5 INJECTION, SOLUTION EPIDURAL; INFILTRATION; INTRACAUDAL; PERINEURAL ONCE
Status: DISCONTINUED | OUTPATIENT
Start: 2018-04-19 | End: 2018-04-19 | Stop reason: HOSPADM

## 2018-04-19 RX ORDER — SODIUM CHLORIDE, SODIUM LACTATE, POTASSIUM CHLORIDE, CALCIUM CHLORIDE 600; 310; 30; 20 MG/100ML; MG/100ML; MG/100ML; MG/100ML
INJECTION, SOLUTION INTRAVENOUS CONTINUOUS
Status: DISCONTINUED | OUTPATIENT
Start: 2018-04-19 | End: 2018-04-19 | Stop reason: HOSPADM

## 2018-04-19 RX ORDER — MIDAZOLAM HYDROCHLORIDE 1 MG/ML
2 INJECTION INTRAMUSCULAR; INTRAVENOUS EVERY 5 MIN PRN
Status: DISCONTINUED | OUTPATIENT
Start: 2018-04-19 | End: 2018-04-19 | Stop reason: HOSPADM

## 2018-04-19 RX ORDER — ROPIVACAINE HYDROCHLORIDE 5 MG/ML
INJECTION, SOLUTION EPIDURAL; INFILTRATION; PERINEURAL
Status: DISCONTINUED | OUTPATIENT
Start: 2018-04-19 | End: 2018-04-19

## 2018-04-19 RX ORDER — MIDAZOLAM HYDROCHLORIDE 5 MG/ML
2 INJECTION INTRAMUSCULAR; INTRAVENOUS
Status: COMPLETED | OUTPATIENT
Start: 2018-04-19 | End: 2018-04-19

## 2018-04-19 RX ORDER — SODIUM CHLORIDE 0.9 % (FLUSH) 0.9 %
5 SYRINGE (ML) INJECTION
Status: DISCONTINUED | OUTPATIENT
Start: 2018-04-19 | End: 2018-04-19 | Stop reason: HOSPADM

## 2018-04-19 RX ADMIN — GLYCOPYRROLATE 0.2 MG: 0.2 INJECTION, SOLUTION INTRAMUSCULAR; INTRAVENOUS at 09:04

## 2018-04-19 RX ADMIN — DIPHENHYDRAMINE HYDROCHLORIDE 6.25 MG: 50 INJECTION, SOLUTION INTRAMUSCULAR; INTRAVENOUS at 09:04

## 2018-04-19 RX ADMIN — ROPIVACAINE HYDROCHLORIDE: 5 INJECTION, SOLUTION EPIDURAL; INFILTRATION; PERINEURAL at 10:04

## 2018-04-19 RX ADMIN — ONDANSETRON 4 MG: 2 INJECTION, SOLUTION INTRAMUSCULAR; INTRAVENOUS at 10:04

## 2018-04-19 RX ADMIN — MIDAZOLAM HYDROCHLORIDE 2 MG: 1 INJECTION INTRAMUSCULAR; INTRAVENOUS at 08:04

## 2018-04-19 RX ADMIN — MUPIROCIN: 20 OINTMENT TOPICAL at 08:04

## 2018-04-19 RX ADMIN — ACETAMINOPHEN 1000 MG: 10 INJECTION, SOLUTION INTRAVENOUS at 10:04

## 2018-04-19 RX ADMIN — LIDOCAINE HYDROCHLORIDE 75 MG: 20 INJECTION, SOLUTION INTRAVENOUS at 09:04

## 2018-04-19 RX ADMIN — PROPOFOL 160 MG: 10 INJECTION, EMULSION INTRAVENOUS at 09:04

## 2018-04-19 RX ADMIN — KETOROLAC TROMETHAMINE 30 MG: 30 INJECTION, SOLUTION INTRAMUSCULAR; INTRAVENOUS at 10:04

## 2018-04-19 RX ADMIN — ROPIVACAINE HYDROCHLORIDE 20 ML: 5 INJECTION, SOLUTION EPIDURAL; INFILTRATION; PERINEURAL at 08:04

## 2018-04-19 RX ADMIN — ROCURONIUM BROMIDE 40 MG: 10 INJECTION, SOLUTION INTRAVENOUS at 09:04

## 2018-04-19 RX ADMIN — SODIUM CHLORIDE, SODIUM LACTATE, POTASSIUM CHLORIDE, AND CALCIUM CHLORIDE: 600; 310; 30; 20 INJECTION, SOLUTION INTRAVENOUS at 08:04

## 2018-04-19 RX ADMIN — CEFAZOLIN 2 G: 330 INJECTION, POWDER, FOR SOLUTION INTRAMUSCULAR; INTRAVENOUS at 09:04

## 2018-04-19 RX ADMIN — NEOSTIGMINE METHYLSULFATE 4 MG: 1 INJECTION INTRAVENOUS at 10:04

## 2018-04-19 RX ADMIN — PHENYLEPHRINE HYDROCHLORIDE 20 MCG/MIN: 10 INJECTION INTRAVENOUS at 09:04

## 2018-04-19 RX ADMIN — SODIUM CHLORIDE, SODIUM LACTATE, POTASSIUM CHLORIDE, AND CALCIUM CHLORIDE: 600; 310; 30; 20 INJECTION, SOLUTION INTRAVENOUS at 10:04

## 2018-04-19 RX ADMIN — GLYCOPYRROLATE 0.6 MG: 0.2 INJECTION, SOLUTION INTRAMUSCULAR; INTRAVENOUS at 10:04

## 2018-04-19 RX ADMIN — CARBOXYMETHYLCELLULOSE SODIUM 2 DROP: 2.5 SOLUTION/ DROPS OPHTHALMIC at 09:04

## 2018-04-19 RX ADMIN — OXYCODONE HYDROCHLORIDE 5 MG: 5 TABLET ORAL at 12:04

## 2018-04-19 RX ADMIN — MIDAZOLAM HYDROCHLORIDE 2 MG: 5 INJECTION, SOLUTION INTRAMUSCULAR; INTRAVENOUS at 08:04

## 2018-04-19 RX ADMIN — DEXAMETHASONE SODIUM PHOSPHATE 8 MG: 4 INJECTION, SOLUTION INTRAMUSCULAR; INTRAVENOUS at 09:04

## 2018-04-19 RX ADMIN — FENTANYL CITRATE 100 MCG: 50 INJECTION, SOLUTION INTRAMUSCULAR; INTRAVENOUS at 08:04

## 2018-04-19 RX ADMIN — FENTANYL CITRATE 100 MCG: 50 INJECTION, SOLUTION INTRAMUSCULAR; INTRAVENOUS at 10:04

## 2018-04-19 NOTE — H&P (VIEW-ONLY)
Chey Mcneill  is here for a completion of her perioperative paperwork. she  Is scheduled to undergo Stage 1:  1. Left shoulder arthroscopic rotator cuff repair  2. Left shoulder arthroscopic biceps tenodesis,  3. Possible SLAP repair  4. Amniox arthrocentesis 14567 on 4/19/2018.  She is a healthy individual and does not need clearance for this procedure.     Risks, indications and benefits of the surgical procedure were discussed with the patient. All questions with regard to surgery, rehab, expected return to functional activities, activities of daily living and recreational endeavors were answered to her satisfaction.    Patient was informed and understands the risks of surgery are greater for patients with a current condition or history of heart disease, obesity, clotting disorders, recurrent infections, steroid use, current or past smoking, and factors such as sedentary lifestyle and noncompliance with medications, therapy or follow-up. The degree of the increased risk is hard to estimate with any degree of precision.    Once no other questions were asked, a brief history and physical exam was then performed.    PAST MEDICAL HISTORY:   Past Medical History:   Diagnosis Date    PONV (postoperative nausea and vomiting)     Ruptured triceps tendon 7/25/2012     PAST SURGICAL HISTORY:   Past Surgical History:   Procedure Laterality Date    ABDOMINAL SURGERY      lap band    BACK SURGERY      ELBOW FRACTURE SURGERY      HYSTERECTOMY  2001    KNEE ARTHROSCOPY Left     16 sx's     FAMILY HISTORY:   Family History   Problem Relation Age of Onset    Lupus Mother     No Known Problems Father      SOCIAL HISTORY:   Social History     Social History    Marital status:      Spouse name: N/A    Number of children: N/A    Years of education: N/A     Occupational History    Not on file.     Social History Main Topics    Smoking status: Never Smoker    Smokeless tobacco: Never Used    Alcohol use  Yes      Comment: occas    Drug use: No    Sexual activity: Not on file     Other Topics Concern    Not on file     Social History Narrative    No narrative on file       MEDICATIONS:   Current Outpatient Prescriptions:     meloxicam (MOBIC) 15 MG tablet, Take 1 tablet (15 mg total) by mouth once daily., Disp: , Rfl: 2    oxyCODONE-acetaminophen (PERCOCET)  mg per tablet, Take 1 tablet by mouth every 12 (twelve) hours as needed for Pain., Disp: 60 tablet, Rfl: 0    traMADol (ULTRAM) 50 mg tablet, Take 1 tablet (50 mg total) by mouth every 12 (twelve) hours as needed., Disp: 60 tablet, Rfl: 0  ALLERGIES:   Review of patient's allergies indicates:   Allergen Reactions    Adhesive Dermatitis     dermabond caused severe blistering    Benzoin tincture plain Hives, Itching and Other (See Comments)     Significant blistering    Morphine Nausea And Vomiting and Other (See Comments)     headaches    Oxycontin [oxycodone] Hives and Itching    Sulfa (sulfonamide antibiotics) Hives    Dermabond [tissue glues] Blisters     Dermabond peterson       Review of Systems   Constitution: Negative. Negative for chills, fever and night sweats.   HENT: Negative for congestion and headaches.    Eyes: Negative for blurred vision, left vision loss and right vision loss.   Cardiovascular: Negative for chest pain and syncope.   Respiratory: Negative for cough and shortness of breath.    Endocrine: Negative for polydipsia, polyphagia and polyuria.   Hematologic/Lymphatic: Negative for bleeding problem. Does not bruise/bleed easily.   Skin: Negative for dry skin, itching and rash.   Musculoskeletal: Negative for falls and muscle weakness.   Gastrointestinal: Negative for abdominal pain and bowel incontinence.   Genitourinary: Negative for bladder incontinence and nocturia.   Neurological: Negative for disturbances in coordination, loss of balance and seizures.   Psychiatric/Behavioral: Negative for depression. The patient does  not have insomnia.    Allergic/Immunologic: Negative for hives and persistent infections.     PHYSICAL EXAM:  GEN: A&Ox3, WD WN NAD  HEENT: WNL  CHEST: CTAB, no W/R/R  HEART: RRR, no M/R/G   ABD: Soft, NT ND, BS x4 QUADS  MS: Refer to previous note for detailed MS exam  NEURO: CN II-XII intact       The surgical consent was then reviewed with the patient, who agreed with all the contents of the consent form and it was signed. she was then given the [facility] surgery packet to bring with her to [facility] for the anesthesia portion of her perioperative paperwork.     PHYSICAL THERAPY:  She was also instructed regarding physical therapy and will begin POD # 1-3. She is doing physical therapy at [facility] with Bj Mason DPT.      POST OP CARE:instructions were reviewed including care of the wound and dressing after surgery and when she can shower.     PAIN MANAGEMENT: Chey Mcneill was also given a pain management regime, which includes the TENS unit given to her by Reji Canseco along with the education required for its use. She was also instructed regarding the Polar ice unit that will be in place after surgery and her postoperative pain medications.     PAIN MEDICATION:  Percocet 10/325mg 1 po q 4-6 hours prn pain  Ultram 50 mg one p.o. q.4-6 hours p.r.n. breakthrough pain,   Phenergan 25 mg one p.o. q.4-6 hours p.r.n. nausea and vomiting.    Patient denies history of seizures.     Patient was instructed to buy and take:  Aspirin 325mg daily x 6 weeks for DVT prophylaxis starting on the evening after surgery.  Patient will also use bilateral TEDs on lower extremities, SCDs during surgery, and early ambulation post-op. If the patient was previously taking 81mg baby aspirin, they were told to not take it will using the above stated aspirin and to restart the 81mg aspirin after completion of the aspirin dose.       Patient was also told to buy over the counter Prilosec medication and  take it once daily for GI protection as long as they are taking NSAIDs or Aspirin.    DVT prophylaxis was discussed with the patient today including risk factors for developing DVTs and history of DVTs. The patient was asked if any specific recommendations were given from the doctor/s that did pre-operative surgical clearance.      Patient was asked if they were taking or using OCP pills or devices. If they answered yes, then they were instructed to stop using OCPs at this pre-operative appointment until 2 months post-op to help prevent DVT development. They understand that there are other forms of birth control that do not involve hormones. They expressed understanding that ignoring/not following this instruction could result in a DVT which could turn into a deadly pulmonary embolism.      The patient was told that narcotic pain medications may make them drowsy and instructions were given to not sign legal documents, drive or operate heavy machinery, cars, or equipment while under the influence of narcotic medications.     As there were no other questions to be asked, she was given my business card along with Cooper Ramos MD business card if she has any questions or concerns prior to surgery or in the postop period.

## 2018-04-19 NOTE — BRIEF OP NOTE
Surgery Date: 4/19/2018      Surgeon(s) and Role:     * Cooper Ramos MD - Primary     * Keith Valadez MD - Fellow     * Cristina MILIAN     Pre-op Diagnosis:  Rotator cuff syndrome of left shoulder [M75.102]  Superior glenoid labrum lesion of left shoulder, initial encounter [S43.432A]  Biceps tendinitis of left upper extremity [M75.22]     Post-op Diagnosis:  Superior posterior labrum fraying, bursitis     Procedure(s) (LRB):  ARTHROSCOPY-SHOULDER (Left)  INJECTION-STEROID; LEFT SHOULDER AMNIOX (Left)  BURSECTOMY (Left)  REPAIR-LABRAL (Left)  ARTHROSCOPY-SHOULDER WITH SUBACROMIAL DECOMPRESSION (Left)     Anesthesia: General     Findings/Key Components:  Posterior superior labrum fraying consistent with internal impingement     Core Measure Documentation:  Were antibiotics extended? No  Was the patient administered a VTE Prophylaxis? No. Short procedure; low risk  Estimated Blood Loss: minimal               Specimens      None          Implants:   Implant Name Type Inv. Item Serial No.  Lot No. LRB No. Used   [id number]   [employer] KEON [id number] [employer] [id number] Left 1         Complications: none           Disposition: PACU - hemodynamically stable.           Condition: Stable     Attestation:  I was present for the entire procedure.

## 2018-04-19 NOTE — ANESTHESIA POSTPROCEDURE EVALUATION
"Anesthesia Post Evaluation    Patient: Chey Mcneill    Procedure(s) Performed: Procedure(s) (LRB):  ARTHROSCOPY-SHOULDER (Left)  INJECTION-STEROID; LEFT SHOULDER AMNIOX (Left)  BURSECTOMY (Left)  REPAIR-LABRAL (Left)  ARTHROSCOPY-SHOULDER WITH SUBACROMIAL DECOMPRESSION (Left)    Final Anesthesia Type: general  Patient location during evaluation: PACU  Patient participation: Yes- Able to Participate  Level of consciousness: oriented and awake  Post-procedure vital signs: reviewed and stable  Pain management: adequate  Airway patency: patent  PONV status at discharge: No PONV  Anesthetic complications: no      Cardiovascular status: hemodynamically stable  Respiratory status: unassisted, spontaneous ventilation and room air  Hydration status: euvolemic  Follow-up not needed.        Visit Vitals  /65 (BP Location: Right arm, Patient Position: Lying)   Pulse 88   Temp 36.6 °C (97.9 °F) (Oral)   Resp 16   Ht 5' 2" (1.575 m)   Wt 55.8 kg (123 lb)   SpO2 100%   Breastfeeding? No   BMI 22.50 kg/m²       Pain/Annie Score: Pain Assessment Performed: Yes (4/19/2018 12:00 PM)  Presence of Pain: denies (4/19/2018 12:00 PM)  Annie Score: 10 (4/19/2018 12:00 PM)  Modified Annie Score: 19 (4/19/2018 11:45 AM)      "

## 2018-04-19 NOTE — TRANSFER OF CARE
"Anesthesia Transfer of Care Note    Patient: Chey Mcneill    Procedure(s) Performed: Procedure(s) (LRB):  ARTHROSCOPY-SHOULDER (Left)  INJECTION-STEROID; LEFT SHOULDER AMNIOX (Left)  BURSECTOMY (Left)  REPAIR-LABRAL (Left)  ARTHROSCOPY-SHOULDER WITH SUBACROMIAL DECOMPRESSION (Left)    Patient location: PACU    Anesthesia Type: general    Transport from OR: Transported from OR on room air with adequate spontaneous ventilation    Post pain: adequate analgesia    Post assessment: no apparent anesthetic complications    Post vital signs: stable    Level of consciousness: awake    Nausea/Vomiting: no nausea/vomiting    Complications: none    Transfer of care protocol was followed      Last vitals:   Visit Vitals  /73   Pulse 68   Ht 5' 2" (1.575 m)   Wt 55.8 kg (123 lb)   Breastfeeding? No   BMI 22.50 kg/m²     "

## 2018-04-19 NOTE — OP NOTE
Operative Note       Surgery Date: 4/19/2018     Surgeon(s) and Role:     * Cooper Ramos MD - Primary     * Keith Valadez MD - Fellow     * Cristina MILIAN    Pre-op Diagnosis:  Rotator cuff syndrome of left shoulder [M75.102]  Superior glenoid labrum lesion of left shoulder, initial encounter [S43.432A]  Biceps tendinitis of left upper extremity [M75.22]    Post-op Diagnosis:  Superior posterior labrum fraying, bursitis    Procedure(s) (LRB):  ARTHROSCOPY-SHOULDER (Left)  INJECTION-STEROID; LEFT SHOULDER AMNIOX (Left)  BURSECTOMY (Left)  REPAIR-LABRAL (Left)  ARTHROSCOPY-SHOULDER WITH SUBACROMIAL DECOMPRESSION (Left)    Anesthesia: General    Findings/Key Components:  Posterior superior labrum fraying consistent with internal impingement    Core Measure Documentation:  Were antibiotics extended? No  Was the patient administered a VTE Prophylaxis? No. Short procedure; low risk  Estimated Blood Loss: minimal           Specimens     None        Implants:   Implant Name Type Inv. Item Serial No.  Lot No. LRB No. Used   [id number]   [employer] KEON [id number] [employer] [id number] Left 1       Complications: none           Disposition: PACU - hemodynamically stable.           Condition: Stable    Attestation:  I was present for the entire procedure.

## 2018-04-19 NOTE — DISCHARGE SUMMARY
.Discharge Note  Short Stay      SUMMARY     Admit Date: 4/19/2018    Attending Physician: Cooper Ramos MD     Discharge Physician: Cooper Ramos MD    Final Diagnosis: same    Disposition: Home or Self Care    Patient Instructions:   Current Discharge Medication List      CONTINUE these medications which have NOT CHANGED    Details   meloxicam (MOBIC) 15 MG tablet Take 1 tablet (15 mg total) by mouth once daily.  Refills: 2      oxyCODONE-acetaminophen (PERCOCET)  mg per tablet Take 1 tablet by mouth every 4 (four) hours as needed for Pain.  Qty: 42 tablet, Refills: 0    Associated Diagnoses: Rotator cuff syndrome of left shoulder; Left bicipital tenosynovitis; Adhesive capsulitis of left shoulder      promethazine (PHENERGAN) 25 MG tablet Take 1 tablet (25 mg total) by mouth every 4 (four) hours as needed for Nausea.  Qty: 30 tablet, Refills: 0    Associated Diagnoses: Rotator cuff syndrome of left shoulder; Left bicipital tenosynovitis; Adhesive capsulitis of left shoulder      traMADol (ULTRAM) 50 mg tablet Take 1 tablet (50 mg total) by mouth every 6 (six) hours as needed for Pain.  Qty: 30 tablet, Refills: 0    Associated Diagnoses: Rotator cuff syndrome of left shoulder; Left bicipital tenosynovitis; Adhesive capsulitis of left shoulder               Discharge Procedure Orders (must include Diet, Follow-up, Activity)  Activity as tolerated     Shower on day dressing removed (No bath)     Keep surgical extremity elevated     Ice to affected area     Weight bearing restrictions (specify)   Order Comments: WBAT operative arm once sensation and strength return     Notify your health care provider if you experience any of the following:  temperature >100.4     Notify your health care provider if you experience any of the following:  persistent nausea and vomiting or diarrhea     Notify your health care provider if you experience any of the following:  severe uncontrolled pain     Notify your health care  provider if you experience any of the following:  redness, tenderness, or signs of infection (pain, swelling, redness, odor or green/yellow discharge around incision site)     Notify your health care provider if you experience any of the following:  difficulty breathing or increased cough     Remove dressing in 72 hours        F/U Dr Ramos 2 weeks

## 2018-04-19 NOTE — ANESTHESIA PROCEDURE NOTES
Peripheral      Start time: 4/19/2018 8:40 AM  Timeout: 4/19/2018 8:38 AM   End time: 4/19/2018 8:50 AM  Surgery related to: shoulder pain  Staffing  Anesthesiologist: KEYONA GONZALEZ  Preanesthetic Checklist  Completed: patient identified, site marked, surgical consent, pre-op evaluation, timeout performed, IV checked, risks and benefits discussed and monitors and equipment checked  Peripheral Block  Patient position: right lateral decubitus  Prep: ChloraPrep  Patient monitoring: heart rate, cardiac monitor, continuous pulse ox and frequent blood pressure checks  Block type: interscalene  Laterality: left  Injection technique: single shot  Needle  Needle gauge: 22 G  Needle length: 3.5 in  Needle localization: nerve stimulator and ultrasound guidance   -ultrasound image captured on disc.  Assessment  Injection assessment: local visualized surrounding nerve, negative parasthesia and negative aspiration  Paresthesia pain: none  Heart rate change: no  Slow fractionated injection: yes  Medications:  Bolus administered: 20 mL of 0.5 ropivacaine  Epinephrine added: none

## 2018-04-19 NOTE — DISCHARGE INSTRUCTIONS
Anesthesia: After Your Surgery  Youve just had surgery. During surgery, you received medication called anesthesia to keep you comfortable and pain-free. After surgery, you may experience some pain or nausea. This is common. Here are some tips for feeling better and recovering after surgery.    Going home  Your doctor or nurse will show you how to take care of yourself when you go home. He or she will also answer your questions. Have an adult family member or friend drive you home. For the first 24 hours after your surgery:  · Do not drive or use heavy equipment.  · Do not make important decisions or sign legal documents.  · Avoid alcohol.  · Have someone stay with you, if needed. He or she can watch for problems and help keep you safe.  Be sure to keep all follow-up appointments with your doctor. And rest after your procedure for as long as your doctor tells you to.    Coping with pain  If you have pain after surgery, pain medication will help you feel better. Take it as directed, before pain becomes severe. Also, ask your doctor or pharmacist about other ways to control pain, such as with heat, ice, and relaxation. And follow any other instructions your surgeon or nurse gives you.    Tips for taking pain medication  To get the best relief possible, remember these points:  · Pain medications can upset your stomach. Taking them with a little food may help.  · Most pain relievers taken by mouth need at least 20 to 30 minutes to take effect.  · Taking medication on a schedule can help you remember to take it. Try to time your medication so that you can take it before beginning an activity, such as dressing, walking, or sitting down for dinner.  · Constipation is a common side effect of pain medications. Contact your doctor before taking any medications like laxatives or stool softeners to help relieve constipation. Also ask about any dietary restrictions, because drinking lots of fluids and eating foods like fruits  and vegetables that are high in fiber can also help. Remember, dont take laxatives unless your surgeon has prescribed them.  · Mixing alcohol and pain medication can cause dizziness and slow your breathing. It can even be fatal. Dont drink alcohol while taking pain medication.  · Pain medication can slow your reflexes. Dont drive or operate machinery while taking pain medication.  If your health care provider tells you to take acetaminophen to help relieve your pain, ask him or her how much you are supposed to take each day. (Acetaminophen is the generic name for Tylenol and other brand-name pain relievers.) Acetaminophen or other pain relievers may interact with your prescription medicines or other over-the-counter (OTC) drugs. Some prescription medications contain acetaminophen along with other active ingredients. Using both prescription and OTC acetaminophen for pain can cause you to overdose. The FDA recommends that you read the labels on your OTC medications carefully. This will help you to clearly understand the list of active ingredients, dosing instructions, and any warnings. It may also help you avoid taking too much acetaminophen. If you have questions or don't understand the information, ask your pharmacist or health care provider to explain it to you before you take the OTC medication.    Managing nausea  Some people have an upset stomach after surgery. This is often due to anesthesia, pain, pain medications, or the stress of surgery. The following tips will help you manage nausea and get good nutrition as you recover. If you were on a special diet before surgery, ask your doctor if you should follow it during recovery. These tips may help:  · Dont push yourself to eat. Your body will tell you when to eat and how much.  · Start off with clear liquids and soup. They are easier to digest.  · Progress to semi-solid foods (mashed potatoes, applesauce, and gelatin) as you feel ready.  · Slowly move to  solid foods. Dont eat fatty, rich, or spicy foods at first.  · Dont force yourself to have three large meals a day. Instead, eat smaller amounts more often.  · Take pain medications with a small amount of solid food, such as crackers or toast to avoid nausea.      Call your surgeon if  · You still have pain an hour after taking medication (it may not be strong enough).  · You feel too sleepy, dizzy, or groggy (medication may be too strong).  · You have side effects like nausea, vomiting, or skin changes (rash, itching, or hives).   © 1838-2481 [employer]. [address] [place] [address], [place] [address]. All rights reserved. This information is not intended as a substitute for professional medical care. Always follow your healthcare professional's instructions.                     [facility] [address], [place]                                    [phone]             Postoperative Instructions for Shoulder Surgery               Your Surgery Included:   Open              Arthroscopic [] Instability Repair     [] Diagnostic   [] Rotator Cuff Repair     [] Lysis of Adhesions / Manipulation [] Distal Clavicle Resection    [x] Debridement [] Biceps Tenodesis       [x] Labrum  [] Rotator Cuff   [] Cartilage   [] Contracture Release    [] SLAP Repair   [] Fracture Fixation    [] Instability Repair  [] AC Joint Reconstruction      [] Rotator Cuff Repair [] Joint Replacement     [x] Subacromial Decompression / Bursectomy   [] Hemiarthroplasty  [] Total Shoulder    [] Biceps Tenotomy / Tenodesis    [] Reverse Total Shoulder       [] Distal Clavicle Resection          [] Amniox Arthrocentesis    [] Contracture Release                 Call our office ([phone]) immediately if you experience any of the following:      Excessive bleeding or pus like drainage at the incision site      Uncontrollable pain not relieved by pain medication      Excessive swelling or redness at the incision site       Fever above 101.5 degrees not controlled with Tylenol or Motrin      Shortness of Breath      Any foul odor or blistering from the surgery site   FOR EMERGENCIES: If any unusual problems or difficulties occur, call our office at [phone], or page the  at [phone] who will direct your call appropriately   1.   Pain Management: A cold therapy cuff, pain medications, local injections, and in some cases, regional anesthesia injections are used to manage your post-operative pain. The decision to use each of these options is based on their risks and benefits.    Medications: You were given one or more of the following medication prescriptions before leaving the hospital. Have the prescriptions filled at a pharmacy on your way home and follow the instructions on the bottles. If you need a refill, please call your pharmacy.     Narcotic Medication (usually Vicodin ES, Lortab, Percocet or Nucynta): Begin taking the medication before your shoulder starts to hurt. Some patients do not like to take any medication, but if you wait until your pain is severe before taking, you will be very uncomfortable for several hours waiting for the narcotic to work. Always take with food.    Nausea / Vomiting: For this issue, we prescribe Phenergan, use this medication as directed.    Cold Therapy: You may have been sent home with a [facility] cold therapy unit and wrap for your shoulder. Fill with ice and water to the indicated fill line and use throughout the day for the first two days and then as needed to help relieve pain and control swelling.     Regional Anesthesia Injections (Blocks): You may have been given a regional nerve block either before or after surgery. This may make your entire shoulder numb for 24-36 hours.                    2.   Diet: Eat a bland diet for the first day after surgery. Progress your diet as tolerated. Constipation may occur with Narcotic usage, contact our office if you are  experiencing constipation.   3.   Activity: After you arrive at home, spend most of the first 24 hours resting in bed, on the couch, or in a reclining chair. After the first 24 hours at home, slowly increase your activity level based on your symptoms.   4.   Dressing Change: Remove the dressing on the 3rd day. It is normal for some blood to be seen on the dressings. It is also normal for you to see apparent bruising on the skin around your shoulder when you remove the dressing. If present, leave the steri-strip tape across the incisions. If you are concerned by the drainage or the appearance of your shoulder, please call one of the numbers listed below.   5.   Showering: You may shower on the 3rd day after surgery with waterproof bandages over small incisions. If you have an incision with Prineo (clear mesh), it does not need to be covered. Do not submerge in any water until after your postoperative appointment in clinic.   6.   Shoulder Sling (with/without Pillow attachment): You may have been sent home with a sling / pillow attachment holding your arm away from your body. You may remove the sling when changing clothes or bathing. Make sure to wear the sling while sleeping unless instructed otherwise. You may remove at rest or for exercises.           [x] You need to wear the sling with pillow for 24 hours a day for 1 day.   7.  Shoulder Exercises: Begin these exercises the first day after surgery in order to help you regain your motion and strength. You may do the following marked exercises for 2-5 mins five times a day:   [x] Shoulder shrugs - Shrug your shoulders up and down.   [x] Pendulums - Bend forward allowing your arm to hang down in front of you. Gently swing your arm side-to-side and front to back.                                                                                                                               [x] Passive abduction - Have a family member gently lift your arm away from your  body bringing your elbow up to the level of your shoulder.                                                                                                                   [x] Shoulder rotation - With your arm at your side, have a family member gently rotate your arm internally and externally.                                                                                                                  [x] Scapular retractions - (Squeeze shoulder blades together): Squeeze shoulder blades together while slightly pulling them down (do not shrug your shoulders upward); You can perform 10-15 reps, several times throughout the day, when seated at your desk, driving in the car, etc.                                                                                                                     [x] Pulley exercises - Put a towel or long sleeve shirt over the top of a door. Stand facing the door. Use your good arm to gently pull your operative arm up in front of you.   [x] Elbow motion - Straighten and bend your elbow.                                                                                                               [x] Ball squeezes - use ball attached to sling/pillow or soft (nerf) ball for  strengthening                                                                                                                 8.  Physical Therapy: Physical therapy is an essential component to your recovery from surgery. Your physical therapy will start in 1-3 days.   FIRST POSTOPERATIVE VISIT: As scheduled.

## 2018-04-19 NOTE — PLAN OF CARE
Chey Estefanía Mcneill has met all discharge criteria from Phase II. Vital Signs are stable, ambulating  without difficulty.Pain is now under control and tolerable for the pt. Pain score 1 at this time.  Discharge instructions given, patient verbalized understanding. Discharged from facility via wheelchair in stable condition.

## 2018-04-19 NOTE — OP NOTE
Shoulder Arthroscopy Procedure Note    DATE OF PROCEDURE: 4/19/2018    ATTENDING SURGEON: Surgeon(s) and Role:     * Cooper Ramos MD - Primary     * Keith Valadez MD-Fellow     * SMA Chance-Assistant     PREOPERATIVE DIAGNOSIS:    Pre-operative Diagnosis: Left shoulder   Superior glenoid labrum lesion (SLAP) S43.439A Impingement Syndrome M75.40    Post-operative Diagnosis:  Left shoulder   Superior glenoid labrum lesion (SLAP) S43.439A Impingement Syndrome M75.40     Procedures Performed:  1. Left shoulder Arthroscopic subacromial decompression and bursectomy CPT - 93223    2. Left shoulder Arthroscopic labral debridement CPT - 02449    3. Left shoulder Amniox Arthrocentesis, 05076    FINDINGS:  Muscle atrophy: None  Biceps tendon status:  Intact   Rotator cuff status:   Subscapularis: normal   Supraspinatus: Intact  Infraspinatus: Intact    ROTATOR CUFF TEAR CHARACTERIZATION:   no rotator cuff tear noted along the anterior, superior or posterior region of the shoulder  No retraction  Normal tissue    ARTICULAR CARTILAGE LESION(S):  Glenoid: ICRS Grade 0      Size: none    Humeral head: ICRS Grade 0      Size: none    EXAMINATION UNDER ANESTHESIA:   Forward Flexion 180degrees  ER side 60 degrees  Abduction 90 degrees  ER in Abduction 90 degrees  IR in Abduction 30 degrees  Anterior stability 1+  Posterior stability 1+  Inferior sulcus sign side 1+  Inferior sulcus sign in ER 0        Indications For Operative Procedure: Chey Mcneill is a 43 y.o. female with persistent left shoulder pain and failure of conservative therapy. Preoperative studies revealed Impingement Syndrome M75.40. she was noted to have problems along the anterior and superior rotator cuff structures. The patient had clinical   evidence of weakness and pain with overhead maneuvers. MRI was obtained revealing evidence of Superior labral damage, which was consistent with the above stated preoperative diagnoses.     Standard  procedure    IMPLANTS UTILIZED:[facility] arthroscopy equipment , Beach Chair, Mitek Vapor, Breg Sling Shot 2 with Abduction Pillow, Trimano and Clarix KEON 100 mg injectable material x 2    DESCRIPTION OF PROCEDURE:   The patient was brought into the Operating Room,   placed in supine position. Following application of the interscalene block in the preoperative hold area, the patient underwent general anesthesia and airway stabilization in the standard fashion. The patient was given the appropriate dose of antibiotics based on body weight. Time-out was utilized to verify left side as the operative site.     Both lower extremities were placed in comfortable position. Nonoperative arm was   carefully placed into a well padded arm lomeli with appropriate position and no pressure on the nerves. The operative arm was then examined under anesthesia revealing the findings as noted in the findings section of this dictation.    left shoulder was then prepped and draped in a sterile fashion with chloraprep material Traction was applied. Spinal needle was used to enter the joint posteriorly. Joint was insufflated in standard fashion with 60 mL normal saline. Needle was then pulled back into subacromial region, which was anesthetized further with 0.5% ropivacaine mixture.    A #11 blade was used to make this portal. Blunt trocar tip was inserted into the glenohumeral joint and area of concern was directly visualized demonstrating   Right shoulder was then prepped and draped in a sterile fashion with chloraprep material Traction was applied. Spinal needle was used to enter the joint posteriorly.   Joint was insufflated in standard fashion with 60 mL normal saline. Needle was then pulled back into subacromial region, which was anesthetized further with 0.5% ropivacaine mixture.    A #11 blade was used to make this portal. Blunt trocar tip was inserted into the glenohumeral joint and area of concern was directly visualized  demonstrating disruption of the biceps tendon with associated biceps tenosynovitis. Rotator cuff was visualized and we noted the above stated findings in the operative findings portion of this dictation. Glenohumeral surfaces and Labral structures demonstrated a Type 1 SLAP lesion and were treated with arthroscopic debridement of the superior labral pathology. Probe analysis noted an intact biceps tendon anchor point. Spinal needle was used to localize an anterosuperior portal, which was filled with 7 mm ribbed cannula, and anterolateral portal that was also filled with a 7 mm ribbed cannula. Labral tearing was noted superiorly and anteriorly and was prepared for repair with shaver and vapor probe.    A spinal needle was used to localize anterosuperior and lateral portals. Pictures were obtained. The rotator cuff was visualized and demonstrated no tear. Arthroscopic bursectomy was performed. Bleeding was stabilized with a Mitek Vapor probe. We then turned our attention to the subacromial region. There was an anterolateral edge of the acromion was removed with a high-speed bur. This was removed without significant difficulty. We did perform a partial release of the CA ligament The acromioclavicular joint was not disrupted. A small acromioplasty was performed anteriorly with removal of 4-5 mm of the anterior acromial edge.  Final debridement of all inflamed bursal tissues was performed. Fluid was extravasated from the joint. A 4-0 nylon sutures were used to close the arthroscopic portals. We then injected additional 0.25% ropivacaine mixture around the shoulder with a 21-gauge needle; the subacromial space was aspirated and an injection of 3 cc of Amniox material was performed. We applied Xeroform gauze, ABD pads and Medipore tape. The patient's arm was placed in a cooling unit, sling and abduction pillow as well as sterile electrode pads peripherally around the shoulder. The patient was allowed to recover from the  anesthetic, was extubated and was taken to Recovery in stable condition. At the completion of case, all instrument and sponge counts were correct.    Physical therapy:   Subacromial decompression Protocol    Start Therapy in 3-5 Days    Use Sling and Abduction Pillow - For 1-2 weeks then discontinue at 2 weeks time.  Remove arm from sling and move elbow and wrist as tolerated NOS  Periscapular strength program    May start ER side rotation to 60 degrees immediately; Forward flexion 180 degrees immediately and abduction 90 degrees with IR/ER as tolerated immediately  Pillow and sling for 6 weeks; no strengthening of the biceps muscle for 6 weeks. Full AAROM/PROM NOW with pendulum exercises as tolerated. Full elbow and wrist ROM immediately;     Discharge home after stable  Follow-up as scheduled  Activity per instruction sheet  Condition Stable

## 2018-04-23 ENCOUNTER — CLINICAL SUPPORT (OUTPATIENT)
Dept: REHABILITATION | Facility: HOSPITAL | Age: 43
End: 2018-04-23
Payer: MEDICAID

## 2018-04-23 ENCOUNTER — TELEPHONE (OUTPATIENT)
Dept: SPORTS MEDICINE | Facility: CLINIC | Age: 43
End: 2018-04-23

## 2018-04-23 DIAGNOSIS — R29.898 DECREASED STRENGTH OF UPPER EXTREMITY: ICD-10-CM

## 2018-04-23 DIAGNOSIS — M25.612 DECREASED RANGE OF MOTION OF LEFT SHOULDER: ICD-10-CM

## 2018-04-23 PROCEDURE — 97110 THERAPEUTIC EXERCISES: CPT | Mod: PN

## 2018-04-23 PROCEDURE — 97161 PT EVAL LOW COMPLEX 20 MIN: CPT | Mod: PN

## 2018-04-23 PROCEDURE — G8984 CARRY CURRENT STATUS: HCPCS | Mod: CL,PN

## 2018-04-23 PROCEDURE — G8985 CARRY GOAL STATUS: HCPCS | Mod: CJ,PN

## 2018-04-23 NOTE — TELEPHONE ENCOUNTER
Spoke with the patient. She is reactive to the chloraprep. Recommend antihistimine and hydrocortisone cream for itching and rash

## 2018-04-23 NOTE — TELEPHONE ENCOUNTER
----- Message from Geri Bustos sent at 4/23/2018  4:06 PM CDT -----  Contact: self  Pt called stating she had surgery last Thursday and now she is covered in a rash and she also stated that she went to PT today today and her surgical wound is draining. Please call pt at [phone]

## 2018-04-23 NOTE — PATIENT INSTRUCTIONS
Scapular Retraction: Unilateral (Standing)        With arms at sides, move right shoulder blade down and toward opposite side hip.  Repeat __10__ times per set. Do ___1-2_ sets per session. Do __3__ sessions per day.     [email or web address]     Copyright © [employer]. All rights reserved.       ROM: Flexion        Keeping left arm on table, slide body away until stretch is felt. Hold __3__ seconds.  Repeat __10__ times per set. Do __1-2__ sets per session. Do __3__ sessions per day.     [email or web address]     Copyright © [employer]. All rights reserved.   ROM: Abduction        With left arm resting on table, palm up, bring head down toward arm and simultaneously move trunk away from table. Hold __3__ seconds.   Repeat __10__ times per set. Do __1-2__ sets per session. Do __3__ sessions per day.     [email or web address]     Copyright © [employer]. All rights reserved.   ROM: Pendulum (Circular)        Let right arm move in [place] clockwise, then counterclockwise, by rocking body weight in circular pattern.  [employer] __10__ times each direction per set. Do _1___ sets per session. Do ___3_ sessions per day.     [email or web address]     Copyright © [employer]. All rights reserved.     Sitting with elbow rested on table sitting with elbow rested on table, keep elbow by side then bring forearm out to comfort, not past midway out. Repeat 10 times 1-2 sets 3 times day.       Continue icing3-5 times day.    Elbow and wrist movement out of sling.

## 2018-04-24 PROBLEM — M79.645 PAIN IN FINGER OF LEFT HAND: Status: RESOLVED | Noted: 2017-05-10 | Resolved: 2018-04-24

## 2018-04-24 PROBLEM — R29.898 DECREASED STRENGTH OF UPPER EXTREMITY: Status: ACTIVE | Noted: 2018-04-24

## 2018-04-24 PROBLEM — M25.649 DECREASED RANGE OF MOTION OF FINGER: Status: RESOLVED | Noted: 2017-05-10 | Resolved: 2018-04-24

## 2018-04-24 PROBLEM — M25.569 ANTERIOR KNEE PAIN: Status: RESOLVED | Noted: 2017-10-05 | Resolved: 2018-04-24

## 2018-04-24 PROBLEM — M79.89 SWELLING OF LEFT RING FINGER: Status: RESOLVED | Noted: 2017-05-10 | Resolved: 2018-04-24

## 2018-04-24 PROBLEM — M25.612 DECREASED RANGE OF MOTION OF LEFT SHOULDER: Status: ACTIVE | Noted: 2018-04-24

## 2018-04-24 NOTE — PLAN OF CARE
[facility] Outpatient Therapy Initial Evaluation    Name: Chey Mcneill  Clinic Number: [id number]    Chey is a 43 y.o. female evaluated on 04/23/2018.     Diagnosis:   Encounter Diagnoses   Name Primary?    Decreased range of motion of left shoulder     Decreased strength of upper extremity      Physician: Dimas Guzmán, *  Treatment Orders: PT Eval and Treat  Procedures Performed:  1. Left shoulder Arthroscopic subacromial decompression and bursectomy CPT - 92235  2. Left shoulder Arthroscopic labral debridement CPT - 85371  3. Left shoulder Amniox Arthrocentesis, 90948  Subacromial decompression Protocol  Start Therapy in 3-5 Days  Use Sling and Abduction Pillow - For 1-2 weeks then discontinue at 2 weeks time.  Remove arm from sling and move elbow and wrist as tolerated NOS  Periscapular strength program  May start ER side rotation to 60 degrees immediately; Forward flexion 180 degrees immediately and abduction 90 degrees with IR/ER as tolerated immediately  Pillow and sling for 6 weeks; no strengthening of the biceps muscle for 6 weeks. Full AAROM/PROM NOW with pendulum exercises as tolerated. Full elbow and wrist ROM immediately;     Past Medical History:   Diagnosis Date    PONV (postoperative nausea and vomiting)     Ruptured triceps tendon 7/25/2012     Review of patient's allergies indicates:   Allergen Reactions    Adhesive Dermatitis     dermabond caused severe blistering    Benzoin tincture plain Hives, Itching and Other (See Comments)     Significant blistering    Morphine Nausea And Vomiting and Other (See Comments)     headaches    Oxycontin [oxycodone] Hives and Itching     Patient states can take Percocet.   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     Envoirnmental concerns:  "none  Cultural, Spiritual, Developmental and Educational concerns: none, pts 16 year old daughter lives with her and is able to help with ADLs and household tasks/chores  Abuse/Neglect, Nutritional concerns: none    Subjective  Pt reports to clinic s/p L shoulder arthroscopic subacromial decompression and bursectomy, Left shoulder Arthroscopic labral debridement, and Left shoulder Amniox Arthrocentesis on 4/19/18 by Dr. Ramos. Her shoulder has been bothering her for years (approx 2012) when she had elbow surgery and symptoms began around then. She denies previous surgery to candy. Doing pendulums. Driving and not taking pain meds when she drives. Sleeps on L side usually, not sleeping on L side now and not sleeping well due to this.     Increases symptoms: tried washing hair, sleeping (trying to sleep elevated)  Decreases Symptoms: pain medication and ice - only taking meds at night    Diagnostic Tests: MRI    Pain Scale: Chey rates pain to be 5 on a scale of 1-10. 8/10 at end of day    Patient Goals: decrease pain and full range of motion, lift something eithout it "killing me", not be weak.    Objective  Observation: Pt is well dressed well nourished female who is alert and oriented x 3. Anterior   Hives, blisters to front of shoulder and three incisions. Anterior incision with brown drainage, no other redness. Pt advised to call MD's office regarding rash and drainage.    Posture: FHRS, L scapular angle higher than R    Shoulder ROM Left Right   Flexion PROM 110 pre PROM 125 after 180   Abduction 90 180   Internal Rotation  To abdomen 50   External Rotation 15 pre 45 post 90     R Elbow   Flexion 55 degrees  Extension 3 degrees    L Elbow   Flex 43 degrees  Extension (-)6 degrees    Shoulder strength Left Right   Flexion NT 5/5   Abduction NT 5/5   Internal Rotation NT 5/5   External Rotation NT 5/5   Extension NT 5/5   *IR and ER measured supine    Special tests deferred secondary to surgery    Joint " Mobility: hypomobile L GH joint grade I-II  Palpation: TTP along incisions and noted edema.   Sensation: intact to light touch    Treatment:  Time In: 2:55pm  Time Out: 3:55pm    PT Evaluation Completed? Yes  Discussed Plan of Care with patient: Yes    Chey received instruction in and demonstrated therapeutic exercises for 15 minutes of therapeutic exercises including:  Seated scapular retraction on L x 10  PROM L shoulder flex to painful limit (approx 125 degrees), abd to 90 degrees, ER to 60 degrees: x 10 each  AAROM seated shoulder flexion on table with slide board x 10  AAROM seated shoulder abd on table with slide board x 10  AAROM seated shoulder ER on table with slide board x 10  Pendulums - emphasis on use of body for movement and not active movement of arm    Written Home Exercises Provided: Seated L unilateral scapular repositioning, Seated AAROM flexion on table, Seated AAROM abd on table, Seated AAROM ER on table limited to <60 degrees.   Chey demo good understanding of the education provided. Patient demo good return demo of skill of exercises. Pt advised to continue with icing her L shoulder 3 times per day and continue with pendulums.     CMS Impairment/Limitation/Restriction for FOTO Shoulder Survey  Status Limitation G-Code CMS Severity Modifier  Intake 40% 60% Current Status CL - At least 60 percent but less than 80 percent  Predicted 62% 38% Goal Status+ CJ - At least 20 percent but less than 40 percent    History  Co-morbidities and personal factors that may impact the plan of care Examination  Body Structures and Functions, activity limitations and participation restrictions that may impact the plan of care    Clinical Presentation   Co-morbidities:   none        Personal Factors:   no deficits Body Regions:   neck  upper extremities    Body Systems:    ROM  strength            Participation Restrictions:   Post op limitations     Activity limitations:   Learning and applying knowledge  no  deficits    General Tasks and Commands  no deficits    Communication  no deficits    Mobility  lifting and carrying objects    Self care  washing oneself (bathing, drying, washing hands)  caring for body parts (brushing teeth, shaving, grooming)  dressing    Domestic Life  cooking  doing house work (cleaning house, washing dishes, laundry)    Interactions/Relationships  no deficits    Life Areas  no deficits    Community and Social Life  recreation and leisure         stable and uncomplicated                      Low - patient is motivated and eager to improve limitations allowing for appropriate healing time post op   low  high Decision Making/ Complexity Score:  low     Assessment  This is a 43 y.o. female referred to outpatient physical therapy and presents with a medical diagnosis of   Encounter Diagnoses   Name Primary?    Decreased range of motion of left shoulder     Decreased strength of upper extremity     and demonstrates limitations as described in the problem list. Pt will benefit from physical therapy services in order to maximize pain free and/or functional use of left shoulder. The following goals were discussed with the patient and patient is in agreement with them as to be addressed in the treatment plan.     Problem List: pain, decreased ROM, decreased flexibility, decreased strength, inability to participate fully in vocation pursuits and decreased ability to fully participate in recreational/sports related activities.    Short Term Goals:  6 weeks  1. Pt will present with increased L shoulder AROM into flexion to 150 degrees for increased ease with ADLs.   2. Pt will participate in MMT to L shoulder to get baseline of strength.  3. Pt will report decreased pain to </= 2/10 with ADLs.  4. Pt will present with increased L shoulder AROM into abduction to 120 degrees for increased ease with ADLs.     Long Term Goals: 12 weeks  1. Pt will present with increased L shoulder AROM into flexion to 170  degrees for increased ease with ADLs.   2. Pt will present with increased MMT to L shoulder by one half grade into flex and abd for increased ease with ADLs.  3. Pt will present with increased L shoulder AROM into abduction to 120 degrees for increased ease with ADLs.   4. Pt will be independent with HEP and self management of symptoms.     Plan  Pt will be treated by physical therapy 2 times a week for 12 weeks for designated treatment time frame to address therapeutic exercises in order to achieve established goals. Chey may at times be seen by a PTA as part of the Rehab Team.     Hollie Nava, YENT      I certify the need for these services furnished under this plan of treatment and while under my care.         ___________________________________  Physician/Referring Practitioner        _________________  Date of Signature

## 2018-05-02 ENCOUNTER — OFFICE VISIT (OUTPATIENT)
Dept: SPORTS MEDICINE | Facility: CLINIC | Age: 43
End: 2018-05-02
Payer: MEDICAID

## 2018-05-02 VITALS
SYSTOLIC BLOOD PRESSURE: 98 MMHG | BODY MASS INDEX: 21.79 KG/M2 | WEIGHT: 123 LBS | HEIGHT: 63 IN | HEART RATE: 83 BPM | DIASTOLIC BLOOD PRESSURE: 63 MMHG

## 2018-05-02 DIAGNOSIS — R29.898 DECREASED STRENGTH OF UPPER EXTREMITY: ICD-10-CM

## 2018-05-02 DIAGNOSIS — S43.432D TYPE 2 SUPERIOR LABRUM EXTENDING FROM ANTERIOR TO POSTERIOR (SLAP) LESION OF LEFT SHOULDER, SUBSEQUENT ENCOUNTER: ICD-10-CM

## 2018-05-02 DIAGNOSIS — M75.102 ROTATOR CUFF SYNDROME OF LEFT SHOULDER: ICD-10-CM

## 2018-05-02 DIAGNOSIS — M25.612 DECREASED RANGE OF MOTION OF LEFT SHOULDER: Primary | ICD-10-CM

## 2018-05-02 DIAGNOSIS — M75.42 ROTATOR CUFF IMPINGEMENT SYNDROME OF LEFT SHOULDER: ICD-10-CM

## 2018-05-02 PROCEDURE — 99213 OFFICE O/P EST LOW 20 MIN: CPT | Mod: PBBFAC,PO | Performed by: ORTHOPAEDIC SURGERY

## 2018-05-02 PROCEDURE — 99999 PR PBB SHADOW E&M-EST. PATIENT-LVL III: CPT | Mod: PBBFAC,,, | Performed by: ORTHOPAEDIC SURGERY

## 2018-05-02 PROCEDURE — 99024 POSTOP FOLLOW-UP VISIT: CPT | Mod: ,,, | Performed by: ORTHOPAEDIC SURGERY
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
## 2018-05-02 NOTE — PATIENT INSTRUCTIONS
Physical therapy:   Subacromial decompression Protocol     Start Therapy in 3-5 Days     Use Sling and Abduction Pillow - For 1-2 weeks then discontinue at 2 weeks time.  Remove arm from sling and move elbow and wrist as tolerated NOS  Periscapular strength program     May start ER side rotation to 60 degrees immediately; Forward flexion 180 degrees immediately and abduction 90 degrees with IR/ER as tolerated immediately  Pillow and sling for 6 weeks; no strengthening of the biceps muscle for 6 weeks. Full AAROM/PROM NOW with pendulum exercises as tolerated. Full elbow and wrist ROM immediately;

## 2018-05-03 ENCOUNTER — CLINICAL SUPPORT (OUTPATIENT)
Dept: REHABILITATION | Facility: HOSPITAL | Age: 43
End: 2018-05-03
Payer: MEDICAID

## 2018-05-03 DIAGNOSIS — M25.512 LEFT ANTERIOR SHOULDER PAIN: Primary | ICD-10-CM

## 2018-05-03 PROCEDURE — 97140 MANUAL THERAPY 1/> REGIONS: CPT | Mod: PN | Performed by: PHYSICAL THERAPIST

## 2018-05-03 PROCEDURE — 97110 THERAPEUTIC EXERCISES: CPT | Mod: PN | Performed by: PHYSICAL THERAPIST

## 2018-05-03 NOTE — PLAN OF CARE
[facility] Outpatient Therapy Initial Evaluation    Name: Chey Mcneill  Clinic Number: [id number]    Chey is a 43 y.o. female evaluated on 04/23/2018.     Diagnosis:   Encounter Diagnoses   Name Primary?    Decreased range of motion of left shoulder     Decreased strength of upper extremity      Physician: Dimas Guzmán, *  Treatment Orders: PT Eval and Treat  Procedures Performed:  1. Left shoulder Arthroscopic subacromial decompression and bursectomy CPT - 96060  2. Left shoulder Arthroscopic labral debridement CPT - 53225  3. Left shoulder Amniox Arthrocentesis, 91389  Subacromial decompression Protocol  Start Therapy in 3-5 Days  Use Sling and Abduction Pillow - For 1-2 weeks then discontinue at 2 weeks time.  Remove arm from sling and move elbow and wrist as tolerated NOS  Periscapular strength program  May start ER side rotation to 60 degrees immediately; Forward flexion 180 degrees immediately and abduction 90 degrees with IR/ER as tolerated immediately  Pillow and sling for 6 weeks; no strengthening of the biceps muscle for 6 weeks. Full AAROM/PROM NOW with pendulum exercises as tolerated. Full elbow and wrist ROM immediately;     Past Medical History:   Diagnosis Date    PONV (postoperative nausea and vomiting)     Ruptured triceps tendon 7/25/2012     Review of patient's allergies indicates:   Allergen Reactions    Adhesive Dermatitis     dermabond caused severe blistering    Benzoin tincture plain Hives, Itching and Other (See Comments)     Significant blistering    Morphine Nausea And Vomiting and Other (See Comments)     headaches    Oxycontin [oxycodone] Hives and Itching     Patient states can take Percocet.   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     Envoirnmental concerns:  "none  Cultural, Spiritual, Developmental and Educational concerns: none, pts 16 year old daughter lives with her and is able to help with ADLs and household tasks/chores  Abuse/Neglect, Nutritional concerns: none    Subjective  Pt reports to clinic s/p L shoulder arthroscopic subacromial decompression and bursectomy, Left shoulder Arthroscopic labral debridement, and Left shoulder Amniox Arthrocentesis on 4/19/18 by Dr. Ramos. Her shoulder has been bothering her for years (approx 2012) when she had elbow surgery and symptoms began around then. She denies previous surgery to candy. Doing pendulums. Driving and not taking pain meds when she drives. Sleeps on L side usually, not sleeping on L side now and not sleeping well due to this.     Increases symptoms: tried washing hair, sleeping (trying to sleep elevated)  Decreases Symptoms: pain medication and ice - only taking meds at night    Diagnostic Tests: MRI    Pain Scale: Chey rates pain to be 5 on a scale of 1-10. 8/10 at end of day    Patient Goals: decrease pain and full range of motion, lift something eithout it "killing me", not be weak.    Objective  Observation: Pt is well dressed well nourished female who is alert and oriented x 3. Anterior   Hives, blisters to front of shoulder and three incisions. Anterior incision with brown drainage, no other redness. Pt advised to call MD's office regarding rash and drainage.    Posture: FHRS, L scapular angle higher than R    Shoulder ROM Left Right   Flexion PROM 110 pre PROM 125 after 180   Abduction 90 180   Internal Rotation  To abdomen 50   External Rotation 15 pre 45 post 90     R Elbow   Flexion 55 degrees  Extension 3 degrees    L Elbow   Flex 43 degrees  Extension (-)6 degrees    Shoulder strength Left Right   Flexion NT 5/5   Abduction NT 5/5   Internal Rotation NT 5/5   External Rotation NT 5/5   Extension NT 5/5   *IR and ER measured supine    Special tests deferred secondary to surgery    Joint " Mobility: hypomobile L GH joint grade I-II  Palpation: TTP along incisions and noted edema.   Sensation: intact to light touch    Treatment:  Time In: 2:55pm  Time Out: 3:55pm    PT Evaluation Completed? Yes  Discussed Plan of Care with patient: Yes    Chey received instruction in and demonstrated therapeutic exercises for 15 minutes of therapeutic exercises including:  Seated scapular retraction on L x 10  PROM L shoulder flex to painful limit (approx 125 degrees), abd to 90 degrees, ER to 60 degrees: x 10 each  AAROM seated shoulder flexion on table with slide board x 10  AAROM seated shoulder abd on table with slide board x 10  AAROM seated shoulder ER on table with slide board x 10  Pendulums - emphasis on use of body for movement and not active movement of arm    Written Home Exercises Provided: Seated L unilateral scapular repositioning, Seated AAROM flexion on table, Seated AAROM abd on table, Seated AAROM ER on table limited to <60 degrees.   Chey demo good understanding of the education provided. Patient demo good return demo of skill of exercises. Pt advised to continue with icing her L shoulder 3 times per day and continue with pendulums.     CMS Impairment/Limitation/Restriction for FOTO Shoulder Survey  Status Limitation G-Code CMS Severity Modifier  Intake 40% 60% Current Status CL - At least 60 percent but less than 80 percent  Predicted 62% 38% Goal Status+ CJ - At least 20 percent but less than 40 percent    History  Co-morbidities and personal factors that may impact the plan of care Examination  Body Structures and Functions, activity limitations and participation restrictions that may impact the plan of care    Clinical Presentation   Co-morbidities:   none        Personal Factors:   no deficits Body Regions:   neck  upper extremities    Body Systems:    ROM  strength            Participation Restrictions:   Post op limitations     Activity limitations:   Learning and applying knowledge  no  deficits    General Tasks and Commands  no deficits    Communication  no deficits    Mobility  lifting and carrying objects    Self care  washing oneself (bathing, drying, washing hands)  caring for body parts (brushing teeth, shaving, grooming)  dressing    Domestic Life  cooking  doing house work (cleaning house, washing dishes, laundry)    Interactions/Relationships  no deficits    Life Areas  no deficits    Community and Social Life  recreation and leisure         stable and uncomplicated                      Low - patient is motivated and eager to improve limitations allowing for appropriate healing time post op   low  high Decision Making/ Complexity Score:  low     Assessment  This is a 43 y.o. female referred to outpatient physical therapy and presents with a medical diagnosis of   Encounter Diagnoses   Name Primary?    Decreased range of motion of left shoulder     Decreased strength of upper extremity     and demonstrates limitations as described in the problem list. Pt will benefit from physical therapy services in order to maximize pain free and/or functional use of left shoulder. The following goals were discussed with the patient and patient is in agreement with them as to be addressed in the treatment plan.     Problem List: pain, decreased ROM, decreased flexibility, decreased strength, inability to participate fully in vocation pursuits and decreased ability to fully participate in recreational/sports related activities.    Short Term Goals:  6 weeks  1. Pt will present with increased L shoulder AROM into flexion to 150 degrees for increased ease with ADLs.   2. Pt will participate in MMT to L shoulder to get baseline of strength.  3. Pt will report decreased pain to </= 2/10 with ADLs.  4. Pt will present with increased L shoulder AROM into abduction to 120 degrees for increased ease with ADLs.     Long Term Goals: 12 weeks  1. Pt will present with increased L shoulder AROM into flexion to 170  degrees for increased ease with ADLs.   2. Pt will present with increased MMT to L shoulder by one half grade into flex and abd for increased ease with ADLs.  3. Pt will present with increased L shoulder AROM into abduction to 120 degrees for increased ease with ADLs.   4. Pt will be independent with HEP and self management of symptoms.     Plan  Pt will be treated by physical therapy 2 times a week for 12 weeks for designated treatment time frame to address therapeutic exercises in order to achieve established goals. Chey may at times be seen by a PTA as part of the Rehab Team.     Hollie Nava, YENT      I certify the need for these services furnished under this plan of treatment and while under my care.         ___________________________________  Physician/Referring Practitioner        _________________  Date of Signature

## 2018-05-03 NOTE — PROGRESS NOTES
Physical Therapy Daily Note     Date: 05/03/2018  Name: Chey Mcneill  Clinic Number: [id number]  Diagnosis: Left shoulder SAD  Encounter Diagnosis   Name Primary?    Left anterior shoulder pain Yes     Physician: Dimas Guzmán, *    Evaluation Date: 4/23/18  Date of Surgery: 4/19/18  Visit #: 2   Start Time:  2:00  Stop Time:  3:00  Total Treatment Time: 60  Physical therapy:   Subacromial decompression Protocol     Start Therapy in 3-5 Days     Use Sling and Abduction Pillow - For 1-2 weeks then discontinue at 2 weeks time.  Remove arm from sling and move elbow and wrist as tolerated NOS  Periscapular strength program     May start ER side rotation to 60 degrees immediately; Forward flexion 180 degrees immediately and abduction 90 degrees with IR/ER as tolerated immediately  Pillow and sling for 6 weeks; no strengthening of the biceps muscle for 6 weeks. Full AAROM/PROM NOW with pendulum exercises as tolerated. Full elbow and wrist ROM immediately;  Precautions:     Subjective     Pt reports the left shoulder is moving well and isnt that painful.    Pain: 1/10    Objective     Measurements taken: none    Patient received individual therapy to increase strength, endurance and ROM with activities as follows:     Chey received therapeutic exercises to develop strength, endurance and ROM for 20  minutes including:   Prone row x 30  Prone ext x 30  Seated abd x 5 min  Ext to row 3x10  Seated abd with ice x 10 min        Chey received the following manual therapy techniques: Joint mobilizations and Soft tissue Mobilization were applied to the: left shoulder for 10 minutes.   PROM: Flex, Abd, ER0, IR/ER with post mobs      Written Home Exercises Provided: updated as per therex list  Pt demo good understanding of the education provided. Chey demonstrated good return demonstration of activities.     Education provided:  Chey verbalized good  understanding of education provided.   No spiritual or educational barriers to learning identified.    Assessment     Improving scapular positioning and functional left shoulder mobility with rx. Continue to progress gentle ROM recovery and scapular stability work.     This is a 43 y.o. female referred to outpatient physical therapy and presents with a medical diagnosis of left shoulder pain and demonstrates limitations as described in the problem list Pt prognosis is Good. Pt will continue to benefit from skilled outpatient physical therapy to address the deficits listed below in the problem list, provide pt/family education and to maximize pt's level of independence in the home and community environment.    Will the patient continue to benefit from skilled PT intervention? yes        Medical necessity is demonstrated by:   - Pain limits function of effected part for all activities  - Unable to participate in daily activities   - Requires skilled supervision to complete and progress HEP  - Fall risk - impaired balance   - Continued inability to participate in vocational pursuits    Short Term Goals:  6 weeks  1. Pt will present with increased L shoulder AROM into flexion to 150 degrees for increased ease with ADLs.   2. Pt will participate in MMT to L shoulder to get baseline of strength.  3. Pt will report decreased pain to </= 2/10 with ADLs.  4. Pt will present with increased L shoulder AROM into abduction to 120 degrees for increased ease with ADLs.      Long Term Goals: 12 weeks  1. Pt will present with increased L shoulder AROM into flexion to 170 degrees for increased ease with ADLs.   2. Pt will present with increased MMT to L shoulder by one half grade into flex and abd for increased ease with ADLs.  3. Pt will present with increased L shoulder AROM into abduction to 120 degrees for increased ease with ADLs.   4. Pt will be independent with HEP and self management of symptoms.        Plan   Continue with  established Plan of Care towards PT goals.      Therapist: ALEJANDRO MAI, PT

## 2018-05-16 ENCOUNTER — CLINICAL SUPPORT (OUTPATIENT)
Dept: REHABILITATION | Facility: HOSPITAL | Age: 43
End: 2018-05-16
Payer: MEDICAID

## 2018-05-16 DIAGNOSIS — M25.512 LEFT ANTERIOR SHOULDER PAIN: Primary | ICD-10-CM

## 2018-05-16 PROCEDURE — 97140 MANUAL THERAPY 1/> REGIONS: CPT | Mod: PN | Performed by: PHYSICAL THERAPIST

## 2018-05-16 PROCEDURE — 97110 THERAPEUTIC EXERCISES: CPT | Mod: PN | Performed by: PHYSICAL THERAPIST

## 2018-05-16 NOTE — PROGRESS NOTES
Physical Therapy Daily Note     Date: 05/16/2018  Name: Chey Mcneill  Clinic Number: [id number]  Diagnosis: Left shoulder SAD  Encounter Diagnosis   Name Primary?    Left anterior shoulder pain Yes     Physician: Dimas Guzmán, *    Evaluation Date: 4/23/18  Date of Surgery: 4/19/18  Visit #: 3   Start Time:  11:00  Stop Time:  12:00  Total Treatment Time: 60  Physical therapy:   Subacromial decompression Protocol     Start Therapy in 3-5 Days     Use Sling and Abduction Pillow - For 1-2 weeks then discontinue at 2 weeks time.  Remove arm from sling and move elbow and wrist as tolerated NOS  Periscapular strength program     May start ER side rotation to 60 degrees immediately; Forward flexion 180 degrees immediately and abduction 90 degrees with IR/ER as tolerated immediately  Pillow and sling for 6 weeks; no strengthening of the biceps muscle for 6 weeks. Full AAROM/PROM NOW with pendulum exercises as tolerated. Full elbow and wrist ROM immediately;  Precautions:     Subjective     Pt reports the left shoulder is moving well. It is just sore.     Pain: 1/10    Objective     Measurements taken: none    Patient received individual therapy to increase strength, endurance and ROM with activities as follows:     Chey received therapeutic exercises to develop strength, endurance and ROM for 25  minutes including:   Prone row x 30  Prone ext x 30  Seated abd x 5 min  Ext to row 3x10  Seated abd with ice x 10 min  ER walkout 3x10  Step and punch 3x10  Ball wall CW, CCW x 30 ea        Chey received the following manual therapy techniques: Joint mobilizations and Soft tissue Mobilization were applied to the: left shoulder for 10 minutes.   PROM: Flex, Abd, ER0, IR/ER with post mobs      Written Home Exercises Provided: updated as per therex list  Pt demo good understanding of the education provided. Chey demonstrated good return demonstration of  activities.     Education provided:  Chey verbalized good understanding of education provided.   No spiritual or educational barriers to learning identified.    Assessment     Improving scapular retraction and posterior cuff activation. Continue to progress as tolerated.     This is a 43 y.o. female referred to outpatient physical therapy and presents with a medical diagnosis of left shoulder pain and demonstrates limitations as described in the problem list Pt prognosis is Good. Pt will continue to benefit from skilled outpatient physical therapy to address the deficits listed below in the problem list, provide pt/family education and to maximize pt's level of independence in the home and community environment.    Will the patient continue to benefit from skilled PT intervention? yes        Medical necessity is demonstrated by:   - Pain limits function of effected part for all activities  - Unable to participate in daily activities   - Requires skilled supervision to complete and progress HEP  - Fall risk - impaired balance   - Continued inability to participate in vocational pursuits    Short Term Goals:  6 weeks  1. Pt will present with increased L shoulder AROM into flexion to 150 degrees for increased ease with ADLs.   2. Pt will participate in MMT to L shoulder to get baseline of strength.  3. Pt will report decreased pain to </= 2/10 with ADLs.  4. Pt will present with increased L shoulder AROM into abduction to 120 degrees for increased ease with ADLs.      Long Term Goals: 12 weeks  1. Pt will present with increased L shoulder AROM into flexion to 170 degrees for increased ease with ADLs.   2. Pt will present with increased MMT to L shoulder by one half grade into flex and abd for increased ease with ADLs.  3. Pt will present with increased L shoulder AROM into abduction to 120 degrees for increased ease with ADLs.   4. Pt will be independent with HEP and self management of symptoms.        Plan    Continue with established Plan of Care towards PT goals.      Therapist: ALEJANDRO MAI, PT

## 2018-05-16 NOTE — PLAN OF CARE
[facility] Outpatient Therapy Initial Evaluation    Name: Chey Mcneill  Clinic Number: [id number]    Chey is a 43 y.o. female evaluated on 04/23/2018.     Diagnosis:   Encounter Diagnoses   Name Primary?    Decreased range of motion of left shoulder     Decreased strength of upper extremity      Physician: Dimas Guzmán, *  Treatment Orders: PT Eval and Treat  Procedures Performed:  1. Left shoulder Arthroscopic subacromial decompression and bursectomy CPT - 13827  2. Left shoulder Arthroscopic labral debridement CPT - 87536  3. Left shoulder Amniox Arthrocentesis, 37219  Subacromial decompression Protocol  Start Therapy in 3-5 Days  Use Sling and Abduction Pillow - For 1-2 weeks then discontinue at 2 weeks time.  Remove arm from sling and move elbow and wrist as tolerated NOS  Periscapular strength program  May start ER side rotation to 60 degrees immediately; Forward flexion 180 degrees immediately and abduction 90 degrees with IR/ER as tolerated immediately  Pillow and sling for 6 weeks; no strengthening of the biceps muscle for 6 weeks. Full AAROM/PROM NOW with pendulum exercises as tolerated. Full elbow and wrist ROM immediately;     Past Medical History:   Diagnosis Date    PONV (postoperative nausea and vomiting)     Ruptured triceps tendon 7/25/2012     Review of patient's allergies indicates:   Allergen Reactions    Adhesive Dermatitis     dermabond caused severe blistering    Benzoin tincture plain Hives, Itching and Other (See Comments)     Significant blistering    Morphine Nausea And Vomiting and Other (See Comments)     headaches    Oxycontin [oxycodone] Hives and Itching     Patient states can take Percocet.   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     Envoirnmental concerns:  "none  Cultural, Spiritual, Developmental and Educational concerns: none, pts 16 year old daughter lives with her and is able to help with ADLs and household tasks/chores  Abuse/Neglect, Nutritional concerns: none    Subjective  Pt reports to clinic s/p L shoulder arthroscopic subacromial decompression and bursectomy, Left shoulder Arthroscopic labral debridement, and Left shoulder Amniox Arthrocentesis on 4/19/18 by Dr. Ramos. Her shoulder has been bothering her for years (approx 2012) when she had elbow surgery and symptoms began around then. She denies previous surgery to candy. Doing pendulums. Driving and not taking pain meds when she drives. Sleeps on L side usually, not sleeping on L side now and not sleeping well due to this.     Increases symptoms: tried washing hair, sleeping (trying to sleep elevated)  Decreases Symptoms: pain medication and ice - only taking meds at night    Diagnostic Tests: MRI    Pain Scale: Chey rates pain to be 5 on a scale of 1-10. 8/10 at end of day    Patient Goals: decrease pain and full range of motion, lift something eithout it "killing me", not be weak.    Objective  Observation: Pt is well dressed well nourished female who is alert and oriented x 3. Anterior   Hives, blisters to front of shoulder and three incisions. Anterior incision with brown drainage, no other redness. Pt advised to call MD's office regarding rash and drainage.    Posture: FHRS, L scapular angle higher than R    Shoulder ROM Left Right   Flexion PROM 110 pre PROM 125 after 180   Abduction 90 180   Internal Rotation  To abdomen 50   External Rotation 15 pre 45 post 90     R Elbow   Flexion 55 degrees  Extension 3 degrees    L Elbow   Flex 43 degrees  Extension (-)6 degrees    Shoulder strength Left Right   Flexion NT 5/5   Abduction NT 5/5   Internal Rotation NT 5/5   External Rotation NT 5/5   Extension NT 5/5   *IR and ER measured supine    Special tests deferred secondary to surgery    Joint " Mobility: hypomobile L GH joint grade I-II  Palpation: TTP along incisions and noted edema.   Sensation: intact to light touch    Treatment:  Time In: 2:55pm  Time Out: 3:55pm    PT Evaluation Completed? Yes  Discussed Plan of Care with patient: Yes    Chey received instruction in and demonstrated therapeutic exercises for 15 minutes of therapeutic exercises including:  Seated scapular retraction on L x 10  PROM L shoulder flex to painful limit (approx 125 degrees), abd to 90 degrees, ER to 60 degrees: x 10 each  AAROM seated shoulder flexion on table with slide board x 10  AAROM seated shoulder abd on table with slide board x 10  AAROM seated shoulder ER on table with slide board x 10  Pendulums - emphasis on use of body for movement and not active movement of arm    Written Home Exercises Provided: Seated L unilateral scapular repositioning, Seated AAROM flexion on table, Seated AAROM abd on table, Seated AAROM ER on table limited to <60 degrees.   Chey demo good understanding of the education provided. Patient demo good return demo of skill of exercises. Pt advised to continue with icing her L shoulder 3 times per day and continue with pendulums.     CMS Impairment/Limitation/Restriction for FOTO Shoulder Survey  Status Limitation G-Code CMS Severity Modifier  Intake 40% 60% Current Status CL - At least 60 percent but less than 80 percent  Predicted 62% 38% Goal Status+ CJ - At least 20 percent but less than 40 percent    History  Co-morbidities and personal factors that may impact the plan of care Examination  Body Structures and Functions, activity limitations and participation restrictions that may impact the plan of care    Clinical Presentation   Co-morbidities:   none        Personal Factors:   no deficits Body Regions:   neck  upper extremities    Body Systems:    ROM  strength            Participation Restrictions:   Post op limitations     Activity limitations:   Learning and applying knowledge  no  deficits    General Tasks and Commands  no deficits    Communication  no deficits    Mobility  lifting and carrying objects    Self care  washing oneself (bathing, drying, washing hands)  caring for body parts (brushing teeth, shaving, grooming)  dressing    Domestic Life  cooking  doing house work (cleaning house, washing dishes, laundry)    Interactions/Relationships  no deficits    Life Areas  no deficits    Community and Social Life  recreation and leisure         stable and uncomplicated                      Low - patient is motivated and eager to improve limitations allowing for appropriate healing time post op   low  high Decision Making/ Complexity Score:  low     Assessment  This is a 43 y.o. female referred to outpatient physical therapy and presents with a medical diagnosis of   Encounter Diagnoses   Name Primary?    Decreased range of motion of left shoulder     Decreased strength of upper extremity     and demonstrates limitations as described in the problem list. Pt will benefit from physical therapy services in order to maximize pain free and/or functional use of left shoulder. The following goals were discussed with the patient and patient is in agreement with them as to be addressed in the treatment plan.     Problem List: pain, decreased ROM, decreased flexibility, decreased strength, inability to participate fully in vocation pursuits and decreased ability to fully participate in recreational/sports related activities.    Short Term Goals:  6 weeks  1. Pt will present with increased L shoulder AROM into flexion to 150 degrees for increased ease with ADLs.   2. Pt will participate in MMT to L shoulder to get baseline of strength.  3. Pt will report decreased pain to </= 2/10 with ADLs.  4. Pt will present with increased L shoulder AROM into abduction to 120 degrees for increased ease with ADLs.     Long Term Goals: 12 weeks  1. Pt will present with increased L shoulder AROM into flexion to 170  degrees for increased ease with ADLs.   2. Pt will present with increased MMT to L shoulder by one half grade into flex and abd for increased ease with ADLs.  3. Pt will present with increased L shoulder AROM into abduction to 120 degrees for increased ease with ADLs.   4. Pt will be independent with HEP and self management of symptoms.     Plan  Pt will be treated by physical therapy 2 times a week for 12 weeks for designated treatment time frame to address therapeutic exercises in order to achieve established goals. Chey may at times be seen by a PTA as part of the Rehab Team.     Hollie Nava, YENT      I certify the need for these services furnished under this plan of treatment and while under my care.         ___________________________________  Physician/Referring Practitioner        _________________  Date of Signature

## 2018-05-22 ENCOUNTER — TELEPHONE (OUTPATIENT)
Dept: SPORTS MEDICINE | Facility: CLINIC | Age: 43
End: 2018-05-22

## 2018-05-22 NOTE — TELEPHONE ENCOUNTER
----- Message from Rocio Bloom sent at 5/22/2018  3:31 PM CDT -----  Contact: Self  Pt is calling to speak with Ms. Cristina or Lori regarding cramping in her right leg x3 days.  Pt wants to speak with either of you about it.    She can be reached at [phone].    Thank you.

## 2018-05-23 ENCOUNTER — TELEPHONE (OUTPATIENT)
Dept: SPORTS MEDICINE | Facility: CLINIC | Age: 43
End: 2018-05-23

## 2018-05-23 ENCOUNTER — CLINICAL SUPPORT (OUTPATIENT)
Dept: REHABILITATION | Facility: HOSPITAL | Age: 43
End: 2018-05-23
Payer: MEDICAID

## 2018-05-23 DIAGNOSIS — M25.512 LEFT ANTERIOR SHOULDER PAIN: Primary | ICD-10-CM

## 2018-05-23 PROCEDURE — 97110 THERAPEUTIC EXERCISES: CPT | Mod: PN | Performed by: PHYSICAL THERAPIST

## 2018-05-23 PROCEDURE — 97140 MANUAL THERAPY 1/> REGIONS: CPT | Mod: PN | Performed by: PHYSICAL THERAPIST

## 2018-05-23 NOTE — TELEPHONE ENCOUNTER
S/w the pt and she said that her calf on her right leg has been cramping and bothering her for a few days she said it is a little better today. She asked if it could be b/c of the torn meniscus and I told her no. Told her when she goes to PT today see if Bj can do calf massage on her to help relieve the cramping she is having and to try to take it easy over the weekend.

## 2018-05-23 NOTE — PLAN OF CARE
[facility] Outpatient Therapy Initial Evaluation    Name: Chey Mcneill  Clinic Number: [id number]    Chey is a 43 y.o. female evaluated on 04/23/2018.     Diagnosis:   Encounter Diagnoses   Name Primary?    Decreased range of motion of left shoulder     Decreased strength of upper extremity      Physician: Dimas Guzmán, *  Treatment Orders: PT Eval and Treat  Procedures Performed:  1. Left shoulder Arthroscopic subacromial decompression and bursectomy CPT - 34619  2. Left shoulder Arthroscopic labral debridement CPT - 85953  3. Left shoulder Amniox Arthrocentesis, 23434  Subacromial decompression Protocol  Start Therapy in 3-5 Days  Use Sling and Abduction Pillow - For 1-2 weeks then discontinue at 2 weeks time.  Remove arm from sling and move elbow and wrist as tolerated NOS  Periscapular strength program  May start ER side rotation to 60 degrees immediately; Forward flexion 180 degrees immediately and abduction 90 degrees with IR/ER as tolerated immediately  Pillow and sling for 6 weeks; no strengthening of the biceps muscle for 6 weeks. Full AAROM/PROM NOW with pendulum exercises as tolerated. Full elbow and wrist ROM immediately;     Past Medical History:   Diagnosis Date    PONV (postoperative nausea and vomiting)     Ruptured triceps tendon 7/25/2012     Review of patient's allergies indicates:   Allergen Reactions    Adhesive Dermatitis     dermabond caused severe blistering    Benzoin tincture plain Hives, Itching and Other (See Comments)     Significant blistering    Morphine Nausea And Vomiting and Other (See Comments)     headaches    Oxycontin [oxycodone] Hives and Itching     Patient states can take Percocet.   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     Envoirnmental concerns:  "none  Cultural, Spiritual, Developmental and Educational concerns: none, pts 16 year old daughter lives with her and is able to help with ADLs and household tasks/chores  Abuse/Neglect, Nutritional concerns: none    Subjective  Pt reports to clinic s/p L shoulder arthroscopic subacromial decompression and bursectomy, Left shoulder Arthroscopic labral debridement, and Left shoulder Amniox Arthrocentesis on 4/19/18 by Dr. Ramos. Her shoulder has been bothering her for years (approx 2012) when she had elbow surgery and symptoms began around then. She denies previous surgery to candy. Doing pendulums. Driving and not taking pain meds when she drives. Sleeps on L side usually, not sleeping on L side now and not sleeping well due to this.     Increases symptoms: tried washing hair, sleeping (trying to sleep elevated)  Decreases Symptoms: pain medication and ice - only taking meds at night    Diagnostic Tests: MRI    Pain Scale: Chey rates pain to be 5 on a scale of 1-10. 8/10 at end of day    Patient Goals: decrease pain and full range of motion, lift something eithout it "killing me", not be weak.    Objective  Observation: Pt is well dressed well nourished female who is alert and oriented x 3. Anterior   Hives, blisters to front of shoulder and three incisions. Anterior incision with brown drainage, no other redness. Pt advised to call MD's office regarding rash and drainage.    Posture: FHRS, L scapular angle higher than R    Shoulder ROM Left Right   Flexion PROM 110 pre PROM 125 after 180   Abduction 90 180   Internal Rotation  To abdomen 50   External Rotation 15 pre 45 post 90     R Elbow   Flexion 55 degrees  Extension 3 degrees    L Elbow   Flex 43 degrees  Extension (-)6 degrees    Shoulder strength Left Right   Flexion NT 5/5   Abduction NT 5/5   Internal Rotation NT 5/5   External Rotation NT 5/5   Extension NT 5/5   *IR and ER measured supine    Special tests deferred secondary to surgery    Joint " Mobility: hypomobile L GH joint grade I-II  Palpation: TTP along incisions and noted edema.   Sensation: intact to light touch    Treatment:  Time In: 2:55pm  Time Out: 3:55pm    PT Evaluation Completed? Yes  Discussed Plan of Care with patient: Yes    Chey received instruction in and demonstrated therapeutic exercises for 15 minutes of therapeutic exercises including:  Seated scapular retraction on L x 10  PROM L shoulder flex to painful limit (approx 125 degrees), abd to 90 degrees, ER to 60 degrees: x 10 each  AAROM seated shoulder flexion on table with slide board x 10  AAROM seated shoulder abd on table with slide board x 10  AAROM seated shoulder ER on table with slide board x 10  Pendulums - emphasis on use of body for movement and not active movement of arm    Written Home Exercises Provided: Seated L unilateral scapular repositioning, Seated AAROM flexion on table, Seated AAROM abd on table, Seated AAROM ER on table limited to <60 degrees.   Chey demo good understanding of the education provided. Patient demo good return demo of skill of exercises. Pt advised to continue with icing her L shoulder 3 times per day and continue with pendulums.     CMS Impairment/Limitation/Restriction for FOTO Shoulder Survey  Status Limitation G-Code CMS Severity Modifier  Intake 40% 60% Current Status CL - At least 60 percent but less than 80 percent  Predicted 62% 38% Goal Status+ CJ - At least 20 percent but less than 40 percent    History  Co-morbidities and personal factors that may impact the plan of care Examination  Body Structures and Functions, activity limitations and participation restrictions that may impact the plan of care    Clinical Presentation   Co-morbidities:   none        Personal Factors:   no deficits Body Regions:   neck  upper extremities    Body Systems:    ROM  strength            Participation Restrictions:   Post op limitations     Activity limitations:   Learning and applying knowledge  no  deficits    General Tasks and Commands  no deficits    Communication  no deficits    Mobility  lifting and carrying objects    Self care  washing oneself (bathing, drying, washing hands)  caring for body parts (brushing teeth, shaving, grooming)  dressing    Domestic Life  cooking  doing house work (cleaning house, washing dishes, laundry)    Interactions/Relationships  no deficits    Life Areas  no deficits    Community and Social Life  recreation and leisure         stable and uncomplicated                      Low - patient is motivated and eager to improve limitations allowing for appropriate healing time post op   low  high Decision Making/ Complexity Score:  low     Assessment  This is a 43 y.o. female referred to outpatient physical therapy and presents with a medical diagnosis of   Encounter Diagnoses   Name Primary?    Decreased range of motion of left shoulder     Decreased strength of upper extremity     and demonstrates limitations as described in the problem list. Pt will benefit from physical therapy services in order to maximize pain free and/or functional use of left shoulder. The following goals were discussed with the patient and patient is in agreement with them as to be addressed in the treatment plan.     Problem List: pain, decreased ROM, decreased flexibility, decreased strength, inability to participate fully in vocation pursuits and decreased ability to fully participate in recreational/sports related activities.    Short Term Goals:  6 weeks  1. Pt will present with increased L shoulder AROM into flexion to 150 degrees for increased ease with ADLs.   2. Pt will participate in MMT to L shoulder to get baseline of strength.  3. Pt will report decreased pain to </= 2/10 with ADLs.  4. Pt will present with increased L shoulder AROM into abduction to 120 degrees for increased ease with ADLs.     Long Term Goals: 12 weeks  1. Pt will present with increased L shoulder AROM into flexion to 170  degrees for increased ease with ADLs.   2. Pt will present with increased MMT to L shoulder by one half grade into flex and abd for increased ease with ADLs.  3. Pt will present with increased L shoulder AROM into abduction to 120 degrees for increased ease with ADLs.   4. Pt will be independent with HEP and self management of symptoms.     Plan  Pt will be treated by physical therapy 2 times a week for 12 weeks for designated treatment time frame to address therapeutic exercises in order to achieve established goals. Chey may at times be seen by a PTA as part of the Rehab Team.     Hollie Nava, YENT      I certify the need for these services furnished under this plan of treatment and while under my care.         ___________________________________  Physician/Referring Practitioner        _________________  Date of Signature

## 2018-05-23 NOTE — TELEPHONE ENCOUNTER
----- Message from Esther Coleman sent at 5/23/2018  9:33 AM CDT -----  Contact: [email or web address]  Patient Returning Call    Who Called:Patient  Who Left Message for Patient:Cristina  Does the patient know what this is regarding?:No  Communication Preference (Diogenes response to Pt. (or) Call Back # and timeframe)  Additional Information:

## 2018-05-23 NOTE — PROGRESS NOTES
Physical Therapy Daily Note     Date: 05/23/2018  Name: Chey Mcneill  Clinic Number: [id number]  Diagnosis: Left shoulder SAD  Encounter Diagnosis   Name Primary?    Left anterior shoulder pain Yes     Physician: Dimas Guzmán, *    Evaluation Date: 4/23/18  Date of Surgery: 4/19/18  Visit #: 4  Start Time:  4:00:  Stop Time:  5:00  Total Treatment Time: 60  Physical therapy:   Subacromial decompression Protocol     Start Therapy in 3-5 Days     Use Sling and Abduction Pillow - For 1-2 weeks then discontinue at 2 weeks time.  Remove arm from sling and move elbow and wrist as tolerated NOS  Periscapular strength program     May start ER side rotation to 60 degrees immediately; Forward flexion 180 degrees immediately and abduction 90 degrees with IR/ER as tolerated immediately  Pillow and sling for 6 weeks; no strengthening of the biceps muscle for 6 weeks. Full AAROM/PROM NOW with pendulum exercises as tolerated. Full elbow and wrist ROM immediately;  Precautions:     Subjective     Pt reports the left shoulder is doing well. My calf is sore but the shoulder is doing well.     Pain: 1/10    Objective     Measurements taken: none    Patient received individual therapy to increase strength, endurance and ROM with activities as follows:     Chey received therapeutic exercises to develop strength, endurance and ROM for 25  minutes including:   Prone row x 30  Prone ext x 30  Seated abd x 5 min  Ext to row 3x10  Seated abd with ice x 10 min  ER walkout 3x10  Step and punch 3x10  Ball wall CW, CCW x 30 ea  EOT push ups 3x10        Chey received the following manual therapy techniques: Joint mobilizations and Soft tissue Mobilization were applied to the: left shoulder for 10 minutes.   PROM: Flex, Abd, ER0, IR/ER with post mobs      Written Home Exercises Provided: updated as per therex list  Pt demo good understanding of the education provided. Chey  demonstrated good return demonstration of activities.     Education provided:  Chey verbalized good understanding of education provided.   No spiritual or educational barriers to learning identified.    Assessment     Left shoulder strength and scapular control continue to improve. End range left shoulder ROM improving as well.     This is a 43 y.o. female referred to outpatient physical therapy and presents with a medical diagnosis of left shoulder pain and demonstrates limitations as described in the problem list Pt prognosis is Good. Pt will continue to benefit from skilled outpatient physical therapy to address the deficits listed below in the problem list, provide pt/family education and to maximize pt's level of independence in the home and community environment.    Will the patient continue to benefit from skilled PT intervention? yes        Medical necessity is demonstrated by:   - Pain limits function of effected part for all activities  - Unable to participate in daily activities   - Requires skilled supervision to complete and progress HEP  - Fall risk - impaired balance   - Continued inability to participate in vocational pursuits    Short Term Goals:  6 weeks  1. Pt will present with increased L shoulder AROM into flexion to 150 degrees for increased ease with ADLs.   2. Pt will participate in MMT to L shoulder to get baseline of strength.  3. Pt will report decreased pain to </= 2/10 with ADLs.  4. Pt will present with increased L shoulder AROM into abduction to 120 degrees for increased ease with ADLs.      Long Term Goals: 12 weeks  1. Pt will present with increased L shoulder AROM into flexion to 170 degrees for increased ease with ADLs.   2. Pt will present with increased MMT to L shoulder by one half grade into flex and abd for increased ease with ADLs.  3. Pt will present with increased L shoulder AROM into abduction to 120 degrees for increased ease with ADLs.   4. Pt will be independent with  HEP and self management of symptoms.        Plan   Continue with established Plan of Care towards PT goals.      Therapist: ALEJANDRO MAI, PT

## 2018-05-23 NOTE — TELEPHONE ENCOUNTER
----- Message from Ramona Ortega sent at 5/22/2018  4:36 PM CDT -----  Contact: self  Pt called returning a missed call back from Cristina regarding a surgery apt. Pt could be reached at [phone]

## 2018-05-30 ENCOUNTER — CLINICAL SUPPORT (OUTPATIENT)
Dept: REHABILITATION | Facility: HOSPITAL | Age: 43
End: 2018-05-30
Payer: MEDICAID

## 2018-05-30 DIAGNOSIS — M25.512 LEFT ANTERIOR SHOULDER PAIN: Primary | ICD-10-CM

## 2018-05-30 PROCEDURE — 97110 THERAPEUTIC EXERCISES: CPT | Mod: PN | Performed by: PHYSICAL THERAPIST

## 2018-05-30 NOTE — PLAN OF CARE
[facility] Outpatient Therapy Initial Evaluation    Name: Chey Mcneill  Clinic Number: [id number]    Chey is a 43 y.o. female evaluated on 04/23/2018.     Diagnosis:   Encounter Diagnoses   Name Primary?    Decreased range of motion of left shoulder     Decreased strength of upper extremity      Physician: Dimas Guzmán, *  Treatment Orders: PT Eval and Treat  Procedures Performed:  1. Left shoulder Arthroscopic subacromial decompression and bursectomy CPT - 01592  2. Left shoulder Arthroscopic labral debridement CPT - 67615  3. Left shoulder Amniox Arthrocentesis, 95705  Subacromial decompression Protocol  Start Therapy in 3-5 Days  Use Sling and Abduction Pillow - For 1-2 weeks then discontinue at 2 weeks time.  Remove arm from sling and move elbow and wrist as tolerated NOS  Periscapular strength program  May start ER side rotation to 60 degrees immediately; Forward flexion 180 degrees immediately and abduction 90 degrees with IR/ER as tolerated immediately  Pillow and sling for 6 weeks; no strengthening of the biceps muscle for 6 weeks. Full AAROM/PROM NOW with pendulum exercises as tolerated. Full elbow and wrist ROM immediately;     Past Medical History:   Diagnosis Date    PONV (postoperative nausea and vomiting)     Ruptured triceps tendon 7/25/2012     Review of patient's allergies indicates:   Allergen Reactions    Adhesive Dermatitis     dermabond caused severe blistering    Benzoin tincture plain Hives, Itching and Other (See Comments)     Significant blistering    Morphine Nausea And Vomiting and Other (See Comments)     headaches    Oxycontin [oxycodone] Hives and Itching     Patient states can take Percocet.   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     Envoirnmental concerns:  "none  Cultural, Spiritual, Developmental and Educational concerns: none, pts 16 year old daughter lives with her and is able to help with ADLs and household tasks/chores  Abuse/Neglect, Nutritional concerns: none    Subjective  Pt reports to clinic s/p L shoulder arthroscopic subacromial decompression and bursectomy, Left shoulder Arthroscopic labral debridement, and Left shoulder Amniox Arthrocentesis on 4/19/18 by Dr. Ramos. Her shoulder has been bothering her for years (approx 2012) when she had elbow surgery and symptoms began around then. She denies previous surgery to candy. Doing pendulums. Driving and not taking pain meds when she drives. Sleeps on L side usually, not sleeping on L side now and not sleeping well due to this.     Increases symptoms: tried washing hair, sleeping (trying to sleep elevated)  Decreases Symptoms: pain medication and ice - only taking meds at night    Diagnostic Tests: MRI    Pain Scale: Chey rates pain to be 5 on a scale of 1-10. 8/10 at end of day    Patient Goals: decrease pain and full range of motion, lift something eithout it "killing me", not be weak.    Objective  Observation: Pt is well dressed well nourished female who is alert and oriented x 3. Anterior   Hives, blisters to front of shoulder and three incisions. Anterior incision with brown drainage, no other redness. Pt advised to call MD's office regarding rash and drainage.    Posture: FHRS, L scapular angle higher than R    Shoulder ROM Left Right   Flexion PROM 110 pre PROM 125 after 180   Abduction 90 180   Internal Rotation  To abdomen 50   External Rotation 15 pre 45 post 90     R Elbow   Flexion 55 degrees  Extension 3 degrees    L Elbow   Flex 43 degrees  Extension (-)6 degrees    Shoulder strength Left Right   Flexion NT 5/5   Abduction NT 5/5   Internal Rotation NT 5/5   External Rotation NT 5/5   Extension NT 5/5   *IR and ER measured supine    Special tests deferred secondary to surgery    Joint " Mobility: hypomobile L GH joint grade I-II  Palpation: TTP along incisions and noted edema.   Sensation: intact to light touch    Treatment:  Time In: 2:55pm  Time Out: 3:55pm    PT Evaluation Completed? Yes  Discussed Plan of Care with patient: Yes    Chey received instruction in and demonstrated therapeutic exercises for 15 minutes of therapeutic exercises including:  Seated scapular retraction on L x 10  PROM L shoulder flex to painful limit (approx 125 degrees), abd to 90 degrees, ER to 60 degrees: x 10 each  AAROM seated shoulder flexion on table with slide board x 10  AAROM seated shoulder abd on table with slide board x 10  AAROM seated shoulder ER on table with slide board x 10  Pendulums - emphasis on use of body for movement and not active movement of arm    Written Home Exercises Provided: Seated L unilateral scapular repositioning, Seated AAROM flexion on table, Seated AAROM abd on table, Seated AAROM ER on table limited to <60 degrees.   Chey demo good understanding of the education provided. Patient demo good return demo of skill of exercises. Pt advised to continue with icing her L shoulder 3 times per day and continue with pendulums.     CMS Impairment/Limitation/Restriction for FOTO Shoulder Survey  Status Limitation G-Code CMS Severity Modifier  Intake 40% 60% Current Status CL - At least 60 percent but less than 80 percent  Predicted 62% 38% Goal Status+ CJ - At least 20 percent but less than 40 percent    History  Co-morbidities and personal factors that may impact the plan of care Examination  Body Structures and Functions, activity limitations and participation restrictions that may impact the plan of care    Clinical Presentation   Co-morbidities:   none        Personal Factors:   no deficits Body Regions:   neck  upper extremities    Body Systems:    ROM  strength            Participation Restrictions:   Post op limitations     Activity limitations:   Learning and applying knowledge  no  deficits    General Tasks and Commands  no deficits    Communication  no deficits    Mobility  lifting and carrying objects    Self care  washing oneself (bathing, drying, washing hands)  caring for body parts (brushing teeth, shaving, grooming)  dressing    Domestic Life  cooking  doing house work (cleaning house, washing dishes, laundry)    Interactions/Relationships  no deficits    Life Areas  no deficits    Community and Social Life  recreation and leisure         stable and uncomplicated                      Low - patient is motivated and eager to improve limitations allowing for appropriate healing time post op   low  high Decision Making/ Complexity Score:  low     Assessment  This is a 43 y.o. female referred to outpatient physical therapy and presents with a medical diagnosis of   Encounter Diagnoses   Name Primary?    Decreased range of motion of left shoulder     Decreased strength of upper extremity     and demonstrates limitations as described in the problem list. Pt will benefit from physical therapy services in order to maximize pain free and/or functional use of left shoulder. The following goals were discussed with the patient and patient is in agreement with them as to be addressed in the treatment plan.     Problem List: pain, decreased ROM, decreased flexibility, decreased strength, inability to participate fully in vocation pursuits and decreased ability to fully participate in recreational/sports related activities.    Short Term Goals:  6 weeks  1. Pt will present with increased L shoulder AROM into flexion to 150 degrees for increased ease with ADLs.   2. Pt will participate in MMT to L shoulder to get baseline of strength.  3. Pt will report decreased pain to </= 2/10 with ADLs.  4. Pt will present with increased L shoulder AROM into abduction to 120 degrees for increased ease with ADLs.     Long Term Goals: 12 weeks  1. Pt will present with increased L shoulder AROM into flexion to 170  degrees for increased ease with ADLs.   2. Pt will present with increased MMT to L shoulder by one half grade into flex and abd for increased ease with ADLs.  3. Pt will present with increased L shoulder AROM into abduction to 120 degrees for increased ease with ADLs.   4. Pt will be independent with HEP and self management of symptoms.     Plan  Pt will be treated by physical therapy 2 times a week for 12 weeks for designated treatment time frame to address therapeutic exercises in order to achieve established goals. Chey may at times be seen by a PTA as part of the Rehab Team.     Hollie Nava, YENT      I certify the need for these services furnished under this plan of treatment and while under my care.         ___________________________________  Physician/Referring Practitioner        _________________  Date of Signature

## 2018-05-30 NOTE — PROGRESS NOTES
Physical Therapy Daily Note     Date: 05/30/2018  Name: Chey Mcneill  Clinic Number: [id number]  Diagnosis: Left shoulder SAD  Encounter Diagnosis   Name Primary?    Left anterior shoulder pain Yes     Physician: Dimas Guzmán, *    Evaluation Date: 4/23/18  Date of Surgery: 4/19/18  Visit #: 5  Start Time:  5:00:  Stop Time:  6:00  Total Treatment Time: 60  Physical therapy:   Subacromial decompression Protocol     Start Therapy in 3-5 Days     Use Sling and Abduction Pillow - For 1-2 weeks then discontinue at 2 weeks time.  Remove arm from sling and move elbow and wrist as tolerated NOS  Periscapular strength program     May start ER side rotation to 60 degrees immediately; Forward flexion 180 degrees immediately and abduction 90 degrees with IR/ER as tolerated immediately  Pillow and sling for 6 weeks; no strengthening of the biceps muscle for 6 weeks. Full AAROM/PROM NOW with pendulum exercises as tolerated. Full elbow and wrist ROM immediately;  Precautions:     Subjective     Pt reports the left shoulder is doing well, its sore.    Pain: 1/10    Objective     Measurements taken: none    Patient received individual therapy to increase strength, endurance and ROM with activities as follows:     Chey received therapeutic exercises to develop strength, endurance and ROM for 25  minutes including:   Prone row x 30  Prone ext x 30  Seated abd x 5 min  Ext to row 3x10  Seated abd with ice x 10 min  ER walkout 3x10  Step and punch 3x10  Ball wall CW, CCW x 30 ea  EOT push ups 3x10          Chey received the following manual therapy techniques: Joint mobilizations and Soft tissue Mobilization were applied to the: left shoulder for 10 minutes.   PROM: Flex, Abd, ER0, IR/ER with post mobs      Written Home Exercises Provided: updated as per therex list  Pt demo good understanding of the education provided. Chey demonstrated good return demonstration  of activities.     Education provided:  Chey verbalized good understanding of education provided.   No spiritual or educational barriers to learning identified.    Assessment     Posterior cuff and scapular stability continue to improve with rx. Continue to progress as tolerated..     This is a 43 y.o. female referred to outpatient physical therapy and presents with a medical diagnosis of left shoulder pain and demonstrates limitations as described in the problem list Pt prognosis is Good. Pt will continue to benefit from skilled outpatient physical therapy to address the deficits listed below in the problem list, provide pt/family education and to maximize pt's level of independence in the home and community environment.    Will the patient continue to benefit from skilled PT intervention? yes        Medical necessity is demonstrated by:   - Pain limits function of effected part for all activities  - Unable to participate in daily activities   - Requires skilled supervision to complete and progress HEP  - Fall risk - impaired balance   - Continued inability to participate in vocational pursuits    Short Term Goals:  6 weeks  1. Pt will present with increased L shoulder AROM into flexion to 150 degrees for increased ease with ADLs.   2. Pt will participate in MMT to L shoulder to get baseline of strength.  3. Pt will report decreased pain to </= 2/10 with ADLs.  4. Pt will present with increased L shoulder AROM into abduction to 120 degrees for increased ease with ADLs.      Long Term Goals: 12 weeks  1. Pt will present with increased L shoulder AROM into flexion to 170 degrees for increased ease with ADLs.   2. Pt will present with increased MMT to L shoulder by one half grade into flex and abd for increased ease with ADLs.  3. Pt will present with increased L shoulder AROM into abduction to 120 degrees for increased ease with ADLs.   4. Pt will be independent with HEP and self management of symptoms.         Plan   Continue with established Plan of Care towards PT goals.      Therapist: ALEJANDRO MAI, PT

## 2018-06-19 ENCOUNTER — CLINICAL SUPPORT (OUTPATIENT)
Dept: REHABILITATION | Facility: HOSPITAL | Age: 43
End: 2018-06-19
Payer: MEDICAID

## 2018-06-19 DIAGNOSIS — M25.512 LEFT ANTERIOR SHOULDER PAIN: Primary | ICD-10-CM

## 2018-06-19 PROCEDURE — 97110 THERAPEUTIC EXERCISES: CPT | Mod: PN | Performed by: PHYSICAL THERAPIST

## 2018-06-19 NOTE — PROGRESS NOTES
Physical Therapy Daily Note     Date: 06/19/2018  Name: Chey Mcneill  Clinic Number: [id number]  Diagnosis: Left shoulder SAD  Encounter Diagnosis   Name Primary?    Left anterior shoulder pain Yes     Physician: Dimas Guzmán, *    Evaluation Date: 4/23/18  Date of Surgery: 4/19/18  Visit #: 6  Start Time:  9:00:  Stop Time:  10:00  Total Treatment Time: 60  Physical therapy:   Subacromial decompression Protocol     Start Therapy in 3-5 Days     Use Sling and Abduction Pillow - For 1-2 weeks then discontinue at 2 weeks time.  Remove arm from sling and move elbow and wrist as tolerated NOS  Periscapular strength program     May start ER side rotation to 60 degrees immediately; Forward flexion 180 degrees immediately and abduction 90 degrees with IR/ER as tolerated immediately  Pillow and sling for 6 weeks; no strengthening of the biceps muscle for 6 weeks. Full AAROM/PROM NOW with pendulum exercises as tolerated. Full elbow and wrist ROM immediately;  Precautions:     Subjective     Pt reports the left shoulder continues to move well. My knee is bothering me more than my shoulder.     Pain: 1/10    Objective     Measurements taken: none    Patient received individual therapy to increase strength, endurance and ROM with activities as follows:     Chey received therapeutic exercises to develop strength, endurance and ROM for 40  minutes including:   Prone row 2#x 30  Prone ext 2#x 30   SL ER 3# 3x10  SA punch 2# 3x10  Ext to row 3x10  ER walkout 3x10  LM with ER 3x10  Step and punch 3x10  Ball wall CW, CCW x 30 ea  EOT push ups 3x10  Ball dribbles full elevation x30        Chey received the following manual therapy techniques: Joint mobilizations and Soft tissue Mobilization were applied to the: left shoulder for 10 minutes.   PROM: Flex, Abd, ER0, IR/ER with post mobs      Written Home Exercises Provided: updated as per therex list  Pt demo good  understanding of the education provided. Chey demonstrated good return demonstration of activities.     Education provided:  Chey verbalized good understanding of education provided.   No spiritual or educational barriers to learning identified.    Assessment     Excellent left shoulder AROM and functional strength. Pt continues to fatigue with progressive overhead work but maintains functional AROM.      This is a 43 y.o. female referred to outpatient physical therapy and presents with a medical diagnosis of left shoulder pain and demonstrates limitations as described in the problem list Pt prognosis is Good. Pt will continue to benefit from skilled outpatient physical therapy to address the deficits listed below in the problem list, provide pt/family education and to maximize pt's level of independence in the home and community environment.    Will the patient continue to benefit from skilled PT intervention? yes        Medical necessity is demonstrated by:   - Pain limits function of effected part for all activities  - Unable to participate in daily activities   - Requires skilled supervision to complete and progress HEP  - Fall risk - impaired balance   - Continued inability to participate in vocational pursuits    Short Term Goals:  6 weeks  1. Pt will present with increased L shoulder AROM into flexion to 150 degrees for increased ease with ADLs.   2. Pt will participate in MMT to L shoulder to get baseline of strength.  3. Pt will report decreased pain to </= 2/10 with ADLs.  4. Pt will present with increased L shoulder AROM into abduction to 120 degrees for increased ease with ADLs.      Long Term Goals: 12 weeks  1. Pt will present with increased L shoulder AROM into flexion to 170 degrees for increased ease with ADLs.   2. Pt will present with increased MMT to L shoulder by one half grade into flex and abd for increased ease with ADLs.  3. Pt will present with increased L shoulder AROM into abduction  to 120 degrees for increased ease with ADLs.   4. Pt will be independent with HEP and self management of symptoms.        Plan   Continue with established Plan of Care towards PT goals.      Therapist: ALEJANDRO MAI, PT

## 2018-06-19 NOTE — PLAN OF CARE
[facility] Outpatient Therapy Initial Evaluation    Name: Chey Mcneill  Clinic Number: [id number]    Chey is a 43 y.o. female evaluated on 04/23/2018.     Diagnosis:   Encounter Diagnoses   Name Primary?    Decreased range of motion of left shoulder     Decreased strength of upper extremity      Physician: Dimas Guzmán, *  Treatment Orders: PT Eval and Treat  Procedures Performed:  1. Left shoulder Arthroscopic subacromial decompression and bursectomy CPT - 10655  2. Left shoulder Arthroscopic labral debridement CPT - 52698  3. Left shoulder Amniox Arthrocentesis, 08494  Subacromial decompression Protocol  Start Therapy in 3-5 Days  Use Sling and Abduction Pillow - For 1-2 weeks then discontinue at 2 weeks time.  Remove arm from sling and move elbow and wrist as tolerated NOS  Periscapular strength program  May start ER side rotation to 60 degrees immediately; Forward flexion 180 degrees immediately and abduction 90 degrees with IR/ER as tolerated immediately  Pillow and sling for 6 weeks; no strengthening of the biceps muscle for 6 weeks. Full AAROM/PROM NOW with pendulum exercises as tolerated. Full elbow and wrist ROM immediately;     Past Medical History:   Diagnosis Date    PONV (postoperative nausea and vomiting)     Ruptured triceps tendon 7/25/2012     Review of patient's allergies indicates:   Allergen Reactions    Adhesive Dermatitis     dermabond caused severe blistering    Benzoin tincture plain Hives, Itching and Other (See Comments)     Significant blistering    Morphine Nausea And Vomiting and Other (See Comments)     headaches    Oxycontin [oxycodone] Hives and Itching     Patient states can take Percocet.   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     Envoirnmental concerns:  "none  Cultural, Spiritual, Developmental and Educational concerns: none, pts 16 year old daughter lives with her and is able to help with ADLs and household tasks/chores  Abuse/Neglect, Nutritional concerns: none    Subjective  Pt reports to clinic s/p L shoulder arthroscopic subacromial decompression and bursectomy, Left shoulder Arthroscopic labral debridement, and Left shoulder Amniox Arthrocentesis on 4/19/18 by Dr. Ramos. Her shoulder has been bothering her for years (approx 2012) when she had elbow surgery and symptoms began around then. She denies previous surgery to candy. Doing pendulums. Driving and not taking pain meds when she drives. Sleeps on L side usually, not sleeping on L side now and not sleeping well due to this.     Increases symptoms: tried washing hair, sleeping (trying to sleep elevated)  Decreases Symptoms: pain medication and ice - only taking meds at night    Diagnostic Tests: MRI    Pain Scale: Chey rates pain to be 5 on a scale of 1-10. 8/10 at end of day    Patient Goals: decrease pain and full range of motion, lift something eithout it "killing me", not be weak.    Objective  Observation: Pt is well dressed well nourished female who is alert and oriented x 3. Anterior   Hives, blisters to front of shoulder and three incisions. Anterior incision with brown drainage, no other redness. Pt advised to call MD's office regarding rash and drainage.    Posture: FHRS, L scapular angle higher than R    Shoulder ROM Left Right   Flexion PROM 110 pre PROM 125 after 180   Abduction 90 180   Internal Rotation  To abdomen 50   External Rotation 15 pre 45 post 90     R Elbow   Flexion 55 degrees  Extension 3 degrees    L Elbow   Flex 43 degrees  Extension (-)6 degrees    Shoulder strength Left Right   Flexion NT 5/5   Abduction NT 5/5   Internal Rotation NT 5/5   External Rotation NT 5/5   Extension NT 5/5   *IR and ER measured supine    Special tests deferred secondary to surgery    Joint " Mobility: hypomobile L GH joint grade I-II  Palpation: TTP along incisions and noted edema.   Sensation: intact to light touch    Treatment:  Time In: 2:55pm  Time Out: 3:55pm    PT Evaluation Completed? Yes  Discussed Plan of Care with patient: Yes    Chey received instruction in and demonstrated therapeutic exercises for 15 minutes of therapeutic exercises including:  Seated scapular retraction on L x 10  PROM L shoulder flex to painful limit (approx 125 degrees), abd to 90 degrees, ER to 60 degrees: x 10 each  AAROM seated shoulder flexion on table with slide board x 10  AAROM seated shoulder abd on table with slide board x 10  AAROM seated shoulder ER on table with slide board x 10  Pendulums - emphasis on use of body for movement and not active movement of arm    Written Home Exercises Provided: Seated L unilateral scapular repositioning, Seated AAROM flexion on table, Seated AAROM abd on table, Seated AAROM ER on table limited to <60 degrees.   Chey demo good understanding of the education provided. Patient demo good return demo of skill of exercises. Pt advised to continue with icing her L shoulder 3 times per day and continue with pendulums.     CMS Impairment/Limitation/Restriction for FOTO Shoulder Survey  Status Limitation G-Code CMS Severity Modifier  Intake 40% 60% Current Status CL - At least 60 percent but less than 80 percent  Predicted 62% 38% Goal Status+ CJ - At least 20 percent but less than 40 percent    History  Co-morbidities and personal factors that may impact the plan of care Examination  Body Structures and Functions, activity limitations and participation restrictions that may impact the plan of care    Clinical Presentation   Co-morbidities:   none        Personal Factors:   no deficits Body Regions:   neck  upper extremities    Body Systems:    ROM  strength            Participation Restrictions:   Post op limitations     Activity limitations:   Learning and applying knowledge  no  deficits    General Tasks and Commands  no deficits    Communication  no deficits    Mobility  lifting and carrying objects    Self care  washing oneself (bathing, drying, washing hands)  caring for body parts (brushing teeth, shaving, grooming)  dressing    Domestic Life  cooking  doing house work (cleaning house, washing dishes, laundry)    Interactions/Relationships  no deficits    Life Areas  no deficits    Community and Social Life  recreation and leisure         stable and uncomplicated                      Low - patient is motivated and eager to improve limitations allowing for appropriate healing time post op   low  high Decision Making/ Complexity Score:  low     Assessment  This is a 43 y.o. female referred to outpatient physical therapy and presents with a medical diagnosis of   Encounter Diagnoses   Name Primary?    Decreased range of motion of left shoulder     Decreased strength of upper extremity     and demonstrates limitations as described in the problem list. Pt will benefit from physical therapy services in order to maximize pain free and/or functional use of left shoulder. The following goals were discussed with the patient and patient is in agreement with them as to be addressed in the treatment plan.     Problem List: pain, decreased ROM, decreased flexibility, decreased strength, inability to participate fully in vocation pursuits and decreased ability to fully participate in recreational/sports related activities.    Short Term Goals:  6 weeks  1. Pt will present with increased L shoulder AROM into flexion to 150 degrees for increased ease with ADLs.   2. Pt will participate in MMT to L shoulder to get baseline of strength.  3. Pt will report decreased pain to </= 2/10 with ADLs.  4. Pt will present with increased L shoulder AROM into abduction to 120 degrees for increased ease with ADLs.     Long Term Goals: 12 weeks  1. Pt will present with increased L shoulder AROM into flexion to 170  degrees for increased ease with ADLs.   2. Pt will present with increased MMT to L shoulder by one half grade into flex and abd for increased ease with ADLs.  3. Pt will present with increased L shoulder AROM into abduction to 120 degrees for increased ease with ADLs.   4. Pt will be independent with HEP and self management of symptoms.     Plan  Pt will be treated by physical therapy 2 times a week for 12 weeks for designated treatment time frame to address therapeutic exercises in order to achieve established goals. Chey may at times be seen by a PTA as part of the Rehab Team.     Hollie Nava, YENT      I certify the need for these services furnished under this plan of treatment and while under my care.         ___________________________________  Physician/Referring Practitioner        _________________  Date of Signature

## 2018-06-20 ENCOUNTER — OFFICE VISIT (OUTPATIENT)
Dept: SPORTS MEDICINE | Facility: CLINIC | Age: 43
End: 2018-06-20
Payer: MEDICAID

## 2018-06-20 VITALS
BODY MASS INDEX: 23.92 KG/M2 | HEIGHT: 63 IN | DIASTOLIC BLOOD PRESSURE: 71 MMHG | HEART RATE: 69 BPM | SYSTOLIC BLOOD PRESSURE: 106 MMHG | WEIGHT: 135 LBS

## 2018-06-20 DIAGNOSIS — M94.261 CHONDROMALACIA, RIGHT KNEE: ICD-10-CM

## 2018-06-20 DIAGNOSIS — M75.102 ROTATOR CUFF SYNDROME OF LEFT SHOULDER: ICD-10-CM

## 2018-06-20 DIAGNOSIS — M23.91 INTERNAL DERANGEMENT OF RIGHT KNEE: ICD-10-CM

## 2018-06-20 DIAGNOSIS — M25.561 CHRONIC PAIN OF RIGHT KNEE: Primary | ICD-10-CM

## 2018-06-20 DIAGNOSIS — M25.612 DECREASED RANGE OF MOTION OF LEFT SHOULDER: ICD-10-CM

## 2018-06-20 DIAGNOSIS — S43.432D TYPE 2 SUPERIOR LABRUM EXTENDING FROM ANTERIOR TO POSTERIOR (SLAP) LESION OF LEFT SHOULDER, SUBSEQUENT ENCOUNTER: ICD-10-CM

## 2018-06-20 DIAGNOSIS — G89.29 CHRONIC PAIN OF RIGHT KNEE: Primary | ICD-10-CM

## 2018-06-20 DIAGNOSIS — M94.261 CHONDROMALACIA OF RIGHT KNEE: ICD-10-CM

## 2018-06-20 DIAGNOSIS — M75.42 ROTATOR CUFF IMPINGEMENT SYNDROME OF LEFT SHOULDER: ICD-10-CM

## 2018-06-20 DIAGNOSIS — M25.512 LEFT SHOULDER PAIN, UNSPECIFIED CHRONICITY: ICD-10-CM

## 2018-06-20 PROCEDURE — 99214 OFFICE O/P EST MOD 30 MIN: CPT | Mod: 57,S$PBB,, | Performed by: ORTHOPAEDIC SURGERY

## 2018-06-20 PROCEDURE — 99999 PR PBB SHADOW E&M-EST. PATIENT-LVL III: CPT | Mod: PBBFAC,,, | Performed by: ORTHOPAEDIC SURGERY

## 2018-06-20 PROCEDURE — 99213 OFFICE O/P EST LOW 20 MIN: CPT | Mod: PBBFAC,PO | Performed by: ORTHOPAEDIC SURGERY
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
## 2018-06-27 ENCOUNTER — CLINICAL SUPPORT (OUTPATIENT)
Dept: REHABILITATION | Facility: HOSPITAL | Age: 43
End: 2018-06-27
Payer: MEDICAID

## 2018-06-27 DIAGNOSIS — M25.512 LEFT ANTERIOR SHOULDER PAIN: Primary | ICD-10-CM

## 2018-06-27 PROCEDURE — 97110 THERAPEUTIC EXERCISES: CPT | Mod: PN | Performed by: PHYSICAL THERAPIST

## 2018-06-27 PROCEDURE — 97164 PT RE-EVAL EST PLAN CARE: CPT | Mod: PN | Performed by: PHYSICAL THERAPIST

## 2018-06-27 NOTE — PROGRESS NOTES
Physical Therapy Daily Note     Date: 06/27/2018  Name: Chey Mcneill  Clinic Number: [id number]  Diagnosis: Left shoulder SAD  Encounter Diagnosis   Name Primary?    Left anterior shoulder pain Yes     Physician: Dimas Guzmán, *    Evaluation Date: 4/23/18  Date of Surgery: 4/19/18  Visit #: 7  Start Time:  8:00:  Stop Time:  9:00  Total Treatment Time: 60  Physical therapy:   Subacromial decompression Protocol     Start Therapy in 3-5 Days     Use Sling and Abduction Pillow - For 1-2 weeks then discontinue at 2 weeks time.  Remove arm from sling and move elbow and wrist as tolerated NOS  Periscapular strength program     May start ER side rotation to 60 degrees immediately; Forward flexion 180 degrees immediately and abduction 90 degrees with IR/ER as tolerated immediately  Pillow and sling for 6 weeks; no strengthening of the biceps muscle for 6 weeks. Full AAROM/PROM NOW with pendulum exercises as tolerated. Full elbow and wrist ROM immediately;  Precautions:     Subjective     Pt reports the left shoulder continues to move well. My knee is bothering me more than my shoulder.     Pain: 1/10    Objective     Measurements taken:   Pain: Current 3-4/10; Worst: 8-9/10  Right knee: 0-132  Left knee: 0-133  TTP on right knee medial fat pad  Right hip abd = 3/5  Left hip abd = 4+/5    Patient received individual therapy to increase strength, endurance and ROM with activities as follows:     Chey received therapeutic exercises to develop strength, endurance and ROM for 40  minutes including:   Prone row 2#x 30  Prone ext 2#x 30   SL ER 3# 3x10  SA punch 2# 3x10  Ext to row 3x10  ER walkout 3x10  LM with ER 3x10  Step and punch 3x10  Ball wall CW, CCW x 30 ea  EOT push ups 3x10  Ball dribbles full elevation x30    SL Hip abd 3x10  Clamshells 3x10  Side steps x 2 laps  Shuttle SL bilateral 2c 3x10        Chey received the following manual therapy  techniques: Joint mobilizations and Soft tissue Mobilization were applied to the: left shoulder for 5 minutes.   PROM: Flex, Abd, ER0, IR/ER with post mobs      Written Home Exercises Provided: updated as per therex list  Pt demo good understanding of the education provided. Chey demonstrated good return demonstration of activities.     Education provided:  Chey verbalized good understanding of education provided.   No spiritual or educational barriers to learning identified.    Assessment     Well maintained left shoulder AROM and functional mobility. Continue to progress left shoulder overhead strength with scapular stability. See measurements for right knee re-eval. Pt needs functional hip strengthening and eccentric quad strength on right in preparation for pending right knee surgery. See new right knee LTGs below.     This is a 43 y.o. female referred to outpatient physical therapy and presents with a medical diagnosis of left shoulder pain and demonstrates limitations as described in the problem list Pt prognosis is Good. Pt will continue to benefit from skilled outpatient physical therapy to address the deficits listed below in the problem list, provide pt/family education and to maximize pt's level of independence in the home and community environment.    Will the patient continue to benefit from skilled PT intervention? yes        Medical necessity is demonstrated by:   - Pain limits function of effected part for all activities  - Unable to participate in daily activities   - Requires skilled supervision to complete and progress HEP  - Fall risk - impaired balance   - Continued inability to participate in vocational pursuits    Short Term Goals:  6 weeks  1. Pt will present with increased L shoulder AROM into flexion to 150 degrees for increased ease with ADLs.   2. Pt will participate in MMT to L shoulder to get baseline of strength.  3. Pt will report decreased pain to </= 2/10 with ADLs.  4. Pt  will present with increased L shoulder AROM into abduction to 120 degrees for increased ease with ADLs.      Long Term Goals: 12 weeks  1. Pt will present with increased L shoulder AROM into flexion to 170 degrees for increased ease with ADLs.   2. Pt will present with increased MMT to L shoulder by one half grade into flex and abd for increased ease with ADLs.  3. Pt will present with increased L shoulder AROM into abduction to 120 degrees for increased ease with ADLs.   4. Pt will be independent with HEP and self management of symptoms.     LTGs Knee:  1. Pt with improved function with decreased pain (2/10 worst with ADLs).  2. Pt with improved gait parameters with improved stability.  3. Pt with bilateral hip abd strength = 5/5  4. Pt able to perform eccentric step downs bilaterally without valgus (3x10).       Plan   Continue with established Plan of Care towards PT goals.      Therapist: ALEJANDRO MAI, PT

## 2018-07-05 ENCOUNTER — CLINICAL SUPPORT (OUTPATIENT)
Dept: REHABILITATION | Facility: HOSPITAL | Age: 43
End: 2018-07-05
Payer: MEDICAID

## 2018-07-05 DIAGNOSIS — M25.512 LEFT ANTERIOR SHOULDER PAIN: Primary | ICD-10-CM

## 2018-07-05 PROCEDURE — 97110 THERAPEUTIC EXERCISES: CPT | Mod: PN | Performed by: PHYSICAL THERAPIST

## 2018-07-05 NOTE — PLAN OF CARE
[facility] Outpatient Therapy Initial Evaluation    Name: Chey Mcneill  Clinic Number: [id number]    Chey is a 43 y.o. female evaluated on 04/23/2018.     Diagnosis:   Encounter Diagnoses   Name Primary?    Decreased range of motion of left shoulder     Decreased strength of upper extremity      Physician: Dimas Guzmán, *  Treatment Orders: PT Eval and Treat  Procedures Performed:  1. Left shoulder Arthroscopic subacromial decompression and bursectomy CPT - 97742  2. Left shoulder Arthroscopic labral debridement CPT - 78525  3. Left shoulder Amniox Arthrocentesis, 04853  Subacromial decompression Protocol  Start Therapy in 3-5 Days  Use Sling and Abduction Pillow - For 1-2 weeks then discontinue at 2 weeks time.  Remove arm from sling and move elbow and wrist as tolerated NOS  Periscapular strength program  May start ER side rotation to 60 degrees immediately; Forward flexion 180 degrees immediately and abduction 90 degrees with IR/ER as tolerated immediately  Pillow and sling for 6 weeks; no strengthening of the biceps muscle for 6 weeks. Full AAROM/PROM NOW with pendulum exercises as tolerated. Full elbow and wrist ROM immediately;     Past Medical History:   Diagnosis Date    PONV (postoperative nausea and vomiting)     Ruptured triceps tendon 7/25/2012     Review of patient's allergies indicates:   Allergen Reactions    Adhesive Dermatitis     dermabond caused severe blistering    Benzoin tincture plain Hives, Itching and Other (See Comments)     Significant blistering    Morphine Nausea And Vomiting and Other (See Comments)     headaches    Oxycontin [oxycodone] Hives and Itching     Patient states can take Percocet.   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     Envoirnmental concerns:  "none  Cultural, Spiritual, Developmental and Educational concerns: none, pts 16 year old daughter lives with her and is able to help with ADLs and household tasks/chores  Abuse/Neglect, Nutritional concerns: none    Subjective  Pt reports to clinic s/p L shoulder arthroscopic subacromial decompression and bursectomy, Left shoulder Arthroscopic labral debridement, and Left shoulder Amniox Arthrocentesis on 4/19/18 by Dr. Ramos. Her shoulder has been bothering her for years (approx 2012) when she had elbow surgery and symptoms began around then. She denies previous surgery to candy. Doing pendulums. Driving and not taking pain meds when she drives. Sleeps on L side usually, not sleeping on L side now and not sleeping well due to this.     Increases symptoms: tried washing hair, sleeping (trying to sleep elevated)  Decreases Symptoms: pain medication and ice - only taking meds at night    Diagnostic Tests: MRI    Pain Scale: Chey rates pain to be 5 on a scale of 1-10. 8/10 at end of day    Patient Goals: decrease pain and full range of motion, lift something eithout it "killing me", not be weak.    Objective  Observation: Pt is well dressed well nourished female who is alert and oriented x 3. Anterior   Hives, blisters to front of shoulder and three incisions. Anterior incision with brown drainage, no other redness. Pt advised to call MD's office regarding rash and drainage.    Posture: FHRS, L scapular angle higher than R    Shoulder ROM Left Right   Flexion PROM 110 pre PROM 125 after 180   Abduction 90 180   Internal Rotation  To abdomen 50   External Rotation 15 pre 45 post 90     R Elbow   Flexion 55 degrees  Extension 3 degrees    L Elbow   Flex 43 degrees  Extension (-)6 degrees    Shoulder strength Left Right   Flexion NT 5/5   Abduction NT 5/5   Internal Rotation NT 5/5   External Rotation NT 5/5   Extension NT 5/5   *IR and ER measured supine    Special tests deferred secondary to surgery    Joint " Mobility: hypomobile L GH joint grade I-II  Palpation: TTP along incisions and noted edema.   Sensation: intact to light touch    Treatment:  Time In: 2:55pm  Time Out: 3:55pm    PT Evaluation Completed? Yes  Discussed Plan of Care with patient: Yes    Chey received instruction in and demonstrated therapeutic exercises for 15 minutes of therapeutic exercises including:  Seated scapular retraction on L x 10  PROM L shoulder flex to painful limit (approx 125 degrees), abd to 90 degrees, ER to 60 degrees: x 10 each  AAROM seated shoulder flexion on table with slide board x 10  AAROM seated shoulder abd on table with slide board x 10  AAROM seated shoulder ER on table with slide board x 10  Pendulums - emphasis on use of body for movement and not active movement of arm    Written Home Exercises Provided: Seated L unilateral scapular repositioning, Seated AAROM flexion on table, Seated AAROM abd on table, Seated AAROM ER on table limited to <60 degrees.   Chey demo good understanding of the education provided. Patient demo good return demo of skill of exercises. Pt advised to continue with icing her L shoulder 3 times per day and continue with pendulums.     CMS Impairment/Limitation/Restriction for FOTO Shoulder Survey  Status Limitation G-Code CMS Severity Modifier  Intake 40% 60% Current Status CL - At least 60 percent but less than 80 percent  Predicted 62% 38% Goal Status+ CJ - At least 20 percent but less than 40 percent    History  Co-morbidities and personal factors that may impact the plan of care Examination  Body Structures and Functions, activity limitations and participation restrictions that may impact the plan of care    Clinical Presentation   Co-morbidities:   none        Personal Factors:   no deficits Body Regions:   neck  upper extremities    Body Systems:    ROM  strength            Participation Restrictions:   Post op limitations     Activity limitations:   Learning and applying knowledge  no  deficits    General Tasks and Commands  no deficits    Communication  no deficits    Mobility  lifting and carrying objects    Self care  washing oneself (bathing, drying, washing hands)  caring for body parts (brushing teeth, shaving, grooming)  dressing    Domestic Life  cooking  doing house work (cleaning house, washing dishes, laundry)    Interactions/Relationships  no deficits    Life Areas  no deficits    Community and Social Life  recreation and leisure         stable and uncomplicated                      Low - patient is motivated and eager to improve limitations allowing for appropriate healing time post op   low  high Decision Making/ Complexity Score:  low     Assessment  This is a 43 y.o. female referred to outpatient physical therapy and presents with a medical diagnosis of   Encounter Diagnoses   Name Primary?    Decreased range of motion of left shoulder     Decreased strength of upper extremity     and demonstrates limitations as described in the problem list. Pt will benefit from physical therapy services in order to maximize pain free and/or functional use of left shoulder. The following goals were discussed with the patient and patient is in agreement with them as to be addressed in the treatment plan.     Problem List: pain, decreased ROM, decreased flexibility, decreased strength, inability to participate fully in vocation pursuits and decreased ability to fully participate in recreational/sports related activities.    Short Term Goals:  6 weeks  1. Pt will present with increased L shoulder AROM into flexion to 150 degrees for increased ease with ADLs.   2. Pt will participate in MMT to L shoulder to get baseline of strength.  3. Pt will report decreased pain to </= 2/10 with ADLs.  4. Pt will present with increased L shoulder AROM into abduction to 120 degrees for increased ease with ADLs.     Long Term Goals: 12 weeks  1. Pt will present with increased L shoulder AROM into flexion to 170  degrees for increased ease with ADLs.   2. Pt will present with increased MMT to L shoulder by one half grade into flex and abd for increased ease with ADLs.  3. Pt will present with increased L shoulder AROM into abduction to 120 degrees for increased ease with ADLs.   4. Pt will be independent with HEP and self management of symptoms.     Plan  Pt will be treated by physical therapy 2 times a week for 12 weeks for designated treatment time frame to address therapeutic exercises in order to achieve established goals. Chey may at times be seen by a PTA as part of the Rehab Team.     Hollie Nava, YENT      I certify the need for these services furnished under this plan of treatment and while under my care.         ___________________________________  Physician/Referring Practitioner        _________________  Date of Signature

## 2018-07-05 NOTE — PROGRESS NOTES
Physical Therapy Daily Note     Date: 07/05/2018  Name: Chey Mcneill  Clinic Number: [id number]  Diagnosis: Left shoulder SAD  Encounter Diagnosis   Name Primary?    Left anterior shoulder pain Yes     Physician: Dimas Guzmán, *    Evaluation Date: 4/23/18  Date of Surgery: 4/19/18  Visit #: 8  Start Time:  8:00:  Stop Time:  9:00  Total Treatment Time: 60  Physical therapy:   Subacromial decompression Protocol     Start Therapy in 3-5 Days     Use Sling and Abduction Pillow - For 1-2 weeks then discontinue at 2 weeks time.  Remove arm from sling and move elbow and wrist as tolerated NOS  Periscapular strength program     May start ER side rotation to 60 degrees immediately; Forward flexion 180 degrees immediately and abduction 90 degrees with IR/ER as tolerated immediately  Pillow and sling for 6 weeks; no strengthening of the biceps muscle for 6 weeks. Full AAROM/PROM NOW with pendulum exercises as tolerated. Full elbow and wrist ROM immediately;  Precautions:     Subjective     Pt reports the left shoulder continues to move well. My right knee and leg were very sore after last week.     Pain: 1/10    Objective     Measurements taken:   Pain: Current 3-4/10; Worst: 8-9/10  Right knee: 0-132  Left knee: 0-133  TTP on right knee medial fat pad  Right hip abd = 3/5  Left hip abd = 4+/5    Patient received individual therapy to increase strength, endurance and ROM with activities as follows:     Chey received therapeutic exercises to develop strength, endurance and ROM for 40  minutes including:   Prone row 2#x 30  Prone ext 2#x 30   SL ER 3# 3x10  SA punch 2# 3x10  Ext to row 3x10  ER walkout 3x10  LM with ER 3x10  Step and punch 3x10  Ball wall CW, CCW x 30 ea  EOT push ups 3x10  Ball dribbles full elevation x30    SL Hip abd 3x10  Clamshells 3x10  Side steps x 2 laps  Shuttle SL bilateral 2c 3x10  Shuttle 2-1 2c 3x10          Chey received the  Problem: Patient Care Overview  Goal: Plan of Care Review  Outcome: Ongoing (interventions implemented as appropriate)  Pt was weaned to room air this shift. Respiratory rate remains increased but work of breathing acceptable. Pt remains stable.       following manual therapy techniques: Joint mobilizations and Soft tissue Mobilization were applied to the: left shoulder for 5 minutes.   PROM: Flex, Abd, ER0, IR/ER with post mobs      Written Home Exercises Provided: updated as per therex list  Pt demo good understanding of the education provided. Chey demonstrated good return demonstration of activities.     Education provided:  Chey verbalized good understanding of education provided.   No spiritual or educational barriers to learning identified.    Assessment     Left shoulder stability continues to improve with rx. Improving right LE eccentric control. Continue RLE functional strengthening in prep for surgery. Continue end range left shoulder ROM recovery and scapular therex.     This is a 43 y.o. female referred to outpatient physical therapy and presents with a medical diagnosis of left shoulder pain and demonstrates limitations as described in the problem list Pt prognosis is Good. Pt will continue to benefit from skilled outpatient physical therapy to address the deficits listed below in the problem list, provide pt/family education and to maximize pt's level of independence in the home and community environment.    Will the patient continue to benefit from skilled PT intervention? yes        Medical necessity is demonstrated by:   - Pain limits function of effected part for all activities  - Unable to participate in daily activities   - Requires skilled supervision to complete and progress HEP  - Fall risk - impaired balance   - Continued inability to participate in vocational pursuits    Short Term Goals:  6 weeks  1. Pt will present with increased L shoulder AROM into flexion to 150 degrees for increased ease with ADLs.   2. Pt will participate in MMT to L shoulder to get baseline of strength.  3. Pt will report decreased pain to </= 2/10 with ADLs.  4. Pt will present with increased L shoulder AROM into abduction to 120 degrees for increased ease  with ADLs.      Long Term Goals: 12 weeks  1. Pt will present with increased L shoulder AROM into flexion to 170 degrees for increased ease with ADLs.   2. Pt will present with increased MMT to L shoulder by one half grade into flex and abd for increased ease with ADLs.  3. Pt will present with increased L shoulder AROM into abduction to 120 degrees for increased ease with ADLs.   4. Pt will be independent with HEP and self management of symptoms.     LTGs Knee:  1. Pt with improved function with decreased pain (2/10 worst with ADLs).  2. Pt with improved gait parameters with improved stability.  3. Pt with bilateral hip abd strength = 5/5  4. Pt able to perform eccentric step downs bilaterally without valgus (3x10).       Plan   Continue with established Plan of Care towards PT goals.      Therapist: ALEJANDRO MAI, PT

## 2018-07-10 ENCOUNTER — CLINICAL SUPPORT (OUTPATIENT)
Dept: REHABILITATION | Facility: HOSPITAL | Age: 43
End: 2018-07-10
Payer: MEDICAID

## 2018-07-10 DIAGNOSIS — M25.512 LEFT ANTERIOR SHOULDER PAIN: Primary | ICD-10-CM

## 2018-07-10 PROCEDURE — 97110 THERAPEUTIC EXERCISES: CPT | Mod: PN | Performed by: PHYSICAL THERAPIST

## 2018-07-10 NOTE — PROGRESS NOTES
Physical Therapy Daily Note     Date: 07/10/2018  Name: Chey Mcneill  Clinic Number: [id number]  Diagnosis: Left shoulder SAD  Encounter Diagnosis   Name Primary?    Left anterior shoulder pain Yes     Physician: Dimas Guzmán, *    Evaluation Date: 4/23/18  Date of Surgery: 4/19/18  Visit #: 9  Start Time:  7:00:  Stop Time:  8:00  Total Treatment Time: 60  Physical therapy:   Subacromial decompression Protocol     Start Therapy in 3-5 Days     Use Sling and Abduction Pillow - For 1-2 weeks then discontinue at 2 weeks time.  Remove arm from sling and move elbow and wrist as tolerated NOS  Periscapular strength program     May start ER side rotation to 60 degrees immediately; Forward flexion 180 degrees immediately and abduction 90 degrees with IR/ER as tolerated immediately  Pillow and sling for 6 weeks; no strengthening of the biceps muscle for 6 weeks. Full AAROM/PROM NOW with pendulum exercises as tolerated. Full elbow and wrist ROM immediately;  Precautions:     Subjective     Pt reports the knee is a little sore today. The shoulder is ok.      Pain: 1/10    Objective     Measurements taken:   Pain: Current 3-4/10; Worst: 8-9/10  Right knee: 0-132  Left knee: 0-133  TTP on right knee medial fat pad  Right hip abd = 3/5  Left hip abd = 4+/5    Patient received individual therapy to increase strength, endurance and ROM with activities as follows:     Chey received therapeutic exercises to develop strength, endurance and ROM for 40  minutes including:   Prone row 2#x 30  Prone ext 2#x 30   SL ER 3# 3x10  SA punch 2# 3x10  Ext to row 3x10  ER walkout 3x10  LM with ER 3x10  Step and punch 3x10  Ball wall CW, CCW x 30 ea  EOT push ups 3x10  Ball dribbles full elevation x30    SL Hip abd 3x10  Clamshells 3x10  Side steps x 2 laps  6 in step ups with hip flexion 3x10  6 in step downs 3x10          Chey received the following manual therapy techniques:  Joint mobilizations and Soft tissue Mobilization were applied to the: left shoulder for 5 minutes.   PROM: Flex, Abd, ER0, IR/ER with post mobs      Written Home Exercises Provided: updated as per therex list  Pt demo good understanding of the education provided. Chey demonstrated good return demonstration of activities.     Education provided:  Chey verbalized good understanding of education provided.   No spiritual or educational barriers to learning identified.    Assessment     Well maintained functional hip activation. Continue to progress eccentric quad control. Improved posterior cuff/scapular retraction. Continue to progress left shoulder scapular retraction to decreased anterior tilting of the scapular optimizing clearance for functional shoulder AROM.      This is a 43 y.o. female referred to outpatient physical therapy and presents with a medical diagnosis of left shoulder pain and demonstrates limitations as described in the problem list Pt prognosis is Good. Pt will continue to benefit from skilled outpatient physical therapy to address the deficits listed below in the problem list, provide pt/family education and to maximize pt's level of independence in the home and community environment.    Will the patient continue to benefit from skilled PT intervention? yes        Medical necessity is demonstrated by:   - Pain limits function of effected part for all activities  - Unable to participate in daily activities   - Requires skilled supervision to complete and progress HEP  - Fall risk - impaired balance   - Continued inability to participate in vocational pursuits    Short Term Goals:  6 weeks  1. Pt will present with increased L shoulder AROM into flexion to 150 degrees for increased ease with ADLs.   2. Pt will participate in MMT to L shoulder to get baseline of strength.  3. Pt will report decreased pain to </= 2/10 with ADLs.  4. Pt will present with increased L shoulder AROM into abduction to  120 degrees for increased ease with ADLs.      Long Term Goals: 12 weeks  1. Pt will present with increased L shoulder AROM into flexion to 170 degrees for increased ease with ADLs.   2. Pt will present with increased MMT to L shoulder by one half grade into flex and abd for increased ease with ADLs.  3. Pt will present with increased L shoulder AROM into abduction to 120 degrees for increased ease with ADLs.   4. Pt will be independent with HEP and self management of symptoms.     LTGs Knee:  1. Pt with improved function with decreased pain (2/10 worst with ADLs).  2. Pt with improved gait parameters with improved stability.  3. Pt with bilateral hip abd strength = 5/5  4. Pt able to perform eccentric step downs bilaterally without valgus (3x10).       Plan   Continue with established Plan of Care towards PT goals.      Therapist: ALEJANDRO MAI, PT

## 2018-07-10 NOTE — PLAN OF CARE
[facility] Outpatient Therapy Initial Evaluation    Name: Chey Mcneill  Clinic Number: [id number]    Chey is a 43 y.o. female evaluated on 04/23/2018.     Diagnosis:   Encounter Diagnoses   Name Primary?    Decreased range of motion of left shoulder     Decreased strength of upper extremity      Physician: Dimas Guzmán, *  Treatment Orders: PT Eval and Treat  Procedures Performed:  1. Left shoulder Arthroscopic subacromial decompression and bursectomy CPT - 80987  2. Left shoulder Arthroscopic labral debridement CPT - 05816  3. Left shoulder Amniox Arthrocentesis, 31427  Subacromial decompression Protocol  Start Therapy in 3-5 Days  Use Sling and Abduction Pillow - For 1-2 weeks then discontinue at 2 weeks time.  Remove arm from sling and move elbow and wrist as tolerated NOS  Periscapular strength program  May start ER side rotation to 60 degrees immediately; Forward flexion 180 degrees immediately and abduction 90 degrees with IR/ER as tolerated immediately  Pillow and sling for 6 weeks; no strengthening of the biceps muscle for 6 weeks. Full AAROM/PROM NOW with pendulum exercises as tolerated. Full elbow and wrist ROM immediately;     Past Medical History:   Diagnosis Date    PONV (postoperative nausea and vomiting)     Ruptured triceps tendon 7/25/2012     Review of patient's allergies indicates:   Allergen Reactions    Adhesive Dermatitis     dermabond caused severe blistering    Benzoin tincture plain Hives, Itching and Other (See Comments)     Significant blistering    Morphine Nausea And Vomiting and Other (See Comments)     headaches    Oxycontin [oxycodone] Hives and Itching     Patient states can take Percocet.   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     Envoirnmental concerns:  "none  Cultural, Spiritual, Developmental and Educational concerns: none, pts 16 year old daughter lives with her and is able to help with ADLs and household tasks/chores  Abuse/Neglect, Nutritional concerns: none    Subjective  Pt reports to clinic s/p L shoulder arthroscopic subacromial decompression and bursectomy, Left shoulder Arthroscopic labral debridement, and Left shoulder Amniox Arthrocentesis on 4/19/18 by Dr. Ramos. Her shoulder has been bothering her for years (approx 2012) when she had elbow surgery and symptoms began around then. She denies previous surgery to candy. Doing pendulums. Driving and not taking pain meds when she drives. Sleeps on L side usually, not sleeping on L side now and not sleeping well due to this.     Increases symptoms: tried washing hair, sleeping (trying to sleep elevated)  Decreases Symptoms: pain medication and ice - only taking meds at night    Diagnostic Tests: MRI    Pain Scale: Chey rates pain to be 5 on a scale of 1-10. 8/10 at end of day    Patient Goals: decrease pain and full range of motion, lift something eithout it "killing me", not be weak.    Objective  Observation: Pt is well dressed well nourished female who is alert and oriented x 3. Anterior   Hives, blisters to front of shoulder and three incisions. Anterior incision with brown drainage, no other redness. Pt advised to call MD's office regarding rash and drainage.    Posture: FHRS, L scapular angle higher than R    Shoulder ROM Left Right   Flexion PROM 110 pre PROM 125 after 180   Abduction 90 180   Internal Rotation  To abdomen 50   External Rotation 15 pre 45 post 90     R Elbow   Flexion 55 degrees  Extension 3 degrees    L Elbow   Flex 43 degrees  Extension (-)6 degrees    Shoulder strength Left Right   Flexion NT 5/5   Abduction NT 5/5   Internal Rotation NT 5/5   External Rotation NT 5/5   Extension NT 5/5   *IR and ER measured supine    Special tests deferred secondary to surgery    Joint " Mobility: hypomobile L GH joint grade I-II  Palpation: TTP along incisions and noted edema.   Sensation: intact to light touch    Treatment:  Time In: 2:55pm  Time Out: 3:55pm    PT Evaluation Completed? Yes  Discussed Plan of Care with patient: Yes    Chey received instruction in and demonstrated therapeutic exercises for 15 minutes of therapeutic exercises including:  Seated scapular retraction on L x 10  PROM L shoulder flex to painful limit (approx 125 degrees), abd to 90 degrees, ER to 60 degrees: x 10 each  AAROM seated shoulder flexion on table with slide board x 10  AAROM seated shoulder abd on table with slide board x 10  AAROM seated shoulder ER on table with slide board x 10  Pendulums - emphasis on use of body for movement and not active movement of arm    Written Home Exercises Provided: Seated L unilateral scapular repositioning, Seated AAROM flexion on table, Seated AAROM abd on table, Seated AAROM ER on table limited to <60 degrees.   Chey demo good understanding of the education provided. Patient demo good return demo of skill of exercises. Pt advised to continue with icing her L shoulder 3 times per day and continue with pendulums.     CMS Impairment/Limitation/Restriction for FOTO Shoulder Survey  Status Limitation G-Code CMS Severity Modifier  Intake 40% 60% Current Status CL - At least 60 percent but less than 80 percent  Predicted 62% 38% Goal Status+ CJ - At least 20 percent but less than 40 percent    History  Co-morbidities and personal factors that may impact the plan of care Examination  Body Structures and Functions, activity limitations and participation restrictions that may impact the plan of care    Clinical Presentation   Co-morbidities:   none        Personal Factors:   no deficits Body Regions:   neck  upper extremities    Body Systems:    ROM  strength            Participation Restrictions:   Post op limitations     Activity limitations:   Learning and applying knowledge  no  deficits    General Tasks and Commands  no deficits    Communication  no deficits    Mobility  lifting and carrying objects    Self care  washing oneself (bathing, drying, washing hands)  caring for body parts (brushing teeth, shaving, grooming)  dressing    Domestic Life  cooking  doing house work (cleaning house, washing dishes, laundry)    Interactions/Relationships  no deficits    Life Areas  no deficits    Community and Social Life  recreation and leisure         stable and uncomplicated                      Low - patient is motivated and eager to improve limitations allowing for appropriate healing time post op   low  high Decision Making/ Complexity Score:  low     Assessment  This is a 43 y.o. female referred to outpatient physical therapy and presents with a medical diagnosis of   Encounter Diagnoses   Name Primary?    Decreased range of motion of left shoulder     Decreased strength of upper extremity     and demonstrates limitations as described in the problem list. Pt will benefit from physical therapy services in order to maximize pain free and/or functional use of left shoulder. The following goals were discussed with the patient and patient is in agreement with them as to be addressed in the treatment plan.     Problem List: pain, decreased ROM, decreased flexibility, decreased strength, inability to participate fully in vocation pursuits and decreased ability to fully participate in recreational/sports related activities.    Short Term Goals:  6 weeks  1. Pt will present with increased L shoulder AROM into flexion to 150 degrees for increased ease with ADLs.   2. Pt will participate in MMT to L shoulder to get baseline of strength.  3. Pt will report decreased pain to </= 2/10 with ADLs.  4. Pt will present with increased L shoulder AROM into abduction to 120 degrees for increased ease with ADLs.     Long Term Goals: 12 weeks  1. Pt will present with increased L shoulder AROM into flexion to 170  degrees for increased ease with ADLs.   2. Pt will present with increased MMT to L shoulder by one half grade into flex and abd for increased ease with ADLs.  3. Pt will present with increased L shoulder AROM into abduction to 120 degrees for increased ease with ADLs.   4. Pt will be independent with HEP and self management of symptoms.     Plan  Pt will be treated by physical therapy 2 times a week for 12 weeks for designated treatment time frame to address therapeutic exercises in order to achieve established goals. Chey may at times be seen by a PTA as part of the Rehab Team.     Hollie Nava, YENT      I certify the need for these services furnished under this plan of treatment and while under my care.         ___________________________________  Physician/Referring Practitioner        _________________  Date of Signature

## 2018-09-11 ENCOUNTER — TELEPHONE (OUTPATIENT)
Dept: SPORTS MEDICINE | Facility: CLINIC | Age: 43
End: 2018-09-11

## 2018-09-15 NOTE — PROGRESS NOTES
Subjective:          Chief Complaint: Chey Mcneill is a 43 y.o. female who had no chief complaint listed for this encounter.    Patient is here for a follow up for her left shoulder. She feels her L shoulder is doing well. She has completed  PT with Bj. She has start a new job and would like to return to PT. She is complaining of more neck pain. She complains of numbness and tingling and sharp pains down the L arm. This happens daily for the last 2 months. She had an MRI in 2016 which showed a small C5-6 herniation without narrowing or stenosis. As for her knee, she states it is doing the same as last visit.     She is having a lot of knee pain and swelling. It gave out on her recently.       DATE OF PROCEDURE: 4/19/2018     ATTENDING SURGEON: Surgeon(s) and Role:     * Cooper Ramos MD - Primary     * Keith Valadez MD-Fellow     * SMA Chance-Assistant     PREOPERATIVE DIAGNOSIS:    Pre-operative Diagnosis: Left shoulder   Superior glenoid labrum lesion (SLAP) S43.439A Impingement Syndrome M75.40     Post-operative Diagnosis:  Left shoulder   Superior glenoid labrum lesion (SLAP) S43.439A Impingement Syndrome M75.40                 Procedures Performed:  1. Left shoulder Arthroscopic subacromial decompression and bursectomy CPT - 11148     2. Left shoulder Arthroscopic labral debridement CPT - 55116     3. Left shoulder Amniox Arthrocentesis, 22920    DATE OF PROCEDURE:  05/11/2017     ATTENDING SURGEON:  Cooper Ramos M.D.     FIRST ASSISTANT:  Antione Henao M.D.(RES) - Fellow.     SECOND ASSISTANT:  SMA Chance -- assistant.     PREOPERATIVE DIAGNOSIS:  Right knee chondromalacia.     POSTOPERATIVE DIAGNOSES:  1.  Right knee chondromalacia.  2.  Right knee patellar chondromalacia.  3.  Right knee patellar malalignment syndrome.  4.  Right knee medial meniscus tear.     OPERATIVE PROCEDURES PERFORMED:  1.  Right knee complex osteochondral allograft transplantation, medial femoral    condyle and patella.  2.  Right knee complex patella realignment.  3.  Right knee arthroscopic medial meniscectomy.  4.  Right knee arthroscopic chondroplasty.  5.  Right knee arthroscopic loose body removal.  6.  Right knee Amniox arthrocentesis.      Denies any change in activities. She has been taking NSAIDS as needed for pain without relief. Pain is located over medial aspect of knee. Denies mechanical symptoms or instability.     Hx of multiple bilateral knee surgeries   Left knee- ACI with Dr. Ramos in 1/26/2012  Right knee- multiple scopes, lateral releases, and anterior tibial tubercleplasty by outside physician. Hardware removal by Dr. Ramos in 2010        Pain   Associated symptoms include joint swelling. Pertinent negatives include no abdominal pain, chest pain, chills, congestion, coughing, fever, headaches, myalgias, nausea, numbness, rash, sore throat or vomiting.          Review of Systems   Constitution: Negative. Negative for chills, fever, weight gain and weight loss.   HENT: Negative for congestion and sore throat.    Eyes: Negative for blurred vision and double vision.   Cardiovascular: Negative for chest pain, leg swelling and palpitations.   Respiratory: Negative for cough and shortness of breath.    Hematologic/Lymphatic: Does not bruise/bleed easily.   Skin: Negative for itching, poor wound healing and rash.   Musculoskeletal: Positive for joint pain, joint swelling and stiffness. Negative for back pain, muscle weakness and myalgias.   Gastrointestinal: Negative for abdominal pain, constipation, diarrhea, nausea and vomiting.   Genitourinary: Negative.  Negative for frequency and hematuria.   Neurological: Negative for dizziness, headaches, numbness, paresthesias and sensory change.   Psychiatric/Behavioral: Negative for altered mental status and depression. The patient is not nervous/anxious.    Allergic/Immunologic: Negative for hives.                   Objective:        General: Chey  is well-developed, well-nourished, appears stated age, in no acute distress, alert and oriented to time, place and person. Endorses instability    General    Vitals reviewed.  Constitutional: She is oriented to person, place, and time. She appears well-developed and well-nourished. No distress.   HENT:   Nose: Nose normal.   Mouth/Throat: No oropharyngeal exudate.   Eyes: Pupils are equal, round, and reactive to light. Right eye exhibits no discharge. Left eye exhibits no discharge.   Neck: Normal range of motion.   Cardiovascular: Normal rate, regular rhythm and intact distal pulses.    Pulmonary/Chest: Effort normal and breath sounds normal. No respiratory distress.   Neurological: She is alert and oriented to person, place, and time. She has normal reflexes. She displays normal reflexes. No cranial nerve deficit. Coordination normal.   Psychiatric: She has a normal mood and affect. Her behavior is normal. Judgment and thought content normal.     General Musculoskeletal Exam   Gait: normal       Right Knee Exam     Inspection   Erythema: absent  Scars: present  Swelling: present  Effusion: effusion  Deformity: deformity  Bruising: absent    Tenderness   The patient is tender to palpation of the medial joint line and condyle (medial tibia).    Crepitus   Right knee crepitus location: mild.    Range of Motion   Extension: 5   Flexion:  140 abnormal     Tests   Meniscus   Leela:  Medial - negative Lateral - negative  Ligament Examination Lachman: normal (-1 to 2mm) PCL-Posterior Drawer: normal (0 to 2mm)     MCL - Valgus: normal (0 to 2mm)  LCL - Varus: normalPivot Shift: normal (Equal)Reverse Pivot Shift: normal (Equal)Dial Test at 30 degrees: normal (< 5 degrees)Dial Test at 90 degrees: normal (< 5 degrees)  Posterior Sag Test: negative  Posterolateral Corner: unstable (>15 degrees difference)  Patella   Patellar apprehension: negative  Passive Patellar Tilt: neutral  Patellar Tracking: normal  Patellar Glide  (quadrants): Lateral - 1   Medial - 2  Q-Angle at 90 degrees: normal  Patellar Grind: negative  J-Sign: none    Other   Meniscal Cyst: absent  Popliteal (Baker's) Cyst: absent  Sensation: normal    Left Knee Exam     Inspection   Erythema: absent  Scars: present  Swelling: absent  Effusion: absent  Deformity: deformity  Bruising: absent    Tenderness   The patient is experiencing no tenderness.     Crepitus   The patient has crepitus of the patella (mild).    Range of Motion   Extension: 5   Flexion: 140     Tests   Meniscus   Leela:  Medial - negative Lateral - negative  Stability Lachman: normal (-1 to 2mm) PCL-Posterior Drawer: normal (0 to 2mm)  MCL - Valgus: normal (0 to 2mm)  LCL - Varus: normal (0 to 2mm)Pivot Shift: normal (Equal)Reverse Pivot Shift: normal (Equal)Dial Test at 30 degrees: normal (< 5 degrees)Dial Test at 90 degrees: normal (< 5 degrees)  Posterior Sag Test: negative  Posterolateral Corner: unstable (>15 degrees difference)  Patella   Patellar apprehension: negative  Passive Patellar Tilt: neutral  Patellar Tracking: normal  Patellar Glide (Quadrants): Lateral - 1 Medial - 2  Q-Angle at 90 degrees: normal  Patellar Grind: negative  J-Sign: J sign absent    Other   Meniscal Cyst: absent  Popliteal (Baker's) Cyst: absent  Sensation: normal    Right Hip Exam     Tests   Raymond: negative  Left Hip Exam     Tests   Raymond: negative          Right Hand/Wrist Exam     Tests   Phalens sign: positive  Tinel's sign (median nerve): positive      Comments:  + Durkins      Left Hand/Wrist Exam     Tests   Phalens sign: positive  Tinel's sign (median nerve): positive      Comments:  + Durkins      Right Shoulder Exam     Inspection/Observation   Swelling: absent  Bruising: absent  Scars: absent  Deformity: absent  Scapular Winging: absent  Scapular Dyskinesia: negative  Atrophy: absent    Tenderness   The patient is experiencing no tenderness.    Range of Motion   Active abduction:  90 normal   Passive  abduction:  100 normal   Extension:  0 normal   Forward Flexion:  180 normal   Forward Elevation: 180 normal  Adduction: 40 normal  External Rotation 0 degrees:  50 normal   External Rotation 90 degrees: 90 normal  Internal rotation 0 degrees:  T8 normal   Internal rotation 90 degrees:  30 normal     Tests & Signs   Apprehension: negative  Cross arm: negative  Drop arm: negative  Storm test: negative  Impingement: negative  Sulcus: absent  Anterosuperior Escape: negative  Lag Sign 0 degrees: negative  Lag Sign 90 degrees: negative  Lift Off Sign: negative  Belly Press: negative  Active Compression Test ([place]'s Sign): negative  Yergason's Test: negative  Speed's Test: negative  Anterior Drawer Test: 1+   Posterior Drawer Test: 1+  Relocation 90 degrees: negative  Relocation > 90 degrees: negative  Bear Hug: negative  Moving Valgus: negative  Jerk Test: negative    Other   Sensation: normal    Left Shoulder Exam     Inspection/Observation   Swelling: absent  Bruising: absent  Scars: present  Deformity: absent  Scapular Winging: absent  Scapular Dyskinesia: negative  Atrophy: absent    Tenderness   The patient is tender to palpation of the greater tuberosity.    Range of Motion   Active abduction:  90 normal   Passive abduction:  100 normal   Extension:  0 normal   Forward Flexion:  180 abnormal   Forward Elevation: 180 abnormal  Adduction: 40 normal  External Rotation 0 degrees:  50 normal   External Rotation 90 degrees: 80 normal  Internal rotation 0 degrees:  T8 normal   Internal rotation 90 degrees:  30 normal     Tests & Signs   Apprehension: negative  Cross arm: negative  Drop arm: negative  Storm test: negative  Impingement: negative  Sulcus: absent  Anterosuperior Escape: negative  Lag Sign 0 degrees: negative  Lag Sign 90 degrees: negative  Lift Off Sign: negative  Belly Press: negative  Active Compression test ([place]'s Sign): negative  Yergasons's Test: negative  Speed's Test: negative  Anterior  Drawer Test: 1+  Posterior Drawer Test: 1+  Relocation 90 degrees: negative  Relocation > 90 degrees: negative  Bear Hug: negative  Moving Valgus: negative  Jerk Test: negative    Other   Sensation: normal       Muscle Strength   Right Upper Extremity   Shoulder Abduction:    Shoulder Internal Rotation:    Shoulder External Rotation:    Supraspinatus:    Subscapularis:    Biceps:    Left Upper Extremity  Shoulder Abduction:    Shoulder Internal Rotation:    Shoulder External Rotation:    Supraspinatus:    Subscapularis:    Biceps:    Right Lower Extremity   Hip Abduction:    Quadriceps:     Hamstrin/5   Left Lower Extremity   Hip Abduction:    Quadriceps:     Hamstrin/5     Reflexes     Left Side  Biceps:  2+  Triceps:  2+  Brachioradialis:  2+  Quadriceps:  2+  Achilles:  2+    Right Side   Biceps:  2+  Triceps:  2+  Brachioradialis:  2+  Quadriceps:  2+  Achilles:  2+    Vascular Exam     Right Pulses  Dorsalis Pedis:      2+  Posterior Tibial:      2+  Radial:                    2+      Left Pulses  Dorsalis Pedis:      2+  Posterior Tibial:      2+  Radial:                    2+      Capillary Refill  Right Hand: normal capillary refill  Left Hand: normal capillary refill    Edema  Right Lower Leg: absent  Left Lower Leg: absent      Physical therapy:   Subacromial decompression Protocol     Start Therapy in 3-5 Days     Use Sling and Abduction Pillow - For 1-2 weeks then discontinue at 2 weeks time.  Remove arm from sling and move elbow and wrist as tolerated NOS  Periscapular strength program     May start ER side rotation to 60 degrees immediately; Forward flexion 180 degrees immediately and abduction 90 degrees with IR/ER as tolerated immediately  Pillow and sling for 6 weeks; no strengthening of the biceps muscle for 6 weeks. Full AAROM/PROM NOW with pendulum exercises as tolerated. Full elbow and wrist ROM immediately;              Assessment:       Encounter Diagnoses   Name Primary?    Left shoulder pain, unspecified chronicity Yes    Tear of MCL (medial collateral ligament) of knee, right, initial encounter     Internal derangement of right knee     Chondromalacia, right knee     Chondromalacia of right patella     Rotator cuff syndrome of left shoulder     Left bicipital tenosynovitis     Neck pain     Type 2 superior labrum extending from anterior to posterior (SLAP) lesion of left shoulder, subsequent encounter     Chronic pain of right knee     Cervical radiculopathy, chronic           Plan:       1. ASES and SF-12 was filled out today in clinic.   RTC 2-3 weeks with Jennifer Dunlap for MRI and EMG results review. Patient will not fill out ASES, SF-12 on return.  RTC in 3 months with Dr. Cooper Ramos. Patient will fill out ASES, SF-12 on return.    2. Cont PT/HEP for shoulder    3. Core/gluteal strengthening    We reviewed with Chey today, the pathology and natural history of her diagnosis. We have discussed a variety of treatment options including medications, physical therapy and other alternative treatments. I also explained the indications, risks and benefits of surgery. After discussion, Chey decided to proceed with surgery. The decision was made to go forward with:    Stage 2:  1. Right knee subchondroplasty medial femur  2. Right knee subchondroplasty medial tibia  3. Right knee arthroscopic chondroplasty  4. Right knee arthroscopic synovectomy  5. Right knee arthroscopic menisectomy  6. Amniox Arthrocentesis, Right    The details of the surgical procedure were explained, including the location of probable incisions and a description of likely hardware and/or grafts to be used.  The patient understands the likely convalescence after surgery.  Also, we have thoroughly discussed the risks, benefits and alternatives to surgery, including, but not limited to, the risk of infection, joint stiffness, blood clot  (including DVT and/or pulmonary embolus), neurologic and vascular injury.  It was explained that, if tissue has been repaired or reconstructed, there is a chance of failure, which may require further management.      All of the patient's questions were answered and informed consent was obtained. The patient will contact us if they have any questions or concerns in the interim.    4. Referral to Neurology placed today    5. MRI of Cervical Spine ordered today  MRI of Lumbar Spine ordered today    6. B/L UE EMG Nerve study ordered today    7. Referral to [facility] ordered today                      Patient questionnaires may have been collected.

## 2018-09-17 ENCOUNTER — OFFICE VISIT (OUTPATIENT)
Dept: SPORTS MEDICINE | Facility: CLINIC | Age: 43
End: 2018-09-17
Payer: MEDICAID

## 2018-09-17 ENCOUNTER — HOSPITAL ENCOUNTER (OUTPATIENT)
Dept: RADIOLOGY | Facility: HOSPITAL | Age: 43
Discharge: HOME | End: 2018-09-17
Attending: ORTHOPAEDIC SURGERY
Payer: MEDICAID

## 2018-09-17 DIAGNOSIS — M25.512 LEFT SHOULDER PAIN, UNSPECIFIED CHRONICITY: ICD-10-CM

## 2018-09-17 DIAGNOSIS — M25.561 CHRONIC PAIN OF RIGHT KNEE: ICD-10-CM

## 2018-09-17 DIAGNOSIS — M75.22 LEFT BICIPITAL TENOSYNOVITIS: ICD-10-CM

## 2018-09-17 DIAGNOSIS — R93.7 ABNORMAL X-RAY OF LUMBAR SPINE: ICD-10-CM

## 2018-09-17 DIAGNOSIS — M75.102 ROTATOR CUFF SYNDROME OF LEFT SHOULDER: ICD-10-CM

## 2018-09-17 DIAGNOSIS — M22.41 CHONDROMALACIA OF RIGHT PATELLA: ICD-10-CM

## 2018-09-17 DIAGNOSIS — M47.816 OSTEOARTHRITIS OF LUMBAR SPINE, UNSPECIFIED SPINAL OSTEOARTHRITIS COMPLICATION STATUS: ICD-10-CM

## 2018-09-17 DIAGNOSIS — M25.512 LEFT SHOULDER PAIN, UNSPECIFIED CHRONICITY: Primary | ICD-10-CM

## 2018-09-17 DIAGNOSIS — M94.261 CHONDROMALACIA, RIGHT KNEE: ICD-10-CM

## 2018-09-17 DIAGNOSIS — M54.2 NECK PAIN: ICD-10-CM

## 2018-09-17 DIAGNOSIS — M23.91 INTERNAL DERANGEMENT OF RIGHT KNEE: ICD-10-CM

## 2018-09-17 DIAGNOSIS — S43.432D TYPE 2 SUPERIOR LABRUM EXTENDING FROM ANTERIOR TO POSTERIOR (SLAP) LESION OF LEFT SHOULDER, SUBSEQUENT ENCOUNTER: ICD-10-CM

## 2018-09-17 DIAGNOSIS — G89.29 CHRONIC PAIN OF RIGHT KNEE: ICD-10-CM

## 2018-09-17 DIAGNOSIS — M54.12 CERVICAL RADICULOPATHY, CHRONIC: ICD-10-CM

## 2018-09-17 DIAGNOSIS — S83.411A TEAR OF MCL (MEDIAL COLLATERAL LIGAMENT) OF KNEE, RIGHT, INITIAL ENCOUNTER: ICD-10-CM

## 2018-09-17 PROCEDURE — 99999 PR PBB SHADOW E&M-EST. PATIENT-LVL III: CPT | Mod: PBBFAC,,, | Performed by: ORTHOPAEDIC SURGERY

## 2018-09-17 PROCEDURE — 73030 X-RAY EXAM OF SHOULDER: CPT | Mod: 26,LT,, | Performed by: RADIOLOGY

## 2018-09-17 PROCEDURE — 99213 OFFICE O/P EST LOW 20 MIN: CPT | Mod: PBBFAC,25,PO | Performed by: ORTHOPAEDIC SURGERY

## 2018-09-17 PROCEDURE — 73030 X-RAY EXAM OF SHOULDER: CPT | Mod: TC,FY,PO,LT

## 2018-09-17 PROCEDURE — 99214 OFFICE O/P EST MOD 30 MIN: CPT | Mod: S$PBB,,, | Performed by: ORTHOPAEDIC SURGERY

## 2018-10-04 ENCOUNTER — OFFICE VISIT (OUTPATIENT)
Dept: SPORTS MEDICINE | Facility: CLINIC | Age: 43
End: 2018-10-04
Payer: MEDICAID

## 2018-10-04 VITALS
HEIGHT: 63 IN | WEIGHT: 135 LBS | DIASTOLIC BLOOD PRESSURE: 68 MMHG | SYSTOLIC BLOOD PRESSURE: 103 MMHG | BODY MASS INDEX: 23.92 KG/M2 | HEART RATE: 68 BPM

## 2018-10-04 DIAGNOSIS — M54.12 CERVICAL RADICULOPATHY: Primary | ICD-10-CM

## 2018-10-04 DIAGNOSIS — M54.2 NECK PAIN: ICD-10-CM

## 2018-10-04 DIAGNOSIS — M54.40 CHRONIC LOW BACK PAIN WITH SCIATICA, SCIATICA LATERALITY UNSPECIFIED, UNSPECIFIED BACK PAIN LATERALITY: ICD-10-CM

## 2018-10-04 DIAGNOSIS — G89.29 CHRONIC LOW BACK PAIN WITH SCIATICA, SCIATICA LATERALITY UNSPECIFIED, UNSPECIFIED BACK PAIN LATERALITY: ICD-10-CM

## 2018-10-04 PROCEDURE — 99214 OFFICE O/P EST MOD 30 MIN: CPT | Mod: S$PBB,,, | Performed by: PHYSICIAN ASSISTANT

## 2018-10-04 PROCEDURE — 99213 OFFICE O/P EST LOW 20 MIN: CPT | Mod: PBBFAC,PO | Performed by: PHYSICIAN ASSISTANT

## 2018-10-04 PROCEDURE — 99999 PR PBB SHADOW E&M-EST. PATIENT-LVL III: CPT | Mod: PBBFAC,,, | Performed by: PHYSICIAN ASSISTANT

## 2018-10-04 NOTE — PROGRESS NOTES
Subjective:          Chief Complaint: Chey Mcneill is a 43 y.o. female who had concerns including Back Pain.    Patient is here today for cervical spine and lumbar spine MRI results review. She was never scheduled for her EMG. She feels her L shoulder is doing well. She has completed  PT with [facility]. She has start a new job and would like to return to PT. She is complaining of more neck pain. She complains of numbness and tingling and sharp pains down the L arm. This happens daily for the last 2 months. She had an MRI in 2016 which showed a small C5-6 herniation without narrowing or stenosis. As for her knee, she states it is doing the same as last visit.     She is having a lot of knee pain and swelling. It gave out on her recently.       DATE OF PROCEDURE: 4/19/2018     ATTENDING SURGEON: Surgeon(s) and Role:     * Cooper Ramos MD - Primary     * Keith Valadez MD-Fellow     * SMA Chance-Assistant     PREOPERATIVE DIAGNOSIS:    Pre-operative Diagnosis: Left shoulder   Superior glenoid labrum lesion (SLAP) S43.439A Impingement Syndrome M75.40     Post-operative Diagnosis:  Left shoulder   Superior glenoid labrum lesion (SLAP) S43.439A Impingement Syndrome M75.40                 Procedures Performed:  1. Left shoulder Arthroscopic subacromial decompression and bursectomy CPT - 63429     2. Left shoulder Arthroscopic labral debridement CPT - 39954     3. Left shoulder Amniox Arthrocentesis, 20615    DATE OF PROCEDURE:  05/11/2017     ATTENDING SURGEON:  Cooper Ramos M.D.     FIRST ASSISTANT:  Antione Henao M.D.(RES) - Fellow.     SECOND ASSISTANT:  SMA Chance -- assistant.     PREOPERATIVE DIAGNOSIS:  Right knee chondromalacia.     POSTOPERATIVE DIAGNOSES:  1.  Right knee chondromalacia.  2.  Right knee patellar chondromalacia.  3.  Right knee patellar malalignment syndrome.  4.  Right knee medial meniscus tear.     OPERATIVE PROCEDURES PERFORMED:  1.  Right knee complex  osteochondral allograft transplantation, medial femoral   condyle and patella.  2.  Right knee complex patella realignment.  3.  Right knee arthroscopic medial meniscectomy.  4.  Right knee arthroscopic chondroplasty.  5.  Right knee arthroscopic loose body removal.  6.  Right knee Amniox arthrocentesis.      Denies any change in activities. She has been taking NSAIDS as needed for pain without relief. Pain is located over medial aspect of knee. Denies mechanical symptoms or instability.     Hx of multiple bilateral knee surgeries   Left knee- ACI with Dr. Ramos in 1/26/2012  Right knee- multiple scopes, lateral releases, and anterior tibial tubercleplasty by outside physician. Hardware removal by Dr. Ramos in 2010        Pain   Associated symptoms include joint swelling. Pertinent negatives include no abdominal pain, chest pain, chills, congestion, coughing, fever, headaches, myalgias, nausea, numbness, rash, sore throat or vomiting.   Back Pain   Pertinent negatives include no abdominal pain, chest pain, fever, headaches, numbness, paresthesias or weight loss.          Review of Systems   Constitution: Negative. Negative for chills, fever, weight gain and weight loss.   HENT: Negative for congestion and sore throat.    Eyes: Negative for blurred vision and double vision.   Cardiovascular: Negative for chest pain, leg swelling and palpitations.   Respiratory: Negative for cough and shortness of breath.    Hematologic/Lymphatic: Does not bruise/bleed easily.   Skin: Negative for itching, poor wound healing and rash.   Musculoskeletal: Positive for back pain, joint pain, joint swelling and stiffness. Negative for muscle weakness and myalgias.   Gastrointestinal: Negative for abdominal pain, constipation, diarrhea, nausea and vomiting.   Genitourinary: Negative.  Negative for frequency and hematuria.   Neurological: Negative for dizziness, headaches, numbness, paresthesias and sensory change.   Psychiatric/Behavioral:  Negative for altered mental status and depression. The patient is not nervous/anxious.    Allergic/Immunologic: Negative for hives.       Pain Related Questions  Over the past 3 days, what was your average pain during activity? (I.e. running, jogging, walking, climbing stairs, getting dressed, ect.): 10  Over the past 3 days, what was your highest pain level?: 10  Over the past 3 days, what was your lowest pain level? : 7    Other  How many nights a week are you awakened by your affected body part?: 7  Was the patient's HEIGHT measured or patient reported?: Patient Reported  Was the patient's WEIGHT measured or patient reported?: Measured      Objective:        General: Chey is well-developed, well-nourished, appears stated age, in no acute distress, alert and oriented to time, place and person. Endorses instability    General    Vitals reviewed.  Constitutional: She is oriented to person, place, and time. She appears well-developed and well-nourished. No distress.   HENT:   Nose: Nose normal.   Mouth/Throat: No oropharyngeal exudate.   Eyes: Pupils are equal, round, and reactive to light. Right eye exhibits no discharge. Left eye exhibits no discharge.   Neck: Normal range of motion.   Cardiovascular: Normal rate, regular rhythm and intact distal pulses.    Pulmonary/Chest: Effort normal and breath sounds normal. No respiratory distress.   Neurological: She is alert and oriented to person, place, and time. She has normal reflexes. She displays normal reflexes. No cranial nerve deficit. Coordination normal.   Psychiatric: She has a normal mood and affect. Her behavior is normal. Judgment and thought content normal.     General Musculoskeletal Exam   Gait: normal       Right Knee Exam     Inspection   Erythema: absent  Scars: present  Swelling: present  Effusion: absent  Deformity: absent  Bruising: absent    Tenderness   The patient is tender to palpation of the medial joint line and condyle (medial  tibia).    Crepitus   Right knee crepitus location: mild.    Range of Motion   Extension: 5   Flexion:  140 abnormal     Tests   Meniscus   Leela:  Medial - negative Lateral - negative  Ligament Examination Lachman: normal (-1 to 2mm) PCL-Posterior Drawer: normal (0 to 2mm)     MCL - Valgus: normal (0 to 2mm)  LCL - Varus: normalPivot Shift: normal (Equal)Reverse Pivot Shift: normal (Equal)Dial Test at 30 degrees: normal (< 5 degrees)Dial Test at 90 degrees: normal (< 5 degrees)  Posterior Sag Test: negative  Posterolateral Corner: unstable (>15 degrees difference)  Patella   Patellar apprehension: negative  Passive Patellar Tilt: neutral  Patellar Tracking: normal  Patellar Glide (quadrants): Lateral - 1   Medial - 2  Q-Angle at 90 degrees: normal  Patellar Grind: negative  J-Sign: none    Other   Meniscal Cyst: absent  Popliteal (Baker's) Cyst: absent  Sensation: normal    Left Knee Exam     Inspection   Erythema: absent  Scars: present  Swelling: absent  Effusion: absent  Deformity: absent  Bruising: absent    Tenderness   The patient is experiencing no tenderness.     Crepitus   The patient has crepitus of the patella (mild).    Range of Motion   Extension: 5   Flexion: 140     Tests   Meniscus   Leela:  Medial - negative Lateral - negative  Stability Lachman: normal (-1 to 2mm) PCL-Posterior Drawer: normal (0 to 2mm)  MCL - Valgus: normal (0 to 2mm)  LCL - Varus: normal (0 to 2mm)Pivot Shift: normal (Equal)Reverse Pivot Shift: normal (Equal)Dial Test at 30 degrees: normal (< 5 degrees)Dial Test at 90 degrees: normal (< 5 degrees)  Posterior Sag Test: negative  Posterolateral Corner: unstable (>15 degrees difference)  Patella   Patellar apprehension: negative  Passive Patellar Tilt: neutral  Patellar Tracking: normal  Patellar Glide (Quadrants): Lateral - 1 Medial - 2  Q-Angle at 90 degrees: normal  Patellar Grind: negative  J-Sign: J sign absent    Other   Meniscal Cyst: absent  Popliteal (Baker's) Cyst:  absent  Sensation: normal    Right Hip Exam     Tests   Raymond: negative  Left Hip Exam     Tests   Raymond: negative          Right Hand/Wrist Exam     Tests   Phalens sign: positive  Tinel's sign (median nerve): positive      Comments:  + Durkins      Left Hand/Wrist Exam     Tests   Phalens sign: positive  Tinel's sign (median nerve): positive      Comments:  + Durkins      Right Shoulder Exam     Inspection/Observation   Swelling: absent  Bruising: absent  Scars: absent  Deformity: absent  Scapular Winging: absent  Scapular Dyskinesia: negative  Atrophy: absent    Tenderness   The patient is experiencing no tenderness.    Range of Motion   Active abduction:  90 normal   Passive abduction:  100 normal   Extension:  0 normal   Forward Flexion:  180 normal   Forward Elevation: 180 normal  Adduction: 40 normal  External Rotation 0 degrees:  50 normal   External Rotation 90 degrees: 90 normal  Internal rotation 0 degrees:  T8 normal   Internal rotation 90 degrees:  30 normal     Tests & Signs   Apprehension: negative  Cross arm: negative  Drop arm: negative  Storm test: negative  Impingement: negative  Sulcus: absent  Anterosuperior Escape: negative  Lag Sign 0 degrees: negative  Lag Sign 90 degrees: negative  Lift Off Sign: negative  Belly Press: negative  Active Compression Test (Cheyenne's Sign): negative  Yergason's Test: negative  Speed's Test: negative  Anterior Drawer Test: 1+   Posterior Drawer Test: 1+  Relocation 90 degrees: negative  Relocation > 90 degrees: negative  Bear Hug: negative  Moving Valgus: negative  Jerk Test: negative    Other   Sensation: normal    Left Shoulder Exam     Inspection/Observation   Swelling: absent  Bruising: absent  Scars: present  Deformity: absent  Scapular Winging: absent  Scapular Dyskinesia: negative  Atrophy: absent    Tenderness   The patient is tender to palpation of the greater tuberosity.    Range of Motion   Active abduction:  90 normal   Passive abduction:  100  normal   Extension:  0 normal   Forward Flexion:  180 abnormal   Forward Elevation: 180 abnormal  Adduction: 40 normal  External Rotation 0 degrees:  50 normal   External Rotation 90 degrees: 80 normal  Internal rotation 0 degrees:  T8 normal   Internal rotation 90 degrees:  30 normal     Tests & Signs   Apprehension: negative  Cross arm: negative  Drop arm: negative  Sotrm test: negative  Impingement: negative  Sulcus: absent  Anterosuperior Escape: negative  Lag Sign 0 degrees: negative  Lag Sign 90 degrees: negative  Lift Off Sign: negative  Belly Press: negative  Active Compression test (Curry's Sign): negative  Yergasons's Test: negative  Speed's Test: negative  Anterior Drawer Test: 1+  Posterior Drawer Test: 1+  Relocation 90 degrees: negative  Relocation > 90 degrees: negative  Bear Hug: negative  Moving Valgus: negative  Jerk Test: negative    Other   Sensation: normal       Muscle Strength   Right Upper Extremity   Shoulder Abduction: 5/5   Shoulder Internal Rotation: 5/5   Shoulder External Rotation: 5/5   Supraspinatus: 5/5/5   Subscapularis: 5/5/5   Biceps: 5/5/5   Left Upper Extremity  Shoulder Abduction: 5/5   Shoulder Internal Rotation: 5/5   Shoulder External Rotation: 4/5   Supraspinatus: 4/5/5   Subscapularis: 5/5/5   Biceps: 5/5/5   Right Lower Extremity   Hip Abduction: 5/5   Quadriceps:  4/5   Hamstrin/5   Left Lower Extremity   Hip Abduction: 5/5   Quadriceps:  5/5   Hamstrin/5     Reflexes     Left Side  Biceps:  2+  Triceps:  2+  Brachioradialis:  2+  Quadriceps:  2+  Achilles:  2+    Right Side   Biceps:  2+  Triceps:  2+  Brachioradialis:  2+  Quadriceps:  2+  Achilles:  2+    Vascular Exam     Right Pulses  Dorsalis Pedis:      2+  Posterior Tibial:      2+  Radial:                    2+      Left Pulses  Dorsalis Pedis:      2+  Posterior Tibial:      2+  Radial:                    2+      Capillary Refill  Right Hand: normal capillary refill  Left Hand: normal capillary  refill    Edema  Right Lower Leg: absent  Left Lower Leg: absent      Cervical spine MRI:  FINDINGS:  CORD: Normal size and signal.  No syrinx.  Cervicomedullary junction is normal.    ALIGNMENT: Normal.  Lateral masses of C1 and C2 are congruent.    BONES: Vertebral body heights are maintained.  No aggressive bone marrow signal.    PARASPINAL AREA: Normal.    CERVICAL DISC LEVELS:    C2-C3: No significant disc pathology.  Mild bilateral facet hypertrophy.  No spinal canal or foraminal stenosis.    C3-C4: Minimal disc osteophyte complex.  Mild-moderate bilateral facet hypertrophy.  No significant spinal canal or foraminal narrowing.    C4-C5: Minimal disc osteophyte complex.  Mild bilateral facet hypertrophy.  No significant spinal canal or foraminal narrowing.    C5-C6: Mild disc osteophyte complex.  Mild bilateral facet hypertrophy.  Slight ventral cord flattening with preserved ventral and dorsal CSF.  No significant foraminal narrowing.    C6-C7: Minimal disc osteophyte complex.  Minimal bilateral facet hypertrophy.  Minimal bi left lateral foraminal narrowing.    C7-T1: No significant disc pathology.  No spinal canal or foraminal stenosis.    Lumbar Spine MRI:  FINDINGS:  NOMENCLATURE: Five lumbar type vertebral bodies.    CORD/CAUDA EQUINA: Conus has normal size and signal and ends at a normal level at L1-L2.    ALIGNMENT: Normal.    BONES: Vertebral body heights are maintained.  No aggressive bone marrow signal.    PARASPINAL AREA: Normal.  Cystic lesion with fluid level in the pelvis, similar in appearance compared to prior study.  Right paramedian T2 hyperintense structure with layering fluid level, likely a hemorrhagic ovarian cyst.    LUMBAR DISC LEVELS:    T12-L1: No significant disc pathology.  No spinal canal or foraminal stenosis.    L1-L2: No significant disc pathology.  No spinal canal or foraminal stenosis.    L2-L3: No significant disc pathology.  Minimal bilateral facet hypertrophy.  No spinal  canal or foraminal stenosis.    L3-L4: Minimal disc bulge.  Mild bilateral facet hypertrophy.  No significant spinal canal or foraminal narrowing.    L4-L5: Minimal disc bulge.  Mild bilateral facet hypertrophy.  No significant spinal canal or foraminal narrowing.    L5-S1: Minimal disc bulge contained in the ventral epidural fat. Mild bilateral facet hypertrophy.  No significant spinal canal or foraminal narrowing.          Assessment:       Encounter Diagnoses   Name Primary?    Cervical radiculopathy Yes    Neck pain     Chronic low back pain with sciatica, sciatica laterality unspecified, unspecified back pain laterality           Plan:       1. ASES and SF-12 was not filled out today in clinic.   RTC in 3 months with Dr. Cooper Ramos. Patient will fill out ASES, SF-12 on return.    2. Cont PT/HEP for shoulder    3. Core/gluteal strengthening    We reviewed with Chey today, the pathology and natural history of her diagnosis. We have discussed a variety of treatment options including medications, physical therapy and other alternative treatments. I also explained the indications, risks and benefits of surgery. After discussion, Chey decided to proceed with surgery. The decision was made to go forward with:    Stage 2:  1. Right knee subchondroplasty medial femur  2. Right knee subchondroplasty medial tibia  3. Right knee arthroscopic chondroplasty  4. Right knee arthroscopic synovectomy  5. Right knee arthroscopic menisectomy  6. Amniox Arthrocentesis, Right    The details of the surgical procedure were explained, including the location of probable incisions and a description of likely hardware and/or grafts to be used.  The patient understands the likely convalescence after surgery.  Also, we have thoroughly discussed the risks, benefits and alternatives to surgery, including, but not limited to, the risk of infection, joint stiffness, blood clot (including DVT and/or pulmonary embolus), neurologic and  vascular injury.  It was explained that, if tissue has been repaired or reconstructed, there is a chance of failure, which may require further management.      All of the patient's questions were answered and informed consent was obtained. The patient will contact us if they have any questions or concerns in the interim.    4. I made the decision to obtain old records of the patient including previous notes and imaging. New imaging was ordered today of the extremity or extremities evaluated. I independently reviewed and interpreted the radiographs and/or MRIs today as well as prior imaging. Reviewed with patient in detail.    5. B/L UE EMG Nerve study scheduled today    6. Appt with Magen Mack scheduled                       Patient questionnaires may have been collected.

## 2018-11-05 ENCOUNTER — TELEPHONE (OUTPATIENT)
Dept: SPORTS MEDICINE | Facility: CLINIC | Age: 43
End: 2018-11-05

## 2018-11-05 NOTE — TELEPHONE ENCOUNTER
Called patient to see if she wants to make an apt. She had a question about the brace she was given from our clinic. I told her I will call her back after speaking with Jennifer

## 2018-11-05 NOTE — TELEPHONE ENCOUNTER
----- Message from Ramona Ortega sent at 11/5/2018  4:46 PM CST -----  Contact: self  Pt called requesting a return call back regarding her knee. Swollen and  very painful. Pt could be reached at [phone]

## 2018-11-12 ENCOUNTER — TELEPHONE (OUTPATIENT)
Dept: ORTHOPEDICS | Facility: CLINIC | Age: 43
End: 2018-11-12

## 2018-11-12 DIAGNOSIS — M50.30 DDD (DEGENERATIVE DISC DISEASE), CERVICAL: Primary | ICD-10-CM

## 2018-11-23 ENCOUNTER — OFFICE VISIT (OUTPATIENT)
Dept: URGENT CARE | Facility: CLINIC | Age: 43
End: 2018-11-23
Payer: MEDICAID

## 2018-11-23 VITALS
SYSTOLIC BLOOD PRESSURE: 110 MMHG | DIASTOLIC BLOOD PRESSURE: 77 MMHG | OXYGEN SATURATION: 98 % | HEART RATE: 75 BPM | BODY MASS INDEX: 23.92 KG/M2 | HEIGHT: 63 IN | RESPIRATION RATE: 18 BRPM | TEMPERATURE: 98 F | WEIGHT: 135 LBS

## 2018-11-23 DIAGNOSIS — R68.89 FLU-LIKE SYMPTOMS: Primary | ICD-10-CM

## 2018-11-23 PROCEDURE — 99214 OFFICE O/P EST MOD 30 MIN: CPT | Mod: S$GLB,,, | Performed by: PHYSICIAN ASSISTANT

## 2018-11-23 RX ORDER — PROMETHAZINE HYDROCHLORIDE AND DEXTROMETHORPHAN HYDROBROMIDE 6.25; 15 MG/5ML; MG/5ML
5 SYRUP ORAL NIGHTLY PRN
Qty: 180 ML | Refills: 1 | Status: SHIPPED | OUTPATIENT
Start: 2018-11-23 | End: 2018-12-03

## 2018-11-23 RX ORDER — BENZONATATE 200 MG/1
200 CAPSULE ORAL 3 TIMES DAILY PRN
Qty: 60 CAPSULE | Refills: 1 | Status: SHIPPED | OUTPATIENT
Start: 2018-11-23 | End: 2018-12-03

## 2018-11-23 RX ORDER — DEXAMETHASONE SODIUM PHOSPHATE 100 MG/10ML
10 INJECTION INTRAMUSCULAR; INTRAVENOUS ONCE
Status: COMPLETED | OUTPATIENT
Start: 2018-11-23 | End: 2018-11-23

## 2018-11-23 RX ADMIN — DEXAMETHASONE SODIUM PHOSPHATE 10 MG: 100 INJECTION INTRAMUSCULAR; INTRAVENOUS at 06:11

## 2018-11-24 NOTE — PROGRESS NOTES
"Subjective:       Patient ID: Chey Mcneill is a 43 y.o. female.    Vitals:  height is 5' 3" (1.6 m) and weight is 61.2 kg (135 lb). Her oral temperature is 98.1 °F (36.7 °C). Her blood pressure is 110/77 and her pulse is 75. Her respiration is 18 and oxygen saturation is 98%.     Chief Complaint: Sore Throat    Pt states has headache with sore throat      Sore Throat    This is a new problem. The current episode started in the past 7 days. The problem has been gradually worsening. Associated symptoms include coughing, headaches and a hoarse voice. Pertinent negatives include no congestion, ear pain, shortness of breath, stridor or vomiting. She has tried nothing for the symptoms. The treatment provided no relief.       Constitution: Negative for chills, sweating, fatigue and fever.   HENT: Positive for sore throat. Negative for ear pain, congestion, sinus pain, sinus pressure and voice change.    Neck: Negative for painful lymph nodes.   Eyes: Negative for eye redness.   Respiratory: Positive for cough. Negative for chest tightness, sputum production, bloody sputum, COPD, shortness of breath, stridor, wheezing and asthma.    Gastrointestinal: Negative for nausea and vomiting.   Musculoskeletal: Negative for muscle ache.   Skin: Negative for rash.   Allergic/Immunologic: Negative for seasonal allergies and asthma.   Neurological: Positive for headaches.   Hematologic/Lymphatic: Negative for swollen lymph nodes.       Objective:      Physical Exam   Constitutional: She is oriented to person, place, and time. She appears well-developed and well-nourished. She is cooperative.  Non-toxic appearance. She appears ill. No distress.   HENT:   Head: Normocephalic and atraumatic.   Right Ear: Hearing, tympanic membrane, external ear and ear canal normal.   Left Ear: Hearing, tympanic membrane, external ear and ear canal normal.   Nose: Nose normal. No mucosal edema, rhinorrhea or nasal deformity. No epistaxis. Right " sinus exhibits no maxillary sinus tenderness and no frontal sinus tenderness. Left sinus exhibits no maxillary sinus tenderness and no frontal sinus tenderness.   Mouth/Throat: Uvula is midline, oropharynx is clear and moist and mucous membranes are normal. No trismus in the jaw. Normal dentition. No uvula swelling. No posterior oropharyngeal erythema.   Eyes: Conjunctivae and lids are normal. No scleral icterus.   Sclera clear bilat   Neck: Trachea normal, full passive range of motion without pain and phonation normal. Neck supple.   Cardiovascular: Normal rate, regular rhythm, normal heart sounds, intact distal pulses and normal pulses.   Pulmonary/Chest: Effort normal and breath sounds normal. No respiratory distress.   Abdominal: Soft. Normal appearance and bowel sounds are normal. She exhibits no distension. There is no tenderness.   Musculoskeletal: Normal range of motion. She exhibits no edema or deformity.   Neurological: She is alert and oriented to person, place, and time. She exhibits normal muscle tone. Coordination normal.   Skin: Skin is warm, dry and intact. She is not diaphoretic. No pallor.   Psychiatric: She has a normal mood and affect. Her speech is normal and behavior is normal. Judgment and thought content normal. Cognition and memory are normal.   Nursing note and vitals reviewed.      Assessment:       1. Flu-like symptoms        Plan:         Flu-like symptoms  -     dexamethasone injection 10 mg  -     benzonatate (TESSALON) 200 MG capsule; Take 1 capsule (200 mg total) by mouth 3 (three) times daily as needed for Cough.  Dispense: 60 capsule; Refill: 1  -     promethazine-dextromethorphan (PROMETHAZINE-DM) 6.25-15 mg/5 mL Syrp; Take 5 mLs by mouth nightly as needed.  Dispense: 180 mL; Refill: 1    patient declines flu test.     Patient Instructions   INFLUENZA (Adult)          Influenza, also called 'the flu,' is a viral illness that affects the air passages of the lungs. It differs from  the common cold. It is highly contagious. It may be spread through the air by coughing and sneezing or by direct contact (touching the sick person and then touching your own eyes, nose or mouth).    Illness starts 1-3 days after exposure and lasts for 1-2 weeks. Antibiotics are usually not needed unless a complication appears (ear or sinus infection or pneumonia).    Symptoms may be mild or severe and can include extreme tiredness (wanting to stay in bed all day), chills, fevers, muscle aching, soreness with eye movement, headache and a dry, hacking cough.    HOME CARE:  Avoid exposure to cigarette smoke (yours or others).  Tylenol or ibuprofen (Advil) will help fever, muscle aching and headache. To avoid risk of liver injury, aspirin should not be used in children and teenagers under 18 with this illness.  Nausea and loss of appetite are common. A light diet is recommended. Avoid dehydration by drinking 6-8 glasses of fluids per day (water, sport drinks like Gatorade, soft drinks without caffeine, juices, tea, soup, etc.). Extra fluids will also help loosen secretions in the nose and lungs.  Over-the-counter cold medicines will not shorten the duration of the illness but may be helpful for the following symptoms: cough (Robitussin DM); sore throat (Chloraseptic lozenges or spray); nasal and sinus congestion (Actifed or Sudafed). [NOTE: Do not use decongestants if you have high blood pressure.]  Stay home until your fever has been gone for at least 24 hours (without the use of fever-reducing medications such as ibuprofen).    FOLLOW UP with your doctor or as directed by our staff if you are not improving over the next week.    NOTE: If you are age 65 or older, or if you have chronic asthma or COPD, we recommend a pneumococcal vaccination every five years and a yearly influenza vaccination (flu-shot) every autumn. Ask your doctor about this.    GET PROMPT MEDICAL ATTENTION if any of the following occur:  Cough with  lots of colored sputum (mucus) or blood in your sputum  Chest pain, shortness of breath, wheezing or difficulty breathing  Severe headache, face, neck or ear pain  New rash  Fever over 101.5º F (38.6 C) for more than three days  Confusion, behavior change or seizure  Severe weakness or dizziness     Symptomatic treatment:    Alternate Tylenol and Ibuprofen every 3 hrs  salt water gargles to soothe throat  Honey/lemon water to soothe throat  Cold-eeze helps to reduce the duration of URI symptoms  Elderberry to reduce duration of URI symptoms  Cepachol helps to numb the discomfort in throat  Nasal saline spray reduces inflammation and dryness  Warm face compresses/hot showers as often as you can to open sinuses   Vicks vapor rub at night  Flonase OTC or Nasacort OTC  Simple foods like chicken noodle soup help hydrate  Delsym helps with coughing at night  Zyrtec/Claritin during the day and Benadryl at night may help if allergy component   Zantac will help if there is reflux from the post nasal drip  Rest as much as you can  Your symptoms will likely last 5-7 days, maybe longer depending on how it affects your body.  You are contagious 5-7, so minimize contact with others to reduce the spread to others. Dehydration is preventable but is one of the main reasons why you will feel so badly. Drink pedialyte, gatorade or propel. Stay hydrated.  Antibiotics are not needed unless a complication(such as Otitis Media, Bacterial sinus infection or pneumonia). Taking antibiotics for Flu/Cold is not supported by evidence-based medicine and can expose you to unnecessary side effects of the medication, such as anaphylaxis.   If you experience any:  Chest pain, shortness of breath, wheezing or difficulty breathing  Severe headache, face, neck or ear pain  New rash  Fever over 101.5º F (38.6 C) for more than three days  Confusion, behavior change or seizure  Severe weakness or dizziness  Go to ER      If you were prescribed a narcotic  medication, do not drive or operate heavy equipment or machinery while taking these medications.  You must understand that you've received an Urgent Care treatment only and that you may be released before all your medical problems are known or treated. You, the patient, will arrange for follow up care as instructed.  Follow up with your PCP or specialty clinic as directed in the next 1-2 weeks if not improved or as needed.  You can call [phone] to schedule an appointment with the appropriate provider.  If your condition worsens we recommend that you receive another evaluation at the emergency room immediately or contact your primary medical clinics after hours call service to discuss your concerns.  Please return here or go to the Emergency Department for any concerns or worsening of condition.

## 2018-11-24 NOTE — PATIENT INSTRUCTIONS
INFLUENZA (Adult)          Influenza, also called 'the flu,' is a viral illness that affects the air passages of the lungs. It differs from the common cold. It is highly contagious. It may be spread through the air by coughing and sneezing or by direct contact (touching the sick person and then touching your own eyes, nose or mouth).    Illness starts 1-3 days after exposure and lasts for 1-2 weeks. Antibiotics are usually not needed unless a complication appears (ear or sinus infection or pneumonia).    Symptoms may be mild or severe and can include extreme tiredness (wanting to stay in bed all day), chills, fevers, muscle aching, soreness with eye movement, headache and a dry, hacking cough.    HOME CARE:  Avoid exposure to cigarette smoke (yours or others).  Tylenol or ibuprofen (Advil) will help fever, muscle aching and headache. To avoid risk of liver injury, aspirin should not be used in children and teenagers under 18 with this illness.  Nausea and loss of appetite are common. A light diet is recommended. Avoid dehydration by drinking 6-8 glasses of fluids per day (water, sport drinks like Gatorade, soft drinks without caffeine, juices, tea, soup, etc.). Extra fluids will also help loosen secretions in the nose and lungs.  Over-the-counter cold medicines will not shorten the duration of the illness but may be helpful for the following symptoms: cough (Robitussin DM); sore throat (Chloraseptic lozenges or spray); nasal and sinus congestion (Actifed or Sudafed). [NOTE: Do not use decongestants if you have high blood pressure.]  Stay home until your fever has been gone for at least 24 hours (without the use of fever-reducing medications such as ibuprofen).    FOLLOW UP with your doctor or as directed by our staff if you are not improving over the next week.    NOTE: If you are age 65 or older, or if you have chronic asthma or COPD, we recommend a pneumococcal vaccination every five years and a yearly influenza  vaccination (flu-shot) every autumn. Ask your doctor about this.    GET PROMPT MEDICAL ATTENTION if any of the following occur:  Cough with lots of colored sputum (mucus) or blood in your sputum  Chest pain, shortness of breath, wheezing or difficulty breathing  Severe headache, face, neck or ear pain  New rash  Fever over 101.5º F (38.6 C) for more than three days  Confusion, behavior change or seizure  Severe weakness or dizziness     Symptomatic treatment:    Alternate Tylenol and Ibuprofen every 3 hrs  salt water gargles to soothe throat  Honey/lemon water to soothe throat  Cold-eeze helps to reduce the duration of URI symptoms  Elderberry to reduce duration of URI symptoms  Cepachol helps to numb the discomfort in throat  Nasal saline spray reduces inflammation and dryness  Warm face compresses/hot showers as often as you can to open sinuses   Vicks vapor rub at night  Flonase OTC or Nasacort OTC  Simple foods like chicken noodle soup help hydrate  Delsym helps with coughing at night  Zyrtec/Claritin during the day and Benadryl at night may help if allergy component   Zantac will help if there is reflux from the post nasal drip  Rest as much as you can  Your symptoms will likely last 5-7 days, maybe longer depending on how it affects your body.  You are contagious 5-7, so minimize contact with others to reduce the spread to others. Dehydration is preventable but is one of the main reasons why you will feel so badly. Drink pedialyte, gatorade or propel. Stay hydrated.  Antibiotics are not needed unless a complication(such as Otitis Media, Bacterial sinus infection or pneumonia). Taking antibiotics for Flu/Cold is not supported by evidence-based medicine and can expose you to unnecessary side effects of the medication, such as anaphylaxis.   If you experience any:  Chest pain, shortness of breath, wheezing or difficulty breathing  Severe headache, face, neck or ear pain  New rash  Fever over 101.5º F (38.6 C) for  more than three days  Confusion, behavior change or seizure  Severe weakness or dizziness  Go to ER      If you were prescribed a narcotic medication, do not drive or operate heavy equipment or machinery while taking these medications.  You must understand that you've received an Urgent Care treatment only and that you may be released before all your medical problems are known or treated. You, the patient, will arrange for follow up care as instructed.  Follow up with your PCP or specialty clinic as directed in the next 1-2 weeks if not improved or as needed.  You can call [phone] to schedule an appointment with the appropriate provider.  If your condition worsens we recommend that you receive another evaluation at the emergency room immediately or contact your primary medical clinics after hours call service to discuss your concerns.  Please return here or go to the Emergency Department for any concerns or worsening of condition.

## 2019-01-14 ENCOUNTER — HOSPITAL ENCOUNTER (OUTPATIENT)
Dept: RADIOLOGY | Facility: HOSPITAL | Age: 44
Discharge: HOME | End: 2019-01-14
Attending: ORTHOPAEDIC SURGERY
Payer: MEDICAID

## 2019-01-14 ENCOUNTER — OFFICE VISIT (OUTPATIENT)
Dept: SPORTS MEDICINE | Facility: CLINIC | Age: 44
End: 2019-01-14
Payer: MEDICAID

## 2019-01-14 VITALS
DIASTOLIC BLOOD PRESSURE: 69 MMHG | WEIGHT: 135 LBS | SYSTOLIC BLOOD PRESSURE: 107 MMHG | BODY MASS INDEX: 23.92 KG/M2 | HEART RATE: 79 BPM | HEIGHT: 63 IN

## 2019-01-14 DIAGNOSIS — M25.512 LEFT SHOULDER PAIN, UNSPECIFIED CHRONICITY: Primary | ICD-10-CM

## 2019-01-14 DIAGNOSIS — M75.102 ROTATOR CUFF SYNDROME OF LEFT SHOULDER: ICD-10-CM

## 2019-01-14 DIAGNOSIS — R29.898 DECREASED STRENGTH OF UPPER EXTREMITY: ICD-10-CM

## 2019-01-14 DIAGNOSIS — M77.12 LEFT TENNIS ELBOW: ICD-10-CM

## 2019-01-14 DIAGNOSIS — M25.612 DECREASED RANGE OF MOTION OF LEFT SHOULDER: ICD-10-CM

## 2019-01-14 DIAGNOSIS — M25.512 LEFT SHOULDER PAIN, UNSPECIFIED CHRONICITY: ICD-10-CM

## 2019-01-14 DIAGNOSIS — M94.261 CHONDROMALACIA, RIGHT KNEE: ICD-10-CM

## 2019-01-14 DIAGNOSIS — M25.522 LEFT ELBOW PAIN: ICD-10-CM

## 2019-01-14 DIAGNOSIS — S43.432D TYPE 2 SUPERIOR LABRUM EXTENDING FROM ANTERIOR TO POSTERIOR (SLAP) LESION OF LEFT SHOULDER, SUBSEQUENT ENCOUNTER: ICD-10-CM

## 2019-01-14 PROCEDURE — 99213 OFFICE O/P EST LOW 20 MIN: CPT | Mod: PBBFAC,25,PO | Performed by: ORTHOPAEDIC SURGERY

## 2019-01-14 PROCEDURE — 73030 XR SHOULDER COMPLETE 2 OR MORE VIEWS LEFT: ICD-10-PCS | Mod: 26,LT,, | Performed by: RADIOLOGY

## 2019-01-14 PROCEDURE — 99999 PR PBB SHADOW E&M-EST. PATIENT-LVL III: CPT | Mod: PBBFAC,,, | Performed by: ORTHOPAEDIC SURGERY

## 2019-01-14 PROCEDURE — 99999 PR PBB SHADOW E&M-EST. PATIENT-LVL III: ICD-10-PCS | Mod: PBBFAC,,, | Performed by: ORTHOPAEDIC SURGERY

## 2019-01-14 PROCEDURE — 99214 OFFICE O/P EST MOD 30 MIN: CPT | Mod: S$PBB,,, | Performed by: ORTHOPAEDIC SURGERY

## 2019-01-14 PROCEDURE — 73030 X-RAY EXAM OF SHOULDER: CPT | Mod: TC,FY,PO,LT

## 2019-01-14 PROCEDURE — 73030 X-RAY EXAM OF SHOULDER: CPT | Mod: 26,LT,, | Performed by: RADIOLOGY

## 2019-01-14 PROCEDURE — 99214 PR OFFICE/OUTPT VISIT, EST, LEVL IV, 30-39 MIN: ICD-10-PCS | Mod: S$PBB,,, | Performed by: ORTHOPAEDIC SURGERY

## 2019-01-14 NOTE — PATIENT INSTRUCTIONS
Understanding Lateral Epicondylitis    Tendons are strong bands of tissue that connect muscles to bones. Lateral epicondylitis affects the tendons that connect muscles in the forearm to the lateral epicondyle. This is the bony knob on the outer side of the elbow. The condition occurs if the extensor tendons of the wrist become red and swollen (irritated). This can cause pain in the elbow, forearm, and wrist. Because the condition is sometimes caused by playing tennis, it is also known as tennis elbow.  How to say it  LA-[id number]-gisella [id number]   What causes lateral epicondylitis?  The condition most often occurs because of overuse. This can be from any activity that repeatedly puts stress on the forearm extensor muscles or tendons and wrist. For instance, playing tennis, lifting weights, cutting meat, painting, and typing can all cause the condition. Wear and tear of the tendons from aging or an injury to the tendons can also cause the condition.  Symptoms of lateral epicondylitis  The most common symptom is pain. You may feel it on the outer side of the elbow and down the back of the forearm. It may be worse when moving or using the elbow, forearm, or wrist. You may also feel pain when gripping or lifting things.  Treatment for lateral epicondylitis  Treatments may include:  · Resting the elbow, forearm, and wrist. Youll need to avoid movements that can make your symptoms worse. You also may need to avoid certain sports and types of work for a time. This helps relieve symptoms and prevent further damage to the tendons.  · Changing the action that caused the problem. For instance, if the tendons were damaged from playing tennis, it may help to change your playing technique or use different equipment. This helps prevent further damage to the tendons.  · Using cold packs. Putting an ice pack on the injured area can help reduce pain and swelling.  · Taking pain medicines. Taking prescription or  over-the-counter pain medicines may help reduce pain and swelling.    · Wearing a brace. This helps reduce strain on the muscles and tendons in the forearm, which may relieve symptoms. It is very important to wear the brace properly.  · Doing exercises and physical therapy. These help improve strength and range of motion in the elbow, forearm, and wrist.  · Getting shots of medicine into the injured area. These may help relieve symptoms for a time.  · Having surgery. This may be an option if other treatments fail to relieve symptoms. In many cases, the surgeon removes the damaged tissue.  Possible complications of lateral epicondylitis  If the tendons involved dont heal properly, symptoms may return or get worse. To help prevent this, follow your treatment plan as directed.  When to call your healthcare provider  Call your healthcare provider right away if you have any of these:  · Fever of 100.4°F (38°C) or higher, or as directed  · Redness, swelling, or warmth in the elbow or forearm that gets worse  · Symptoms that dont get better with treatment, or get worse  · New symptoms   Date Last Reviewed: 3/10/2016  © 8410-5800 [employer]. [address] [place] [address]. All rights reserved. This information is not intended as a substitute for professional medical care. Always follow your healthcare professional's instructions.        Tennis Elbow  Muscles connect to bones by thick, fibrous cords (tendons). When the muscles are overused by repeated motion, the tendons may become inflamed and painful. This condition is called tendonitis.  Tennis elbow (lateral epicondylitis) is a form of tendonitis. It occurs when the forearm muscles are used again and again in a twisting motion. Pain from tennis elbow occurs mainly on the outside of the elbow. But the pain can spread into the forearm and wrist. Your elbow may also be swollen and tender to the touch.  The pain may get worse when you move your  arm or do simple activities. Bending your wrist back, shaking hands, or turning a doorknob may cause pain. The pain often gets worse after several weeks or months. Sometimes you may feel pain when your arm is still.  Tennis players who use a backhand stroke with poor technique are more likely to get tennis elbow. But playing tennis is only one cause of tennis elbow. Other common activities that can cause it include:  · Hammering  · Painting  · Raking  Besides tennis players, people at risk include , gardeners, musicians, and dentists. Sometimes people get tennis elbow without doing anything that would cause the injury.  Treatment includes resting the arm and taking anti-inflammatory medicines. Special splints help ease symptoms. Symptoms should get better after 4 to 6 weeks of rest. You may need steroid injections if resting and using a splint dont help. After the pain is relieved, you should change your activities so the symptoms dont return. You may need physical therapy. It may include stretching, range-of-motion, and strengthening exercises. These treatments help most cases. You may need surgery if your symptoms continue for 6 months.  Home care  Follow these guidelines when caring for yourself at home:  · Rest your elbow as needed. Protect it from movement that causes pain. You may be told to use a forearm splint at night to ease symptoms in the morning. Your health care provider may recommend a special wrap or splint to compress the muscles of the forearm. This can ease pain during daytime activities. As your symptoms get better, start to move your elbow more.  · Put an ice pack on the injured area. Do this for 20 minutes every 1 to 2 hours the first day for pain relief. You can make an ice pack by wrapping a plastic bag of ice cubes in a thin towel. Continue using the ice pack 3 to 4 times a day for the next 2 days. Then use the ice pack as needed to ease pain and swelling.  · You may use  acetaminophen or ibuprofen to control pain, unless another pain medicine was prescribed. If you have chronic liver or kidney disease, talk with your health care provider before using these medicines. Also talk with your provider if youve had a stomach ulcer or GI bleeding.  · After your elbow heals, avoid the motion that caused your pain. Or learn to move in a way that causes less stress on the tendon. Using a forearm wrap may keep tennis elbow from happening again.  · A tennis elbow strap may ease pain and keep you from further injury when you start playing tennis again. You can also lower your risk for injury by warming up before you play and cooling down afterward. You should also use the right equipment. For instance, make sure your racquet has the right  and is the right size for you.  Follow-up care  Follow up with your health care provider, or as advised, if your symptoms dont get better after 1 week of treatment.  When to seek medical advice  Call your health care provider right away if any of these occur:  · Redness over the painful area  · Pain or swelling at the elbow gets worse  · Any numbness or tingling in your arm, hands, or fingers  · Unexplained fever over 101ºF (37.8ºC)   Date Last Reviewed: 2/17/2015  © 3166-4214 [employer]. [address] [place] [address], [place] [address]. All rights reserved. This information is not intended as a substitute for professional medical care. Always follow your healthcare professional's instructions.        Treating Tennis Elbow    Your treatment will depend on how inflamed your tendon is. The goal is to relieve your symptoms and help you regain full use of your elbow.  Rest and medicine  Wearing a tennis elbow splint allows the inflamed tendon to rest. It must be worn properly. It should be placed down the arm past the painful area of the elbow. If it is directly over the inflamed tendon, it can worsen the symptoms. This brace can help the tendon heal.  Using your other hand or changing your  also takes stress off the tendon. Oral nonsteroidal anti-inflammatory medicines (NSAIDs) and/or ice can relieve pain and reduce swelling.  Exercises and therapy  Your healthcare provider may give you an exercise program. He or she may refer you to a therapist. The therapist will teach you to gently stretch and then strengthen the muscles around your elbow.  Anti-inflammatory injections  Your healthcare provider may give you injections of an anti-inflammatory, such as cortisone. This helps reduce swelling. You may have more pain at first. But in a few days, your elbow should feel better.  If surgery is needed  If your symptoms persist for a long time, or other treatments dont work, your healthcare provider may recommend surgery. Surgery repairs the inflamed tendon.   Date Last Reviewed: 9/26/2015  © 9630-6696 [employer]. [address] [place] [address]. All rights reserved. This information is not intended as a substitute for professional medical care. Always follow your healthcare professional's instructions.        Wrist Flexion (Flexibility)    1. Stand or sit and hold your arms straight out in front of you.  2. Dangle your right hand at the wrist. Gently press down on your right hand with your left hand. Feel the stretch in your right wrist and the top of your hand.  3. Hold for 30 to 60 seconds, then relax.  4. Switch arms and repeat 2 times.   Date Last Reviewed: 3/10/2016  © 1809-9998 [employer]. [address] [place] [address]. All rights reserved. This information is not intended as a substitute for professional medical care. Always follow your healthcare professional's instructions.

## 2019-01-14 NOTE — PROGRESS NOTES
Subjective:          Chief Complaint: Chey Mcneill is a 43 y.o. female who had concerns including Pain of the Left Shoulder.    RHD Patient is here today for left shoulder and left elbow follow up. She reports that the shoulder is doing okay. Her primary concern for today is her left elbow. She underwent Left Triceps Repair and PRP in 8/2012 by Dr. Ramos. She describes pain with lifting anything greater than about 1 lb. She noticed this a few weeks ago when she was getting ready for a bonfire.     She is having a lot of knee pain and swelling. It gave out on her recently.       DATE OF PROCEDURE: 4/19/2018     ATTENDING SURGEON: Surgeon(s) and Role:     * Cooper Ramos MD - Primary     * Keith Valadez MD-Fellow     * SMA Chance-Assistant     PREOPERATIVE DIAGNOSIS:    Pre-operative Diagnosis: Left shoulder   Superior glenoid labrum lesion (SLAP) S43.439A Impingement Syndrome M75.40     Post-operative Diagnosis:  Left shoulder   Superior glenoid labrum lesion (SLAP) S43.439A Impingement Syndrome M75.40                 Procedures Performed:  1. Left shoulder Arthroscopic subacromial decompression and bursectomy CPT - 69323     2. Left shoulder Arthroscopic labral debridement CPT - 49172     3. Left shoulder Amniox Arthrocentesis, 42544    DATE OF PROCEDURE:  05/11/2017     ATTENDING SURGEON:  Cooper Ramos M.D.     FIRST ASSISTANT:  Antione Henao M.D.(RES) - Fellow.     SECOND ASSISTANT:  SMA hCance -- assistant.     PREOPERATIVE DIAGNOSIS:  Right knee chondromalacia.     POSTOPERATIVE DIAGNOSES:  1.  Right knee chondromalacia.  2.  Right knee patellar chondromalacia.  3.  Right knee patellar malalignment syndrome.  4.  Right knee medial meniscus tear.     OPERATIVE PROCEDURES PERFORMED:  1.  Right knee complex osteochondral allograft transplantation, medial femoral   condyle and patella.  2.  Right knee complex patella realignment.  3.  Right knee arthroscopic medial  meniscectomy.  4.  Right knee arthroscopic chondroplasty.  5.  Right knee arthroscopic loose body removal.  6.  Right knee Amniox arthrocentesis.      Denies any change in activities. She has been taking NSAIDS as needed for pain without relief. Pain is located over medial aspect of knee. Denies mechanical symptoms or instability.     Hx of multiple bilateral knee surgeries   Left knee- ACI with Dr. Ramos in 1/26/2012  Right knee- multiple scopes, lateral releases, and anterior tibial tubercleplasty by outside physician. Hardware removal by Dr. Ramos in 2010        Back Pain   Pertinent negatives include no abdominal pain, chest pain, fever, headaches, numbness, paresthesias or weight loss.   Pain   Associated symptoms include joint swelling. Pertinent negatives include no abdominal pain, chest pain, chills, congestion, coughing, fever, headaches, myalgias, nausea, numbness, rash, sore throat or vomiting.          Review of Systems   Constitution: Negative. Negative for chills, fever, weight gain and weight loss.   HENT: Negative for congestion and sore throat.    Eyes: Negative for blurred vision and double vision.   Cardiovascular: Negative for chest pain, leg swelling and palpitations.   Respiratory: Negative for cough and shortness of breath.    Hematologic/Lymphatic: Does not bruise/bleed easily.   Skin: Negative for itching, poor wound healing and rash.   Musculoskeletal: Positive for back pain, joint pain, joint swelling and stiffness. Negative for muscle weakness and myalgias.   Gastrointestinal: Negative for abdominal pain, constipation, diarrhea, nausea and vomiting.   Genitourinary: Negative.  Negative for frequency and hematuria.   Neurological: Negative for dizziness, headaches, numbness, paresthesias and sensory change.   Psychiatric/Behavioral: Negative for altered mental status and depression. The patient is not nervous/anxious.    Allergic/Immunologic: Negative for hives.       Pain Related  Questions  Over the past 3 days, what was your average pain during activity? (I.e. running, jogging, walking, climbing stairs, getting dressed, ect.): 0  Over the past 3 days, what was your highest pain level?: 0  Over the past 3 days, what was your lowest pain level? : 0    Other  How many nights a week are you awakened by your affected body part?: 0      Objective:        General: Chey is well-developed, well-nourished, appears stated age, in no acute distress, alert and oriented to time, place and person. Endorses instability    General    Vitals reviewed.  Constitutional: She is oriented to person, place, and time. She appears well-developed and well-nourished. No distress.   HENT:   Right Ear: External ear normal.   Left Ear: External ear normal.   Nose: Nose normal.   Mouth/Throat: No oropharyngeal exudate.   Eyes: Pupils are equal, round, and reactive to light. Right eye exhibits no discharge. Left eye exhibits no discharge.   Neck: Normal range of motion.   Cardiovascular: Normal rate, regular rhythm and intact distal pulses.    Pulmonary/Chest: Effort normal and breath sounds normal. No respiratory distress.   Abdominal: Soft.   Neurological: She is alert and oriented to person, place, and time. She has normal reflexes. She displays normal reflexes. No cranial nerve deficit. She exhibits normal muscle tone. Coordination normal.   Psychiatric: She has a normal mood and affect. Her behavior is normal. Judgment and thought content normal.     General Musculoskeletal Exam   Gait: normal       Right Knee Exam     Inspection   Erythema: absent  Scars: present  Swelling: present  Effusion: absent  Deformity: absent  Bruising: absent    Tenderness   The patient is tender to palpation of the medial joint line and condyle (medial tibia).    Crepitus   Right knee crepitus location: mild.    Range of Motion   Extension: 5   Flexion:  140 abnormal     Tests   Meniscus   Leela:  Medial - negative Lateral -  negative  Ligament Examination Lachman: normal (-1 to 2mm) PCL-Posterior Drawer: normal (0 to 2mm)     MCL - Valgus: normal (0 to 2mm)  LCL - Varus: normalPivot Shift: normal (Equal)Reverse Pivot Shift: normal (Equal)Dial Test at 30 degrees: normal (< 5 degrees)Dial Test at 90 degrees: normal (< 5 degrees)  Posterior Sag Test: negative  Posterolateral Corner: unstable (>15 degrees difference)  Patella   Patellar apprehension: negative  Passive Patellar Tilt: neutral  Patellar Tracking: normal  Patellar Glide (quadrants): Lateral - 1   Medial - 2  Q-Angle at 90 degrees: normal  Patellar Grind: negative  J-Sign: none    Other   Meniscal Cyst: absent  Popliteal (Baker's) Cyst: absent  Sensation: normal    Left Knee Exam     Inspection   Erythema: absent  Scars: present  Swelling: absent  Effusion: absent  Deformity: absent  Bruising: absent    Tenderness   The patient is experiencing no tenderness.     Crepitus   The patient has crepitus of the patella (mild).    Range of Motion   Extension: 5   Flexion: 140     Tests   Meniscus   Leela:  Medial - negative Lateral - negative  Stability Lachman: normal (-1 to 2mm) PCL-Posterior Drawer: normal (0 to 2mm)  MCL - Valgus: normal (0 to 2mm)  LCL - Varus: normal (0 to 2mm)Pivot Shift: normal (Equal)Reverse Pivot Shift: normal (Equal)Dial Test at 30 degrees: normal (< 5 degrees)Dial Test at 90 degrees: normal (< 5 degrees)  Posterior Sag Test: negative  Posterolateral Corner: unstable (>15 degrees difference)  Patella   Patellar apprehension: negative  Passive Patellar Tilt: neutral  Patellar Tracking: normal  Patellar Glide (Quadrants): Lateral - 1 Medial - 2  Q-Angle at 90 degrees: normal  Patellar Grind: negative  J-Sign: J sign absent    Other   Meniscal Cyst: absent  Popliteal (Baker's) Cyst: absent  Sensation: normal    Right Hip Exam     Tests   Raymond: negative  Left Hip Exam     Tests   Raymond: negative          Right Hand/Wrist Exam     Inspection   Scars: Wrist -  absent   Effusion: Wrist - absent   Bruising: Wrist - absent   Deformity: Wrist - deformity     Range of Motion     Wrist   Extension:  50 normal   Flexion:  70 normal   Abduction: 20 normal  Adduction: 35 normal    Tests   Phalens sign: positive  Tinel's sign (median nerve): positive  Finkelstein's test: negative  Carpal Tunnel Compression Test: negative  Cubital Tunnel Compression Test: negative  LT Stability: negative  Ewing's Test: negative      Other     Neuorologic Exam    Median Distribution: normal  Ulnar Distribution: normal  Radial Distribution: normal    Comments:  + Durkins      Left Hand/Wrist Exam     Inspection   Scars: Wrist - absent   Effusion: Wrist - absent   Bruising: Wrist - absent   Deformity: Wrist - absent     Range of Motion     Wrist   Extension:  50 normal   Flexion:  70 normal   Abduction: 20 normal  Adduction: 35 normal    Tests   Phalens sign: positive  Tinel's sign (median nerve): positive  Finkelstein's test: negative  Carpal Tunnel Compression Test: negative  Cubital Tunnel Compression Test: negative  LT Stability: negative  Ewing's Test: negative      Other     Sensory Exam  Median Distribution: normal  Ulnar Distribution: normal  Radial Distribution: normal    Comments:  + Durkins      Right Elbow Exam     Inspection   Scars: absent  Effusion: absent  Bruising: absent  Deformity: absent  Atrophy: absent    Range of Motion   Extension:  0 normal   Flexion:  130 normal   Pronation: normal   Supination:  80 normal     Tests   Varus: negative  Valgus: negative  Tinel's sign (cubital tunnel): negative  Tennis Elbow: negative  Golfer's Elbow: negative  Radial Capitellar Grind: negative    Other   Sensation: normal      Left Elbow Exam     Inspection   Scars: absent  Effusion: present  Bruising: absent  Deformity: absent  Atrophy: absent    Pain   The patient exhibits pain of the lateral epicondyle    Swelling   The patient is swollen on the lateral epicondyle    Tenderness   The  patient is tender to palpation of the lateral epicondyle.     Range of Motion   Extension:  0 normal   Flexion:  130 normal   Pronation: normal Left elbow pronation: 90.   Supination:  80 normal     Tests   Varus: negative  Tinel's sign (cubital tunnel): negative  Tennis Elbow: negative  Golfer's Elbow: negative  Radial Capitellar Grind: negative    Other   Sensation: normal    Right Shoulder Exam     Inspection/Observation   Swelling: absent  Bruising: absent  Scars: absent  Deformity: absent  Scapular Winging: absent  Scapular Dyskinesia: negative  Atrophy: absent    Tenderness   The patient is experiencing no tenderness.    Range of Motion   Active abduction:  90 normal   Passive abduction:  100 normal   Extension:  0 normal   Forward Flexion:  180 normal   Forward Elevation: 180 normal  Adduction: 40 normal  External Rotation 0 degrees:  50 normal   External Rotation 90 degrees: 90 normal  Internal rotation 0 degrees:  T8 normal   Internal rotation 90 degrees:  30 normal     Tests & Signs   Apprehension: negative  Cross arm: negative  Drop arm: negative  Storm test: negative  Impingement: negative  Sulcus: absent  Anterosuperior Escape: negative  Lag Sign 0 degrees: negative  Lag Sign 90 degrees: negative  Lift Off Sign: negative  Belly Press: negative  Active Compression Test (Sullivan's Sign): negative  Yergason's Test: negative  Speed's Test: negative  Anterior Drawer Test: 1+   Posterior Drawer Test: 1+  Relocation 90 degrees: negative  Relocation > 90 degrees: negative  Bear Hug: negative  Moving Valgus: negative  Jerk Test: negative    Other   Sensation: normal    Left Shoulder Exam     Inspection/Observation   Swelling: absent  Bruising: absent  Scars: present  Deformity: absent  Scapular Winging: absent  Scapular Dyskinesia: negative  Atrophy: absent    Tenderness   The patient is tender to palpation of the greater tuberosity.    Range of Motion   Active abduction:  90 normal   Passive abduction:  100  normal   Extension:  0 normal   Forward Flexion:  180 abnormal   Forward Elevation: 180 abnormal  Adduction: 40 normal  External Rotation 0 degrees:  50 normal   External Rotation 90 degrees: 80 normal  Internal rotation 0 degrees:  T8 normal   Internal rotation 90 degrees:  30 normal     Tests & Signs   Apprehension: negative  Cross arm: negative  Drop arm: negative  Storm test: negative  Impingement: negative  Sulcus: absent  Anterosuperior Escape: negative  Lag Sign 0 degrees: negative  Lag Sign 90 degrees: negative  Lift Off Sign: negative  Belly Press: negative  Active Compression test (Taylor's Sign): negative  Yergasons's Test: negative  Speed's Test: negative  Anterior Drawer Test: 1+  Posterior Drawer Test: 1+  Relocation 90 degrees: negative  Relocation > 90 degrees: negative  Bear Hug: negative  Moving Valgus: negative  Jerk Test: negative    Other   Sensation: normal       Muscle Strength   Right Upper Extremity   Shoulder Abduction:    Shoulder Internal Rotation:    Shoulder External Rotation:    Supraspinatus:    Subscapularis:    Biceps:    Wrist extension:    Wrist flexion:    :    Pinch Mechanism:   Elbow Pronation:     Elbow Supination:     Elbow Extension:   Elbow Flexion:   Intrinsics:   EPL (Extensor Pollicis Longus):   Left Upper Extremity  Shoulder Abduction:    Shoulder Internal Rotation:    Shoulder External Rotation:    Supraspinatus:    Subscapularis:    Biceps:    Wrist extension:    Wrist flexion:    :     Pinch Mechanism:   Elbow Pronation:     Elbow Supination:     Elbow Extension:   Elbow Flexion:   Intrinsics:   EPL (Extensor Pollicis Longus):   Right Lower Extremity   Hip Abduction:    Quadriceps:     Hamstrin/5   Left Lower Extremity   Hip Abduction:    Quadriceps:     Hamstrin/     Reflexes     Left Side  Biceps:  2+  Triceps:   2+  Brachioradialis:  2+  Quadriceps:  2+  Achilles:  2+    Right Side   Biceps:  2+  Triceps:  2+  Brachioradialis:  2+  Quadriceps:  2+  Achilles:  2+    Vascular Exam     Right Pulses  Dorsalis Pedis:      2+  Posterior Tibial:      2+  Radial:                    2+      Left Pulses  Dorsalis Pedis:      2+  Posterior Tibial:      2+  Radial:                    2+      Capillary Refill  Right Hand: normal capillary refill  Left Hand: normal capillary refill  Right Derick's Test: no rapid refill no slow refill  Left Derick's Test: no rapid refill no slow refill    Edema  Right Forearm: absent  Right Lower Leg: absent  Left Forearm: absent  Left Lower Leg: absent      Cervical spine MRI:  FINDINGS:  CORD: Normal size and signal.  No syrinx.  Cervicomedullary junction is normal.    ALIGNMENT: Normal.  Lateral masses of C1 and C2 are congruent.    BONES: Vertebral body heights are maintained.  No aggressive bone marrow signal.    PARASPINAL AREA: Normal.    CERVICAL DISC LEVELS:    C2-C3: No significant disc pathology.  Mild bilateral facet hypertrophy.  No spinal canal or foraminal stenosis.    C3-C4: Minimal disc osteophyte complex.  Mild-moderate bilateral facet hypertrophy.  No significant spinal canal or foraminal narrowing.    C4-C5: Minimal disc osteophyte complex.  Mild bilateral facet hypertrophy.  No significant spinal canal or foraminal narrowing.    C5-C6: Mild disc osteophyte complex.  Mild bilateral facet hypertrophy.  Slight ventral cord flattening with preserved ventral and dorsal CSF.  No significant foraminal narrowing.    C6-C7: Minimal disc osteophyte complex.  Minimal bilateral facet hypertrophy.  Minimal bi left lateral foraminal narrowing.    C7-T1: No significant disc pathology.  No spinal canal or foraminal stenosis.    Lumbar Spine MRI:  FINDINGS:  NOMENCLATURE: Five lumbar type vertebral bodies.    CORD/CAUDA EQUINA: Conus has normal size and signal and ends at a normal level at  L1-L2.    ALIGNMENT: Normal.    BONES: Vertebral body heights are maintained.  No aggressive bone marrow signal.    PARASPINAL AREA: Normal.  Cystic lesion with fluid level in the pelvis, similar in appearance compared to prior study.  Right paramedian T2 hyperintense structure with layering fluid level, likely a hemorrhagic ovarian cyst.    LUMBAR DISC LEVELS:    T12-L1: No significant disc pathology.  No spinal canal or foraminal stenosis.    L1-L2: No significant disc pathology.  No spinal canal or foraminal stenosis.    L2-L3: No significant disc pathology.  Minimal bilateral facet hypertrophy.  No spinal canal or foraminal stenosis.    L3-L4: Minimal disc bulge.  Mild bilateral facet hypertrophy.  No significant spinal canal or foraminal narrowing.    L4-L5: Minimal disc bulge.  Mild bilateral facet hypertrophy.  No significant spinal canal or foraminal narrowing.    L5-S1: Minimal disc bulge contained in the ventral epidural fat. Mild bilateral facet hypertrophy.  No significant spinal canal or foraminal narrowing.          Assessment:       Encounter Diagnoses   Name Primary?    Left shoulder pain, unspecified chronicity Yes    Left elbow pain     Left tennis elbow     Decreased strength of upper extremity     Decreased range of motion of left shoulder     Rotator cuff syndrome of left shoulder     Chondromalacia, right knee     Type 2 superior labrum extending from anterior to posterior (SLAP) lesion of left shoulder, subsequent encounter           Plan:       1. ASES and SF-12 was not filled out today in clinic.   RTC in 3 months with Dr. Cooper Ramos for reevaluation shoulder , knee and elbow. Patient will fill out ASES, SF-12 on return.    2. Cont PT/HEP for shoulder    3. Core/gluteal strengthening    We reviewed with Chey today, the pathology and natural history of her diagnosis. We have discussed a variety of treatment options including medications, physical therapy and other alternative  treatments. I also explained the indications, risks and benefits of surgery. After discussion, Chey decided to proceed with surgery. The decision was made to go forward with:    Stage 2:  1. Right knee subchondroplasty medial femur  2. Right knee subchondroplasty medial tibia  3. Right knee arthroscopic chondroplasty  4. Right knee arthroscopic synovectomy  5. Right knee arthroscopic menisectomy  6. Amniox Arthrocentesis, Right    The details of the surgical procedure were explained, including the location of probable incisions and a description of likely hardware and/or grafts to be used.  The patient understands the likely convalescence after surgery.  Also, we have thoroughly discussed the risks, benefits and alternatives to surgery, including, but not limited to, the risk of infection, joint stiffness, blood clot (including DVT and/or pulmonary embolus), neurologic and vascular injury.  It was explained that, if tissue has been repaired or reconstructed, there is a chance of failure, which may require further management.      All of the patient's questions were answered and informed consent was obtained. The patient will contact us if they have any questions or concerns in the interim.    4. I made the decision to obtain old records of the patient including previous notes and imaging. New imaging was ordered today of the extremity or extremities evaluated. I independently reviewed and interpreted the radiographs and/or MRIs today as well as prior imaging. Reviewed with patient in detail.    5. B/L UE EMG Nerve study scheduled today    6. Appt with Magen Mack scheduled     7. [id number] - Alex Gudino, performed a custom orthotic / brace adjustment, fitting and training with the patient. The patient demonstrated understanding and proper care. This was performed for 15 minutes. Tennis elbow arm band                    Patient questionnaires may have been collected.

## 2019-01-21 ENCOUNTER — PATIENT MESSAGE (OUTPATIENT)
Dept: SPORTS MEDICINE | Facility: CLINIC | Age: 44
End: 2019-01-21

## 2019-04-22 ENCOUNTER — OFFICE VISIT (OUTPATIENT)
Dept: SPORTS MEDICINE | Facility: CLINIC | Age: 44
End: 2019-04-22
Payer: MEDICAID

## 2019-04-22 VITALS
BODY MASS INDEX: 23.92 KG/M2 | HEART RATE: 63 BPM | HEIGHT: 63 IN | DIASTOLIC BLOOD PRESSURE: 67 MMHG | WEIGHT: 135 LBS | SYSTOLIC BLOOD PRESSURE: 105 MMHG

## 2019-04-22 DIAGNOSIS — M25.512 CHRONIC LEFT SHOULDER PAIN: ICD-10-CM

## 2019-04-22 DIAGNOSIS — G89.29 CHRONIC PAIN OF RIGHT KNEE: ICD-10-CM

## 2019-04-22 DIAGNOSIS — R93.89 ABNORMAL X-RAY: ICD-10-CM

## 2019-04-22 DIAGNOSIS — M94.261 CHONDROMALACIA, RIGHT KNEE: Primary | ICD-10-CM

## 2019-04-22 DIAGNOSIS — M23.92 DERANGEMENT OF LEFT KNEE: ICD-10-CM

## 2019-04-22 DIAGNOSIS — M22.41 CHONDROMALACIA OF RIGHT PATELLA: ICD-10-CM

## 2019-04-22 DIAGNOSIS — G89.29 CHRONIC LEFT SHOULDER PAIN: ICD-10-CM

## 2019-04-22 DIAGNOSIS — M23.91 INTERNAL DERANGEMENT OF RIGHT KNEE: ICD-10-CM

## 2019-04-22 DIAGNOSIS — M25.561 CHRONIC PAIN OF RIGHT KNEE: ICD-10-CM

## 2019-04-22 PROBLEM — M77.12 LEFT TENNIS ELBOW: Status: RESOLVED | Noted: 2019-01-14 | Resolved: 2019-04-22

## 2019-04-22 PROBLEM — R29.898 DECREASED STRENGTH OF UPPER EXTREMITY: Status: RESOLVED | Noted: 2018-04-24 | Resolved: 2019-04-22

## 2019-04-22 PROBLEM — M25.612 DECREASED RANGE OF MOTION OF LEFT SHOULDER: Status: RESOLVED | Noted: 2018-04-24 | Resolved: 2019-04-22

## 2019-04-22 PROBLEM — M75.102 ROTATOR CUFF SYNDROME OF LEFT SHOULDER: Status: RESOLVED | Noted: 2018-04-19 | Resolved: 2019-04-22

## 2019-04-22 PROCEDURE — 99214 PR OFFICE/OUTPT VISIT, EST, LEVL IV, 30-39 MIN: ICD-10-PCS | Mod: S$PBB,,, | Performed by: ORTHOPAEDIC SURGERY

## 2019-04-22 PROCEDURE — 99999 PR PBB SHADOW E&M-EST. PATIENT-LVL III: CPT | Mod: PBBFAC,,, | Performed by: ORTHOPAEDIC SURGERY

## 2019-04-22 PROCEDURE — 99213 OFFICE O/P EST LOW 20 MIN: CPT | Mod: PBBFAC,PO | Performed by: ORTHOPAEDIC SURGERY

## 2019-04-22 PROCEDURE — 99214 OFFICE O/P EST MOD 30 MIN: CPT | Mod: S$PBB,,, | Performed by: ORTHOPAEDIC SURGERY

## 2019-04-22 PROCEDURE — 99999 PR PBB SHADOW E&M-EST. PATIENT-LVL III: ICD-10-PCS | Mod: PBBFAC,,, | Performed by: ORTHOPAEDIC SURGERY

## 2019-04-22 NOTE — PROGRESS NOTES
Subjective:          Chief Complaint: Chey Mcneill is a 44 y.o. female who had concerns including Follow-up of the Left Shoulder.    RHD Patient is here today for left shoulder and left elbow follow up. She is having a lot of knee pain and swelling. It is giving out on her more often than before.      DATE OF PROCEDURE: 4/19/2018     ATTENDING SURGEON: Surgeon(s) and Role:     * Cooper Ramos MD - Primary     * Keith Valadez MD-Fellow     * SMA Chance-Assistant     PREOPERATIVE DIAGNOSIS:    Pre-operative Diagnosis: Left shoulder   Superior glenoid labrum lesion (SLAP) S43.439A Impingement Syndrome M75.40     Post-operative Diagnosis:  Left shoulder   Superior glenoid labrum lesion (SLAP) S43.439A Impingement Syndrome M75.40                 Procedures Performed:  1. Left shoulder Arthroscopic subacromial decompression and bursectomy CPT - 10361     2. Left shoulder Arthroscopic labral debridement CPT - 43869     3. Left shoulder Amniox Arthrocentesis, 37089    DATE OF PROCEDURE:  05/11/2017     ATTENDING SURGEON:  Cooper Ramos M.D.     FIRST ASSISTANT:  Antione Henao M.D.(RES) - Fellow.     SECOND ASSISTANT:  SMA Chance -- assistant.     PREOPERATIVE DIAGNOSIS:  Right knee chondromalacia.     POSTOPERATIVE DIAGNOSES:  1.  Right knee chondromalacia.  2.  Right knee patellar chondromalacia.  3.  Right knee patellar malalignment syndrome.  4.  Right knee medial meniscus tear.     OPERATIVE PROCEDURES PERFORMED:  1.  Right knee complex osteochondral allograft transplantation, medial femoral   condyle and patella.  2.  Right knee complex patella realignment.  3.  Right knee arthroscopic medial meniscectomy.  4.  Right knee arthroscopic chondroplasty.  5.  Right knee arthroscopic loose body removal.  6.  Right knee Amniox arthrocentesis.      Denies any change in activities. She has been taking NSAIDS as needed for pain without relief. Pain is located over medial aspect of knee. Denies  mechanical symptoms or instability.     Hx of multiple bilateral knee surgeries   Left knee- [facility] with Dr. Ramos in 1/26/2012  Right knee- multiple scopes, lateral releases, and anterior tibial tubercleplasty by outside physician. Hardware removal by Dr. Ramos in 2010      Back Pain   Pertinent negatives include no abdominal pain, chest pain, fever, headaches, numbness, paresthesias or weight loss.   Pain   Associated symptoms include joint swelling. Pertinent negatives include no abdominal pain, chest pain, chills, congestion, coughing, fever, headaches, myalgias, nausea, numbness, rash, sore throat or vomiting.          Review of Systems   Constitution: Negative. Negative for chills, fever, weight gain and weight loss.   HENT: Negative for congestion and sore throat.    Eyes: Negative for blurred vision and double vision.   Cardiovascular: Negative for chest pain, leg swelling and palpitations.   Respiratory: Negative for cough and shortness of breath.    Hematologic/Lymphatic: Does not bruise/bleed easily.   Skin: Negative for itching, poor wound healing and rash.   Musculoskeletal: Positive for back pain, joint pain, joint swelling and stiffness. Negative for muscle weakness and myalgias.   Gastrointestinal: Negative for abdominal pain, constipation, diarrhea, nausea and vomiting.   Genitourinary: Negative.  Negative for frequency and hematuria.   Neurological: Negative for dizziness, headaches, numbness, paresthesias and sensory change.   Psychiatric/Behavioral: Negative for altered mental status and depression. The patient is not nervous/anxious.    Allergic/Immunologic: Negative for hives.                   Objective:        General: Chey is well-developed, well-nourished, appears stated age, in no acute distress, alert and oriented to time, place and person. Endorses instability    General    Vitals reviewed.  Constitutional: She is oriented to person, place, and time. She appears well-developed and  well-nourished. No distress.   HENT:   Right Ear: External ear normal.   Left Ear: External ear normal.   Nose: Nose normal.   Mouth/Throat: No oropharyngeal exudate.   Eyes: Pupils are equal, round, and reactive to light. Right eye exhibits no discharge. Left eye exhibits no discharge.   Neck: Normal range of motion.   Cardiovascular: Normal rate, regular rhythm and intact distal pulses.    Pulmonary/Chest: Effort normal and breath sounds normal. No respiratory distress.   Abdominal: Soft.   Neurological: She is alert and oriented to person, place, and time. She has normal reflexes. She displays normal reflexes. No cranial nerve deficit. She exhibits normal muscle tone. Coordination normal.   Psychiatric: She has a normal mood and affect. Her behavior is normal. Judgment and thought content normal.     General Musculoskeletal Exam   Gait: normal       Right Knee Exam     Inspection   Erythema: absent  Scars: present  Swelling: present  Effusion: absent  Deformity: absent  Bruising: absent    Tenderness   The patient is tender to palpation of the medial joint line and condyle (medial tibia).    Crepitus   The patient has crepitus of the patella (mild - moderate).    Range of Motion   Extension: 5   Flexion:  140 abnormal     Tests   Meniscus   Leela:  Medial - negative Lateral - negative  Ligament Examination Lachman: normal (-1 to 2mm) PCL-Posterior Drawer: normal (0 to 2mm)     MCL - Valgus: normal (0 to 2mm)  LCL - Varus: normalPivot Shift: normal (Equal)Reverse Pivot Shift: normal (Equal)Dial Test at 30 degrees: normal (< 5 degrees)Dial Test at 90 degrees: normal (< 5 degrees)  Posterior Sag Test: negative  Posterolateral Corner: unstable (>15 degrees difference)  Patella   Patellar apprehension: negative  Passive Patellar Tilt: neutral  Patellar Tracking: normal  Patellar Glide (quadrants): Lateral - 1   Medial - 2  Q-Angle at 90 degrees: normal  Patellar Grind: negative  J-Sign: none    Other   Meniscal  Cyst: absent  Popliteal (Baker's) Cyst: absent  Sensation: normal    Left Knee Exam     Inspection   Erythema: absent  Scars: present  Swelling: absent  Effusion: absent  Deformity: absent  Bruising: absent    Tenderness   The patient is experiencing no tenderness.     Crepitus   The patient has crepitus of the patella (mild).    Range of Motion   Extension: 5   Flexion: 140     Tests   Meniscus   Leela:  Medial - negative Lateral - negative  Stability Lachman: normal (-1 to 2mm) PCL-Posterior Drawer: normal (0 to 2mm)  MCL - Valgus: normal (0 to 2mm)  LCL - Varus: normal (0 to 2mm)Pivot Shift: normal (Equal)Reverse Pivot Shift: normal (Equal)Dial Test at 30 degrees: normal (< 5 degrees)Dial Test at 90 degrees: normal (< 5 degrees)  Posterior Sag Test: negative  Posterolateral Corner: unstable (>15 degrees difference)  Patella   Patellar apprehension: negative  Passive Patellar Tilt: neutral  Patellar Tracking: normal  Patellar Glide (Quadrants): Lateral - 1 Medial - 2  Q-Angle at 90 degrees: normal  Patellar Grind: negative  J-Sign: J sign absent    Other   Meniscal Cyst: absent  Popliteal (Baker's) Cyst: absent  Sensation: normal    Right Hip Exam     Tests   Raymond: negative  Left Hip Exam     Tests   Raymond: negative          Right Hand/Wrist Exam     Inspection   Scars: Wrist - absent   Effusion: Wrist - absent   Bruising: Wrist - absent   Deformity: Wrist - deformity     Range of Motion     Wrist   Extension:  50 normal   Flexion:  70 normal   Abduction: 20 normal  Adduction: 35 normal    Tests   Phalens sign: positive  Tinel's sign (median nerve): positive  Finkelstein's test: negative  Carpal Tunnel Compression Test: negative  Cubital Tunnel Compression Test: negative  LT Stability: negative  Ewing's Test: negative      Other     Neuorologic Exam    Median Distribution: normal  Ulnar Distribution: normal  Radial Distribution: normal    Comments:  + Gonsalo      Left Hand/Wrist Exam     Inspection   Scars:  Wrist - absent   Effusion: Wrist - absent   Bruising: Wrist - absent   Deformity: Wrist - absent     Range of Motion     Wrist   Extension:  50 normal   Flexion:  70 normal   Abduction: 20 normal  Adduction: 35 normal    Tests   Phalens sign: positive  Tinel's sign (median nerve): positive  Finkelstein's test: negative  Carpal Tunnel Compression Test: negative  Cubital Tunnel Compression Test: negative  LT Stability: negative  Ewing's Test: negative      Other     Sensory Exam  Median Distribution: normal  Ulnar Distribution: normal  Radial Distribution: normal    Comments:  + Durkins      Right Elbow Exam     Inspection   Scars: absent  Effusion: absent  Bruising: absent  Deformity: absent  Atrophy: absent    Range of Motion   Extension:  0 normal   Flexion:  130 normal   Pronation: normal   Supination:  80 normal     Tests   Varus: negative  Valgus: negative  Tinel's sign (cubital tunnel): negative  Tennis Elbow: negative  Golfer's Elbow: negative  Radial Capitellar Grind: negative    Other   Sensation: normal      Left Elbow Exam     Inspection   Scars: absent  Effusion: present  Bruising: absent  Deformity: absent  Atrophy: absent    Pain   The patient exhibits pain of the lateral epicondyle    Swelling   The patient is swollen on the lateral epicondyle    Tenderness   The patient is tender to palpation of the lateral epicondyle.     Range of Motion   Extension:  0 normal   Flexion:  130 normal   Pronation: normal Left elbow pronation: 90.   Supination:  80 normal     Tests   Varus: negative  Tinel's sign (cubital tunnel): negative  Tennis Elbow: negative  Golfer's Elbow: negative  Radial Capitellar Grind: negative    Other   Sensation: normal    Right Shoulder Exam     Inspection/Observation   Swelling: absent  Bruising: absent  Scars: absent  Deformity: absent  Scapular Winging: absent  Scapular Dyskinesia: negative  Atrophy: absent    Tenderness   The patient is experiencing no tenderness.    Range of  Motion   Active abduction:  90 normal   Passive abduction:  100 normal   Extension:  0 normal   Forward Flexion:  180 normal   Forward Elevation: 180 normal  Adduction: 40 normal  External Rotation 0 degrees:  50 normal   External Rotation 90 degrees: 90 normal  Internal rotation 0 degrees:  T8 normal   Internal rotation 90 degrees:  30 normal     Tests & Signs   Apprehension: negative  Cross arm: negative  Drop arm: negative  Storm test: negative  Impingement: negative  Sulcus: absent  Anterosuperior Escape: negative  Lag Sign 0 degrees: negative  Lag Sign 90 degrees: negative  Lift Off Sign: negative  Belly Press: negative  Active Compression Test (Clyo's Sign): negative  Yergason's Test: negative  Speed's Test: negative  Anterior Drawer Test: 1+   Posterior Drawer Test: 1+  Relocation 90 degrees: negative  Relocation > 90 degrees: negative  Bear Hug: negative  Moving Valgus: negative  Jerk Test: negative    Other   Sensation: normal    Left Shoulder Exam     Inspection/Observation   Swelling: absent  Bruising: absent  Scars: present  Deformity: absent  Scapular Winging: absent  Scapular Dyskinesia: negative  Atrophy: absent    Range of Motion   Active abduction:  90 normal   Passive abduction:  100 normal   Extension:  0 normal   Forward Flexion:  180 abnormal   Forward Elevation: 180 abnormal  Adduction: 40 normal  External Rotation 0 degrees:  50 normal   External Rotation 90 degrees: 80 normal  Internal rotation 0 degrees:  T8 normal   Internal rotation 90 degrees:  30 normal     Tests & Signs   Apprehension: negative  Cross arm: negative  Drop arm: negative  Storm test: negative  Impingement: negative  Sulcus: absent  Anterosuperior Escape: negative  Lag Sign 0 degrees: negative  Lag Sign 90 degrees: negative  Lift Off Sign: negative  Belly Press: negative  Active Compression test (Clyo's Sign): negative  Yergasons's Test: negative  Speed's Test: negative  Anterior Drawer Test: 1+  Posterior Drawer  Test: 1+  Relocation 90 degrees: negative  Relocation > 90 degrees: negative  Bear Hug: negative  Moving Valgus: negative  Jerk Test: negative    Other   Sensation: normal       Muscle Strength   Right Upper Extremity   Shoulder Abduction:    Shoulder Internal Rotation:    Shoulder External Rotation:    Supraspinatus:    Subscapularis:    Biceps:    Wrist extension:    Wrist flexion:    :    Pinch Mechanism:   Elbow Pronation:     Elbow Supination:     Elbow Extension:   Elbow Flexion:   Intrinsics:   EPL (Extensor Pollicis Longus):   Left Upper Extremity  Shoulder Abduction:    Shoulder Internal Rotation:    Shoulder External Rotation:    Supraspinatus:    Subscapularis:    Biceps:    Wrist extension:    Wrist flexion:    :     Pinch Mechanism:   Elbow Pronation:     Elbow Supination:     Elbow Extension:   Elbow Flexion:   Intrinsics:   EPL (Extensor Pollicis Longus):   Right Lower Extremity   Hip Abduction:    Quadriceps:     Hamstrin/5   Left Lower Extremity   Hip Abduction:    Quadriceps:     Hamstrin/5     Reflexes     Left Side  Biceps:  2+  Triceps:  2+  Brachioradialis:  2+  Quadriceps:  2+  Achilles:  2+    Right Side   Biceps:  2+  Triceps:  2+  Brachioradialis:  2+  Quadriceps:  2+  Achilles:  2+    Vascular Exam     Right Pulses  Dorsalis Pedis:      2+  Posterior Tibial:      2+  Radial:                    2+      Left Pulses  Dorsalis Pedis:      2+  Posterior Tibial:      2+  Radial:                    2+      Capillary Refill  Right Hand: normal capillary refill  Left Hand: normal capillary refill  Right Derick's Test: no rapid refill no slow refill  Left Derick's Test: no rapid refill no slow refill    Edema  Right Forearm: absent  Right Lower Leg: absent  Left Forearm: absent  Left Lower Leg: absent      Cervical spine MRI:  FINDINGS:  CORD: Normal  size and signal.  No syrinx.  Cervicomedullary junction is normal.    ALIGNMENT: Normal.  Lateral masses of C1 and C2 are congruent.    BONES: Vertebral body heights are maintained.  No aggressive bone marrow signal.    PARASPINAL AREA: Normal.    CERVICAL DISC LEVELS:    C2-C3: No significant disc pathology.  Mild bilateral facet hypertrophy.  No spinal canal or foraminal stenosis.    C3-C4: Minimal disc osteophyte complex.  Mild-moderate bilateral facet hypertrophy.  No significant spinal canal or foraminal narrowing.    C4-C5: Minimal disc osteophyte complex.  Mild bilateral facet hypertrophy.  No significant spinal canal or foraminal narrowing.    C5-C6: Mild disc osteophyte complex.  Mild bilateral facet hypertrophy.  Slight ventral cord flattening with preserved ventral and dorsal CSF.  No significant foraminal narrowing.    C6-C7: Minimal disc osteophyte complex.  Minimal bilateral facet hypertrophy.  Minimal bi left lateral foraminal narrowing.    C7-T1: No significant disc pathology.  No spinal canal or foraminal stenosis.    Lumbar Spine MRI:  FINDINGS:  NOMENCLATURE: Five lumbar type vertebral bodies.    CORD/CAUDA EQUINA: Conus has normal size and signal and ends at a normal level at L1-L2.    ALIGNMENT: Normal.    BONES: Vertebral body heights are maintained.  No aggressive bone marrow signal.    PARASPINAL AREA: Normal.  Cystic lesion with fluid level in the pelvis, similar in appearance compared to prior study.  Right paramedian T2 hyperintense structure with layering fluid level, likely a hemorrhagic ovarian cyst.    LUMBAR DISC LEVELS:    T12-L1: No significant disc pathology.  No spinal canal or foraminal stenosis.    L1-L2: No significant disc pathology.  No spinal canal or foraminal stenosis.    L2-L3: No significant disc pathology.  Minimal bilateral facet hypertrophy.  No spinal canal or foraminal stenosis.    L3-L4: Minimal disc bulge.  Mild bilateral facet hypertrophy.  No significant spinal  canal or foraminal narrowing.    L4-L5: Minimal disc bulge.  Mild bilateral facet hypertrophy.  No significant spinal canal or foraminal narrowing.    L5-S1: Minimal disc bulge contained in the ventral epidural fat. Mild bilateral facet hypertrophy.  No significant spinal canal or foraminal narrowing.     MRI: right knee  Narrative     EXAMINATION:  MRI KNEE WITHOUT CONTRAST RIGHT    CLINICAL HISTORY:  Abnormal xray, knee;assess cartilage grafts and meniscal status;Abnormal findings on diagnostic imaging of other specified body structures    TECHNIQUE:  Multiplanar, multisequence images were performed about the knee.  T2 cartilage mapping sequences performed.    COMPARISON:  November 6, 2017    FINDINGS:  Menisci:  Lateral meniscus is intact.  Medial meniscus demonstrates postoperative changes without tear.    Ligaments:  ACL, PCL, MCL, and LCL complex are intact.    Tendons:  Quadriceps and patellar tendons are intact.  Postoperative changes at the tibial tubercle noted.    Cartilage:    Patellofemoral: Status post patellar osteochondral graft which demonstrates similar bone-bone interface and thin line of edema.  Incomplete bony integration currently.  Cartilage-cartilage interface is congruent.  Overlying cartilage/reparative tissue is intact without full-thickness defect.    Medial tibiofemoral: Femoral osteochondral grafts are similar with incomplete bony integration.  Thin linear edema signal remains at the interface as well as a small amount of adjacent underlying edema at the anterior graft, similar.  There is similar recess of the posterior aspect of the anterior graft.  Cartilage-cartilage interface appears similar without large cleft.  There is similar repaired tissue overlying the grafts.  No definite full-thickness defect.    Small area of bone marrow edema present within the far medial aspect of the medial tibial plateau with overlying cartilage loss, similar prior study.    Lateral tibiofemoral:  Articular cartilage is maintained.    Bone: No fracture.    Miscellaneous: Trace joint fluid present.      Impression       No detrimental change in the patellar and medial femoral condyle osteochondral allografts.               Assessment:       Encounter Diagnoses   Name Primary?    Chondromalacia, right knee Yes    Chondromalacia of right patella     Internal derangement of right knee     Chronic left shoulder pain     Chronic pain of right knee     Derangement of left knee     Abnormal x-ray           Plan:       1. ASES and SF-12 was not filled out today in clinic.   RTC in 1-2 months with Cooper Ramos MD for MRI results Right knee(Fill out KOOS). Patient will fill out ASES, SF-12 on return.    2. Cont PT/HEP for shoulder    3. Core/gluteal strengthening    We reviewed with Chey today, the pathology and natural history of her diagnosis. We have discussed a variety of treatment options including medications, physical therapy and other alternative treatments. I also explained the indications, risks and benefits of surgery. After discussion, Chey decided to proceed with surgery. The decision was made to go forward with:    Stage 2:  1. Right knee subchondroplasty medial femur  2. Right knee subchondroplasty medial tibia  3. Right knee arthroscopic chondroplasty  4. Right knee arthroscopic synovectomy  5. Right knee arthroscopic menisectomy    The details of the surgical procedure were explained, including the location of probable incisions and a description of likely hardware and/or grafts to be used.  The patient understands the likely convalescence after surgery.  Also, we have thoroughly discussed the risks, benefits and alternatives to surgery, including, but not limited to, the risk of infection, joint stiffness, blood clot (including DVT and/or pulmonary embolus), neurologic and vascular injury.  It was explained that, if tissue has been repaired or reconstructed, there is a chance of failure,  which may require further management.      All of the patient's questions were answered and informed consent was obtained. The patient will contact us if they have any questions or concerns in the interim.    4. I made the decision to obtain old records of the patient including previous notes and imaging. New imaging was ordered today of the extremity or extremities evaluated. I independently reviewed and interpreted the radiographs and/or MRIs today as well as prior imaging. Reviewed with patient in detail.    5. MRI right knee to assess the medial compartment  And grafts                  Patient questionnaires may have been collected.

## 2019-05-15 ENCOUNTER — TELEPHONE (OUTPATIENT)
Dept: SPORTS MEDICINE | Facility: CLINIC | Age: 44
End: 2019-05-15

## 2019-05-15 NOTE — TELEPHONE ENCOUNTER
Telephoned patient to schedule surgery. Asked that she return call or we could discuss during her visit with Dr. Ramos on 5/20/19.

## 2020-01-06 ENCOUNTER — PATIENT MESSAGE (OUTPATIENT)
Dept: SPORTS MEDICINE | Facility: CLINIC | Age: 45
End: 2020-01-06

## 2020-01-27 ENCOUNTER — PATIENT MESSAGE (OUTPATIENT)
Dept: SPORTS MEDICINE | Facility: CLINIC | Age: 45
End: 2020-01-27

## 2020-02-04 NOTE — PROGRESS NOTES
Subjective:          Chief Complaint: Chey Mcneill is a 44 y.o. female who had concerns including Pain of the Right Knee.    RHD Patient is here today for right knee and right elbow follow up. She is having a lot of knee pain and swelling. It is giving out on her more often than before. She would like to discuss surgical options for her right knee. She is schedule the procedure as previously discussed. She injured her right elbow while working as a . She states that the pain in her right elbow has continued to increased. She has pain with gripping things. She also reports pain on flexion and extension with the right elbow.       DATE OF PROCEDURE: 4/19/2018     ATTENDING SURGEON: Surgeon(s) and Role:     * Cooper Ramos MD - Primary     * Keith Valadez MD-Fellow     * SMA Chance-Assistant     PREOPERATIVE DIAGNOSIS:    Pre-operative Diagnosis: Left shoulder   Superior glenoid labrum lesion (SLAP) S43.439A Impingement Syndrome M75.40     Post-operative Diagnosis:  Left shoulder   Superior glenoid labrum lesion (SLAP) S43.439A Impingement Syndrome M75.40                 Procedures Performed:  1. Left shoulder Arthroscopic subacromial decompression and bursectomy CPT - 62756     2. Left shoulder Arthroscopic labral debridement CPT - 75570     3. Left shoulder Amniox Arthrocentesis, 97430    DATE OF PROCEDURE:  05/11/2017     ATTENDING SURGEON:  Cooper Ramos M.D.     FIRST ASSISTANT:  Antione Henao M.D.(RES) - Fellow.     SECOND ASSISTANT:  SMA Chance -- assistant.     PREOPERATIVE DIAGNOSIS:  Right knee chondromalacia.     POSTOPERATIVE DIAGNOSES:  1.  Right knee chondromalacia.  2.  Right knee patellar chondromalacia.  3.  Right knee patellar malalignment syndrome.  4.  Right knee medial meniscus tear.     OPERATIVE PROCEDURES PERFORMED:  1.  Right knee complex osteochondral allograft transplantation, medial femoral   condyle and patella.  2.  Right knee complex  patella realignment.  3.  Right knee arthroscopic medial meniscectomy.  4.  Right knee arthroscopic chondroplasty.  5.  Right knee arthroscopic loose body removal.  6.  Right knee Amniox arthrocentesis.      Denies any change in activities. She has been taking NSAIDS as needed for pain without relief. Pain is located over medial aspect of knee. Denies mechanical symptoms or instability.     Hx of multiple bilateral knee surgeries   Left knee- ACI with Dr. Ramos in 1/26/2012  Right knee- multiple scopes, lateral releases, and anterior tibial tubercleplasty by outside physician. Hardware removal by Dr. Ramos in 2010      Back Pain   Pertinent negatives include no abdominal pain, chest pain, fever, headaches, numbness, paresthesias or weight loss.   Pain   Associated symptoms include joint swelling. Pertinent negatives include no abdominal pain, chest pain, chills, congestion, coughing, fever, headaches, myalgias, nausea, numbness, rash, sore throat or vomiting.          Review of Systems   Constitution: Negative. Negative for chills, fever, night sweats, weight gain and weight loss.   HENT: Negative for congestion, hearing loss and sore throat.    Eyes: Negative for blurred vision, double vision and visual disturbance.   Cardiovascular: Negative for chest pain, leg swelling and palpitations.   Respiratory: Negative for cough and shortness of breath.    Endocrine: Negative for polyuria.   Hematologic/Lymphatic: Negative for bleeding problem. Does not bruise/bleed easily.   Skin: Negative for itching, poor wound healing and rash.   Musculoskeletal: Positive for back pain, joint pain, joint swelling and stiffness. Negative for muscle cramps, muscle weakness and myalgias.   Gastrointestinal: Negative for abdominal pain, constipation, diarrhea, melena, nausea and vomiting.   Genitourinary: Negative.  Negative for frequency and hematuria.   Neurological: Negative for dizziness, headaches, loss of balance, numbness,  paresthesias and sensory change.   Psychiatric/Behavioral: Negative for altered mental status and depression. The patient is not nervous/anxious.    Allergic/Immunologic: Negative for hives.                   Objective:        General: Chey is well-developed, well-nourished, appears stated age, in no acute distress, alert and oriented to time, place and person. Endorses instability    General    Vitals reviewed.  Constitutional: She is oriented to person, place, and time. She appears well-developed and well-nourished. No distress.   HENT:   Right Ear: External ear normal.   Left Ear: External ear normal.   Nose: Nose normal.   Mouth/Throat: No oropharyngeal exudate.   Eyes: Pupils are equal, round, and reactive to light. Right eye exhibits no discharge. Left eye exhibits no discharge.   Neck: Normal range of motion.   Cardiovascular: Normal rate, regular rhythm and intact distal pulses.    Pulmonary/Chest: Effort normal and breath sounds normal. No respiratory distress.   Abdominal: Soft.   Neurological: She is alert and oriented to person, place, and time. She has normal reflexes. She displays normal reflexes. No cranial nerve deficit. She exhibits normal muscle tone. Coordination normal.   Psychiatric: She has a normal mood and affect. Her behavior is normal. Judgment and thought content normal.     General Musculoskeletal Exam   Gait: normal       Right Knee Exam     Inspection   Erythema: absent  Scars: present  Swelling: present  Effusion: absent  Deformity: absent  Bruising: absent    Tenderness   The patient is tender to palpation of the medial joint line and condyle (medial tibia).    Crepitus   The patient has crepitus of the patella (mild - moderate).    Range of Motion   Extension: 5   Flexion:  140 abnormal     Tests   Meniscus   Leela:  Medial - negative Lateral - negative  Ligament Examination Lachman: normal (-1 to 2mm) PCL-Posterior Drawer: normal (0 to 2mm)     MCL - Valgus: normal (0 to  2mm)  LCL - Varus: normalPivot Shift: normal (Equal)Reverse Pivot Shift: normal (Equal)Dial Test at 30 degrees: normal (< 5 degrees)Dial Test at 90 degrees: normal (< 5 degrees)  Posterior Sag Test: negative  Posterolateral Corner: unstable (>15 degrees difference)  Patella   Patellar apprehension: negative  Passive Patellar Tilt: neutral  Patellar Tracking: normal  Patellar Glide (quadrants): Lateral - 1   Medial - 2  Q-Angle at 90 degrees: normal  Patellar Grind: negative  J-Sign: none    Other   Meniscal Cyst: absent  Popliteal (Baker's) Cyst: absent  Sensation: normal    Left Knee Exam     Inspection   Erythema: absent  Scars: present  Swelling: absent  Effusion: absent  Deformity: absent  Bruising: absent    Tenderness   The patient is experiencing no tenderness.     Crepitus   The patient has crepitus of the patella (mild).    Range of Motion   Extension: 5   Flexion: 140     Tests   Meniscus   Leela:  Medial - negative Lateral - negative  Stability Lachman: normal (-1 to 2mm) PCL-Posterior Drawer: normal (0 to 2mm)  MCL - Valgus: normal (0 to 2mm)  LCL - Varus: normal (0 to 2mm)Pivot Shift: normal (Equal)Reverse Pivot Shift: normal (Equal)Dial Test at 30 degrees: normal (< 5 degrees)Dial Test at 90 degrees: normal (< 5 degrees)  Posterior Sag Test: negative  Posterolateral Corner: unstable (>15 degrees difference)  Patella   Patellar apprehension: negative  Passive Patellar Tilt: neutral  Patellar Tracking: normal  Patellar Glide (Quadrants): Lateral - 1 Medial - 2  Q-Angle at 90 degrees: normal  Patellar Grind: negative  J-Sign: J sign absent    Other   Meniscal Cyst: absent  Popliteal (Baker's) Cyst: absent  Sensation: normal    Right Hip Exam     Tests   Raymond: negative  Left Hip Exam     Tests   Raymond: negative          Right Hand/Wrist Exam     Inspection   Scars: Wrist - absent   Effusion: Wrist - absent   Bruising: Wrist - absent   Deformity: Wrist - deformity     Range of Motion     Wrist    Extension:  50 normal   Flexion:  70 normal   Abduction: 20 normal  Adduction: 35 normal    Tests   Phalens sign: positive  Tinel's sign (median nerve): positive  Finkelstein's test: negative  Carpal Tunnel Compression Test: negative  Cubital Tunnel Compression Test: negative  LT Stability: negative  Ewing's Test: negative      Other     Neuorologic Exam    Median Distribution: normal  Ulnar Distribution: normal  Radial Distribution: normal    Comments:  + Durkins      Left Hand/Wrist Exam     Inspection   Scars: Wrist - absent   Effusion: Wrist - absent   Bruising: Wrist - absent   Deformity: Wrist - absent     Range of Motion     Wrist   Extension:  50 normal   Flexion:  70 normal   Abduction: 20 normal  Adduction: 35 normal    Tests   Phalens sign: positive  Tinel's sign (median nerve): positive  Finkelstein's test: negative  Carpal Tunnel Compression Test: negative  Cubital Tunnel Compression Test: negative  LT Stability: negative  Ewing's Test: negative      Other     Sensory Exam  Median Distribution: normal  Ulnar Distribution: normal  Radial Distribution: normal    Comments:  + Durkins      Right Elbow Exam     Inspection   Scars: absent  Effusion: absent  Bruising: absent  Deformity: absent  Atrophy: absent    Range of Motion   Extension:  0 normal   Flexion:  130 normal   Pronation: normal   Supination:  80 normal     Tests   Varus: negative  Valgus: negative  Tinel's sign (cubital tunnel): negative  Tennis Elbow: negative  Golfer's Elbow: negative  Radial Capitellar Grind: negative    Other   Sensation: normal      Left Elbow Exam     Inspection   Scars: absent  Effusion: present  Bruising: absent  Deformity: absent  Atrophy: absent    Pain   The patient exhibits pain of the lateral epicondyle and triceps insertion    Swelling   The patient is swollen on the lateral epicondyle    Range of Motion   Extension:  0 normal   Flexion:  130 normal   Pronation: normal Left elbow pronation: 90.   Supination:   80 normal     Tests   Varus: negative  Tinel's sign (cubital tunnel): negative  Tennis Elbow: negative  Golfer's Elbow: negative  Radial Capitellar Grind: negative    Other   Sensation: normal    Right Shoulder Exam     Inspection/Observation   Swelling: absent  Bruising: absent  Scars: absent  Deformity: absent  Scapular Winging: absent  Scapular Dyskinesia: negative  Atrophy: absent    Tenderness   The patient is experiencing no tenderness.    Range of Motion   Active abduction:  90 normal   Passive abduction:  100 normal   Extension:  0 normal   Forward Flexion:  180 normal   Forward Elevation: 180 normal  Adduction: 40 normal  External Rotation 0 degrees:  50 normal   External Rotation 90 degrees: 90 normal  Internal rotation 0 degrees:  T8 normal   Internal rotation 90 degrees:  30 normal     Tests & Signs   Apprehension: negative  Cross arm: negative  Drop arm: negative  Storm test: negative  Impingement: negative  Sulcus: absent  Anterosuperior Escape: negative  Lag Sign 0 degrees: negative  Lag Sign 90 degrees: negative  Lift Off Sign: negative  Belly Press: negative  Active Compression Test ([place]'s Sign): negative  Yergason's Test: negative  Speed's Test: negative  Anterior Drawer Test: 1+   Posterior Drawer Test: 1+  Relocation 90 degrees: negative  Relocation > 90 degrees: negative  Bear Hug: negative  Moving Valgus: negative  Jerk Test: negative    Other   Sensation: normal    Left Shoulder Exam     Inspection/Observation   Swelling: absent  Bruising: absent  Scars: present  Deformity: absent  Scapular Winging: absent  Scapular Dyskinesia: negative  Atrophy: absent    Range of Motion   Active abduction:  90 normal   Passive abduction:  100 normal   Extension:  0 normal   Forward Flexion:  180 abnormal   Forward Elevation: 180 abnormal  Adduction: 40 normal  External Rotation 0 degrees:  50 normal   External Rotation 90 degrees: 80 normal  Internal rotation 0 degrees:  T8 normal   Internal rotation  90 degrees:  30 normal     Tests & Signs   Apprehension: negative  Cross arm: negative  Drop arm: negative  Storm test: negative  Impingement: negative  Sulcus: absent  Anterosuperior Escape: negative  Lag Sign 0 degrees: negative  Lag Sign 90 degrees: negative  Lift Off Sign: negative  Belly Press: negative  Active Compression test (Boone's Sign): negative  Yergasons's Test: negative  Speed's Test: negative  Anterior Drawer Test: 1+  Posterior Drawer Test: 1+  Relocation 90 degrees: negative  Relocation > 90 degrees: negative  Bear Hug: negative  Moving Valgus: negative  Jerk Test: negative    Other   Sensation: normal       Muscle Strength   Right Upper Extremity   Shoulder Abduction:    Shoulder Internal Rotation:    Shoulder External Rotation:    Supraspinatus:    Subscapularis:    Biceps:    Wrist extension:    Wrist flexion:    :    Pinch Mechanism:   Elbow Pronation:     Elbow Supination:     Elbow Extension:   Elbow Flexion:   Intrinsics:   EPL (Extensor Pollicis Longus):   Left Upper Extremity  Shoulder Abduction:    Shoulder Internal Rotation:    Shoulder External Rotation:    Supraspinatus:    Subscapularis:    Biceps:    Wrist extension:    Wrist flexion:    :     Pinch Mechanism:   Elbow Pronation:     Elbow Supination:     Elbow Extension:   Elbow Flexion:   Intrinsics:   EPL (Extensor Pollicis Longus):   Right Lower Extremity   Hip Abduction:    Quadriceps:     Hamstrin/5   Left Lower Extremity   Hip Abduction:    Quadriceps:     Hamstrin/5     Reflexes     Left Side  Biceps:  2+  Triceps:  2+  Brachioradialis:  2+  Quadriceps:  2+  Achilles:  2+    Right Side   Biceps:  2+  Triceps:  2+  Brachioradialis:  2+  Quadriceps:  2+  Achilles:  2+    Vascular Exam     Right Pulses  Dorsalis Pedis:      2+  Posterior Tibial:      2+  Radial:                     2+      Left Pulses  Dorsalis Pedis:      2+  Posterior Tibial:      2+  Radial:                    2+      Capillary Refill  Right Hand: normal capillary refill  Left Hand: normal capillary refill  Right Derick's Test: no rapid refill no slow refill  Left Derikc's Test: no rapid refill no slow refill    Edema  Right Forearm: absent  Right Lower Leg: absent  Left Forearm: absent  Left Lower Leg: absent      Cervical spine MRI:  FINDINGS:  CORD: Normal size and signal.  No syrinx.  Cervicomedullary junction is normal.    ALIGNMENT: Normal.  Lateral masses of C1 and C2 are congruent.    BONES: Vertebral body heights are maintained.  No aggressive bone marrow signal.    PARASPINAL AREA: Normal.    CERVICAL DISC LEVELS:    C2-C3: No significant disc pathology.  Mild bilateral facet hypertrophy.  No spinal canal or foraminal stenosis.    C3-C4: Minimal disc osteophyte complex.  Mild-moderate bilateral facet hypertrophy.  No significant spinal canal or foraminal narrowing.    C4-C5: Minimal disc osteophyte complex.  Mild bilateral facet hypertrophy.  No significant spinal canal or foraminal narrowing.    C5-C6: Mild disc osteophyte complex.  Mild bilateral facet hypertrophy.  Slight ventral cord flattening with preserved ventral and dorsal CSF.  No significant foraminal narrowing.    C6-C7: Minimal disc osteophyte complex.  Minimal bilateral facet hypertrophy.  Minimal bi left lateral foraminal narrowing.    C7-T1: No significant disc pathology.  No spinal canal or foraminal stenosis.    Lumbar Spine MRI:  FINDINGS:  NOMENCLATURE: Five lumbar type vertebral bodies.    CORD/CAUDA EQUINA: Conus has normal size and signal and ends at a normal level at L1-L2.    ALIGNMENT: Normal.    BONES: Vertebral body heights are maintained.  No aggressive bone marrow signal.    PARASPINAL AREA: Normal.  Cystic lesion with fluid level in the pelvis, similar in appearance compared to prior study.  Right paramedian T2 hyperintense structure  with layering fluid level, likely a hemorrhagic ovarian cyst.    LUMBAR DISC LEVELS:    T12-L1: No significant disc pathology.  No spinal canal or foraminal stenosis.    L1-L2: No significant disc pathology.  No spinal canal or foraminal stenosis.    L2-L3: No significant disc pathology.  Minimal bilateral facet hypertrophy.  No spinal canal or foraminal stenosis.    L3-L4: Minimal disc bulge.  Mild bilateral facet hypertrophy.  No significant spinal canal or foraminal narrowing.    L4-L5: Minimal disc bulge.  Mild bilateral facet hypertrophy.  No significant spinal canal or foraminal narrowing.    L5-S1: Minimal disc bulge contained in the ventral epidural fat. Mild bilateral facet hypertrophy.  No significant spinal canal or foraminal narrowing.     MRI: right knee  Narrative     EXAMINATION:  MRI KNEE WITHOUT CONTRAST RIGHT    CLINICAL HISTORY:  Abnormal xray, knee;assess cartilage grafts and meniscal status;Abnormal findings on diagnostic imaging of other specified body structures    TECHNIQUE:  Multiplanar, multisequence images were performed about the knee.  T2 cartilage mapping sequences performed.    COMPARISON:  November 6, 2017    FINDINGS:  Menisci:  Lateral meniscus is intact.  Medial meniscus demonstrates postoperative changes without tear.    Ligaments:  ACL, PCL, MCL, and LCL complex are intact.    Tendons:  Quadriceps and patellar tendons are intact.  Postoperative changes at the tibial tubercle noted.    Cartilage:    Patellofemoral: Status post patellar osteochondral graft which demonstrates similar bone-bone interface and thin line of edema.  Incomplete bony integration currently.  Cartilage-cartilage interface is congruent.  Overlying cartilage/reparative tissue is intact without full-thickness defect.    Medial tibiofemoral: Femoral osteochondral grafts are similar with incomplete bony integration.  Thin linear edema signal remains at the interface as well as a small amount of adjacent  underlying edema at the anterior graft, similar.  There is similar recess of the posterior aspect of the anterior graft.  Cartilage-cartilage interface appears similar without large cleft.  There is similar repaired tissue overlying the grafts.  No definite full-thickness defect.    Small area of bone marrow edema present within the far medial aspect of the medial tibial plateau with overlying cartilage loss, similar prior study.    Lateral tibiofemoral: Articular cartilage is maintained.    Bone: No fracture.    Miscellaneous: Trace joint fluid present.      Impression       No detrimental change in the patellar and medial femoral condyle osteochondral allografts.     Radiographs today:  Radiographs ordered and reviewed today in clinic of the bilateral knee demonstrates moderate right medial compartment narrowing noted with spur formation; good incorporation of the graft medially   Left knee films stable.                 Assessment:       Encounter Diagnoses   Name Primary?    Chondromalacia, right knee Yes    Chondromalacia of right patella     Internal derangement of right knee     Chronic left shoulder pain     Chronic pain of right knee           Plan:       1. IKDC, SF-12 and KOOS was filled out today in clinic.     RTC in 3-4 weeks with Dr. Cooper Ramos for MRI result review and preoperative history and physical. Patient will not fill out IKDC, SF-12 and KOOS on return.      2. Cont PT/HEP for shoulder    3. Core/gluteal strengthening    We reviewed with Chey today, the pathology and natural history of her diagnosis. We have discussed a variety of treatment options including medications, physical therapy and other alternative treatments. I also explained the indications, risks and benefits of surgery. After discussion, Chey decided to proceed with surgery. The decision was made to go forward with:    1. Right knee subchondroplasty medial femur  2. Right knee subchondroplasty medial tibia  3. Right  knee arthroscopic chondroplasty  4. Right knee arthroscopic synovectomy  5. Right knee arthroscopic menisectomy    The details of the surgical procedure were explained, including the location of probable incisions and a description of likely hardware and/or grafts to be used.  The patient understands the likely convalescence after surgery.  Also, we have thoroughly discussed the risks, benefits and alternatives to surgery, including, but not limited to, the risk of infection, joint stiffness, blood clot (including DVT and/or pulmonary embolus), neurologic and vascular injury.  It was explained that, if tissue has been repaired or reconstructed, there is a chance of failure, which may require further management.      All of the patient's questions were answered and informed consent was obtained. The patient will contact us if they have any questions or concerns in the interim.    4. MRI of Right Knee with t2 mapping and cartilage specific sequencing, medial femur and patella    5. MRI pending  On return for result review Hip to ankle films required    Possible osteotomy vs. unicomparmental in the future   but will stick with plan for subchondroplasty based on MRI pictures              Patient questionnaires may have been collected.

## 2020-02-10 ENCOUNTER — OFFICE VISIT (OUTPATIENT)
Dept: SPORTS MEDICINE | Facility: CLINIC | Age: 45
End: 2020-02-10
Payer: MEDICAID

## 2020-02-10 ENCOUNTER — HOSPITAL ENCOUNTER (OUTPATIENT)
Dept: RADIOLOGY | Facility: HOSPITAL | Age: 45
Discharge: HOME | End: 2020-02-10
Attending: ORTHOPAEDIC SURGERY
Payer: MEDICAID

## 2020-02-10 VITALS — HEART RATE: 82 BPM | DIASTOLIC BLOOD PRESSURE: 69 MMHG | SYSTOLIC BLOOD PRESSURE: 111 MMHG

## 2020-02-10 DIAGNOSIS — G89.29 CHRONIC PAIN OF RIGHT KNEE: Primary | ICD-10-CM

## 2020-02-10 DIAGNOSIS — M25.561 CHRONIC PAIN OF RIGHT KNEE: ICD-10-CM

## 2020-02-10 DIAGNOSIS — G89.29 CHRONIC LEFT SHOULDER PAIN: ICD-10-CM

## 2020-02-10 DIAGNOSIS — G89.29 CHRONIC PAIN OF RIGHT KNEE: ICD-10-CM

## 2020-02-10 DIAGNOSIS — M23.91 INTERNAL DERANGEMENT OF RIGHT KNEE: ICD-10-CM

## 2020-02-10 DIAGNOSIS — M25.512 CHRONIC LEFT SHOULDER PAIN: ICD-10-CM

## 2020-02-10 DIAGNOSIS — M22.41 CHONDROMALACIA OF RIGHT PATELLA: ICD-10-CM

## 2020-02-10 DIAGNOSIS — M25.561 CHRONIC PAIN OF RIGHT KNEE: Primary | ICD-10-CM

## 2020-02-10 DIAGNOSIS — M21.161 ACQUIRED GENU VARUM, RIGHT: ICD-10-CM

## 2020-02-10 DIAGNOSIS — M94.261 CHONDROMALACIA, RIGHT KNEE: Primary | ICD-10-CM

## 2020-02-10 PROCEDURE — 73564 X-RAY EXAM KNEE 4 OR MORE: CPT | Mod: 26,50,, | Performed by: RADIOLOGY

## 2020-02-10 PROCEDURE — 73564 X-RAY EXAM KNEE 4 OR MORE: CPT | Mod: TC,50

## 2020-02-10 PROCEDURE — 99214 OFFICE O/P EST MOD 30 MIN: CPT | Mod: S$PBB,,, | Performed by: ORTHOPAEDIC SURGERY

## 2020-02-10 PROCEDURE — 99999 PR PBB SHADOW E&M-EST. PATIENT-LVL III: ICD-10-PCS | Mod: PBBFAC,,, | Performed by: ORTHOPAEDIC SURGERY

## 2020-02-10 PROCEDURE — 99999 PR PBB SHADOW E&M-EST. PATIENT-LVL III: CPT | Mod: PBBFAC,,, | Performed by: ORTHOPAEDIC SURGERY

## 2020-02-10 PROCEDURE — 73564 XR KNEE ORTHO BILAT WITH FLEXION: ICD-10-PCS | Mod: 26,50,, | Performed by: RADIOLOGY

## 2020-02-10 PROCEDURE — 99213 OFFICE O/P EST LOW 20 MIN: CPT | Mod: PBBFAC,25 | Performed by: ORTHOPAEDIC SURGERY

## 2020-02-10 PROCEDURE — 99214 PR OFFICE/OUTPT VISIT, EST, LEVL IV, 30-39 MIN: ICD-10-PCS | Mod: S$PBB,,, | Performed by: ORTHOPAEDIC SURGERY

## 2020-02-12 ENCOUNTER — TELEPHONE (OUTPATIENT)
Dept: SPORTS MEDICINE | Facility: CLINIC | Age: 45
End: 2020-02-12

## 2020-02-12 ENCOUNTER — PATIENT MESSAGE (OUTPATIENT)
Dept: SPORTS MEDICINE | Facility: CLINIC | Age: 45
End: 2020-02-12

## 2020-02-12 NOTE — TELEPHONE ENCOUNTER
LVM for patient in regard to MRI appointment on 2/14. Would like to reschedule due to not having the authorization for the MRI

## 2020-02-13 ENCOUNTER — TELEPHONE (OUTPATIENT)
Dept: SPORTS MEDICINE | Facility: CLINIC | Age: 45
End: 2020-02-13

## 2020-02-13 NOTE — TELEPHONE ENCOUNTER
----- Message from Angel Stuart sent at 2/13/2020 11:45 AM CST -----  Contact: self  Pt would like a call back regarding her MRI, it needs to be rescheduled due to not having the authorization.    Contact Info

## 2020-02-14 ENCOUNTER — HOSPITAL ENCOUNTER (OUTPATIENT)
Dept: RADIOLOGY | Facility: HOSPITAL | Age: 45
Discharge: HOME | End: 2020-02-14
Attending: ORTHOPAEDIC SURGERY
Payer: MEDICAID

## 2020-02-14 DIAGNOSIS — M25.561 CHRONIC PAIN OF RIGHT KNEE: ICD-10-CM

## 2020-02-14 DIAGNOSIS — M94.261 CHONDROMALACIA, RIGHT KNEE: ICD-10-CM

## 2020-02-14 DIAGNOSIS — G89.29 CHRONIC LEFT SHOULDER PAIN: ICD-10-CM

## 2020-02-14 DIAGNOSIS — M23.91 INTERNAL DERANGEMENT OF RIGHT KNEE: ICD-10-CM

## 2020-02-14 DIAGNOSIS — G89.29 CHRONIC PAIN OF RIGHT KNEE: ICD-10-CM

## 2020-02-14 DIAGNOSIS — M25.512 CHRONIC LEFT SHOULDER PAIN: ICD-10-CM

## 2020-02-14 DIAGNOSIS — M22.41 CHONDROMALACIA OF RIGHT PATELLA: ICD-10-CM

## 2020-02-14 PROCEDURE — 73721 MRI KNEE WITHOUT CONTRAST RIGHT: ICD-10-PCS | Mod: 26,RT,, | Performed by: RADIOLOGY

## 2020-02-14 PROCEDURE — 73721 MRI JNT OF LWR EXTRE W/O DYE: CPT | Mod: 26,RT,, | Performed by: RADIOLOGY

## 2020-02-14 PROCEDURE — 73721 MRI JNT OF LWR EXTRE W/O DYE: CPT | Mod: TC,RT

## 2020-02-17 ENCOUNTER — OFFICE VISIT (OUTPATIENT)
Dept: SPORTS MEDICINE | Facility: CLINIC | Age: 45
End: 2020-02-17
Payer: MEDICAID

## 2020-02-17 VITALS
BODY MASS INDEX: 27.6 KG/M2 | DIASTOLIC BLOOD PRESSURE: 72 MMHG | WEIGHT: 150 LBS | HEART RATE: 60 BPM | HEIGHT: 62 IN | SYSTOLIC BLOOD PRESSURE: 107 MMHG

## 2020-02-17 DIAGNOSIS — M77.11 RIGHT TENNIS ELBOW: ICD-10-CM

## 2020-02-17 DIAGNOSIS — M94.261 CHONDROMALACIA, RIGHT KNEE: Primary | ICD-10-CM

## 2020-02-17 DIAGNOSIS — M25.561 CHRONIC PAIN OF RIGHT KNEE: ICD-10-CM

## 2020-02-17 DIAGNOSIS — M23.203 OLD TEAR OF MEDIAL MENISCUS OF RIGHT KNEE, UNSPECIFIED TEAR TYPE: ICD-10-CM

## 2020-02-17 DIAGNOSIS — M23.91 INTERNAL DERANGEMENT OF RIGHT KNEE: ICD-10-CM

## 2020-02-17 DIAGNOSIS — G89.29 CHRONIC PAIN OF RIGHT KNEE: ICD-10-CM

## 2020-02-17 PROCEDURE — 99214 PR OFFICE/OUTPT VISIT, EST, LEVL IV, 30-39 MIN: ICD-10-PCS | Mod: S$PBB,,, | Performed by: ORTHOPAEDIC SURGERY

## 2020-02-17 PROCEDURE — 99213 OFFICE O/P EST LOW 20 MIN: CPT | Mod: PBBFAC | Performed by: ORTHOPAEDIC SURGERY

## 2020-02-17 PROCEDURE — 99999 PR PBB SHADOW E&M-EST. PATIENT-LVL III: ICD-10-PCS | Mod: PBBFAC,,, | Performed by: ORTHOPAEDIC SURGERY

## 2020-02-17 PROCEDURE — 99214 OFFICE O/P EST MOD 30 MIN: CPT | Mod: S$PBB,,, | Performed by: ORTHOPAEDIC SURGERY

## 2020-02-17 PROCEDURE — 99999 PR PBB SHADOW E&M-EST. PATIENT-LVL III: CPT | Mod: PBBFAC,,, | Performed by: ORTHOPAEDIC SURGERY

## 2020-02-17 NOTE — PROGRESS NOTES
Subjective:          Chief Complaint: Chey Mcneill is a 44 y.o. female who had concerns including Results of the Right Knee.    RHD Patient is here today for right knee and right elbow follow up. She had MRI of right knee, see report below.  The knee has continued to be painful.  Regarding her elbow it also is still painful laterally and posteriorly.  The elbow pain is worse with wrist extension and pronation.    DATE OF PROCEDURE: 4/19/2018     ATTENDING SURGEON: Surgeon(s) and Role:     * Cooper Ramos MD - Primary     * Keith Valadez MD-Fellow     * SMA Chance-Assistant     PREOPERATIVE DIAGNOSIS:    Pre-operative Diagnosis: Left shoulder   Superior glenoid labrum lesion (SLAP) S43.439A Impingement Syndrome M75.40     Post-operative Diagnosis:  Left shoulder   Superior glenoid labrum lesion (SLAP) S43.439A Impingement Syndrome M75.40                 Procedures Performed:  1. Left shoulder Arthroscopic subacromial decompression and bursectomy CPT - 53095     2. Left shoulder Arthroscopic labral debridement CPT - 04628     3. Left shoulder Amniox Arthrocentesis, 16359    DATE OF PROCEDURE:  05/11/2017     ATTENDING SURGEON:  Cooper Ramos M.D.     FIRST ASSISTANT:  Antione Henao M.D.(RES) - Fellow.     SECOND ASSISTANT:  SMA Chance -- assistant.     PREOPERATIVE DIAGNOSIS:  Right knee chondromalacia.     POSTOPERATIVE DIAGNOSES:  1.  Right knee chondromalacia.  2.  Right knee patellar chondromalacia.  3.  Right knee patellar malalignment syndrome.  4.  Right knee medial meniscus tear.     OPERATIVE PROCEDURES PERFORMED:  1.  Right knee complex osteochondral allograft transplantation, medial femoral   condyle and patella.  2.  Right knee complex patella realignment.  3.  Right knee arthroscopic medial meniscectomy.  4.  Right knee arthroscopic chondroplasty.  5.  Right knee arthroscopic loose body removal.  6.  Right knee Amniox arthrocentesis.      Denies any change in activities.  She has been taking NSAIDS as needed for pain without relief. Pain is located over medial aspect of knee. Denies mechanical symptoms or instability.     Hx of multiple bilateral knee surgeries   Left knee- ACI with Dr. Ramos in 1/26/2012  Right knee- multiple scopes, lateral releases, and anterior tibial tubercleplasty by outside physician. Hardware removal by Dr. Ramos in 2010      Back Pain   Pertinent negatives include no abdominal pain, chest pain, fever, headaches, numbness, paresthesias or weight loss.   Pain   Associated symptoms include joint swelling. Pertinent negatives include no abdominal pain, chest pain, chills, congestion, coughing, fever, headaches, myalgias, nausea, numbness, rash, sore throat or vomiting.          Review of Systems   Constitution: Negative. Negative for chills, fever, night sweats, weight gain and weight loss.   HENT: Negative for congestion, hearing loss and sore throat.    Eyes: Negative for blurred vision, double vision and visual disturbance.   Cardiovascular: Negative for chest pain, leg swelling and palpitations.   Respiratory: Negative for cough and shortness of breath.    Endocrine: Negative for polyuria.   Hematologic/Lymphatic: Negative for bleeding problem. Does not bruise/bleed easily.   Skin: Negative for itching, poor wound healing and rash.   Musculoskeletal: Positive for back pain, joint pain, joint swelling and stiffness. Negative for muscle cramps, muscle weakness and myalgias.   Gastrointestinal: Negative for abdominal pain, constipation, diarrhea, melena, nausea and vomiting.   Genitourinary: Negative.  Negative for frequency and hematuria.   Neurological: Negative for dizziness, headaches, loss of balance, numbness, paresthesias and sensory change.   Psychiatric/Behavioral: Negative for altered mental status and depression. The patient is not nervous/anxious.    Allergic/Immunologic: Negative for hives.                   Objective:        General: Chey rosario  well-developed, well-nourished, appears stated age, in no acute distress, alert and oriented to time, place and person. Endorses instability    General    Vitals reviewed.  Constitutional: She is oriented to person, place, and time. She appears well-developed and well-nourished. No distress.   HENT:   Right Ear: External ear normal.   Left Ear: External ear normal.   Nose: Nose normal.   Mouth/Throat: No oropharyngeal exudate.   Eyes: Pupils are equal, round, and reactive to light. Right eye exhibits no discharge. Left eye exhibits no discharge.   Neck: Normal range of motion.   Cardiovascular: Normal rate, regular rhythm and intact distal pulses.    Pulmonary/Chest: Effort normal and breath sounds normal. No respiratory distress.   Abdominal: Soft.   Neurological: She is alert and oriented to person, place, and time. She has normal reflexes. She displays normal reflexes. No cranial nerve deficit. She exhibits normal muscle tone. Coordination normal.   Psychiatric: She has a normal mood and affect. Her behavior is normal. Judgment and thought content normal.     General Musculoskeletal Exam   Gait: normal       Right Knee Exam     Inspection   Erythema: absent  Scars: present  Swelling: present  Effusion: absent  Deformity: absent  Bruising: absent    Tenderness   The patient is tender to palpation of the medial joint line and condyle (medial tibia).    Crepitus   The patient has crepitus of the patella (mild - moderate).    Range of Motion   Extension: 5   Flexion:  140 abnormal     Tests   Meniscus   Leela:  Medial - positive Lateral - negative  Ligament Examination Lachman: normal (-1 to 2mm) PCL-Posterior Drawer: normal (0 to 2mm)     MCL - Valgus: normal (0 to 2mm)  LCL - Varus: normalPivot Shift: normal (Equal)Reverse Pivot Shift: normal (Equal)Dial Test at 30 degrees: normal (< 5 degrees)Dial Test at 90 degrees: normal (< 5 degrees)  Posterior Sag Test: negative  Posterolateral Corner: unstable (>15  degrees difference)  Patella   Patellar apprehension: negative  Passive Patellar Tilt: neutral  Patellar Tracking: normal  Patellar Glide (quadrants): Lateral - 1   Medial - 2  Q-Angle at 90 degrees: normal  Patellar Grind: negative  J-Sign: none    Other   Meniscal Cyst: absent  Popliteal (Baker's) Cyst: absent  Sensation: normal    Left Knee Exam     Inspection   Erythema: absent  Scars: present  Swelling: absent  Effusion: absent  Deformity: absent  Bruising: absent    Tenderness   The patient is experiencing no tenderness.     Crepitus   The patient has crepitus of the patella (mild).    Range of Motion   Extension: 5   Flexion: 140     Tests   Meniscus   Leela:  Medial - negative Lateral - negative  Stability Lachman: normal (-1 to 2mm) PCL-Posterior Drawer: normal (0 to 2mm)  MCL - Valgus: normal (0 to 2mm)  LCL - Varus: normal (0 to 2mm)Pivot Shift: normal (Equal)Reverse Pivot Shift: normal (Equal)Dial Test at 30 degrees: normal (< 5 degrees)Dial Test at 90 degrees: normal (< 5 degrees)  Posterior Sag Test: negative  Posterolateral Corner: unstable (>15 degrees difference)  Patella   Patellar apprehension: negative  Passive Patellar Tilt: neutral  Patellar Tracking: normal  Patellar Glide (Quadrants): Lateral - 1 Medial - 2  Q-Angle at 90 degrees: normal  Patellar Grind: negative  J-Sign: J sign absent    Other   Meniscal Cyst: absent  Popliteal (Baker's) Cyst: absent  Sensation: normal    Right Hip Exam     Tests   Raymond: negative  Left Hip Exam     Tests   Raymond: negative          Right Hand/Wrist Exam     Inspection   Scars: Wrist - absent   Effusion: Wrist - absent   Bruising: Wrist - absent   Deformity: Wrist - deformity     Range of Motion     Wrist   Extension:  50 normal   Flexion:  70 normal   Abduction: 20 normal  Adduction: 35 normal    Tests   Phalens sign: positive  Tinel's sign (median nerve): positive  Finkelstein's test: negative  Carpal Tunnel Compression Test: negative  Cubital Tunnel  Compression Test: negative  LT Stability: negative  Ewing's Test: negative      Other     Neuorologic Exam    Median Distribution: normal  Ulnar Distribution: normal  Radial Distribution: normal    Comments:  + Durkins      Left Hand/Wrist Exam     Inspection   Scars: Wrist - absent   Effusion: Wrist - absent   Bruising: Wrist - absent   Deformity: Wrist - absent     Range of Motion     Wrist   Extension:  50 normal   Flexion:  70 normal   Abduction: 20 normal  Adduction: 35 normal    Tests   Phalens sign: positive  Tinel's sign (median nerve): positive  Finkelstein's test: negative  Carpal Tunnel Compression Test: negative  Cubital Tunnel Compression Test: negative  LT Stability: negative  Ewing's Test: negative      Other     Sensory Exam  Median Distribution: normal  Ulnar Distribution: normal  Radial Distribution: normal    Comments:  + Durkins      Right Elbow Exam     Inspection   Scars: absent  Effusion: absent  Bruising: absent  Deformity: absent  Atrophy: absent    Pain   The patient exhibits pain of the extensor musculature, lateral epicondyle and triceps insertion    Tenderness   The patient is tender to palpation of the lateral epicondyle.     Range of Motion   Extension:  0 normal   Flexion:  130 normal   Pronation: normal   Supination:  80 normal     Tests   Varus: negative  Valgus: negative  Tinel's sign (cubital tunnel): negative  Tennis Elbow: severe  Golfer's Elbow: negative  Radial Capitellar Grind: negative    Other   Sensation: normal      Left Elbow Exam     Inspection   Scars: absent  Effusion: absent  Bruising: absent  Deformity: absent  Atrophy: absent    Swelling   The patient is swollen on the lateral epicondyle    Range of Motion   Extension:  0 normal   Flexion:  130 normal   Pronation: normal Left elbow pronation: 90.   Supination:  80 normal     Tests   Varus: negative  Tinel's sign (cubital tunnel): negative  Tennis Elbow: negative  Golfer's Elbow: negative  Radial Capitellar  Grind: negative    Other   Sensation: normal    Right Shoulder Exam     Inspection/Observation   Swelling: absent  Bruising: absent  Scars: absent  Deformity: absent  Scapular Winging: absent  Scapular Dyskinesia: negative  Atrophy: absent    Tenderness   The patient is experiencing no tenderness.    Range of Motion   Active abduction:  90 normal   Passive abduction:  100 normal   Extension:  0 normal   Forward Flexion:  180 normal   Forward Elevation: 180 normal  Adduction: 40 normal  External Rotation 0 degrees:  50 normal   External Rotation 90 degrees: 90 normal  Internal rotation 0 degrees:  T8 normal   Internal rotation 90 degrees:  30 normal     Tests & Signs   Apprehension: negative  Cross arm: negative  Drop arm: negative  Storm test: negative  Impingement: negative  Sulcus: absent  Anterosuperior Escape: negative  Lag Sign 0 degrees: negative  Lag Sign 90 degrees: negative  Lift Off Sign: negative  Belly Press: negative  Active Compression Test (Greeley's Sign): negative  Yergason's Test: negative  Speed's Test: negative  Anterior Drawer Test: 1+   Posterior Drawer Test: 1+  Relocation 90 degrees: negative  Relocation > 90 degrees: negative  Bear Hug: negative  Moving Valgus: negative  Jerk Test: negative    Other   Sensation: normal    Left Shoulder Exam     Inspection/Observation   Swelling: absent  Bruising: absent  Scars: present  Deformity: absent  Scapular Winging: absent  Scapular Dyskinesia: negative  Atrophy: absent    Range of Motion   Active abduction:  90 normal   Passive abduction:  100 normal   Extension:  0 normal   Forward Flexion:  180 abnormal   Forward Elevation: 180 abnormal  Adduction: 40 normal  External Rotation 0 degrees:  50 normal   External Rotation 90 degrees: 80 normal  Internal rotation 0 degrees:  T8 normal   Internal rotation 90 degrees:  30 normal     Tests & Signs   Apprehension: negative  Cross arm: negative  Drop arm: negative  Storm test: negative  Impingement:  negative  Sulcus: absent  Anterosuperior Escape: negative  Lag Sign 0 degrees: negative  Lag Sign 90 degrees: negative  Lift Off Sign: negative  Belly Press: negative  Active Compression test (Lycoming's Sign): negative  Yergasons's Test: negative  Speed's Test: negative  Anterior Drawer Test: 1+  Posterior Drawer Test: 1+  Relocation 90 degrees: negative  Relocation > 90 degrees: negative  Bear Hug: negative  Moving Valgus: negative  Jerk Test: negative    Other   Sensation: normal       Muscle Strength   Right Upper Extremity   Shoulder Abduction:    Shoulder Internal Rotation:    Shoulder External Rotation:    Supraspinatus:    Subscapularis:    Biceps:    Wrist extension:    Wrist flexion:    :    Pinch Mechanism:   Elbow Pronation:     Elbow Supination:     Elbow Extension:   Elbow Flexion:   Intrinsics:   EPL (Extensor Pollicis Longus):   Left Upper Extremity  Shoulder Abduction:    Shoulder Internal Rotation:    Shoulder External Rotation:    Supraspinatus:    Subscapularis:    Biceps:    Wrist extension:    Wrist flexion:    :     Pinch Mechanism:   Elbow Pronation:     Elbow Supination:     Elbow Extension:   Elbow Flexion:   Intrinsics:   EPL (Extensor Pollicis Longus):   Right Lower Extremity   Hip Abduction:    Quadriceps:     Hamstrin/5   Left Lower Extremity   Hip Abduction:    Quadriceps:     Hamstrin/5     Reflexes     Left Side  Biceps:  2+  Triceps:  2+  Brachioradialis:  2+  Quadriceps:  2+  Achilles:  2+    Right Side   Biceps:  2+  Triceps:  2+  Brachioradialis:  2+  Quadriceps:  2+  Achilles:  2+    Vascular Exam     Right Pulses  Dorsalis Pedis:      2+  Posterior Tibial:      2+  Radial:                    2+      Left Pulses  Dorsalis Pedis:      2+  Posterior Tibial:      2+  Radial:                    2+      Capillary Refill  Right Hand:  normal capillary refill  Left Hand: normal capillary refill  Right Derick's Test: no rapid refill no slow refill  Left Derick's Test: no rapid refill no slow refill    Edema  Right Forearm: absent  Right Lower Leg: absent  Left Forearm: absent  Left Lower Leg: absent      Cervical spine MRI:  FINDINGS:  CORD: Normal size and signal.  No syrinx.  Cervicomedullary junction is normal.    ALIGNMENT: Normal.  Lateral masses of C1 and C2 are congruent.    BONES: Vertebral body heights are maintained.  No aggressive bone marrow signal.    PARASPINAL AREA: Normal.    CERVICAL DISC LEVELS:    C2-C3: No significant disc pathology.  Mild bilateral facet hypertrophy.  No spinal canal or foraminal stenosis.    C3-C4: Minimal disc osteophyte complex.  Mild-moderate bilateral facet hypertrophy.  No significant spinal canal or foraminal narrowing.    C4-C5: Minimal disc osteophyte complex.  Mild bilateral facet hypertrophy.  No significant spinal canal or foraminal narrowing.    C5-C6: Mild disc osteophyte complex.  Mild bilateral facet hypertrophy.  Slight ventral cord flattening with preserved ventral and dorsal CSF.  No significant foraminal narrowing.    C6-C7: Minimal disc osteophyte complex.  Minimal bilateral facet hypertrophy.  Minimal bi left lateral foraminal narrowing.    C7-T1: No significant disc pathology.  No spinal canal or foraminal stenosis.    Lumbar Spine MRI:  FINDINGS:  NOMENCLATURE: Five lumbar type vertebral bodies.    CORD/CAUDA EQUINA: Conus has normal size and signal and ends at a normal level at L1-L2.    ALIGNMENT: Normal.    BONES: Vertebral body heights are maintained.  No aggressive bone marrow signal.    PARASPINAL AREA: Normal.  Cystic lesion with fluid level in the pelvis, similar in appearance compared to prior study.  Right paramedian T2 hyperintense structure with layering fluid level, likely a hemorrhagic ovarian cyst.    LUMBAR DISC LEVELS:    T12-L1: No significant disc pathology.  No spinal  canal or foraminal stenosis.    L1-L2: No significant disc pathology.  No spinal canal or foraminal stenosis.    L2-L3: No significant disc pathology.  Minimal bilateral facet hypertrophy.  No spinal canal or foraminal stenosis.    L3-L4: Minimal disc bulge.  Mild bilateral facet hypertrophy.  No significant spinal canal or foraminal narrowing.    L4-L5: Minimal disc bulge.  Mild bilateral facet hypertrophy.  No significant spinal canal or foraminal narrowing.    L5-S1: Minimal disc bulge contained in the ventral epidural fat. Mild bilateral facet hypertrophy.  No significant spinal canal or foraminal narrowing.     MRI: right knee  Narrative     EXAMINATION:  MRI KNEE WITHOUT CONTRAST RIGHT    CLINICAL HISTORY:  Abnormal xray, knee;assess cartilage grafts and meniscal status;Abnormal findings on diagnostic imaging of other specified body structures    TECHNIQUE:  Multiplanar, multisequence images were performed about the knee.  T2 cartilage mapping sequences performed.    COMPARISON:  November 6, 2017    FINDINGS:  Menisci:  Lateral meniscus is intact.  Medial meniscus demonstrates postoperative changes without tear.    Ligaments:  ACL, PCL, MCL, and LCL complex are intact.    Tendons:  Quadriceps and patellar tendons are intact.  Postoperative changes at the tibial tubercle noted.    Cartilage:    Patellofemoral: Status post patellar osteochondral graft which demonstrates similar bone-bone interface and thin line of edema.  Incomplete bony integration currently.  Cartilage-cartilage interface is congruent.  Overlying cartilage/reparative tissue is intact without full-thickness defect.    Medial tibiofemoral: Femoral osteochondral grafts are similar with incomplete bony integration.  Thin linear edema signal remains at the interface as well as a small amount of adjacent underlying edema at the anterior graft, similar.  There is similar recess of the posterior aspect of the anterior graft.  Cartilage-cartilage  interface appears similar without large cleft.  There is similar repaired tissue overlying the grafts.  No definite full-thickness defect.    Small area of bone marrow edema present within the far medial aspect of the medial tibial plateau with overlying cartilage loss, similar prior study.    Lateral tibiofemoral: Articular cartilage is maintained.    Bone: No fracture.    Miscellaneous: Trace joint fluid present.      Impression       No detrimental change in the patellar and medial femoral condyle osteochondral allografts.     Radiographs today:  Radiographs ordered and reviewed today in clinic of the bilateral knee demonstrates moderate right medial compartment narrowing noted with spur formation; good incorporation of the graft medially   Left knee films stable.     EXAMINATION:  MRI KNEE WITHOUT CONTRAST RIGHT    CLINICAL HISTORY:  Arthritis, knee;    TECHNIQUE:  Routine MRI evaluation of the right knee without contrast using the following sequences: Coronal PDFS, PD; sagittal T2FS, T1; axial PDFS.  Additional T2 mapping sequences were performed through the patellofemoral compartment.    COMPARISON:  MRI 04/02/2018.    FINDINGS:  Menisci: There is persistent degenerative free edge fraying and mucoid degeneration of the body segment and peripheral 3rd of the posterior horn of the medial meniscus.  Lateral meniscus is intact.  Anterior and posterior root ligaments are within normal limits.    Ligaments: ACL, PCL, MCL and posterior-lateral corner structures are normal.    Extensor Mechanism: Patellofemoral alignment is maintained.  There is thickening of the medial and suprapatellar plicae.  MPFL and medial and lateral retinacula are intact.  There is persistent heterogeneity involving the central fibers of the patellar tendon, likely related to previous graft harvesting.  Quadriceps tendon is normal.    Cartilage:    *Patellofemoral: Postoperative changes of osteochondral allograft.  There is appropriate  integration of the allograft with surface congruity and no significant marrow edema.  There is suspected superficial and deep chondral fissuring overlying the medial and central trochlea.  *Medial tibiofemoral: Postoperative changes of osteochondral allografts covering most of the anterior, central and posterior weight-bearing portions of the femoral condyle.  Posterior allograft demonstrates appropriate integration with interval increased thinning of the overlying cartilage.  Anterior allograft demonstrates appropriate intra gray shin with stable thinning of the overlying cartilage.  No significant bone marrow edema underlying the allografts.  There has been interval increased bone marrow edema within the femoral condyle medial to the level of the allograft and within the anterior and central weight-bearing portions of the tibial plateau with worsening full-thickness fissuring of the overlying cartilage.  *Lateral tibiofemoral: There is slight chondral signal heterogeneity overlying the tibial plateau, presumably related to early degeneration.  No full-thickness defects or subchondral edema.  Bones: There are postoperative changes of tibial tubercleplasty.  No fractures.  No avascular necrosis.  No marrow infiltrative process.    Miscellaneous: There is a small joint effusion with associated synovitis.  No popliteal cyst.  Iliotibial band is intact.  Neurovascular structures are within normal limits.      Impression       1. Satisfactory imaging appearance of the patellar osteochondral allograft noting appropriate integration and surface congruity with stable appearance the overlying cartilage.  No subchondral edema.  2. Two osteochondral allografts within the medial femoral condyle that demonstrate increased thinning of the overlying cartilage when compared to MRI dated 04/02/2018.  Worsening chondromalacia and subchondral edema noted within the medial femoral condyle medial to the allografts and within the medial  aspect of the adjacent tibial plateau.  3. Degenerative free edge fraying and mucoid degeneration body segment and peripheral third posterior horn medial meniscus.  4. Postoperative changes of tibial tubercleplasty.  5. Small joint effusion.                 Assessment:       Encounter Diagnoses   Name Primary?    Chondromalacia, right knee Yes    Chronic pain of right knee     Internal derangement of right knee     Old tear of medial meniscus of right knee, unspecified tear type     Right tennis elbow           Plan:       1. IKDC, SF-12 and KOOS was filled out today in clinic.     RTC in 3-4 weeks with Dr. Cooper Ramos for preoperative history and physical. Patient will not fill out IKDC, SF-12 and KOOS on return.      2. PT at [facility] for postoperative care    3.   We reviewed with Chey today, the pathology and natural history of her diagnosis. We have discussed a variety of treatment options including medications, physical therapy and other alternative treatments. I also explained the indications, risks and benefits of surgery. After discussion, Chey decided to proceed with surgery. The decision was made to go forward with:    1. Right knee subchondroplasty medial femur  2. Right knee subchondroplasty medial tibia  3. Right knee arthroscopic chondroplasty  4. Right knee arthroscopic synovectomy  5. Right knee arthroscopic menisectomy    The details of the surgical procedure were explained, including the location of probable incisions and a description of likely hardware and/or grafts to be used.  The patient understands the likely convalescence after surgery.  Also, we have thoroughly discussed the risks, benefits and alternatives to surgery, including, but not limited to, the risk of infection, joint stiffness, blood clot (including DVT and/or pulmonary embolus), neurologic and vascular injury.  It was explained that, if tissue has been repaired or reconstructed, there is a chance of failure, which  may require further management.      All of the patient's questions were answered and informed consent was obtained. The patient will contact us if they have any questions or concerns in the interim.    4. Possible osteotomy vs. unicomparmental in the future   but will stick with plan for subchondroplasty based on MRI pictures    5. [id number] - Alex Gudion, performed a custom orthotic / brace adjustment, fitting and training with the patient. The patient demonstrated understanding and proper care. This was performed for 15 minutes. Tennis elbow arm band              Patient questionnaires may have been collected.

## 2020-02-17 NOTE — PATIENT INSTRUCTIONS
Knee Arthroscopy  Knee problems can often be diagnosed and treated with a technique called arthroscopy. This type of surgery is done using an instrument called an arthroscope (scope). Only a few small incisions are needed for this surgery. The procedure can be used to diagnose a knee problem. In many cases, treatment can also be done using arthroscopy.     Insertion of fluid, arthroscope, and instruments through small incisions (portals).         Patient undergoes procedure      The arthroscope  The scope allows the doctor to look directly into the knee joint. It is about the size of a pencil and contains a pathway for fluids. It also contains coated glass fibers that beam an intense, cool light into the knee joint. A camera is attached to the scope as well. It provides clear images of most areas in your knee joint. The doctor views these images on a monitor.  Preparing for the procedure  · Have lab or other testing done as advised.  · Tell your doctor about any medicines or supplements you take  · Do not eat or drink anything for 10 hours before the procedure.  · Once you arrive for surgery, you will be given an IV line in your arm or hand. This provides fluids and medicines.  · To keep you free of pain during the surgery, youll receive medicine called anesthesia. You may have:  ¨ General anesthesia. This puts you into a deep sleep during the surgery.  ¨ Regional anesthesia. This numbs the body from the waist down.  ¨ Local anesthesia. This numbs just the knee.  In addition to regional or local anesthesia, you may receive sedation. This medicine makes you relaxed and sleepy during the surgery.  The procedure  · A few small incisions (portals) are made in your knee.  · The scope is inserted through one of the portals.  · Sterile fluid is put into the knee joint. This makes it easier to see and work inside your joint.  · Using the scope, the doctor confirms the type and degree of knee damage. If possible, the  problem is treated at this time. This is done using surgical tools put through the other portals.  · When the surgery is done, all tools are removed. The incisions are closed with sutures, staples, surgical glue, or strips of surgical tape.  Risks and complications of arthroscopy  All surgeries have risks. The risks of arthroscopy include:  · Bleeding  · Infection  · Blood clots  · Swelling and stiffness of the knee  · Injury to normal tissue  · Continuing knee problems   Date Last Reviewed: 9/20/2015  © 9910-9909 [employer]. [address] [place] [address]. All rights reserved. This information is not intended as a substitute for professional medical care. Always follow your healthcare professional's instructions.        Knee Arthroscopy: Conditions Treated  Arthroscopy is used to find and treat many types of knee problems. These include tears in the meniscal cartilage, joint loose bodies, or anterior cruciate ligament (ACL) tears.     Damaged meniscal cartilage is removed.   Meniscal cartilage tears  There are several types of meniscal cartilage tears. The meniscal cartilage attaches on the tibia (shinbone) and acts as a shock absorber for the knee. Your surgery will depend on the type and extent of your injury. Your surgeon can remove the damaged tissue or fix the tear. Treatment should ease the pain and swelling. It can also help keep the joint from locking.     To replace damaged ACL tissue, a graft of strong, healthy tissue is attached.   ACL tears  A torn ACL (anterior cruciate ligament) can make the knee unstable. You may have pain and swelling, and your knee may give out. Your surgeon can repair the ACL by reconstructing it. To rebuild your ACL, damaged tissue may be replaced with a graft of healthy tissue from an area near your knee, or from a donor.     Date Last Reviewed: 8/31/2015 © 2000-2017 [employer]. [address] [place] [address]. All rights  reserved. This information is not intended as a substitute for professional medical care. Always follow your healthcare professional's instructions.        After Knee Arthroscopy  After surgery, your joint may be swollen, painful, and stiff. Your recovery time will depend on what was done. Your surgeon will tell you when to resume activity and weight bearing. If you had meniscal cartilage or loose bodies removed, you may be told to bear weight early on. After ACL repair, do not pivot or make sudden moves.     You may be told to ride an exercise bike daily. This will help restore your knee's motion and strength.   At home  Follow your surgeons guidelines for healing:  · Elevate your knee as much as possible and ice your knee for 20 minutes. then allow at least 20 minutes before the next icing session.  · Limit weight-bearing, if instructed to do so.  · Keep your knee bandaged according to your surgeon's instructions.  · When you shower, wrap your knee with plastic to keep it dry.  · Take pain medicine as directed.  Your checklist  The checklist below helps remind you what to do after arthroscopy.  ? Schedule your first follow-up visit for 5 days after surgery or as directed by your surgeon.  ? Take care of your incision and bathe as directed.  ? Complete your physical therapy program.  ? Talk with your surgeon about activities you can do immediately and those that need to wait.  Contact your surgeon right away if you have any of the following:  · Fever  · Chills  · Persistent warmth or redness around the knee  · Persistent or increased pain  · Significant swelling in your knee  · Increasing pain in your calf muscle  Date Last Reviewed: 9/22/2015  © 7555-9863 "[employer] [address] [place] [address], [place] [address]. All rights reserved. This information is not intended as a substitute for professional medical care. Always follow your healthcare professional's instructions.        Treating Meniscus  Problems  The type of surgery you have depends on the nature of your tear. its size and location. Your surgeon may use arthroscopy, a method that involves putting a tiny camera inside your knee, so that your doctor can clearly see your joint. Arthroscopy requires only small incisions (cuts), and you can usually go home the same day as surgery. During surgery, you may have local anesthesia (your doctor numbs your knee with medicine), a regional block (numbing your body from the waist down), or general anesthesia (your doctor gives you drugs to put you into a deep sleep so you do't feel pain.)  Pre-op checklist  · Don't eat or drink 10 hours before surgery.  · Arrange for someone to drive you home after surgery.  · Tell your doctor if you take any medicine, supplements, or herbal remedies.        Repair  For certain tears, your surgeon will try to repair the meniscus. He or she will sew torn edges so they can heal properly. Or your surgeon will use special fasteners to repair damage. In some cases, repairs may require another incision at the back or side of your knee.       Removal  In most cases, your surgeon will remove the damaged part of your meniscus. The meniscus won't completely grow back, so your doctor will remove as little tissue as possible. Other tissue, called the articular cartilage, will take over the role as shock absorber for your knee joint.       After surgery  You'll spend some time in the recovery area and can go home when you've recovered from the anesthesia. Your knee will be bandaged, and you may have stitches, steri-strips, or staples. You may need crutches to keep weight off the knee and may have a splint for support.  Date Last Reviewed: 9/4/2015  © 9366-9531 [employer]. [address] [place] [address], [place] [address]. All rights reserved. This information is not intended as a substitute for professional medical care. Always follow your healthcare professional's  instructions.         <<-----Click here for Discharge Medication Review

## 2020-02-19 ENCOUNTER — TELEPHONE (OUTPATIENT)
Dept: SPORTS MEDICINE | Facility: CLINIC | Age: 45
End: 2020-02-19

## 2020-02-19 NOTE — TELEPHONE ENCOUNTER
----- Message from Norma Stiles sent at 2/19/2020  1:55 PM CST -----  Pt is calling to reschedule her surgery date. Pt is stating that she just got off of the phone with Rosi about and hour ago and would like for Rosi to give her a call back as soon as possible

## 2020-02-19 NOTE — TELEPHONE ENCOUNTER
Spoke to patient about picking a surgery date. She is interested in 3/26 but Dr. Ramos is not in surgery on that date. I offered her the earlier surgery dates and she has chosen 3/19 tentatively.

## 2020-02-19 NOTE — TELEPHONE ENCOUNTER
----- Message from Angel Stuart sent at 2/19/2020  8:55 AM CST -----  Contact: self  Pt needs a call back regarding information about a surgery date       Contact info

## 2020-02-20 DIAGNOSIS — M23.91 INTERNAL DERANGEMENT OF RIGHT KNEE: ICD-10-CM

## 2020-02-20 DIAGNOSIS — M23.203 OLD BUCKET HANDLE TEAR OF MEDIAL MENISCUS OF RIGHT KNEE: ICD-10-CM

## 2020-02-20 DIAGNOSIS — M94.269 TIBIAL PLATEAU CHONDROMALACIA: Primary | ICD-10-CM

## 2020-03-09 NOTE — ANESTHESIA PAT ROS NOTE
03/09/2020  Chey Mcneill is a 44 y.o., female.      Pre-op Assessment    I have reviewed the Patient Summary Reports.      I have reviewed the Medications.     Review of Systems  Anesthesia Hx:  4/2018 +PONV    Anesthesia Hx:  No previous Anesthesia No problems History of prior surgery of interest to airway management or planning: Previous anesthesia: General May 2017 Dr Ramos NAA with general anesthesia.  Procedure performed at an [facility]. Denies Family Hx of Anesthesia complications.   Denies Personal Hx of Anesthesia complications   Method of Intubation: Lazar Inserted by: CRNA Airway Device: Endotracheal Tube Airway Device Size: 7.0 Style: Cuffed Depth of Insertion (cm): 21 Inflation Amount (mL): 3 Placement Verified By: Auscultation;Capnometry Breath Sounds: Equal Bilateral Insertion attempts (enter comment if more than 2 attempts): 1 2017 -- Airway/Jaw/Neck:  Airway Findings: Mouth Opening: Small, but > 3cm Tongue: Normal  General Airway Assessment: Adult, Possible difficult intubation  Mallampati: II  TM Distance: Normal, at least 6 cm         Social:  Non-Smoker, Social Alcohol Use    Hematology/Oncology:  Hematology Normal   Oncology Normal     EENT/Dental:EENT/Dental Normal   Cardiovascular:    Denies Angina.    Pulmonary:  Pulmonary Normal  Denies Shortness of breath.  Denies Recent URI.    Renal/:  Renal/ Normal     Hepatic/GI:  Hepatic/GI Normal    Musculoskeletal:   Multiple ortho issues    Neurological:   Neuromuscular Disease, Cervical radiculopathy   Endocrine:  Endocrine Normal    Dermatological:  Skin Normal    Psych:  Psychiatric Normal          Anesthesia Assessment: Preoperative EQUATION    Planned Procedure: Procedure(s) (LRB):  SUBCHONDROPLASTY-femur (Right)  REPAIR, Subchondroplasty - Tibia (Right)  ARTHROSCOPY, KNEE, WITH MENISCECTOMY (Right)  ARTHROSCOPY,  KNEE, WITH CHONDROPLASTY (Right)  SYNOVECTOMY, KNEE (Right)  Requested Anesthesia Type:General  Surgeon: Cooper Ramos MD  Service: Orthopedics  Known or anticipated Date of Surgery:3/16/2020    Surgeon notes: reviewed    Electronic QUestionnaire Assessment completed via nurse interview with patient.        Triage considerations:     The patient has no apparent active cardiac condition (No unstable coronary Syndrome such as severe unstable angina or recent [<1 month] myocardial infarction, decompensated CHF, severe valvular   disease or significant arrhythmia)    Previous anesthesia records:GETA, Nerve block for post-op pain and Hx PONV    Subspecialty notes: Ortho    Other important co-morbidities: Suboxone      Tests already available:  No recent tests.             Instructions given. (See in Nurse's note)    Optimization:   Patient  has previously scheduled Medical Appointment:    Navigation:               Straight Line to surgery.               No tests, anesthesia preop clinic visit, or consult required.

## 2020-03-12 ENCOUNTER — TELEPHONE (OUTPATIENT)
Dept: SPORTS MEDICINE | Facility: CLINIC | Age: 45
End: 2020-03-12

## 2020-03-12 ENCOUNTER — PATIENT MESSAGE (OUTPATIENT)
Dept: SURGERY | Facility: HOSPITAL | Age: 45
End: 2020-03-12

## 2020-03-12 NOTE — TELEPHONE ENCOUNTER
Spoke to the patient regarding her message via [facility]. Message copied below:  Good morning, I'm due to have pre-op tomorrow the 13th. I started with a dry cough last night. No fever, no chills, no shortness of breath. But a dry cough with what feels like a very, I mean super tiny rattle in my throat.     Don't want to cause concern with everyone freaking out, but also would rather tell y'all now.     Please let me know what you would like me to do.     Thank you,     Swati Mcneill     1. Asked the patient if she has recently traveled out of the country. The patient replied no.    2. Asked the patient is she or someone she knows has been in close contact with someone who traveled outside of the country. The patient replied no.    Informed the patient that we would like her to contact her PCP and make them aware of her symptoms but at this time we have no cause for concern. Patient will continue to monitor her symptoms. Should she worsen she was instructed to call her PCP office first before going in. Patient understood all the information provided and agreed with the plan.

## 2020-03-13 ENCOUNTER — OFFICE VISIT (OUTPATIENT)
Dept: SPORTS MEDICINE | Facility: CLINIC | Age: 45
End: 2020-03-13
Payer: MEDICAID

## 2020-03-13 ENCOUNTER — TELEPHONE (OUTPATIENT)
Dept: SPORTS MEDICINE | Facility: CLINIC | Age: 45
End: 2020-03-13

## 2020-03-13 ENCOUNTER — PATIENT MESSAGE (OUTPATIENT)
Dept: SPORTS MEDICINE | Facility: CLINIC | Age: 45
End: 2020-03-13

## 2020-03-13 ENCOUNTER — ANESTHESIA EVENT (OUTPATIENT)
Dept: SURGERY | Facility: HOSPITAL | Age: 45
End: 2020-03-13
Payer: MEDICAID

## 2020-03-13 VITALS
WEIGHT: 145 LBS | HEART RATE: 62 BPM | HEIGHT: 62 IN | BODY MASS INDEX: 26.68 KG/M2 | DIASTOLIC BLOOD PRESSURE: 74 MMHG | SYSTOLIC BLOOD PRESSURE: 109 MMHG

## 2020-03-13 DIAGNOSIS — M94.269 TIBIAL PLATEAU CHONDROMALACIA: Primary | ICD-10-CM

## 2020-03-13 DIAGNOSIS — M23.203 OLD BUCKET HANDLE TEAR OF MEDIAL MENISCUS OF RIGHT KNEE: ICD-10-CM

## 2020-03-13 DIAGNOSIS — M23.91 INTERNAL DERANGEMENT OF RIGHT KNEE: ICD-10-CM

## 2020-03-13 PROCEDURE — 99499 UNLISTED E&M SERVICE: CPT | Mod: S$PBB,,, | Performed by: PHYSICIAN ASSISTANT

## 2020-03-13 PROCEDURE — 99999 PR PBB SHADOW E&M-EST. PATIENT-LVL III: CPT | Mod: PBBFAC,,, | Performed by: PHYSICIAN ASSISTANT

## 2020-03-13 PROCEDURE — 99999 PR PBB SHADOW E&M-EST. PATIENT-LVL III: ICD-10-PCS | Mod: PBBFAC,,, | Performed by: PHYSICIAN ASSISTANT

## 2020-03-13 PROCEDURE — 99499 NO LOS: ICD-10-PCS | Mod: S$PBB,,, | Performed by: PHYSICIAN ASSISTANT

## 2020-03-13 PROCEDURE — 99213 OFFICE O/P EST LOW 20 MIN: CPT | Mod: PBBFAC | Performed by: PHYSICIAN ASSISTANT

## 2020-03-13 RX ORDER — PROMETHAZINE HYDROCHLORIDE 25 MG/1
25 TABLET ORAL EVERY 4 HOURS PRN
Qty: 30 TABLET | Refills: 0 | Status: SHIPPED | OUTPATIENT
Start: 2020-03-13 | End: 2021-12-06 | Stop reason: ALTCHOICE

## 2020-03-13 RX ORDER — ASPIRIN 325 MG
TABLET ORAL
Qty: 42 TABLET | Refills: 0 | Status: SHIPPED | OUTPATIENT
Start: 2020-03-13 | End: 2021-12-02

## 2020-03-13 RX ORDER — SODIUM CHLORIDE 0.9 % (FLUSH) 0.9 %
10 SYRINGE (ML) INJECTION
Status: CANCELLED | OUTPATIENT
Start: 2020-03-13

## 2020-03-13 RX ORDER — CELECOXIB 200 MG/1
200 CAPSULE ORAL 2 TIMES DAILY WITH MEALS
Qty: 60 CAPSULE | Refills: 0 | Status: SHIPPED | OUTPATIENT
Start: 2020-03-13 | End: 2020-03-13

## 2020-03-13 RX ORDER — ROPIVACAINE/EPI/CLONIDINE/KET 2.46-0.005
SYRINGE (ML) INJECTION ONCE
Status: CANCELLED | OUTPATIENT
Start: 2020-03-13 | End: 2020-03-13

## 2020-03-13 RX ORDER — HYDROCODONE BITARTRATE AND ACETAMINOPHEN 10; 325 MG/1; MG/1
1 TABLET ORAL EVERY 6 HOURS PRN
Qty: 28 TABLET | Refills: 0 | Status: SHIPPED | OUTPATIENT
Start: 2020-03-13 | End: 2021-12-06

## 2020-03-13 RX ORDER — MUPIROCIN 20 MG/G
OINTMENT TOPICAL
Status: CANCELLED | OUTPATIENT
Start: 2020-03-13

## 2020-03-13 NOTE — H&P (VIEW-ONLY)
Chey Mcneill  is here for a completion of her perioperative paperwork. she  Is scheduled to undergo     1. Right knee subchondroplasty medial femur  2. Right knee subchondroplasty medial tibia  3. Right knee arthroscopic chondroplasty  4. Right knee arthroscopic synovectomy  5. Right knee arthroscopic menisectomy on 3/16/2020.      She is a healthy individual and does not need clearance for this procedure.     Risks, indications and benefits of the surgical procedure were discussed with the patient. All questions with regard to surgery, rehab, expected return to functional activities, activities of daily living and recreational endeavors were answered to her satisfaction.    Patient was informed and understands the risks of surgery are greater for patients with a current condition or history of heart disease, obesity, clotting disorders, recurrent infections, steroid use, current or past smoking, and factors such as sedentary lifestyle and noncompliance with medications, therapy or follow-up. The degree of the increased risk is hard to estimate with any degree of precision.    Once no other questions were asked, a brief history and physical exam was then performed.    PAST MEDICAL HISTORY:   Past Medical History:   Diagnosis Date    PONV (postoperative nausea and vomiting)     Ruptured triceps tendon 7/25/2012     PAST SURGICAL HISTORY:   Past Surgical History:   Procedure Laterality Date    ABDOMINAL SURGERY      lap band    BACK SURGERY      ELBOW FRACTURE SURGERY      HYSTERECTOMY  2001    KNEE ARTHROSCOPY Left     16 sx's    SHOULDER SURGERY       FAMILY HISTORY:   Family History   Problem Relation Age of Onset    Lupus Mother     No Known Problems Father      SOCIAL HISTORY:   Social History     Socioeconomic History    Marital status:      Spouse name: Not on file    Number of children: Not on file    Years of education: Not on file    Highest education level: Not on file    Occupational History    Not on file   Social Needs    Financial resource strain: Not on file    Food insecurity:     Worry: Not on file     Inability: Not on file    Transportation needs:     Medical: Not on file     Non-medical: Not on file   Tobacco Use    Smoking status: Never Smoker    Smokeless tobacco: Never Used   Substance and Sexual Activity    Alcohol use: Yes     Comment: occas    Drug use: No    Sexual activity: Not on file   Lifestyle    Physical activity:     Days per week: Not on file     Minutes per session: Not on file    Stress: Not on file   Relationships    Social connections:     Talks on phone: Not on file     Gets together: Not on file     Attends Nondenominational service: Not on file     Active member of club or organization: Not on file     Attends meetings of clubs or organizations: Not on file     Relationship status: Not on file   Other Topics Concern    Not on file   Social History Narrative    Not on file       MEDICATIONS: No current outpatient medications on file.  ALLERGIES:   Review of patient's allergies indicates:   Allergen Reactions    Adhesive Dermatitis     dermabond caused severe blistering    Benzoin tincture plain Hives, Itching and Other (See Comments)     Significant blistering    Morphine Nausea And Vomiting and Other (See Comments)     headaches    Oxycontin [oxycodone] Hives and Itching     Patient states can take Percocet.    Sulfa (sulfonamide antibiotics) Hives    Chlorhexidine     Dermabond [tissue glues] Blisters     Dermabond peterson       Review of Systems   Constitution: Negative. Negative for chills, fever and night sweats.   HENT: Negative for congestion and headaches.    Eyes: Negative for blurred vision, left vision loss and right vision loss.   Cardiovascular: Negative for chest pain and syncope.   Respiratory: Negative for cough and shortness of breath.    Endocrine: Negative for polydipsia, polyphagia and polyuria.   Hematologic/Lymphatic:  Negative for bleeding problem. Does not bruise/bleed easily.   Skin: Negative for dry skin, itching and rash.   Musculoskeletal: Negative for falls and muscle weakness.   Gastrointestinal: Negative for abdominal pain and bowel incontinence.   Genitourinary: Negative for bladder incontinence and nocturia.   Neurological: Negative for disturbances in coordination, loss of balance and seizures.   Psychiatric/Behavioral: Negative for depression. The patient does not have insomnia.    Allergic/Immunologic: Negative for hives and persistent infections.     PHYSICAL EXAM:  GEN: A&Ox3, WD WN NAD  HEENT: WNL  CHEST: CTAB, no W/R/R  HEART: RRR, no M/R/G   ABD: Soft, NT ND, BS x4 QUADS  MS: Refer to previous note for detailed MS exam  NEURO: CN II-XII intact       The surgical consent was then reviewed with the patient, who agreed with all the contents of the consent form and it was signed. she was instructed to wait for a phone call from the anesthesia department prior to surgery to discuss past medical history, medications, and clearance. Also, informed she may be required to get additional testing per the anesthesia department prior to having surgery.     PHYSICAL THERAPY:  She was also instructed regarding physical therapy and will begin POD # 1-3. She is doing physical therapy at [facility].    POST OP CARE:instructions were reviewed including care of the wound and dressing after surgery and when she can shower.     PAIN MANAGEMENT: Chey Mcneill was also given a pain management regime, which includes the TENS unit given to her by Yamini Falcon along with the education required for its use. She was also instructed regarding the Polar ice unit that will be in place after surgery and her postoperative pain medications.     PAIN MEDICATION:  Norco 10/325mg 1 po q 4-6 hours prn pain  Phenergan 25 mg one p.o. q.4-6 hours p.r.n. nausea and vomiting.  Aspirin 325mg daily x 6 weeks for DVT  prophylaxis starting on the evening after surgery.    Post op meds to be delivered bedside prior to discharge. Deliver to family if patient is in surgery at 5pm.   Patient denies history of seizures.     Patient will also use bilateral TEDs on lower extremities, SCDs during surgery, and early ambulation post-op. If the patient was previously taking 81mg baby aspirin, they were told to not take it will using the above stated aspirin and to restart the 81mg aspirin after completion of the aspirin dose.       Patient was also told to buy over the counter Prilosec medication and take it once daily for GI protection as long as they are taking NSAIDs or Aspirin.    DVT prophylaxis was discussed with the patient today including risk factors for developing DVTs and history of DVTs. The patient was asked if any specific recommendations were given from the doctor/s that did pre-operative surgical clearance.      Patient was asked if they were taking or using OCP pills or devices. If they answered yes, then they were instructed to stop using OCPs at this pre-operative appointment until 2 months post-op to help prevent DVT development. They understand that there are other forms of birth control that do not involve hormones. They expressed understanding that ignoring/not following this instruction could result in a DVT which could turn into a deadly pulmonary embolism.      The patient was told that narcotic pain medications may make them drowsy and instructions were given to not sign legal documents, drive or operate heavy machinery, cars, or equipment while under the influence of narcotic medications.     As there were no other questions to be asked, she was given my business card along with Cooper Ramos MD business card if she has any questions or concerns prior to surgery or in the postop period.

## 2020-03-13 NOTE — TELEPHONE ENCOUNTER
Called informing patient of surgery arrival time (5:15 am) and location ([place]).    Patient and patient's family confirmed she will not be staying overnight.    All of the patient's questions were answered and the patient will contact us if they have any questions or concerns in the interim.

## 2020-03-13 NOTE — TELEPHONE ENCOUNTER
Spoke to patient about message below. I informed the patient that the medication was sent to [facility] [place] [facility] but would be delivered bedside to [place].

## 2020-03-13 NOTE — H&P
Chey Mcneill  is here for a completion of her perioperative paperwork. she  Is scheduled to undergo     1. Right knee subchondroplasty medial femur  2. Right knee subchondroplasty medial tibia  3. Right knee arthroscopic chondroplasty  4. Right knee arthroscopic synovectomy  5. Right knee arthroscopic menisectomy on 3/16/2020.      She is a healthy individual and does not need clearance for this procedure.     Risks, indications and benefits of the surgical procedure were discussed with the patient. All questions with regard to surgery, rehab, expected return to functional activities, activities of daily living and recreational endeavors were answered to her satisfaction.    Patient was informed and understands the risks of surgery are greater for patients with a current condition or history of heart disease, obesity, clotting disorders, recurrent infections, steroid use, current or past smoking, and factors such as sedentary lifestyle and noncompliance with medications, therapy or follow-up. The degree of the increased risk is hard to estimate with any degree of precision.    Once no other questions were asked, a brief history and physical exam was then performed.    PAST MEDICAL HISTORY:   Past Medical History:   Diagnosis Date    PONV (postoperative nausea and vomiting)     Ruptured triceps tendon 7/25/2012     PAST SURGICAL HISTORY:   Past Surgical History:   Procedure Laterality Date    ABDOMINAL SURGERY      lap band    BACK SURGERY      ELBOW FRACTURE SURGERY      HYSTERECTOMY  2001    KNEE ARTHROSCOPY Left     16 sx's    SHOULDER SURGERY       FAMILY HISTORY:   Family History   Problem Relation Age of Onset    Lupus Mother     No Known Problems Father      SOCIAL HISTORY:   Social History     Socioeconomic History    Marital status:      Spouse name: Not on file    Number of children: Not on file    Years of education: Not on file    Highest education level: Not on file    Occupational History    Not on file   Social Needs    Financial resource strain: Not on file    Food insecurity:     Worry: Not on file     Inability: Not on file    Transportation needs:     Medical: Not on file     Non-medical: Not on file   Tobacco Use    Smoking status: Never Smoker    Smokeless tobacco: Never Used   Substance and Sexual Activity    Alcohol use: Yes     Comment: occas    Drug use: No    Sexual activity: Not on file   Lifestyle    Physical activity:     Days per week: Not on file     Minutes per session: Not on file    Stress: Not on file   Relationships    Social connections:     Talks on phone: Not on file     Gets together: Not on file     Attends Confucianist service: Not on file     Active member of club or organization: Not on file     Attends meetings of clubs or organizations: Not on file     Relationship status: Not on file   Other Topics Concern    Not on file   Social History Narrative    Not on file       MEDICATIONS: No current outpatient medications on file.  ALLERGIES:   Review of patient's allergies indicates:   Allergen Reactions    Adhesive Dermatitis     dermabond caused severe blistering    Benzoin tincture plain Hives, Itching and Other (See Comments)     Significant blistering    Morphine Nausea And Vomiting and Other (See Comments)     headaches    Oxycontin [oxycodone] Hives and Itching     Patient states can take Percocet.    Sulfa (sulfonamide antibiotics) Hives    Chlorhexidine     Dermabond [tissue glues] Blisters     Dermabond peterson       Review of Systems   Constitution: Negative. Negative for chills, fever and night sweats.   HENT: Negative for congestion and headaches.    Eyes: Negative for blurred vision, left vision loss and right vision loss.   Cardiovascular: Negative for chest pain and syncope.   Respiratory: Negative for cough and shortness of breath.    Endocrine: Negative for polydipsia, polyphagia and polyuria.   Hematologic/Lymphatic:  Negative for bleeding problem. Does not bruise/bleed easily.   Skin: Negative for dry skin, itching and rash.   Musculoskeletal: Negative for falls and muscle weakness.   Gastrointestinal: Negative for abdominal pain and bowel incontinence.   Genitourinary: Negative for bladder incontinence and nocturia.   Neurological: Negative for disturbances in coordination, loss of balance and seizures.   Psychiatric/Behavioral: Negative for depression. The patient does not have insomnia.    Allergic/Immunologic: Negative for hives and persistent infections.     PHYSICAL EXAM:  GEN: A&Ox3, WD WN NAD  HEENT: WNL  CHEST: CTAB, no W/R/R  HEART: RRR, no M/R/G   ABD: Soft, NT ND, BS x4 QUADS  MS: Refer to previous note for detailed MS exam  NEURO: CN II-XII intact       The surgical consent was then reviewed with the patient, who agreed with all the contents of the consent form and it was signed. she was instructed to wait for a phone call from the anesthesia department prior to surgery to discuss past medical history, medications, and clearance. Also, informed she may be required to get additional testing per the anesthesia department prior to having surgery.     PHYSICAL THERAPY:  She was also instructed regarding physical therapy and will begin POD # 1-3. She is doing physical therapy at [facility].    POST OP CARE:instructions were reviewed including care of the wound and dressing after surgery and when she can shower.     PAIN MANAGEMENT: Chey Mcneill was also given a pain management regime, which includes the TENS unit given to her by Yamini Falcon along with the education required for its use. She was also instructed regarding the Polar ice unit that will be in place after surgery and her postoperative pain medications.     PAIN MEDICATION:  Norco 10/325mg 1 po q 4-6 hours prn pain  Phenergan 25 mg one p.o. q.4-6 hours p.r.n. nausea and vomiting.  Aspirin 325mg daily x 6 weeks for DVT  prophylaxis starting on the evening after surgery.    Post op meds to be delivered bedside prior to discharge. Deliver to family if patient is in surgery at 5pm.   Patient denies history of seizures.     Patient will also use bilateral TEDs on lower extremities, SCDs during surgery, and early ambulation post-op. If the patient was previously taking 81mg baby aspirin, they were told to not take it will using the above stated aspirin and to restart the 81mg aspirin after completion of the aspirin dose.       Patient was also told to buy over the counter Prilosec medication and take it once daily for GI protection as long as they are taking NSAIDs or Aspirin.    DVT prophylaxis was discussed with the patient today including risk factors for developing DVTs and history of DVTs. The patient was asked if any specific recommendations were given from the doctor/s that did pre-operative surgical clearance.      Patient was asked if they were taking or using OCP pills or devices. If they answered yes, then they were instructed to stop using OCPs at this pre-operative appointment until 2 months post-op to help prevent DVT development. They understand that there are other forms of birth control that do not involve hormones. They expressed understanding that ignoring/not following this instruction could result in a DVT which could turn into a deadly pulmonary embolism.      The patient was told that narcotic pain medications may make them drowsy and instructions were given to not sign legal documents, drive or operate heavy machinery, cars, or equipment while under the influence of narcotic medications.     As there were no other questions to be asked, she was given my business card along with Cooper Ramos MD business card if she has any questions or concerns prior to surgery or in the postop period.

## 2020-03-13 NOTE — TELEPHONE ENCOUNTER
----- Message from Molly Garrison sent at 3/13/2020  4:48 PM CDT -----  Contact: self  Pt calling because her medications went to the wrong pharmacy. She's having surgery Monday Asking for a call back.  Please send to [facility] [phone]      Contact info [phone]

## 2020-03-16 ENCOUNTER — HOSPITAL ENCOUNTER (OUTPATIENT)
Facility: HOSPITAL | Age: 45
Discharge: HOME | End: 2020-03-16
Attending: ORTHOPAEDIC SURGERY | Admitting: ORTHOPAEDIC SURGERY
Payer: MEDICAID

## 2020-03-16 ENCOUNTER — ANESTHESIA (OUTPATIENT)
Dept: SURGERY | Facility: HOSPITAL | Age: 45
End: 2020-03-16
Payer: MEDICAID

## 2020-03-16 VITALS
HEIGHT: 62 IN | SYSTOLIC BLOOD PRESSURE: 126 MMHG | TEMPERATURE: 98 F | DIASTOLIC BLOOD PRESSURE: 69 MMHG | OXYGEN SATURATION: 97 % | RESPIRATION RATE: 20 BRPM | BODY MASS INDEX: 26.68 KG/M2 | WEIGHT: 145 LBS | HEART RATE: 76 BPM

## 2020-03-16 DIAGNOSIS — M23.203 OLD BUCKET HANDLE TEAR OF MEDIAL MENISCUS OF RIGHT KNEE: ICD-10-CM

## 2020-03-16 DIAGNOSIS — M94.269 TIBIAL PLATEAU CHONDROMALACIA: ICD-10-CM

## 2020-03-16 DIAGNOSIS — M23.91 INTERNAL DERANGEMENT OF RIGHT KNEE: ICD-10-CM

## 2020-03-16 PROCEDURE — 94761 N-INVAS EAR/PLS OXIMETRY MLT: CPT

## 2020-03-16 PROCEDURE — 76942 PR U/S GUIDANCE FOR NEEDLE GUIDANCE: ICD-10-PCS | Mod: 26,,, | Performed by: ANESTHESIOLOGY

## 2020-03-16 PROCEDURE — 76942 ECHO GUIDE FOR BIOPSY: CPT | Performed by: STUDENT IN AN ORGANIZED HEALTH CARE EDUCATION/TRAINING PROGRAM

## 2020-03-16 PROCEDURE — 64448 NJX AA&/STRD FEM NRV NFS IMG: CPT | Mod: 59,RT,, | Performed by: ANESTHESIOLOGY

## 2020-03-16 PROCEDURE — 37000008 HC ANESTHESIA 1ST 15 MINUTES: Performed by: ORTHOPAEDIC SURGERY

## 2020-03-16 PROCEDURE — 25000003 PHARM REV CODE 250: Performed by: NURSE ANESTHETIST, CERTIFIED REGISTERED

## 2020-03-16 PROCEDURE — 01400 ANES OPN/ARTHRS KNEE JT NOS: CPT | Performed by: ORTHOPAEDIC SURGERY

## 2020-03-16 PROCEDURE — 63600175 PHARM REV CODE 636 W HCPCS: Performed by: STUDENT IN AN ORGANIZED HEALTH CARE EDUCATION/TRAINING PROGRAM

## 2020-03-16 PROCEDURE — C1751 CATH, INF, PER/CENT/MIDLINE: HCPCS | Performed by: ANESTHESIOLOGY

## 2020-03-16 PROCEDURE — D9220A PRA ANESTHESIA: ICD-10-PCS | Mod: CRNA,,, | Performed by: NURSE ANESTHETIST, CERTIFIED REGISTERED

## 2020-03-16 PROCEDURE — 63600175 PHARM REV CODE 636 W HCPCS: Performed by: ANESTHESIOLOGY

## 2020-03-16 PROCEDURE — 64448 NJX AA&/STRD FEM NRV NFS IMG: CPT | Mod: 59,RT | Performed by: STUDENT IN AN ORGANIZED HEALTH CARE EDUCATION/TRAINING PROGRAM

## 2020-03-16 PROCEDURE — C1889 IMPLANT/INSERT DEVICE, NOC: HCPCS | Performed by: ORTHOPAEDIC SURGERY

## 2020-03-16 PROCEDURE — 37000009 HC ANESTHESIA EA ADD 15 MINS: Performed by: ORTHOPAEDIC SURGERY

## 2020-03-16 PROCEDURE — 27599 UNLISTED PX FEMUR/KNEE: CPT | Mod: RT,,, | Performed by: ORTHOPAEDIC SURGERY

## 2020-03-16 PROCEDURE — 25000003 PHARM REV CODE 250: Performed by: PHYSICIAN ASSISTANT

## 2020-03-16 PROCEDURE — 99900035 HC TECH TIME PER 15 MIN (STAT)

## 2020-03-16 PROCEDURE — 25000003 PHARM REV CODE 250: Performed by: ORTHOPAEDIC SURGERY

## 2020-03-16 PROCEDURE — 25000003 PHARM REV CODE 250: Performed by: ANESTHESIOLOGY

## 2020-03-16 PROCEDURE — 71000015 HC POSTOP RECOV 1ST HR: Performed by: ORTHOPAEDIC SURGERY

## 2020-03-16 PROCEDURE — 63600175 PHARM REV CODE 636 W HCPCS: Performed by: PHYSICIAN ASSISTANT

## 2020-03-16 PROCEDURE — 64448 PR NERVE BLOCK INJ, ANES/STEROID, FEMORAL, CONT INFUSION, INCL IMAG GUIDANCE: ICD-10-PCS | Mod: 59,RT,, | Performed by: ANESTHESIOLOGY

## 2020-03-16 PROCEDURE — 36000710: Performed by: ORTHOPAEDIC SURGERY

## 2020-03-16 PROCEDURE — 29855 TIBIAL ARTHROSCOPY/SURGERY: CPT | Mod: RT,,, | Performed by: ORTHOPAEDIC SURGERY

## 2020-03-16 PROCEDURE — D9220A PRA ANESTHESIA: ICD-10-PCS | Mod: ANES,,, | Performed by: ANESTHESIOLOGY

## 2020-03-16 PROCEDURE — D9220A PRA ANESTHESIA: Mod: CRNA,,, | Performed by: NURSE ANESTHETIST, CERTIFIED REGISTERED

## 2020-03-16 PROCEDURE — 29877 ARTHRS KNEE SURG DBRDMT/SHVG: CPT | Mod: 51,RT,, | Performed by: ORTHOPAEDIC SURGERY

## 2020-03-16 PROCEDURE — 94770 HC EXHALED C02 TEST: CPT | Mod: 59

## 2020-03-16 PROCEDURE — D9220A PRA ANESTHESIA: Mod: ANES,,, | Performed by: ANESTHESIOLOGY

## 2020-03-16 PROCEDURE — 29855 PR TIBIAL SCOPE/SURG/FX AID,UNICONDYLR: ICD-10-PCS | Mod: RT,,, | Performed by: ORTHOPAEDIC SURGERY

## 2020-03-16 PROCEDURE — 36000711: Performed by: ORTHOPAEDIC SURGERY

## 2020-03-16 PROCEDURE — 71000033 HC RECOVERY, INTIAL HOUR: Performed by: ORTHOPAEDIC SURGERY

## 2020-03-16 PROCEDURE — 63600175 PHARM REV CODE 636 W HCPCS: Performed by: ORTHOPAEDIC SURGERY

## 2020-03-16 PROCEDURE — 29877 PR KNEE SCOPE,SHAVE ARTICULAR CART: ICD-10-PCS | Mod: 51,RT,, | Performed by: ORTHOPAEDIC SURGERY

## 2020-03-16 PROCEDURE — 27201423 OPTIME MED/SURG SUP & DEVICES STERILE SUPPLY: Performed by: ORTHOPAEDIC SURGERY

## 2020-03-16 PROCEDURE — 76942 ECHO GUIDE FOR BIOPSY: CPT | Mod: 26,,, | Performed by: ANESTHESIOLOGY

## 2020-03-16 PROCEDURE — 63600175 PHARM REV CODE 636 W HCPCS: Performed by: NURSE ANESTHETIST, CERTIFIED REGISTERED

## 2020-03-16 PROCEDURE — 27599 PR SUBCHONDROPLASTY, KNEE, OPEN: ICD-10-PCS | Mod: RT,,, | Performed by: ORTHOPAEDIC SURGERY

## 2020-03-16 DEVICE — KIT KNEE SCP ACCUPRT 11G 120MM: Type: IMPLANTABLE DEVICE | Site: KNEE | Status: FUNCTIONAL

## 2020-03-16 RX ORDER — OXYCODONE HYDROCHLORIDE 5 MG/1
10 TABLET ORAL EVERY 4 HOURS PRN
Status: DISCONTINUED | OUTPATIENT
Start: 2020-03-16 | End: 2020-03-16 | Stop reason: HOSPADM

## 2020-03-16 RX ORDER — METOCLOPRAMIDE HYDROCHLORIDE 5 MG/ML
5 INJECTION INTRAMUSCULAR; INTRAVENOUS EVERY 6 HOURS PRN
Status: DISCONTINUED | OUTPATIENT
Start: 2020-03-16 | End: 2020-03-16 | Stop reason: HOSPADM

## 2020-03-16 RX ORDER — LIDOCAINE HCL/PF 100 MG/5ML
SYRINGE (ML) INTRAVENOUS
Status: DISCONTINUED | OUTPATIENT
Start: 2020-03-16 | End: 2020-03-16

## 2020-03-16 RX ORDER — ROCURONIUM BROMIDE 10 MG/ML
INJECTION, SOLUTION INTRAVENOUS
Status: DISCONTINUED | OUTPATIENT
Start: 2020-03-16 | End: 2020-03-16

## 2020-03-16 RX ORDER — EPINEPHRINE 1 MG/ML
INJECTION, SOLUTION INTRACARDIAC; INTRAMUSCULAR; INTRAVENOUS; SUBCUTANEOUS
Status: DISCONTINUED | OUTPATIENT
Start: 2020-03-16 | End: 2020-03-16 | Stop reason: HOSPADM

## 2020-03-16 RX ORDER — ONDANSETRON 2 MG/ML
INJECTION INTRAMUSCULAR; INTRAVENOUS
Status: DISCONTINUED | OUTPATIENT
Start: 2020-03-16 | End: 2020-03-16

## 2020-03-16 RX ORDER — MUPIROCIN 20 MG/G
OINTMENT TOPICAL
Status: DISCONTINUED | OUTPATIENT
Start: 2020-03-16 | End: 2020-03-16 | Stop reason: HOSPADM

## 2020-03-16 RX ORDER — CEFAZOLIN SODIUM 1 G/3ML
2 INJECTION, POWDER, FOR SOLUTION INTRAMUSCULAR; INTRAVENOUS
Status: DISCONTINUED | OUTPATIENT
Start: 2020-03-16 | End: 2020-03-16 | Stop reason: HOSPADM

## 2020-03-16 RX ORDER — FENTANYL CITRATE 50 UG/ML
25 INJECTION, SOLUTION INTRAMUSCULAR; INTRAVENOUS EVERY 5 MIN PRN
Status: DISCONTINUED | OUTPATIENT
Start: 2020-03-16 | End: 2020-03-16 | Stop reason: HOSPADM

## 2020-03-16 RX ORDER — MELOXICAM 7.5 MG/1
15 TABLET ORAL DAILY
Status: DISCONTINUED | OUTPATIENT
Start: 2020-03-16 | End: 2020-03-16

## 2020-03-16 RX ORDER — SODIUM CHLORIDE 0.9 % (FLUSH) 0.9 %
10 SYRINGE (ML) INJECTION
Status: DISCONTINUED | OUTPATIENT
Start: 2020-03-16 | End: 2020-03-16 | Stop reason: HOSPADM

## 2020-03-16 RX ORDER — SUCCINYLCHOLINE CHLORIDE 20 MG/ML
INJECTION INTRAMUSCULAR; INTRAVENOUS
Status: DISCONTINUED | OUTPATIENT
Start: 2020-03-16 | End: 2020-03-16

## 2020-03-16 RX ORDER — DEXAMETHASONE SODIUM PHOSPHATE 4 MG/ML
INJECTION, SOLUTION INTRA-ARTICULAR; INTRALESIONAL; INTRAMUSCULAR; INTRAVENOUS; SOFT TISSUE
Status: DISCONTINUED | OUTPATIENT
Start: 2020-03-16 | End: 2020-03-16

## 2020-03-16 RX ORDER — ROPIVACAINE HYDROCHLORIDE 5 MG/ML
INJECTION, SOLUTION EPIDURAL; INFILTRATION; PERINEURAL
Status: DISCONTINUED | OUTPATIENT
Start: 2020-03-16 | End: 2020-03-16

## 2020-03-16 RX ORDER — MIDAZOLAM HYDROCHLORIDE 1 MG/ML
0.5 INJECTION INTRAMUSCULAR; INTRAVENOUS
Status: DISCONTINUED | OUTPATIENT
Start: 2020-03-16 | End: 2020-03-16 | Stop reason: HOSPADM

## 2020-03-16 RX ORDER — ACETAMINOPHEN 325 MG/1
650 TABLET ORAL EVERY 4 HOURS PRN
Status: DISCONTINUED | OUTPATIENT
Start: 2020-03-16 | End: 2020-03-16 | Stop reason: HOSPADM

## 2020-03-16 RX ORDER — SODIUM CHLORIDE 9 MG/ML
INJECTION, SOLUTION INTRAVENOUS CONTINUOUS
Status: DISCONTINUED | OUTPATIENT
Start: 2020-03-16 | End: 2020-03-16 | Stop reason: HOSPADM

## 2020-03-16 RX ORDER — ONDANSETRON 2 MG/ML
4 INJECTION INTRAMUSCULAR; INTRAVENOUS DAILY PRN
Status: DISCONTINUED | OUTPATIENT
Start: 2020-03-16 | End: 2020-03-16 | Stop reason: HOSPADM

## 2020-03-16 RX ORDER — HYDROCODONE BITARTRATE AND ACETAMINOPHEN 5; 325 MG/1; MG/1
1 TABLET ORAL EVERY 4 HOURS PRN
Status: DISCONTINUED | OUTPATIENT
Start: 2020-03-16 | End: 2020-03-16 | Stop reason: HOSPADM

## 2020-03-16 RX ORDER — ONDANSETRON 2 MG/ML
4 INJECTION INTRAMUSCULAR; INTRAVENOUS EVERY 12 HOURS PRN
Status: DISCONTINUED | OUTPATIENT
Start: 2020-03-16 | End: 2020-03-16 | Stop reason: HOSPADM

## 2020-03-16 RX ORDER — ACETAMINOPHEN 500 MG
1000 TABLET ORAL ONCE
Status: COMPLETED | OUTPATIENT
Start: 2020-03-16 | End: 2020-03-16

## 2020-03-16 RX ORDER — TRAMADOL HYDROCHLORIDE 50 MG/1
100 TABLET ORAL EVERY 6 HOURS PRN
Status: DISCONTINUED | OUTPATIENT
Start: 2020-03-16 | End: 2020-03-16 | Stop reason: HOSPADM

## 2020-03-16 RX ORDER — TRAMADOL HYDROCHLORIDE 50 MG/1
50 TABLET ORAL EVERY 6 HOURS PRN
Status: DISCONTINUED | OUTPATIENT
Start: 2020-03-16 | End: 2020-03-16 | Stop reason: HOSPADM

## 2020-03-16 RX ORDER — MIDAZOLAM HYDROCHLORIDE 1 MG/ML
INJECTION, SOLUTION INTRAMUSCULAR; INTRAVENOUS
Status: DISCONTINUED | OUTPATIENT
Start: 2020-03-16 | End: 2020-03-16

## 2020-03-16 RX ORDER — LIDOCAINE HYDROCHLORIDE 20 MG/ML
INJECTION INTRAVENOUS
Status: DISCONTINUED | OUTPATIENT
Start: 2020-03-16 | End: 2020-03-16

## 2020-03-16 RX ORDER — MELOXICAM 7.5 MG/1
15 TABLET ORAL DAILY
Status: DISCONTINUED | OUTPATIENT
Start: 2020-03-16 | End: 2020-03-16 | Stop reason: HOSPADM

## 2020-03-16 RX ORDER — PROPOFOL 10 MG/ML
VIAL (ML) INTRAVENOUS
Status: DISCONTINUED | OUTPATIENT
Start: 2020-03-16 | End: 2020-03-16

## 2020-03-16 RX ORDER — FENTANYL CITRATE 50 UG/ML
INJECTION, SOLUTION INTRAMUSCULAR; INTRAVENOUS
Status: DISCONTINUED | OUTPATIENT
Start: 2020-03-16 | End: 2020-03-16

## 2020-03-16 RX ORDER — KETAMINE HCL IN 0.9 % NACL 50 MG/5 ML
SYRINGE (ML) INTRAVENOUS
Status: DISCONTINUED | OUTPATIENT
Start: 2020-03-16 | End: 2020-03-16

## 2020-03-16 RX ORDER — ROPIVACAINE/EPI/CLONIDINE/KET 2.46-0.005
SYRINGE (ML) INJECTION
Status: DISCONTINUED | OUTPATIENT
Start: 2020-03-16 | End: 2020-03-16 | Stop reason: HOSPADM

## 2020-03-16 RX ADMIN — MELOXICAM 15 MG: 7.5 TABLET ORAL at 06:03

## 2020-03-16 RX ADMIN — FENTANYL CITRATE 25 MCG: 50 INJECTION INTRAMUSCULAR; INTRAVENOUS at 09:03

## 2020-03-16 RX ADMIN — ROCURONIUM BROMIDE 30 MG: 10 INJECTION, SOLUTION INTRAVENOUS at 07:03

## 2020-03-16 RX ADMIN — MIDAZOLAM HYDROCHLORIDE 2 MG: 1 INJECTION, SOLUTION INTRAMUSCULAR; INTRAVENOUS at 06:03

## 2020-03-16 RX ADMIN — MUPIROCIN: 20 OINTMENT TOPICAL at 05:03

## 2020-03-16 RX ADMIN — CEFAZOLIN 2 G: 330 INJECTION, POWDER, FOR SOLUTION INTRAMUSCULAR; INTRAVENOUS at 07:03

## 2020-03-16 RX ADMIN — FENTANYL CITRATE 50 MCG: 50 INJECTION INTRAMUSCULAR; INTRAVENOUS at 06:03

## 2020-03-16 RX ADMIN — ACETAMINOPHEN 1000 MG: 500 TABLET ORAL at 05:03

## 2020-03-16 RX ADMIN — TRAMADOL HYDROCHLORIDE 100 MG: 50 TABLET, FILM COATED ORAL at 08:03

## 2020-03-16 RX ADMIN — ROPIVACAINE HYDROCHLORIDE 6 ML: 5 INJECTION, SOLUTION EPIDURAL; INFILTRATION; PERINEURAL at 06:03

## 2020-03-16 RX ADMIN — Medication 20 MG: at 07:03

## 2020-03-16 RX ADMIN — MIDAZOLAM 2 MG: 1 INJECTION INTRAMUSCULAR; INTRAVENOUS at 07:03

## 2020-03-16 RX ADMIN — DEXAMETHASONE SODIUM PHOSPHATE 8 MG: 4 INJECTION, SOLUTION INTRAMUSCULAR; INTRAVENOUS at 07:03

## 2020-03-16 RX ADMIN — LIDOCAINE HYDROCHLORIDE 100 MG: 20 INJECTION, SOLUTION INTRAVENOUS at 07:03

## 2020-03-16 RX ADMIN — SODIUM CHLORIDE 1000 ML: 0.9 INJECTION, SOLUTION INTRAVENOUS at 06:03

## 2020-03-16 RX ADMIN — SUCCINYLCHOLINE CHLORIDE 120 MG: 20 INJECTION, SOLUTION INTRAMUSCULAR; INTRAVENOUS at 07:03

## 2020-03-16 RX ADMIN — PROPOFOL 150 MG: 10 INJECTION, EMULSION INTRAVENOUS at 07:03

## 2020-03-16 RX ADMIN — ONDANSETRON 4 MG: 2 INJECTION INTRAMUSCULAR; INTRAVENOUS at 07:03

## 2020-03-16 RX ADMIN — FENTANYL CITRATE 25 MCG: 50 INJECTION INTRAMUSCULAR; INTRAVENOUS at 08:03

## 2020-03-16 RX ADMIN — ROPIVACAINE HYDROCHLORIDE: 2 INJECTION, SOLUTION EPIDURAL; INFILTRATION at 09:03

## 2020-03-16 RX ADMIN — FENTANYL CITRATE 100 MCG: 50 INJECTION, SOLUTION INTRAMUSCULAR; INTRAVENOUS at 07:03

## 2020-03-16 NOTE — ADDENDUM NOTE
Addendum  created 03/16/20 1054 by Konstantin Benoit RN    Intraprocedure Event edited, Sign clinical note

## 2020-03-16 NOTE — ANESTHESIA PROCEDURE NOTES
Adductor Canal Catheter    Patient location during procedure: pre-op   Block not for primary anesthetic.  Reason for block: at surgeon's request and post-op pain management   Post-op Pain Location: RIGHT Knee  Start time: 3/16/2020 6:36 AM  Timeout: 3/16/2020 6:31 AM   End time: 3/16/2020 6:45 AM    Staffing  Authorizing Provider: Maliha Earl MD  Performing Provider: Sumit Reyez MD    Preanesthetic Checklist  Completed: patient identified, site marked, surgical consent, pre-op evaluation, timeout performed, IV checked, risks and benefits discussed and monitors and equipment checked  Peripheral Block  Patient position: supine  Prep: ChloraPrep and site prepped and draped  Patient monitoring: heart rate, cardiac monitor, continuous pulse ox, continuous capnometry and frequent blood pressure checks  Block type: adductor canal  Laterality: right  Injection technique: continuous  Needle  Needle type: Tuohy   Needle gauge: 17 G  Needle length: 3.5 in  Needle localization: anatomical landmarks and ultrasound guidance  Catheter type: spring wound  Catheter size: 19 G  Test dose: lidocaine 1.5% with Epi 1-to-200,000 and negative   -ultrasound image captured on disc.  Assessment  Injection assessment: negative aspiration, negative parasthesia and local visualized surrounding nerve  Paresthesia pain: none  Heart rate change: no  Slow fractionated injection: yes  Additional Notes  VSS.  [facility] RN monitoring vitals throughout procedure.  Patient tolerated procedure well.

## 2020-03-16 NOTE — PLAN OF CARE
VSS.  Patient tolerating oral liquids without difficulty. No apparent s&s of distress noted at this time, no complaints voiced at this time. Discharge instructions reviewed with patient and friend, Tavon Villalpando with good verbal feedback received.   Polar ice intact, ONQ intact. Crutches given, post op medications received.  Patient able to void prior to discharge.  Patient ready for discharge.

## 2020-03-16 NOTE — PROGRESS NOTES
Patient's friend Tavon Villalpando updated.  Patient has crutches, post op medications, discharge paperwork and polar ice.

## 2020-03-16 NOTE — BRIEF OP NOTE
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]     * Old bucket handle tear of medial meniscus of right knee [M23.203]     * Internal derangement of right knee [M23.91]    Procedure(s) (LRB):  SUBCHONDROPLASTY-femur (Right)  REPAIR, Subchondroplasty - Tibia (Right)  ARTHROSCOPY, KNEE    Anesthesia: General    Description of the findings of the procedure(s): see op note    Estimated Blood Loss: * No values recorded between 3/16/2020  7:31 AM and 3/16/2020  8:36 AM *         Specimens:   Specimen (12h ago, onward)    None            Discharge Note    OUTCOME: Patient tolerated treatment/procedure well without complication and is now ready for discharge.    DISPOSITION: Home or Self Care    FINAL DIAGNOSIS:  Tibial plateau chondromalacia    FOLLOWUP: In clinic    DISCHARGE INSTRUCTIONS:    Discharge Procedure Orders   Diet general     Keep surgical extremity elevated     Ice to affected area     No driving, operating heavy equipment or signing legal documents while taking pain medication     Call MD for:  temperature >100.4     Call MD for:  persistent nausea and vomiting     Call MD for:  severe uncontrolled pain     Call MD for:  difficulty breathing, headache or visual disturbances     Call MD for:  redness, tenderness, or signs of infection (pain, swelling, redness, odor or green/yellow discharge around incision site)     Call MD for:  hives     Call MD for:  persistent dizziness or light-headedness     Call MD for:  extreme fatigue

## 2020-03-16 NOTE — OP NOTE
DATE OF PROCEDURE: 3/16/2020    ATTENDING SURGEON: Surgeon(s) and Role:     * Cooper Ramos MD - Primary    ASSISTANTS:  Hema Powers MD-Resident  SMA Keysha - Assistant    PREOPERATIVE DIAGNOSIS:  Right      POSTOPERATIVE DIAGNOSIS:   Right  Chondromalacia, patella M22.40, Chondromalacia, (excludes patella) M94.29, Fracture, Tibia plateau S82.143A, Loose Body M23.40, Synovitis M65.9 and Femoral Subchondral fracture    PROCEDURES(S) PERFORMED:   1. Right  Femoral Subchondroplasty, 77839  2.  Right  Tibial Subchondroplasty, 54371  3.  Right  Arthroscopy, debridement/shaving of articular cartilage (chondroplasty) 72515  4.  Right  Arthroscopy, knee, for removal of loose body or foreign body 96833  5.  Right  Arthroscopy, knee, synovectomy, limited 13084    ANESTHESIA: General, Local and Regional w/ catheter  , adductor block with catheter, Local 50 cc GEO    FLUIDS IN THE CASE: 1000 ml    ESTIMATED BLOOD LOSS: Minimal    URINE OUTPUT: 0 ml    COMPLICATIONS: none    CONDITION ON RETURN TO RECOVERY ROOM: Good     Expand All Collapse All       IMPLANTS UTILIZED: Bladimir SCP Kit x 2    INDICATIONS FOR OPERATIVE PROCEDURE: Chey Mcneill is a 44 y.o.  female with history of right knee pain and pathology. The patient's history and physical examination findings consistent with the procedure performed. He noted significant problems in the area of concern with problems on activities of daily living and aggressive use of the right leg. As a result of these problems and problems with overall activity level, the patient was deemed to be an appropriate candidate for operative intervention. Nonoperative versus operative options were discussed. The risks and benefits were discussed with the patient. The patient acknowledged understanding and wished to proceed with operative intervention. Informed consent was obtained prior to the procedure. Details of the surgical procedure were explained, including incisions  and probable rehabilitation course. The patient understands the likely length of convalescence after surgery; and we have explained the risks, benefits, and alternatives of surgery. Reasonable expectations and potential complications were discussed and acknowledged, including but not limited to infection, bleeding, blood clots, (DVT and/or PE), nerve injury, retear, instability, continued pain and stiffness. It was also explained that there was a chance of failure which may require further management. The patient agreed and understood and wished to proceed.       FINDINGS:     ARTICULAR CARTILAGE LESION(S):  Medial Femoral Condyle: ICRS Grade 4B      Size: 3 x 6 cm  Medial tibial plateau: ICRS Grade 2      Size: 2.0 x 2.0 cm        Lateral Femoral Condyle: ICRS Grade 0      Size: none  Lateral tibial plateau: ICRS Grade 0      Size: none        Patellar surface: ICRS Grade 2      Size: 1.5 x 2.0 cm  Trochlear groove: ICRS Grade 3      Size: 2.0 x 2.0 cm        EXAMINATION UNDER ANESTHESIA:   Extension 0 degrees  Flexion 140 degrees  Lachman Maneuver:  Negative  Anterior Drawer: Negative  Pivot Shift: Negative  Posterior Drawer:  Negative  Varus stability @ 30 degrees: 0  Valgus stability @ 30 degrees: 0  Patellar glide:1 quadrant lateral, 2 quadrant medial    DESCRIPTION OF PROCEDURE: The patient was brought into the Operating Room and placed in supine position. Upon application of Regional w/ catheter  adductor block in the preoperative holding area, the patient underwent General to stabilize the airway. The patient was given the appropriate dose of antibiotics based on body weight. Timeout was utilized to verify the side as the operative side. Both upper extremities were placed in comfortable position. Examination under anesthesia was performed. The nonoperative leg was carefully padded along the heel and peroneal nerve regions and maintained flat on the table. The operative leg was then stabilized with a lateral  post for intra-operative positioning as well as a popliteal post placed at the mid-calf level.  No bump was placed under the hip on the operative side. The operative leg was prepped and draped in a sterile fashion with ChloraPrep material.    We injected 0.5% ropivacaine mixture into the anterolateral and anteromedial aspect of the knee with application of 15 mL per portal site. A #11 blade was used to make the arthroscopic portals. Blunt trocar and sheath were inserted into the intercondylar notch and then subsequently into the suprapatellar pouch. This patellofemoral joint was visualized, demonstrating normal lateral patellar tilt, normal patellar subluxation. The patellar tracked midline with flexion and extension. Arthroscopic pictures were obtained. There was articular cartilage damage was present in the patellofemoral compartment as noted in the Findings section of this operative report. The lesion if present was treated with arthroscopic chondroplasty technique removing all irregular edges and flaps of articular cartilage at the lesion.    Attention was turned to the intercondylar notch where the anterior cruciate ligament (ACL) and posterior cruciate ligament (PCL) structures were visualized. Visualization demonstrated an intact ACL and an intact PCL. Probe analysis revealed no signs of occult pathology within the ligamentous structures.     Attention was then turned to the lateral compartment. The patient demonstrated an intact lateral meniscus with probe analysis demonstrating no occult tears or pathology Arthroscopic instrumentation was used to veify no tear and pictures obtained. no articular cartilage damage in the lateral compartment The lesion if present was treated withobservation.    Attention was then turned to the medial compartment. The patient demonstrated an intact medial meniscus with probe analysis demonstrating no occult tears or pathology Arthroscopic instrumentation was used to  veify no  tear and pictures obtained. There was articular cartilage damage was present in the medial compartment as noted in the Findings section of this operative report. The lesion if present was treated with arthroscopic chondroplasty technique removing all irregular edges and flaps of articular cartilage at the lesion.    Arthroscopic limited  synovectomy was performed in the anterior medial and lateral regions of the knee.    Subchondroplasty:  Arthroscopic instrumentation was removed from the right knee and the C-arm was used to visualize the medial femoral region. A spinal needle was used to localize the location for incision based on flouroscopic visualization laterally and anteriorly. A # 11 blade was used to create a small incision and the trochar from the Bladimir set was used to place the application system into the medial femur 1.5 - 2 cm proximal to the medial articular subchondral bone plate. The holes were positioned towards the joint line using flouroscopic guidance and the AccuFill injected; all vials were placed and the trochar was reapplied. Attention was turned distally. The C-arm was used to visualize the medial tibial plateau region. A # 11 blade was used to create a small incision and the trochar from the Bladimir set was used to place the application system into the medial femur 1.5 - 2.0 cm distal to the medial articular subchondral bone plate. The holes were positioned towards the joint line using flouroscopic guidance and the AccuFill injected; all vials were placed and the trochar was reapplied. We allowed 15 minutes for the calcium phosphate to harden and solidify. The trochars were removed. We used arthroscopic instrumentation to verify that no calcium phosphate had entered the medial compartment. The trochars were removed and we verified no material in the medial soft tissue structures as well.       No further treatments were performed in the knee.     Final arthroscopic pictures were obtained.  Fluid was extravasated from the joint. A 4-0 nylon sutures were used to close the arthroscopic portals. We then injected additional 0.5% ropivicaine mixture using 10 ml per portal site and along then anterior portion of the knee. no further treatments were performed to the knee. Xeroform was applied along with application of sterile electrodes proximally and distally, gauze, ABD pads,cast padding, long-leg MEMO hose stocking and cooling unit. No immobilizer was required postoperatively for this patient. The patient was then allowed to recover from anesthesia. general was removed. The patient was taken to Recovery Room in  Good condition. At the completion case, all instrument and sponge counts were correct.    NOTE: I was present and scrubbed for the key portions of the procedure.    PHYSICAL THERAPY:  The patient should begin physical therapy on postoperative   day # 3 and will be advanced to outpatient therapy as soon as   Possible following discharge.  Weight bearing:as tolerated  right leg  Range of Motion:Full normal motion symmetric to opposite side    No CPM required due to procedure performed    Additional exercises to be performed are:   to begin quad sets with a heel roll to obtain hyperextension, straight leg raise and heel slides with the heel supported in a closed-chain fashion    Immediate specific exercises should include:   Gait program should include the above stated weightbearing; in addition: active extension with a heel-to-toe gait working on maintaining extension    Discharge summary:  The patient was discharged to home in Good  Follow-up as scheduled preoperatively.    Medication(s): Refer to Discharge Medication List         Resume preoperative diet as tolerated    Activity per outpatient discharge instruction sheet

## 2020-03-16 NOTE — DISCHARGE INSTRUCTIONS
[address], [place]                                                                                          [phone]                   Postoperative Instructions for Knee Surgery                 Your Surgery Included:   Open               Arthroscopic   [] Ligament Repair       [] Diagnostic           [] ACL     [] PCL     [] MCL     [] PLLC      [] Synovectomy / Plica Removal [] Meniscal Cartilage Repair / Transplantation      [] Lysis of Adhesions / Manipulation [] Articular Cartilage Repair      [] Interval Release           [] Microfracture       [] OATS   [] ACI      [] Meniscectomy           [] Osteochondral Allograft      [] Meniscal Cartilage Repair  [] Patellar Realignment       [] Debridement / Chondroplasty         [] Lateral Release   [] Ligament Repair      [] Articular Cartilage Repair          [] Extensor Mechanism             []   Microfracture  []  OATS         []  Cartilage Biopsy [] Tendon Repair          [] Ligament Reconstruction          [] Patella                  [] Quadriceps             []   ACL    []   PCL  [] High Tibial Osteotomy       [] PRP Arthrocentesis  [] Joint Replacement         [] Amniox Arthrocentesis           [] Unicompartmental   [] Patellofemoral                  Call our office ([phone]) immediately or message through [facility] if you experience any of the following:       Excessive bleeding or pus like drainage at the incision site       Uncontrollable pain not relieved by pain medication       Excessive swelling or redness at the incision site       Fever above 101.5 degrees not controlled with Tylenol or Motrin       Shortness of Breath or severe calf pain       Any foul odor or blistering from the surgery site    FOR EMERGENCIES: [facility] is the best way to contact us. If on the weekend, page the  at [phone] who will direct your call appropriately.    1.   Pain Management: A cold therapy cuff, pain  medications, local injections, TENs unit, and in some cases, regional anesthesia injections are used to manage your post-operative pain. The decision to use each of these options is based on their risks and benefits.     Medications: You were given one or more of the following medication prescriptions during your preoperative appointment. Follow the instructions on the bottles.     Narcotic Medication (usually Percocet, Roxicodone, or Norco): Begin taking the medication before your knee starts to hurt. Some patients do not like to take any medication, but if you wait until your pain is severe before taking, you will be very uncomfortable for several hours waiting for the narcotic to work. Always take with food.     Nausea / Vomiting: For this issue, we prescribe Phenergan or Zofran, use this medication as directed as needed for nausea.     Cold Therapy: You may have been sent home with a Flipswap® cold therapy unit and wrap for your knee. Fill with ice and water to the indicated fill line. You can use 20-30 minutes on then off, several times a day. This will help relieve pain and control swelling. Do not sleep with on.     Regional Anesthesia Injections (Blocks): You may have been given a regional nerve block either before or after surgery. This may make your entire leg numb for 24-36 hours.                            * Proceed with caution when bearing weight on your leg.     2.   Diet: Eat a bland diet for the first day after surgery. Progress your diet as tolerated. Constipation may occur with Narcotic usage. We recommend Colace 100mg twice a day while taking narcotics.    3.   Activity: Limit your activity during the first 48 hours, keep your leg elevated with pillows under your heel. After the first 48 hours at home, increase your activity level based on your symptoms.    4.   Dressing: (a) The soft, bulky dressing will be removed on the 3rd day after surgery. Place waterproof bandages at this time. Keep  "wounds as dry as possible for first 2 weeks. It is normal for some blood to be seen on the dressings. It is also normal for you to see apparent bruising on the skin around your incisions. If you are concerned by the drainage or the appearance of your wound site, you can send a picture via [facility].    5.   Shower: (a) You may shower on the 3rd day after surgery. Place waterproof bandages prior to shower. It is recommended to use Saran wrap before showering. Do not submerge limb for 4 weeks or incisions completely healed in any water.     6.   Your procedure did not require a post-operative brace.    7.   Your procedure did not require a Continuous Passive Motion (CPM) device.    8.   Weight Bearing: You may have been sent home with crutches. If instructed (see below), use these crutches at all times unless at complete rest.      [] Non-weight bearing for      weeks (you may touch your toes to the floor)      [] Partial weight bearing for   weeks    [] 25% Body Weight   [] 50% Body Weight      [x] Full weight bearing            [x]  NOW    []  after  weeks     9.  Knee Exercises: Begin these exercises the first day after surgery in order to help you regain your motion and strength. You may do the following marked exercises:     [x] Quad Sets - Begin activating your quadriceps muscle by driving your          knee downward into full knee extension while seated on a table or bed   with a towel rolled and propped under your heel     [x] Straight Leg Raise (SLR) - While ramón your quadriceps muscle, lift     your fully extended leg to the level of your non-operative knee (as shown)     [x] Heel Slides - With the knee straight, slide your heel slowly toward your   buttocks, hold at the endpoint for 10-15 seconds, then slowly straighten     [x] Ankle pumps - With your knee straight, move your ankle in a "pumping"    fashion to activate your calf and leg muscles      10.  Physical Therapy: Physical therapy is an " essential component to your recovery from surgery. Your physical therapy will start in 3 days.    FIRST POSTOPERATIVE VISIT: As scheduled.

## 2020-03-16 NOTE — TRANSFER OF CARE
"Anesthesia Transfer of Care Note    Patient: Chey Mcneill    Procedure(s) Performed: Procedure(s) (LRB):  SUBCHONDROPLASTY-femur (Right)  REPAIR, Subchondroplasty - Tibia (Right)  ARTHROSCOPY, KNEE    Patient location: PACU    Anesthesia Type: general    Transport from OR: Transported from OR on 6-10 L/min O2 by face mask with adequate spontaneous ventilation    Post pain: adequate analgesia    Post assessment: no apparent anesthetic complications    Post vital signs: stable    Level of consciousness: awake    Nausea/Vomiting: no nausea/vomiting    Complications: none    Transfer of care protocol was followed      Last vitals:   Visit Vitals  /66 (BP Location: Right arm, Patient Position: Lying)   Pulse (!) 53   Temp 36.7 °C (98.1 °F) (Oral)   Resp 14   Ht 5' 2" (1.575 m)   Wt 65.8 kg (145 lb)   SpO2 100%   Breastfeeding? No   BMI 26.52 kg/m²     "

## 2020-03-16 NOTE — ANESTHESIA POSTPROCEDURE EVALUATION
Anesthesia Post Evaluation    Patient: Chey Mcneill    Procedure(s) Performed: Procedure(s) (LRB):  SUBCHONDROPLASTY-femur (Right)  REPAIR, Subchondroplasty - Tibia (Right)  ARTHROSCOPY, KNEE    Final Anesthesia Type: general    Patient location during evaluation: PACU  Patient participation: Yes- Able to Participate  Level of consciousness: awake and alert and oriented  Post-procedure vital signs: reviewed and stable  Pain management: adequate  Airway patency: patent    PONV status at discharge: No PONV  Anesthetic complications: no      Cardiovascular status: hemodynamically stable  Respiratory status: nasal cannula  Hydration status: euvolemic  Follow-up not needed.          Vitals Value Taken Time   /61 3/16/2020  9:02 AM   Temp 35.9 °C (96.6 °F) 3/16/2020  8:40 AM   Pulse 60 3/16/2020  9:11 AM   Resp 16 3/16/2020  9:11 AM   SpO2 100 % 3/16/2020  9:11 AM   Vitals shown include unvalidated device data.      No case tracking events are documented in the log.      Pain/Annie Score: Pain Rating Prior to Med Admin: 8 (3/16/2020  8:56 AM)  Annie Score: 7 (3/16/2020  8:40 AM)

## 2020-03-16 NOTE — ANESTHESIA PROCEDURE NOTES
Intubation  Performed by: Tad Friend CRNA  Authorized by: Maliha Earl MD     Intubation:     Induction:  Intravenous    Intubated:  Postinduction    Mask assessment prior to intubation: RSI no mask due t COVID 19.    Attempts:  1    Attempted By:  CR    Blade:  Lazar 4 (RSI small mouth opening)    Laryngeal View Grade: Grade I - full view of chords      Difficult Airway Encountered?: No      Complications:  None    Airway Device:  Oral endotracheal tube    Airway Device Size:  7.0    Style/Cuff Inflation:  Cuffed (inflated to minimal occlusive pressure)    Inflation Amount (mL):  6    Tube secured:  23

## 2020-03-16 NOTE — ANESTHESIA PREPROCEDURE EVALUATION
03/16/2020  Chey Mcneill is a 44 y.o., female.    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]      Old bucket handle tear of medial meniscus of right knee [M23.203]      Internal derangement of right knee [M23.91]     Patient Active Problem List   Diagnosis    Chronic pain of right knee    Shoulder pain    Neck pain    Cervical radiculopathy, chronic    Type 2 superior labrum extending from anterior to posterior (SLAP) lesion of left shoulder    Internal derangement of right knee    Chondromalacia of right knee    Derangement of left knee    Right knee pain    Chondromalacia, right knee    Chondromalacia of right patella    Tear of MCL (medial collateral ligament) of knee, right, initial encounter    Old tear of medial meniscus of right knee    Right tennis elbow    Tibial plateau chondromalacia     Past Surgical History:   Procedure Laterality Date    ABDOMINAL SURGERY      lap band    BACK SURGERY      ELBOW FRACTURE SURGERY      HYSTERECTOMY  2001    KNEE ARTHROSCOPY Left     16 sx's    SHOULDER SURGERY       Current Discharge Medication List      CONTINUE these medications which have NOT CHANGED    Details   aspirin 325 MG tablet Take 1 tablet at lunch daily starting after surgery for 6 weeks to prevent DVT.  Qty: 42 tablet, Refills: 0    Comments: Post op meds to be delivered bedside prior to discharge. Deliver to family if patient is  in surgery at 5pm. [place]  Associated Diagnoses: Tibial plateau chondromalacia; Internal derangement of right knee; Old bucket handle tear of medial meniscus of right knee      HYDROcodone-acetaminophen (NORCO)  mg per tablet Take 1 tablet by mouth every 6 (six) hours as needed for Pain.  Qty: 28 tablet, Refills: 0    Comments: Post op meds to be delivered bedside prior to discharge. Deliver to family if patient is in surgery at 5pm. [place]  Associated Diagnoses: Tibial plateau chondromalacia; Internal derangement of right knee; Old bucket handle tear of medial meniscus of right knee      promethazine (PHENERGAN) 25 MG tablet Take 1 tablet (25 mg total) by mouth every 4 (four) hours as needed for Nausea.  Qty: 30 tablet, Refills: 0    Comments: Post op meds to be delivered bedside prior to discharge. Deliver to family if patient is in surgery at 5pm. [place]  Associated Diagnoses: Tibial plateau chondromalacia; Internal derangement of right knee; Old bucket handle tear of medial meniscus of right knee               Pre-op Assessment    I have reviewed the Patient Summary Reports.     I have reviewed the Nursing Notes.   I have reviewed the Medications.     Review of Systems  Anesthesia Hx:  No previous Anesthesia Denies Hx of Anesthetic complications No problems History of prior surgery of interest to airway management or planning: Previous anesthesia: General May 2017 Dr Ramos Merged with [facility] with general anesthesia.  Procedure performed at an [facility]. Denies Family Hx of Anesthesia complications.   Denies Personal Hx of Anesthesia complications.   Social:  Non-Smoker, Social Alcohol Use    Hematology/Oncology:  Hematology Normal   Oncology Normal     EENT/Dental:EENT/Dental Normal   Cardiovascular:  Cardiovascular Normal     Pulmonary:  Pulmonary Normal    Renal/:  Renal/ Normal     Hepatic/GI:  Hepatic/GI Normal    Musculoskeletal:  Musculoskeletal Normal    Neurological:  Neurology Normal     Endocrine:  Endocrine Normal    Dermatological:  Skin Normal    Psych:  Psychiatric Normal           Physical Exam  General:  Well nourished      Dental:  Dental Findings: molar caps        Mental Status:  Mental Status Findings:  Cooperative, Alert and Oriented         Anesthesia Plan  Type of Anesthesia, risks & benefits discussed:  Anesthesia Type:  general, regional  Patient's Preference:   Intra-op Monitoring Plan: standard ASA monitors  Intra-op Monitoring Plan Comments:   Post Op Pain Control Plan: multimodal analgesia and peripheral nerve block  Post Op Pain Control Plan Comments:   Induction:   IV  Beta Blocker:  Patient is not currently on a Beta-Blocker (No further documentation required).       Informed Consent: Patient understands risks and agrees with Anesthesia plan.  Questions answered. Anesthesia consent signed with patient.  ASA Score: 1     Day of Surgery Review of History & Physical:    H&P update referred to the surgeon.         Ready For Surgery From Anesthesia Perspective.

## 2020-03-16 NOTE — PROGRESS NOTES
Patient states that she does not have any family to stay with her around the clock to be able to monitor her during On-Q pain pump infusion. Dr. Earl notified. Per [facility] policy, PNC was removed prior to discharge. Instructed patient to take a pain pill prior to going to bed tonight. Verbalizes understanding.

## 2020-03-16 NOTE — ADDENDUM NOTE
Addendum  created 03/16/20 1006 by Tad Friend, CRNA    Intraprocedure Blocks edited, Intraprocedure Event edited, Sign clinical note

## 2020-03-16 NOTE — INTERVAL H&P NOTE
The patient has been examined and the H&P has been reviewed:    I concur with the findings and no changes have occurred since H&P was written.    Anesthesia/Surgery risks, benefits and alternative options discussed and understood by patient/family.          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

## 2020-03-17 ENCOUNTER — PATIENT MESSAGE (OUTPATIENT)
Dept: ADMINISTRATIVE | Facility: OTHER | Age: 45
End: 2020-03-17

## 2020-03-18 ENCOUNTER — PATIENT MESSAGE (OUTPATIENT)
Dept: ADMINISTRATIVE | Facility: OTHER | Age: 45
End: 2020-03-18

## 2020-03-20 ENCOUNTER — TELEPHONE (OUTPATIENT)
Dept: SPORTS MEDICINE | Facility: CLINIC | Age: 45
End: 2020-03-20

## 2020-03-23 ENCOUNTER — OFFICE VISIT (OUTPATIENT)
Dept: SPORTS MEDICINE | Facility: CLINIC | Age: 45
End: 2020-03-23
Payer: MEDICAID

## 2020-03-23 ENCOUNTER — PATIENT MESSAGE (OUTPATIENT)
Dept: SPORTS MEDICINE | Facility: CLINIC | Age: 45
End: 2020-03-23

## 2020-03-23 ENCOUNTER — TELEPHONE (OUTPATIENT)
Dept: SPORTS MEDICINE | Facility: CLINIC | Age: 45
End: 2020-03-23

## 2020-03-23 VITALS
WEIGHT: 145 LBS | HEART RATE: 81 BPM | SYSTOLIC BLOOD PRESSURE: 120 MMHG | BODY MASS INDEX: 26.68 KG/M2 | HEIGHT: 62 IN | DIASTOLIC BLOOD PRESSURE: 86 MMHG

## 2020-03-23 DIAGNOSIS — M23.91 INTERNAL DERANGEMENT OF RIGHT KNEE: ICD-10-CM

## 2020-03-23 DIAGNOSIS — M94.269 TIBIAL PLATEAU CHONDROMALACIA: Primary | ICD-10-CM

## 2020-03-23 DIAGNOSIS — Z09 SURGERY FOLLOW-UP EXAMINATION: ICD-10-CM

## 2020-03-23 DIAGNOSIS — Z98.890 S/P ARTHROSCOPY OF KNEE: ICD-10-CM

## 2020-03-23 PROCEDURE — 99999 PR PBB SHADOW E&M-EST. PATIENT-LVL III: ICD-10-PCS | Mod: PBBFAC,,, | Performed by: PHYSICIAN ASSISTANT

## 2020-03-23 PROCEDURE — 99024 PR POST-OP FOLLOW-UP VISIT: ICD-10-PCS | Mod: ,,, | Performed by: PHYSICIAN ASSISTANT

## 2020-03-23 PROCEDURE — 99999 PR PBB SHADOW E&M-EST. PATIENT-LVL III: CPT | Mod: PBBFAC,,, | Performed by: PHYSICIAN ASSISTANT

## 2020-03-23 PROCEDURE — 99024 POSTOP FOLLOW-UP VISIT: CPT | Mod: ,,, | Performed by: PHYSICIAN ASSISTANT

## 2020-03-23 PROCEDURE — 99213 OFFICE O/P EST LOW 20 MIN: CPT | Mod: PBBFAC | Performed by: PHYSICIAN ASSISTANT

## 2020-03-23 RX ORDER — HYDROCORTISONE 25 MG/G
CREAM TOPICAL 2 TIMES DAILY
Qty: 1 TUBE | Refills: 0 | Status: SHIPPED | OUTPATIENT
Start: 2020-03-23 | End: 2022-07-12

## 2020-03-23 NOTE — TELEPHONE ENCOUNTER
----- Message from Chip Koenig sent at 3/23/2020  1:04 PM CDT -----  Contact: pt   Please call pt at [phone]     Patient is asking for an immediate work excuse so she does not have to travel    Thank you

## 2020-03-23 NOTE — PROGRESS NOTES
Subjective:          Chief Complaint: Chey Mcneill is a 44 y.o. female who had concerns including Post-op Evaluation of the Right Knee.    Pt presents for 2 week post-op evaluation. Pt reports redness skin irritation and skin blistering over this site of the regional anesthesia block.  Tegaderm was applied there.  Wound sites still draining over the area with intense itching.  She has been using antibacterial cream with no relief.  She is doing PT with [facility], with Bj Mason, DPT and progressing as scheduled. Pt is taking pain meds as needed. Denies fever, chills, discharge from wound site, and N/V.    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 Right  Femoral Subchondroplasty, 87654  2.  Right  Tibial Subchondroplasty, 32123  3.  Right  Arthroscopy, debridement/shaving of articular cartilage (chondroplasty) 14152  4.  Right  Arthroscopy, knee, for removal of loose body or foreign body 80361  5.  Right  Arthroscopy, knee, synovectomy, limited 90069          Review of Systems   Constitution: Negative for chills and fever.   HENT: Negative for congestion and sore throat.    Eyes: Negative for discharge and double vision.   Cardiovascular: Negative for chest pain, palpitations and syncope.   Respiratory: Negative for cough and shortness of breath.    Endocrine: Negative for cold intolerance and heat intolerance.   Skin: Negative for dry skin and rash.   Musculoskeletal: Positive for joint pain.   Gastrointestinal: Negative for abdominal pain, nausea and vomiting.   Neurological:  Negative for focal weakness, numbness and paresthesias.       Pain Related Questions  Over the past 3 days, what was your average pain during activity? (I.e. running, jogging, walking, climbing stairs, getting dressed, ect.): 3  Over the past 3 days, what was your highest pain level?: 5  Over the past 3 days, what was your lowest pain level? : 2    Other  How many nights a week are you awakened by your affected body part?: 1  Was the patient's HEIGHT measured or patient reported?: Patient Reported  Was the patient's WEIGHT measured or patient reported?: Measured      Objective:        General: Chey is well-developed, well-nourished, appears stated age, in no acute distress, alert and oriented to time, place and person.     General    Nursing note and vitals reviewed.  Constitutional: She is oriented to person, place, and time. She appears well-developed and well-nourished. No distress.   HENT:   Head: Normocephalic and atraumatic.   Nose: Nose normal.   Eyes: Conjunctivae and EOM are normal.   Neck: Normal range of motion. Neck supple.   Cardiovascular: Normal rate, regular rhythm and intact distal pulses.    Pulmonary/Chest: Effort normal and breath sounds normal. No respiratory distress.   Abdominal: Soft. Bowel sounds are normal. She exhibits no distension. There is no tenderness.   Neurological: She is alert and oriented to person, place, and time. She has normal reflexes.   Psychiatric: She has a normal mood and affect. Her behavior is normal. Thought content normal.     General Musculoskeletal Exam   Gait: antalgic       Right Knee Exam     Inspection   Erythema: present  Scars: present  Swelling: present  Effusion: absent  Deformity: absent  Bruising: absent    Tenderness   The patient is tender to palpation of the condyle.    Range of Motion   Extension: 0   Flexion: 130     Other   Sensation: normal    Comments:  Proximal thigh, site of regional anesthesia block and Tegaderm. Erythematous pruritic  papulovesicular rash; picture provided below      Knee Incisions clean/dry/intact  No sign of infection  Mild swelling  Compartments soft  Neurovascular status intact in extremity      Left Knee Exam     Range of Motion   Extension: 0   Flexion: 130     Other   Sensation: normal    Vascular Exam     Right Pulses  Dorsalis Pedis:      2+  Posterior Tibial:      2+        Left Pulses  Dorsalis Pedis:      2+  Posterior Tibial:      2+        Edema  Right Lower Leg: absent  Left Lower Leg: absent                    Assessment:       Encounter Diagnoses   Name Primary?    Tibial plateau chondromalacia Yes    Internal derangement of right knee     Surgery follow-up examination     S/P arthroscopy of knee           Plan:       1. IKDC, SF-12 and KOOS was not filled out today in clinic.     RTC in 1 weeks with Waldo Guzmán PA-C Patient will fill out IKDC, SF-12 and KOOS on return and bilateral radiographs.    2. Provided patient with operative note.  3.  Continue to cover wounds  4. May not shower now without covering incisions.  5. Continue  mg once daily and Celebrex 200 mg BID for 4 wks.  Hydrocortisone cream prescribed today, will apply twice daily.  Patient will contact us if does not improve.  6. Continue PT per protocol.    All of the patient's questions were answered and the patient will contact us if they have any questions or concerns in the interim.                  [facility] patient questionnaires have been collected today.

## 2020-03-23 NOTE — TELEPHONE ENCOUNTER
LVM for pt letting her know I would send a work letter restricting travel at this time to her [facility] portal. Provided clinic number if she had any questions about the letter.

## 2020-03-24 ENCOUNTER — PATIENT MESSAGE (OUTPATIENT)
Dept: ADMINISTRATIVE | Facility: OTHER | Age: 45
End: 2020-03-24

## 2020-03-27 ENCOUNTER — TELEPHONE (OUTPATIENT)
Dept: SPORTS MEDICINE | Facility: CLINIC | Age: 45
End: 2020-03-27

## 2020-03-27 NOTE — TELEPHONE ENCOUNTER
Spoke to patient, informed her of the changed schedules for providers due to COVID-19 concerns, and asked if she would be able to come in on 4/1 for her post-op appointment instead of 3/30. She was okay with this. Rescheduled her for 4/1 at 7:45 am with Waldo Guzmán PA-C.

## 2020-04-01 ENCOUNTER — OFFICE VISIT (OUTPATIENT)
Dept: SPORTS MEDICINE | Facility: CLINIC | Age: 45
End: 2020-04-01
Payer: MEDICAID

## 2020-04-01 ENCOUNTER — PATIENT MESSAGE (OUTPATIENT)
Dept: SPORTS MEDICINE | Facility: CLINIC | Age: 45
End: 2020-04-01

## 2020-04-01 DIAGNOSIS — M23.91 INTERNAL DERANGEMENT OF RIGHT KNEE: ICD-10-CM

## 2020-04-01 DIAGNOSIS — Z98.890 S/P ARTHROSCOPY OF KNEE: ICD-10-CM

## 2020-04-01 DIAGNOSIS — Z09 SURGERY FOLLOW-UP EXAMINATION: ICD-10-CM

## 2020-04-01 DIAGNOSIS — M94.269 TIBIAL PLATEAU CHONDROMALACIA: Primary | ICD-10-CM

## 2020-04-01 PROCEDURE — 99212 OFFICE O/P EST SF 10 MIN: CPT | Mod: PBBFAC | Performed by: PHYSICIAN ASSISTANT

## 2020-04-01 PROCEDURE — 99999 PR PBB SHADOW E&M-EST. PATIENT-LVL II: ICD-10-PCS | Mod: PBBFAC,,, | Performed by: PHYSICIAN ASSISTANT

## 2020-04-01 PROCEDURE — 99024 POSTOP FOLLOW-UP VISIT: CPT | Mod: ,,, | Performed by: PHYSICIAN ASSISTANT

## 2020-04-01 PROCEDURE — 99024 PR POST-OP FOLLOW-UP VISIT: ICD-10-PCS | Mod: ,,, | Performed by: PHYSICIAN ASSISTANT

## 2020-04-01 PROCEDURE — 99999 PR PBB SHADOW E&M-EST. PATIENT-LVL II: CPT | Mod: PBBFAC,,, | Performed by: PHYSICIAN ASSISTANT

## 2020-04-01 RX ORDER — CHLORZOXAZONE 500 MG/1
500 TABLET ORAL 3 TIMES DAILY PRN
Qty: 30 TABLET | Refills: 0 | Status: SHIPPED | OUTPATIENT
Start: 2020-04-01 | End: 2020-04-11

## 2020-04-01 NOTE — PROGRESS NOTES
Subjective:          Chief Complaint: Chey Mcneill is a 45 y.o. female who had concerns including Post-op Evaluation of the Right Knee.    Pt presents for 2 week post-op evaluation. Pt reports redness skin irritation and skin blistering over this site of the regional anesthesia block has significantly improved since last visit.  She denies any drainage over Tegaderm wound site or portal sites.  She is experiencing muscle spasms at night.  She is doing PT with [facility], with Bj Mason, DPT and progressing as scheduled.  Patient is no longer taking pain meds as needed. Denies fever, chills, discharge from wound site, and N/V.    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 Right  Femoral Subchondroplasty, 44812  2.  Right  Tibial Subchondroplasty, 86143  3.  Right  Arthroscopy, debridement/shaving of articular cartilage (chondroplasty) 21694  4.  Right  Arthroscopy, knee, for removal of loose body or foreign body 83090  5.  Right  Arthroscopy, knee, synovectomy, limited 73467          Review of Systems   Constitution: Negative for chills and fever.   HENT: Negative for congestion and sore throat.    Eyes: Negative for discharge and double vision.   Cardiovascular: Negative for chest pain, palpitations and syncope.   Respiratory: Negative for cough and shortness of breath.    Endocrine: Negative for cold intolerance and heat intolerance.   Skin: Negative for dry skin and rash.   Musculoskeletal: Positive for joint pain.   Gastrointestinal: Negative for abdominal pain, nausea and  vomiting.   Neurological: Negative for focal weakness, numbness and paresthesias.                   Objective:        General: Chey is well-developed, well-nourished, appears stated age, in no acute distress, alert and oriented to time, place and person.     General    Nursing note and vitals reviewed.  Constitutional: She is oriented to person, place, and time. She appears well-developed and well-nourished. No distress.   HENT:   Head: Normocephalic and atraumatic.   Nose: Nose normal.   Eyes: Conjunctivae and EOM are normal.   Neck: Normal range of motion. Neck supple.   Cardiovascular: Normal rate, regular rhythm and intact distal pulses.    Pulmonary/Chest: Effort normal and breath sounds normal. No respiratory distress.   Abdominal: Soft. Bowel sounds are normal. She exhibits no distension. There is no tenderness.   Neurological: She is alert and oriented to person, place, and time. She has normal reflexes.   Psychiatric: She has a normal mood and affect. Her behavior is normal. Thought content normal.     General Musculoskeletal Exam   Gait: antalgic       Right Knee Exam     Inspection   Erythema: present  Scars: present  Swelling: present  Effusion: absent  Deformity: absent  Bruising: absent    Tenderness   The patient is tender to palpation of the condyle.    Range of Motion   Extension: 0   Flexion: 120     Other   Sensation: normal    Comments:  Proximal thigh, site of regional anesthesia block and Tegaderm. Erythematous pruritic papulovesicular rash no longer present, site has significantly improved      Knee Incisions clean/dry/intact  No sign of infection  Mild swelling  Compartments soft  Neurovascular status intact in extremity      Left Knee Exam     Range of Motion   Extension: 0   Flexion: 130     Other   Sensation: normal    Vascular Exam     Right Pulses  Dorsalis Pedis:      2+  Posterior Tibial:      2+        Left Pulses  Dorsalis Pedis:      2+  Posterior Tibial:       2+        Edema  Right Lower Leg: absent  Left Lower Leg: absent          Assessment:       Encounter Diagnoses   Name Primary?    Tibial plateau chondromalacia Yes    Internal derangement of right knee     Surgery follow-up examination     S/P arthroscopy of knee           Plan:       1. IKDC, SF-12 and KOOS was not filled out today in clinic.     RTC in 4 weeks with Dr. Cooper Ramos Patient will fill out IKDC, SF-12 and KOOS on return and bilateral radiographs.    2. Provided patient with operative note.  3. Sutures removed today  4. May not shower now without covering incisions.  5. Continue  mg once daily and Celebrex 200 mg BID for 4 wks.  Hydrocortisone cream prescribed today, will apply twice daily.  6. Continue PT per protocol.  7.  Parafon Forte Rx for spasms    All of the patient's questions were answered and the patient will contact us if they have any questions or concerns in the interim.            [facility] patient questionnaires have been collected today.

## 2020-04-07 ENCOUNTER — PATIENT MESSAGE (OUTPATIENT)
Dept: SPORTS MEDICINE | Facility: CLINIC | Age: 45
End: 2020-04-07

## 2020-04-21 ENCOUNTER — TELEPHONE (OUTPATIENT)
Dept: SPORTS MEDICINE | Facility: CLINIC | Age: 45
End: 2020-04-21

## 2020-04-21 ENCOUNTER — PATIENT MESSAGE (OUTPATIENT)
Dept: SPORTS MEDICINE | Facility: CLINIC | Age: 45
End: 2020-04-21

## 2020-04-21 ENCOUNTER — PATIENT MESSAGE (OUTPATIENT)
Dept: ADMINISTRATIVE | Facility: OTHER | Age: 45
End: 2020-04-21

## 2020-04-21 NOTE — TELEPHONE ENCOUNTER
Spoke to patient regarding appointment on 4/27. Unfortunately, due to COVID-19 concerns, we are rescheduling all non-urgent appointments. Patient's appointment was rescheduled to virtual visit on 4/27 at 12:15p with Dr. Ramos.

## 2020-04-27 ENCOUNTER — TELEPHONE (OUTPATIENT)
Dept: SPORTS MEDICINE | Facility: CLINIC | Age: 45
End: 2020-04-27

## 2020-04-27 ENCOUNTER — OFFICE VISIT (OUTPATIENT)
Dept: SPORTS MEDICINE | Facility: CLINIC | Age: 45
End: 2020-04-27
Payer: MEDICAID

## 2020-04-27 ENCOUNTER — PATIENT MESSAGE (OUTPATIENT)
Dept: SPORTS MEDICINE | Facility: CLINIC | Age: 45
End: 2020-04-27

## 2020-04-27 DIAGNOSIS — M25.561 CHRONIC PAIN OF RIGHT KNEE: ICD-10-CM

## 2020-04-27 DIAGNOSIS — M23.92 DERANGEMENT OF LEFT KNEE: ICD-10-CM

## 2020-04-27 DIAGNOSIS — M94.261 CHONDROMALACIA, RIGHT KNEE: ICD-10-CM

## 2020-04-27 DIAGNOSIS — M25.512 CHRONIC LEFT SHOULDER PAIN: ICD-10-CM

## 2020-04-27 DIAGNOSIS — M94.269 TIBIAL PLATEAU CHONDROMALACIA: ICD-10-CM

## 2020-04-27 DIAGNOSIS — M94.261 CHONDROMALACIA OF RIGHT KNEE: ICD-10-CM

## 2020-04-27 DIAGNOSIS — G89.29 CHRONIC PAIN OF RIGHT KNEE: ICD-10-CM

## 2020-04-27 DIAGNOSIS — M22.41 CHONDROMALACIA OF RIGHT PATELLA: ICD-10-CM

## 2020-04-27 DIAGNOSIS — G89.29 CHRONIC LEFT SHOULDER PAIN: ICD-10-CM

## 2020-04-27 DIAGNOSIS — M23.203 OLD TEAR OF MEDIAL MENISCUS OF RIGHT KNEE, UNSPECIFIED TEAR TYPE: ICD-10-CM

## 2020-04-27 DIAGNOSIS — M23.91 INTERNAL DERANGEMENT OF RIGHT KNEE: ICD-10-CM

## 2020-04-27 DIAGNOSIS — S43.432D TYPE 2 SUPERIOR LABRUM EXTENDING FROM ANTERIOR TO POSTERIOR (SLAP) LESION OF LEFT SHOULDER, SUBSEQUENT ENCOUNTER: Primary | ICD-10-CM

## 2020-04-27 PROCEDURE — 99024 PR POST-OP FOLLOW-UP VISIT: ICD-10-PCS | Mod: 95,,, | Performed by: ORTHOPAEDIC SURGERY

## 2020-04-27 PROCEDURE — 99024 POSTOP FOLLOW-UP VISIT: CPT | Mod: 95,,, | Performed by: ORTHOPAEDIC SURGERY

## 2020-04-27 RX ORDER — MELOXICAM 15 MG/1
15 TABLET ORAL DAILY
Qty: 30 TABLET | Refills: 2 | Status: SHIPPED | OUTPATIENT
Start: 2020-04-27 | End: 2020-05-27

## 2020-04-27 RX ORDER — METHOCARBAMOL 500 MG/1
500 TABLET, FILM COATED ORAL 3 TIMES DAILY
Qty: 90 TABLET | Refills: 0 | Status: SHIPPED | OUTPATIENT
Start: 2020-04-27 | End: 2020-05-07

## 2020-04-27 NOTE — Clinical Note
RTC in 4 weeks with Dr. Cooper Ramos Patient will fill out IKDC, SF-12 and KOOS and bilateral knee series on return.

## 2020-04-27 NOTE — TELEPHONE ENCOUNTER
Scheduled patient follow up on 5/25 at 9a     ----- Message from Cooper Ramos MD sent at 4/27/2020 12:35 PM CDT -----  RTC in 4 weeks with Dr. Cooper Ramos Patient will fill out IKDC, SF-12 and KOOS and bilateral knee series on return.

## 2020-04-27 NOTE — PROGRESS NOTES
Subjective:          Chief Complaint: Chey Mcneill is a 45 y.o. female who had no chief complaint listed for this encounter.    Pt presents for 2 week post-op evaluation. Pt reports redness skin irritation and skin blistering over this site of the regional anesthesia block has significantly improved since last visit.  She denies any drainage over Tegaderm wound site or portal sites.  She is experiencing muscle spasms at night.  She is doing PT with [facility], with Bj Mason, DPT and progressing as scheduled.  Patient is no longer taking pain meds as needed. Denies fever, chills, discharge from wound site, and N/V.    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 Right  Femoral Subchondroplasty, 08041  2.  Right  Tibial Subchondroplasty, 02666  3.  Right  Arthroscopy, debridement/shaving of articular cartilage (chondroplasty) 26820  4.  Right  Arthroscopy, knee, for removal of loose body or foreign body 98896  5.  Right  Arthroscopy, knee, synovectomy, limited 88805    Telemedicine/Virtual Visit Documentation:    The patient location is: home    The chief complaint leading to consultation is: see HPI    VISIT TYPE X  Virtual visit with synchronous audio and video   Telephone E/M service     Total time spent with patient: see X dequan on chart below.   More than half of the time was spent counseling or coordinating care including prognosis, differential diagnosis, risks and benefits of treatment, instructions, compliance risk reductions      EST MINUTES X  67206 5   52503 10   70302 15   [id number] 25   [id number] 40   NEW    11141 10   58493 20   13926 30   [id number] 45   15081 60   PHONE     5-10   97282 11-20   22095 21-30     H&P  Orthopaedics      SUBJECTIVE:    History of Present Illness:  Patient is a 45 y.o. female with post-operative follow-up. She was doing well however then received treatment with chiropractic manipulative therapy recently and developed swelling and popping in the right knee.  She is concerned something may have happened during the chiropractic treatments.    Review of patient's allergies indicates:   -- Adhesive -- Dermatitis    --  dermabond caused severe blistering   -- Benzoin tincture plain -- Hives, Itching and Other (See                            Comments)    --  Significant blistering   -- Morphine -- Nausea And Vomiting and Other (See                            Comments)    --  headaches   -- Oxycontin (oxycodone) -- Hives and Itching    --  Patient states can take Percocet.   -- Sulfa (sulfonamide antibiotics) -- Hives   -- Chlorhexidine    -- Dermabond (tissue glues) -- Blisters    --  Dermabond prineo   -- Celebrex (celecoxib) -- Itching    Past Medical History:  No date: PONV (postoperative nausea and vomiting)  7/25/2012: Ruptured triceps tendon  Past Surgical History:  No date: ABDOMINAL SURGERY      Comment:  lap band  3/16/2020: ARTHROSCOPY OF KNEE      Comment:  Procedure: ARTHROSCOPY, KNEE;  Surgeon: Cooper Ramos MD;  Location: [facility] OR;  Service: Orthopedics;;  No date: BACK SURGERY  No date: ELBOW FRACTURE SURGERY  2001: HYSTERECTOMY  No date: KNEE ARTHROSCOPY; Left      Comment:  16 sx's  No date: SHOULDER SURGERY  3/16/2020: SUBCHONDROPLASTY; Right      Comment:  Procedure: SUBCHONDROPLASTY-femur;  Surgeon: Cooper Ramos MD;  Location: [facility] OR;  Service: Orthopedics;                 Laterality: Right;  SUBCHONDROPLASTY-femurRegional                w/Catheter,Adductor,GEO  50cc  Review of patient's family history indicates:  Problem: Lupus      Relation: Mother          Age of Onset: (Not Specified)  Problem: No Known Problems      Relation: Father          Age of Onset: (Not Specified)    Social History    Tobacco Use      Smoking status: Never Smoker      Smokeless tobacco: Never Used    Alcohol use: Yes      Comment: occas    Drug use: No       Review of Systems:  Patient denies constitutional symptoms, cardiac symptoms, respiratory symptoms, GI symptoms.  The remainder of the musculoskeletal ROS is included in the HPI.      OBJECTIVE:    Physical Exam:  Gen:  No acute distress  CV:  Peripherally well-perfused.  Pulses 2+ bilaterally.  Lungs:  Normal respiratory effort.  Abdomen:  Soft, non-tender, non-distended  Head/Neck:  Normocephalic.  Atraumatic. No TTP, AROM and PROM intact without pain  Neuro:  CN intact without deficit, SILT throughout B/L Upper & Lower Extremities    MSK:  See attached    No imaging was obtained for this visit as patient was unable to complete an in clinic visit.    ASSESSMENT/PLAN:    A/P: Chey Mcneill is a 45 y.o. doing well until recent chiropractic treatments.     Plan:  1. IKDC, SF-12 and KOOS was not filled out today in clinic.     RTC in 4 weeks with Dr. Cooper Ramos Patient will fill out IKDC, SF-12 and KOOS and bilateral knee series on return.    2. Start meloxicam 15 mg po qd prn and robaxin 500 mg TID, prescribed today    3. Continue therapy with Bj Mason at [facility] as planned    4. Hold chiropractic treatments at this time    5. Jobst stocking to compress swelling recommended                Review of Systems   Constitution: Negative for chills, fever and night sweats.   HENT: Negative for congestion, hearing loss and sore throat.    Eyes: Negative for blurred vision, discharge, double vision and visual disturbance.   Cardiovascular: Negative for chest pain, leg swelling, palpitations and syncope.   Respiratory: Negative for  cough and shortness of breath.    Endocrine: Negative for cold intolerance, heat intolerance and polyuria.   Hematologic/Lymphatic: Negative for bleeding problem.   Skin: Negative for dry skin and rash.   Musculoskeletal: Positive for joint pain. Negative for back pain, joint swelling, muscle cramps and muscle weakness.   Gastrointestinal: Negative for abdominal pain, melena, nausea and vomiting.   Genitourinary: Negative for hematuria.   Neurological: Negative for focal weakness, loss of balance, numbness and paresthesias.   Psychiatric/Behavioral: Negative for altered mental status.                   Objective:        General: Chey is well-developed, well-nourished, appears stated age, in no acute distress, alert and oriented to time, place and person.     General    Nursing note and vitals reviewed.  Constitutional: She is oriented to person, place, and time. She appears well-developed and well-nourished. No distress.   HENT:   Head: Normocephalic and atraumatic.   Nose: Nose normal.   Mouth/Throat: No oropharyngeal exudate.   Eyes: Conjunctivae and EOM are normal. Pupils are equal, round, and reactive to light. Right eye exhibits no discharge. Left eye exhibits no discharge.   Neck: Normal range of motion. Neck supple.   Cardiovascular: Normal rate, regular rhythm and intact distal pulses.    Pulmonary/Chest: Effort normal and breath sounds normal. No respiratory distress.   Abdominal: Soft. Bowel sounds are normal. She exhibits no distension. There is no tenderness.   Neurological: She is alert and oriented to person, place, and time. She has normal reflexes. She displays normal reflexes. No cranial nerve deficit. Coordination normal.   Psychiatric: She has a normal mood and affect. Her behavior is normal. Judgment and thought content normal.     General Musculoskeletal Exam   Gait: antalgic       Right Knee Exam     Inspection   Erythema: present  Scars: present  Swelling: present  Effusion:  absent  Deformity: absent  Bruising: absent    Tenderness   The patient is tender to palpation of the patella.    Crepitus   The patient has crepitus of the patella (mild to moderate).    Range of Motion   Extension: 0   Flexion: 130     Tests   Meniscus   Leela:  Medial - negative Lateral - negative  Ligament Examination Lachman: normal (-1 to 2mm) PCL-Posterior Drawer: normal (0 to 2mm)     MCL - Valgus: normal (0 to 2mm)  LCL - Varus: normalPivot Shift: normal (Equal)Reverse Pivot Shift: normal (Equal)Dial Test at 30 degrees: normal (< 5 degrees)Dial Test at 90 degrees: normal (< 5 degrees)  Posterior Sag Test: negative  Posterolateral Corner: unstable (>15 degrees difference)  Patella   Patellar apprehension: negative  Passive Patellar Tilt: neutral  Patellar Tracking: normal  Patellar Glide (quadrants): Lateral - 1   Medial - 2  Q-Angle at 90 degrees: normal  Patellar Grind: negative  J-Sign: none    Other   Meniscal Cyst: absent  Popliteal (Baker's) Cyst: absent  Sensation: normal    Comments:      Knee Incisions clean/dry/intact  No sign of infection  Moderate swelling  Compartments soft  Neurovascular status intact in extremity      Left Knee Exam     Inspection   Erythema: absent  Scars: absent  Swelling: absent  Effusion: absent  Deformity: absent  Bruising: absent    Tenderness   The patient is experiencing no tenderness.     Range of Motion   Extension: 0   Flexion: 130     Tests   Meniscus   Leela:  Medial - negative Lateral - negative  Stability Lachman: normal (-1 to 2mm) PCL-Posterior Drawer: normal (0 to 2mm)  MCL - Valgus: normal (0 to 2mm)  LCL - Varus: normal (0 to 2mm)Pivot Shift: normal (Equal)Reverse Pivot Shift: normal (Equal)Dial Test at 30 degrees: normal (< 5 degrees)Dial Test at 90 degrees: normal (< 5 degrees)  Posterior Sag Test: negative  Posterolateral Corner: unstable (>15 degrees difference)  Patella   Patellar apprehension: negative  Passive Patellar Tilt: neutral  Patellar  Tracking: normal  Patellar Glide (Quadrants): Lateral - 1 Medial - 2  Q-Angle at 90 degrees: normal  Patellar Grind: negative  J-Sign: J sign absent    Other   Meniscal Cyst: absent  Popliteal (Baker's) Cyst: absent  Sensation: normal    Right Hip Exam     Tests   Raymond: negative  Left Hip Exam     Tests   Raymond: negative      Right Shoulder Exam     Inspection/Observation   Swelling: absent  Bruising: absent  Scars: absent  Deformity: absent  Scapular Winging: absent  Scapular Dyskinesia: negative  Atrophy: absent    Tenderness   The patient is experiencing no tenderness.    Range of Motion   Active abduction:  90 normal   Passive abduction:  100 normal   Extension:  0 normal   Forward Flexion:  180 normal   Forward Elevation: 180 normal  Adduction: 40 normal  External Rotation 0 degrees:  50 normal   External Rotation 90 degrees: 90 normal  Internal rotation 0 degrees:  T8 normal   Internal rotation 90 degrees:  30 normal     Tests & Signs   Apprehension: negative  Cross arm: negative  Drop arm: negative  Storm test: negative  Impingement: negative  Sulcus: absent  Anterosuperior Escape: negative  Lag Sign 0 degrees: negative  Lag Sign 90 degrees: negative  Lift Off Sign: negative  Belly Press: negative  Active Compression Test (Russell's Sign): negative  Yergason's Test: negative  Speed's Test: negative  Anterior Drawer Test: 1+   Posterior Drawer Test: 1+  Relocation 90 degrees: negative  Relocation > 90 degrees: negative  Bear Hug: negative  Moving Valgus: negative  Jerk Test: negative    Other   Sensation: normal    Left Shoulder Exam     Inspection/Observation   Swelling: absent  Bruising: absent  Scars: absent  Deformity: absent  Scapular Winging: absent  Scapular Dyskinesia: negative  Atrophy: absent    Tenderness   The patient is experiencing no tenderness.     Range of Motion   Active abduction:  90 normal   Passive abduction:  100 normal   Extension:  0 normal   Forward Flexion:  180 normal   Forward  Elevation: 180 normal  Adduction: 40 normal  External Rotation 0 degrees:  50 normal   External Rotation 90 degrees: 90 normal  Internal rotation 0 degrees:  T8 normal   Internal rotation 90 degrees:  30 normal     Tests & Signs   Apprehension: negative  Cross arm: negative  Drop arm: negative  Storm test: negative  Impingement: negative  Sulcus: absent  Anterosuperior Escape: negative  Lag Sign 0 degrees: negative  Lag Sign 90 degrees: negative  Lift Off Sign: negative  Belly Press: negative  Active Compression test (Webb's Sign): negative  Yergasons's Test: negative  Speed's Test: negative  Anterior Drawer Test: 1+  Posterior Drawer Test: 1+  Relocation 90 degrees: negative  Relocation > 90 degrees: negative  Bear Hug: negative  Moving Valgus: negative  Jerk Test: negative    Other   Sensation: normal       Muscle Strength   Right Upper Extremity   Shoulder Abduction: 5/5   Shoulder Internal Rotation: 5/5   Shoulder External Rotation: 5/5   Supraspinatus: 5/5/5   Subscapularis: 5/5/5   Biceps: 5/5/5   Left Upper Extremity  Shoulder Abduction: 5/5   Shoulder Internal Rotation: 5/5   Shoulder External Rotation: 5/5   Supraspinatus: 5/5/5   Subscapularis: 5/5/5   Biceps: 5/5/5   Right Lower Extremity   Hip Abduction: 5/5   Quadriceps:  4/5   Hamstrin/5   Left Lower Extremity   Hip Abduction: 5/5   Quadriceps:  5/5   Hamstrin/5     Reflexes     Left Side  Biceps:  2+  Triceps:  2+  Brachioradialis:  2+  Quadriceps:  2+  Achilles:  2+    Right Side   Biceps:  2+  Triceps:  2+  Brachioradialis:  2+  Quadriceps:  2+  Achilles:  2+    Vascular Exam     Right Pulses  Dorsalis Pedis:      2+  Posterior Tibial:      2+  Radial:                    2+      Left Pulses  Dorsalis Pedis:      2+  Posterior Tibial:      2+  Radial:                    2+      Capillary Refill  Right Hand: normal capillary refill  Left Hand: normal capillary refill    Edema  Right Lower Leg: absent  Left Lower Leg: absent           Assessment:       Encounter Diagnoses   Name Primary?    Type 2 superior labrum extending from anterior to posterior (SLAP) lesion of left shoulder, subsequent encounter Yes    Chondromalacia, right knee     Chondromalacia of right knee     Chondromalacia of right patella     Chronic pain of right knee     Derangement of left knee     Internal derangement of right knee     Old tear of medial meniscus of right knee, unspecified tear type     Tibial plateau chondromalacia     Chronic left shoulder pain           Plan:       1. IKDC, SF-12 and KOOS was not filled out today in clinic.     RTC in 4 weeks with Dr. Cooper Ramos Patient will fill out IKDC, SF-12 and KOOS and bilateral knee series on return.    2. Start meloxicam 15 mg po qd prn and robaxin 500 mg TID, prescribed today    3. Continue therapy with Bj Fernandeson at [facility] as planned    4. Hold chiropractic treatments at this time    5. Jobst stocking to compress swelling recommended    6. 15 minute discussion          [facility] patient questionnaires have been collected today.

## 2020-05-11 ENCOUNTER — TELEPHONE (OUTPATIENT)
Dept: SPORTS MEDICINE | Facility: CLINIC | Age: 45
End: 2020-05-11

## 2020-05-13 ENCOUNTER — OFFICE VISIT (OUTPATIENT)
Dept: SPORTS MEDICINE | Facility: CLINIC | Age: 45
End: 2020-05-13
Payer: MEDICAID

## 2020-05-13 ENCOUNTER — HOSPITAL ENCOUNTER (OUTPATIENT)
Dept: RADIOLOGY | Facility: HOSPITAL | Age: 45
Discharge: HOME | End: 2020-05-13
Attending: ORTHOPAEDIC SURGERY
Payer: MEDICAID

## 2020-05-13 VITALS
DIASTOLIC BLOOD PRESSURE: 85 MMHG | HEIGHT: 62 IN | SYSTOLIC BLOOD PRESSURE: 113 MMHG | BODY MASS INDEX: 26.68 KG/M2 | WEIGHT: 145 LBS | HEART RATE: 72 BPM

## 2020-05-13 DIAGNOSIS — M23.91 INTERNAL DERANGEMENT OF RIGHT KNEE: ICD-10-CM

## 2020-05-13 DIAGNOSIS — M25.561 RIGHT KNEE PAIN, UNSPECIFIED CHRONICITY: ICD-10-CM

## 2020-05-13 DIAGNOSIS — M94.261 CHONDROMALACIA OF RIGHT KNEE: ICD-10-CM

## 2020-05-13 DIAGNOSIS — M25.561 RIGHT KNEE PAIN, UNSPECIFIED CHRONICITY: Primary | ICD-10-CM

## 2020-05-13 DIAGNOSIS — M25.561 CHRONIC PAIN OF RIGHT KNEE: ICD-10-CM

## 2020-05-13 DIAGNOSIS — G89.29 CHRONIC PAIN OF RIGHT KNEE: ICD-10-CM

## 2020-05-13 PROCEDURE — 99024 PR POST-OP FOLLOW-UP VISIT: ICD-10-PCS | Mod: S$PBB,,, | Performed by: ORTHOPAEDIC SURGERY

## 2020-05-13 PROCEDURE — 99999 PR PBB SHADOW E&M-EST. PATIENT-LVL IV: CPT | Mod: PBBFAC,,, | Performed by: ORTHOPAEDIC SURGERY

## 2020-05-13 PROCEDURE — 73564 XR KNEE ORTHO BILAT WITH FLEXION: ICD-10-PCS | Mod: 26,50,, | Performed by: RADIOLOGY

## 2020-05-13 PROCEDURE — 97110 PR THERAPEUTIC EXERCISES: ICD-10-PCS | Mod: GP,S$PBB,, | Performed by: ORTHOPAEDIC SURGERY

## 2020-05-13 PROCEDURE — 99214 OFFICE O/P EST MOD 30 MIN: CPT | Mod: PBBFAC,25 | Performed by: ORTHOPAEDIC SURGERY

## 2020-05-13 PROCEDURE — 73564 X-RAY EXAM KNEE 4 OR MORE: CPT | Mod: 26,50,, | Performed by: RADIOLOGY

## 2020-05-13 PROCEDURE — 73564 X-RAY EXAM KNEE 4 OR MORE: CPT | Mod: TC,50

## 2020-05-13 PROCEDURE — 99024 POSTOP FOLLOW-UP VISIT: CPT | Mod: S$PBB,,, | Performed by: ORTHOPAEDIC SURGERY

## 2020-05-13 PROCEDURE — 99999 PR PBB SHADOW E&M-EST. PATIENT-LVL IV: ICD-10-PCS | Mod: PBBFAC,,, | Performed by: ORTHOPAEDIC SURGERY

## 2020-05-13 PROCEDURE — 97110 THERAPEUTIC EXERCISES: CPT | Mod: GP,S$PBB,, | Performed by: ORTHOPAEDIC SURGERY

## 2020-05-13 NOTE — PROGRESS NOTES
Subjective:          Chief Complaint: Chey Mcneill is a 45 y.o. female who had concerns including Pain of the Right Knee.    Pt presents for 6 week post-op evaluation. She is doing much better today. She is doing PT with [facility], with Bj Mason DPT and progressing as scheduled. She would like to switch to [facility] in [place].  Patient is no longer taking pain meds as needed. Denies fever, chills, discharge from wound site, and N/V.    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 Right  Femoral Subchondroplasty, 33096  2.  Right  Tibial Subchondroplasty, 76672  3.  Right  Arthroscopy, debridement/shaving of articular cartilage (chondroplasty) 99053  4.  Right  Arthroscopy, knee, for removal of loose body or foreign body 20264  5.  Right  Arthroscopy, knee, synovectomy, limited 76671                    Review of Systems   Constitution: Negative for chills, fever and night sweats.   HENT: Negative for congestion, hearing loss and sore throat.    Eyes: Negative for blurred vision, discharge, double vision and visual disturbance.   Cardiovascular: Negative for chest pain, leg swelling, palpitations and syncope.   Respiratory: Negative for cough and shortness of breath.    Endocrine: Negative for cold intolerance, heat intolerance and polyuria.   Hematologic/Lymphatic: Negative for bleeding problem.   Skin: Negative for dry skin and rash.   Musculoskeletal: Positive for joint pain. Negative for back pain, joint swelling, muscle cramps and muscle weakness.   Gastrointestinal:  Negative for abdominal pain, melena, nausea and vomiting.   Genitourinary: Negative for hematuria.   Neurological: Negative for focal weakness, loss of balance, numbness and paresthesias.   Psychiatric/Behavioral: Negative for altered mental status.       Pain Related Questions  Over the past 3 days, what was your average pain during activity? (I.e. running, jogging, walking, climbing stairs, getting dressed, ect.): 6  Over the past 3 days, what was your highest pain level?: 6  Over the past 3 days, what was your lowest pain level? : 0    Other  How many nights a week are you awakened by your affected body part?: 0  Was the patient's HEIGHT measured or patient reported?: Patient Reported  Was the patient's WEIGHT measured or patient reported?: Measured      Objective:        General: Chey is well-developed, well-nourished, appears stated age, in no acute distress, alert and oriented to time, place and person.     General    Nursing note and vitals reviewed.  Constitutional: She is oriented to person, place, and time. She appears well-developed and well-nourished. No distress.   HENT:   Head: Normocephalic and atraumatic.   Nose: Nose normal.   Mouth/Throat: No oropharyngeal exudate.   Eyes: Conjunctivae and EOM are normal. Pupils are equal, round, and reactive to light. Right eye exhibits no discharge. Left eye exhibits no discharge.   Neck: Normal range of motion. Neck supple.   Cardiovascular: Normal rate, regular rhythm and intact distal pulses.    Pulmonary/Chest: Effort normal and breath sounds normal. No respiratory distress.   Abdominal: Soft. Bowel sounds are normal. She exhibits no distension. There is no tenderness.   Neurological: She is alert and oriented to person, place, and time. She has normal reflexes. She displays normal reflexes. No cranial nerve deficit. Coordination normal.   Psychiatric: She has a normal mood and affect. Her behavior is normal. Judgment and thought content normal.      General Musculoskeletal Exam   Gait: antalgic       Right Knee Exam     Inspection   Erythema: absent  Scars: present  Swelling: absent  Effusion: absent  Deformity: absent  Bruising: absent    Tenderness   The patient is tender to palpation of the patella.    Crepitus   The patient has crepitus of the patella (mild to moderate).    Range of Motion   Extension: 0   Flexion: 140     Tests   Meniscus   Leela:  Medial - negative Lateral - negative  Ligament Examination Lachman: normal (-1 to 2mm) PCL-Posterior Drawer: normal (0 to 2mm)     MCL - Valgus: normal (0 to 2mm)  LCL - Varus: normalPivot Shift: normal (Equal)Reverse Pivot Shift: normal (Equal)Dial Test at 30 degrees: normal (< 5 degrees)Dial Test at 90 degrees: normal (< 5 degrees)  Posterior Sag Test: negative  Posterolateral Corner: unstable (>15 degrees difference)  Patella   Patellar apprehension: negative  Passive Patellar Tilt: neutral  Patellar Tracking: normal  Patellar Glide (quadrants): Lateral - 1   Medial - 2  Q-Angle at 90 degrees: normal  Patellar Grind: negative  J-Sign: none    Other   Meniscal Cyst: absent  Popliteal (Baker's) Cyst: absent  Sensation: normal    Comments:      Knee Incisions clean/dry/intact  No sign of infection  Moderate swelling  Compartments soft  Neurovascular status intact in extremity      Left Knee Exam     Inspection   Erythema: absent  Scars: absent  Swelling: absent  Effusion: absent  Deformity: absent  Bruising: absent    Tenderness   The patient is experiencing no tenderness.     Range of Motion   Extension: 0   Flexion: 140     Tests   Meniscus   Leela:  Medial - negative Lateral - negative  Stability Lachman: normal (-1 to 2mm) PCL-Posterior Drawer: normal (0 to 2mm)  MCL - Valgus: normal (0 to 2mm)  LCL - Varus: normal (0 to 2mm)Pivot Shift: normal (Equal)Reverse Pivot Shift: normal (Equal)Dial Test at 30 degrees: normal (< 5 degrees)Dial Test at 90 degrees: normal (< 5 degrees)  Posterior Sag Test:  negative  Posterolateral Corner: unstable (>15 degrees difference)  Patella   Patellar apprehension: negative  Passive Patellar Tilt: neutral  Patellar Tracking: normal  Patellar Glide (Quadrants): Lateral - 1 Medial - 2  Q-Angle at 90 degrees: normal  Patellar Grind: negative  J-Sign: J sign absent    Other   Meniscal Cyst: absent  Popliteal (Baker's) Cyst: absent  Sensation: normal    Right Hip Exam     Tests   Raymond: negative  Left Hip Exam     Tests   Raymond: negative      Right Shoulder Exam     Inspection/Observation   Swelling: absent  Bruising: absent  Scars: absent  Deformity: absent  Scapular Winging: absent  Scapular Dyskinesia: negative  Atrophy: absent    Tenderness   The patient is experiencing no tenderness.    Range of Motion   Active abduction:  90 normal   Passive abduction:  100 normal   Extension:  0 normal   Forward Flexion:  180 normal   Forward Elevation: 180 normal  Adduction: 40 normal  External Rotation 0 degrees:  50 normal   External Rotation 90 degrees: 90 normal  Internal rotation 0 degrees:  T8 normal   Internal rotation 90 degrees:  30 normal     Tests & Signs   Apprehension: negative  Cross arm: negative  Drop arm: negative  Storm test: negative  Impingement: negative  Sulcus: absent  Anterosuperior Escape: negative  Lag Sign 0 degrees: negative  Lag Sign 90 degrees: negative  Lift Off Sign: negative  Belly Press: negative  Active Compression Test (Centre's Sign): negative  Yergason's Test: negative  Speed's Test: negative  Anterior Drawer Test: 1+   Posterior Drawer Test: 1+  Relocation 90 degrees: negative  Relocation > 90 degrees: negative  Bear Hug: negative  Moving Valgus: negative  Jerk Test: negative    Other   Sensation: normal    Left Shoulder Exam     Inspection/Observation   Swelling: absent  Bruising: absent  Scars: absent  Deformity: absent  Scapular Winging: absent  Scapular Dyskinesia: negative  Atrophy: absent    Tenderness   The patient is experiencing no tenderness.      Range of Motion   Active abduction:  90 normal   Passive abduction:  100 normal   Extension:  0 normal   Forward Flexion:  180 normal   Forward Elevation: 180 normal  Adduction: 40 normal  External Rotation 0 degrees:  50 normal   External Rotation 90 degrees: 90 normal  Internal rotation 0 degrees:  T8 normal   Internal rotation 90 degrees:  30 normal     Tests & Signs   Apprehension: negative  Cross arm: negative  Drop arm: negative  Storm test: negative  Impingement: negative  Sulcus: absent  Anterosuperior Escape: negative  Lag Sign 0 degrees: negative  Lag Sign 90 degrees: negative  Lift Off Sign: negative  Belly Press: negative  Active Compression test (Bellona's Sign): negative  Yergasons's Test: negative  Speed's Test: negative  Anterior Drawer Test: 1+  Posterior Drawer Test: 1+  Relocation 90 degrees: negative  Relocation > 90 degrees: negative  Bear Hug: negative  Moving Valgus: negative  Jerk Test: negative    Other   Sensation: normal       Muscle Strength   Right Upper Extremity   Shoulder Abduction: 5/5   Shoulder Internal Rotation: 5/5   Shoulder External Rotation: 5/5   Supraspinatus: 5/5/5   Subscapularis: 5/5/5   Biceps: 5/5/5   Left Upper Extremity  Shoulder Abduction: 5/5   Shoulder Internal Rotation: 5/5   Shoulder External Rotation: 5/5   Supraspinatus: 5/5/5   Subscapularis: 5/5/5   Biceps: 5/5/5   Right Lower Extremity   Hip Abduction: 5/5   Quadriceps:  4/5   Hamstrin/5   Left Lower Extremity   Hip Abduction: 5/5   Quadriceps:  5/5   Hamstrin/5     Reflexes     Left Side  Biceps:  2+  Triceps:  2+  Brachioradialis:  2+  Quadriceps:  2+  Achilles:  2+    Right Side   Biceps:  2+  Triceps:  2+  Brachioradialis:  2+  Quadriceps:  2+  Achilles:  2+    Vascular Exam     Right Pulses  Dorsalis Pedis:      2+  Posterior Tibial:      2+  Radial:                    2+      Left Pulses  Dorsalis Pedis:      2+  Posterior Tibial:      2+  Radial:                    2+      Capillary  Refill  Right Hand: normal capillary refill  Left Hand: normal capillary refill    Edema  Right Lower Leg: absent  Left Lower Leg: absent          Assessment:       Encounter Diagnoses   Name Primary?    Right knee pain, unspecified chronicity Yes    Chronic pain of right knee     Internal derangement of right knee     Chondromalacia of right knee           Plan:       1. IKDC, SF-12 and KOOS was not filled out today in clinic.     RTC in 3 months with Dr. Cooper Ramos Patient will fill out IKDC, SF-12 and KOOS and bilateral knee series on return.    2. No refills needed    3. PT orders sent to [facility]    4. HEP 87758 - Cooper Ramos MD and SMA Keysha, instructed and demonstrated a CORE HEP. The patient then demonstrated understanding of exercises and proper technique. This program was performed for 17 minutes.           [facility] patient questionnaires have been collected today.

## 2020-05-14 ENCOUNTER — PATIENT MESSAGE (OUTPATIENT)
Dept: SPORTS MEDICINE | Facility: CLINIC | Age: 45
End: 2020-05-14

## 2020-07-13 ENCOUNTER — OFFICE VISIT (OUTPATIENT)
Dept: URGENT CARE | Facility: CLINIC | Age: 45
End: 2020-07-13
Payer: MEDICAID

## 2020-07-13 VITALS
WEIGHT: 145 LBS | DIASTOLIC BLOOD PRESSURE: 77 MMHG | SYSTOLIC BLOOD PRESSURE: 117 MMHG | HEART RATE: 89 BPM | BODY MASS INDEX: 26.68 KG/M2 | OXYGEN SATURATION: 97 % | RESPIRATION RATE: 16 BRPM | HEIGHT: 62 IN | TEMPERATURE: 100 F

## 2020-07-13 DIAGNOSIS — R05.9 COUGH: ICD-10-CM

## 2020-07-13 DIAGNOSIS — R52 GENERALIZED BODY ACHES: Primary | ICD-10-CM

## 2020-07-13 LAB
CTP QC/QA: YES
FLUAV AG NPH QL: NEGATIVE
FLUBV AG NPH QL: NEGATIVE

## 2020-07-13 PROCEDURE — 99214 PR OFFICE/OUTPT VISIT, EST, LEVL IV, 30-39 MIN: ICD-10-PCS | Mod: 25,S$GLB,, | Performed by: PHYSICIAN ASSISTANT

## 2020-07-13 PROCEDURE — U0003 INFECTIOUS AGENT DETECTION BY NUCLEIC ACID (DNA OR RNA); SEVERE ACUTE RESPIRATORY SYNDROME CORONAVIRUS 2 (SARS-COV-2) (CORONAVIRUS DISEASE [COVID-19]), AMPLIFIED PROBE TECHNIQUE, MAKING USE OF HIGH THROUGHPUT TECHNOLOGIES AS DESCRIBED BY CMS-2020-01-R: HCPCS

## 2020-07-13 PROCEDURE — 99214 OFFICE O/P EST MOD 30 MIN: CPT | Mod: 25,S$GLB,, | Performed by: PHYSICIAN ASSISTANT

## 2020-07-13 PROCEDURE — 87804 POCT INFLUENZA A/B: ICD-10-PCS | Mod: 59,QW,S$GLB, | Performed by: PHYSICIAN ASSISTANT

## 2020-07-13 PROCEDURE — 87804 INFLUENZA ASSAY W/OPTIC: CPT | Mod: QW,S$GLB,, | Performed by: PHYSICIAN ASSISTANT

## 2020-07-13 NOTE — PROGRESS NOTES
"Subjective:       Patient ID: Chey Mcneill is a 45 y.o. female.    Vitals:  height is 5' 2" (1.575 m) and weight is 65.8 kg (145 lb). Her oral temperature is 99.7 °F (37.6 °C). Her blood pressure is 117/77 and her pulse is 89. Her respiration is 16 and oxygen saturation is 97%.     Chief Complaint: Fever    Patient presents to urgent care with fever, body aches, chills and fatigue that started this morning. Patient is requesting both flu and COVID-19 testing in clinic today. Patient has not tried anything OTC for symptoms prior to arrival.    Fever   This is a new problem. The current episode started today. The problem occurs intermittently. The problem has been gradually worsening. The temperature was taken using an oral thermometer. Associated symptoms include congestion, coughing and muscle aches. Pertinent negatives include no abdominal pain, chest pain, diarrhea, ear pain, headaches, nausea, rash, sleepiness, sore throat, urinary pain, vomiting or wheezing. She has tried nothing for the symptoms.       Constitution: Positive for chills, fatigue and fever. Negative for sweating.   HENT: Positive for congestion, postnasal drip and sinus pressure. Negative for ear pain, drooling, nosebleeds, foreign body in nose, sinus pain, sore throat, trouble swallowing and voice change.    Neck: Negative for neck pain, neck stiffness, painful lymph nodes and neck swelling.   Cardiovascular: Negative for chest pain, leg swelling, palpitations, sob on exertion and passing out.   Eyes: Negative for eye pain, eye redness, photophobia, double vision, blurred vision and eyelid swelling.   Respiratory: Positive for cough. Negative for chest tightness, sputum production, bloody sputum, shortness of breath, stridor and wheezing.    Gastrointestinal: Negative for abdominal pain, abdominal bloating, nausea, vomiting, constipation, diarrhea and heartburn.   Genitourinary: Negative for dysuria, frequency, urgency, flank pain, " hematuria and pelvic pain.   Musculoskeletal: Positive for muscle ache. Negative for joint pain, joint swelling, abnormal ROM of joint, back pain and muscle cramps.   Skin: Negative for rash and hives.   Allergic/Immunologic: Negative for seasonal allergies, food allergies, hives, itching and sneezing.   Neurological: Negative for dizziness, history of vertigo, light-headedness, passing out, loss of balance, headaches, altered mental status, loss of consciousness and seizures.   Hematologic/Lymphatic: Negative for swollen lymph nodes.   Psychiatric/Behavioral: Negative for altered mental status and nervous/anxious. The patient is not nervous/anxious.        Objective:      Physical Exam   Constitutional: She is oriented to person, place, and time. She appears well-developed. She is cooperative.  Non-toxic appearance. She does not appear ill. No distress.   HENT:   Head: Normocephalic and atraumatic.   Right Ear: Hearing, tympanic membrane, external ear and ear canal normal.   Left Ear: Hearing, tympanic membrane, external ear and ear canal normal.   Nose: Mucosal edema and rhinorrhea present. No nasal deformity. No epistaxis. Right sinus exhibits no maxillary sinus tenderness and no frontal sinus tenderness. Left sinus exhibits no maxillary sinus tenderness and no frontal sinus tenderness.   Mouth/Throat: Uvula is midline and mucous membranes are normal. No trismus in the jaw. Normal dentition. No uvula swelling. Posterior oropharyngeal erythema and cobblestoning present. No oropharyngeal exudate, posterior oropharyngeal edema or tonsillar abscesses.   Eyes: Conjunctivae and lids are normal. No scleral icterus.   Neck: Trachea normal, full passive range of motion without pain and phonation normal. Neck supple. No neck rigidity. No edema and no erythema present.   Cardiovascular: Normal rate, regular rhythm, normal heart sounds and normal pulses.   Pulmonary/Chest: Effort normal and breath sounds normal. No accessory  muscle usage or stridor. No respiratory distress. She has no decreased breath sounds. She has no wheezes. She has no rhonchi. She has no rales.   Abdominal: Normal appearance.   Musculoskeletal: Normal range of motion.         General: No deformity.   Lymphadenopathy:     She has no cervical adenopathy.   Neurological: She is alert and oriented to person, place, and time. She exhibits normal muscle tone. Coordination normal.   Skin: Skin is warm, dry, intact, not diaphoretic, not pale and no rash. Capillary refill takes less than 2 seconds.   Psychiatric: Her speech is normal and behavior is normal. Judgment and thought content normal.   Nursing note and vitals reviewed.    Results for orders placed or performed in visit on 07/13/20   POCT Influenza A/B   Result Value Ref Range    Rapid Influenza A Ag Negative Negative    Rapid Influenza B Ag Negative Negative     Acceptable Yes            Assessment:       1. Generalized body aches    2. Cough        Plan:         Generalized body aches  -     COVID-19 Routine Screening  -     POCT Influenza A/B    Cough  -     COVID-19 Routine Screening      Patient Instructions   You must understand that you've received an Urgent Care treatment only and that you may be released before all your medical problems are known or treated. You, the patient, will arrange for follow up care as instructed.  Follow up with your PCP or specialty clinic as directed if not improved or as needed. You can call [phone] to schedule an appointment with the appropriate provider.  If your condition worsens we recommend that you receive another evaluation at the Emergency Department for any concerns or worsening of condition.  Patient aware and verbalized understanding.    You were tested for COVID-19 today in clinic. We will call you with results.   Counseled patient and answered questions in regards to COVID-19 testing.   Advised patient to go home/quarantine, treat symptoms,  avoid contact with others until symptoms are resolved.   Info given for virtual visit, covid 19 information line, state info line.   Strict ER precautions if trouble breathing- call ER prior to entry.   Follow local/state guidelines per covid emergency.   Patient aware, verbalized understanding and agreed with plan of care.    IF NOT IMPROVING, FOLLOW UP WITH VIRTUAL ONLINE VISIT WITH A PROVIDER 24/7- FOR MORE INFORMATION OR TO DOWNLOAD THE [facility], VISIT [facility] ANYWHERE CARE AT [email or web address]    FOR 24/7 NURSE ADVICE, CALL [phone]  FOR COVID 19 RELATED QUESTIONS, CALL THE [facility] covid hotline: [phone]    [place] FOR UP TO DATE INFORMATION:  Text or dial 211, test keyword LACOVID [id number] OR DIAL 211    HELPFUL EXTERNAL RESOURCES:  OFFICE OF PUBLIC HEALTH: [place]   Http://ldh.[place].gov/  6-120-991-5305    [facility] FOR DIS[facility]ASE CONTROL  Https://www.cdc.gov/    WORLD HEALTH ORGANIZATION (WHO)  [email or web address].who.int/

## 2020-07-13 NOTE — PATIENT INSTRUCTIONS
You must understand that you've received an Urgent Care treatment only and that you may be released before all your medical problems are known or treated. You, the patient, will arrange for follow up care as instructed.  Follow up with your PCP or specialty clinic as directed if not improved or as needed. You can call [phone] to schedule an appointment with the appropriate provider.  If your condition worsens we recommend that you receive another evaluation at the Emergency Department for any concerns or worsening of condition.  Patient aware and verbalized understanding.    You were tested for COVID-19 today in clinic. We will call you with results.   Counseled patient and answered questions in regards to COVID-19 testing.   Advised patient to go home/quarantine, treat symptoms, avoid contact with others until symptoms are resolved.   Info given for virtual visit, covid 19 information line, state info line.   Strict ER precautions if trouble breathing- call ER prior to entry.   Follow local/state guidelines per covid emergency.   Patient aware, verbalized understanding and agreed with plan of care.    IF NOT IMPROVING, FOLLOW UP WITH VIRTUAL ONLINE VISIT WITH A PROVIDER 24/7- FOR MORE INFORMATION OR TO DOWNLOAD THE [facility], VISIT [facility] ANYWHERE CARE AT [email or web address]    FOR 24/7 NURSE ADVICE, CALL [phone]  FOR COVID 19 RELATED QUESTIONS, CALL THE [facility] covid hotline: [phone]    [place] FOR UP TO DATE INFORMATION:  Text or dial 211, test keyword LACOVID [id number] OR DIAL 211    HELPFUL EXTERNAL RESOURCES:  OFFICE OF PUBLIC HEALTH: [place]   Http://ldh.[place].gov/  3-652-581-0433    [facility] FOR DIS[facility]ASE CONTR[facility]  Https://www.cdc.gov/    WORLD HEALTH ORGANIZATION (WHO)  [email or web address].who.int/

## 2020-07-13 NOTE — LETTER
[address], [address] ? [place], [address] ? Phone [phone] ? Fax [phone]           Return to Work/School    Patient: Chey Mcneill  YOB: 1975   Date: 07/13/2020      To Whom It May Concern:     Chey Mcneill was in contact with/seen in my office on 07/13/2020. COVID-19 is present in our communities across the state. Not all patients are eligible or appropriate to be tested. In this situation, your employee meets the following criteria:     Cehy Mcneill has met the criteria for COVID-19 testing based upon symptoms, travel, and/or potential exposure. The test has been completed and is pending results at this time. During this time the employee is not able to work and should be quarantined per the Centers for Disease Control timelines.      If you have any questions or concerns, or if I can be of further assistance, please do not hesitate to contact me.     Sincerely,        Hollie Jean PA-C

## 2020-07-16 LAB — SARS-COV-2 RNA RESP QL NAA+PROBE: NOT DETECTED

## 2020-07-17 ENCOUNTER — TELEPHONE (OUTPATIENT)
Dept: URGENT CARE | Facility: CLINIC | Age: 45
End: 2020-07-17

## 2020-08-14 NOTE — PROGRESS NOTES
Subjective:          Chief Complaint: Chey Mcneill is a 45 y.o. female who had concerns including Pain of the Right Knee.    Pt presents for 5 month post-op evaluation.  She is doing very well. Therapy is going well for her and she is happy with it. She has no concerns or complaints today. She denies pain. She is not taking any pain medications. She denies fevers/chills.     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 Right  Femoral Subchondroplasty, 85793  2.  Right  Tibial Subchondroplasty, 60486  3.  Right  Arthroscopy, debridement/shaving of articular cartilage (chondroplasty) 09906  4.  Right  Arthroscopy, knee, for removal of loose body or foreign body 27797  5.  Right  Arthroscopy, knee, synovectomy, limited 95642                    Review of Systems   Constitution: Negative for chills, fever and night sweats.   HENT: Negative for congestion, hearing loss and sore throat.    Eyes: Negative for blurred vision, discharge, double vision and visual disturbance.   Cardiovascular: Negative for chest pain, leg swelling, palpitations and syncope.   Respiratory: Negative for cough and shortness of breath.    Endocrine: Negative for cold intolerance, heat intolerance and polyuria.   Hematologic/Lymphatic: Negative for bleeding problem.   Skin: Negative for dry skin and rash.   Musculoskeletal: Positive for joint pain. Negative for back pain, joint swelling, muscle cramps and muscle weakness.   Gastrointestinal: Negative for abdominal pain, melena, nausea and vomiting.   Genitourinary:  Negative for hematuria.   Neurological: Negative for focal weakness, loss of balance, numbness and paresthesias.   Psychiatric/Behavioral: Negative for altered mental status.   All other systems reviewed and are negative.      Pain Related Questions  Over the past 3 days, what was your average pain during activity? (I.e. running, jogging, walking, climbing stairs, getting dressed, ect.): 0  Over the past 3 days, what was your highest pain level?: 0    Other  How many nights a week are you awakened by your affected body part?: 0  Was the patient's HEIGHT measured or patient reported?: Patient Reported  Was the patient's WEIGHT measured or patient reported?: Measured      Objective:        General: Chey is well-developed, well-nourished, appears stated age, in no acute distress, alert and oriented to time, place and person.     General    Nursing note and vitals reviewed.  Constitutional: She is oriented to person, place, and time. She appears well-developed and well-nourished. No distress.   HENT:   Head: Normocephalic and atraumatic.   Nose: Nose normal.   Mouth/Throat: No oropharyngeal exudate.   Eyes: Conjunctivae and EOM are normal. Pupils are equal, round, and reactive to light. Right eye exhibits no discharge. Left eye exhibits no discharge.   Neck: Normal range of motion. Neck supple.   Cardiovascular: Normal rate, regular rhythm and intact distal pulses.    Pulmonary/Chest: Effort normal and breath sounds normal. No respiratory distress.   Abdominal: Soft. Bowel sounds are normal. She exhibits no distension. There is no abdominal tenderness.   Neurological: She is alert and oriented to person, place, and time. She has normal reflexes. She displays normal reflexes. No cranial nerve deficit. Coordination normal.   Psychiatric: She has a normal mood and affect. Her behavior is normal. Judgment and thought content normal.     General Musculoskeletal Exam   Gait: antalgic       Right Knee Exam     Inspection    Erythema: absent  Scars: present  Swelling: absent  Effusion: absent  Deformity: absent  Bruising: absent    Tenderness   The patient is tender to palpation of the patella.    Crepitus   The patient has crepitus of the patella (mild to moderate).    Range of Motion   Extension: 0   Flexion: 140     Tests   Meniscus   Leela:  Medial - negative Lateral - negative  Ligament Examination Lachman: normal (-1 to 2mm) PCL-Posterior Drawer: normal (0 to 2mm)     MCL - Valgus: normal (0 to 2mm)  LCL - Varus: normalPivot Shift: normal (Equal)Reverse Pivot Shift: normal (Equal)Dial Test at 30 degrees: normal (< 5 degrees)Dial Test at 90 degrees: normal (< 5 degrees)  Posterior Sag Test: negative  Posterolateral Corner: unstable (>15 degrees difference)  Patella   Patellar apprehension: negative  Passive Patellar Tilt: neutral  Patellar Tracking: normal  Patellar Glide (quadrants): Lateral - 1   Medial - 2  Q-Angle at 90 degrees: normal  Patellar Grind: negative  J-Sign: none    Other   Meniscal Cyst: absent  Popliteal (Baker's) Cyst: absent  Sensation: normal    Comments:      Knee Incisions clean/dry/intact  No sign of infection  Compartments soft  Neurovascular status intact in extremity  Mild fat pad tenderness      Left Knee Exam     Inspection   Erythema: absent  Scars: absent  Swelling: absent  Effusion: absent  Deformity: absent  Bruising: absent    Tenderness   The patient is experiencing no tenderness.     Range of Motion   Extension: 0   Flexion: 130     Tests   Meniscus   Leela:  Medial - negative Lateral - negative  Stability Lachman: normal (-1 to 2mm) PCL-Posterior Drawer: normal (0 to 2mm)  MCL - Valgus: normal (0 to 2mm)  LCL - Varus: normal (0 to 2mm)Pivot Shift: normal (Equal)Reverse Pivot Shift: normal (Equal)Dial Test at 30 degrees: normal (< 5 degrees)Dial Test at 90 degrees: normal (< 5 degrees)  Posterior Sag Test: negative  Posterolateral Corner: unstable (>15 degrees difference)  Patella    Patellar apprehension: negative  Passive Patellar Tilt: neutral  Patellar Tracking: normal  Patellar Glide (Quadrants): Lateral - 1 Medial - 2  Q-Angle at 90 degrees: normal  Patellar Grind: negative  J-Sign: J sign absent    Other   Meniscal Cyst: absent  Popliteal (Baker's) Cyst: absent  Sensation: normal    Right Hip Exam     Tests   Raymond: negative  Left Hip Exam     Tests   Raymond: negative      Right Shoulder Exam     Inspection/Observation   Swelling: absent  Bruising: absent  Scars: absent  Deformity: absent  Scapular Winging: absent  Scapular Dyskinesia: negative  Atrophy: absent    Tenderness   The patient is experiencing no tenderness.    Range of Motion   Active abduction:  90 normal   Passive abduction:  100 normal   Extension:  0 normal   Forward Flexion:  180 normal   Forward Elevation: 180 normal  Adduction: 40 normal  External Rotation 0 degrees:  50 normal   External Rotation 90 degrees: 90 normal  Internal rotation 0 degrees:  T8 normal   Internal rotation 90 degrees:  30 normal     Tests & Signs   Apprehension: negative  Cross arm: negative  Drop arm: negative  Storm test: negative  Impingement: negative  Sulcus: absent  Anterosuperior Escape: negative  Lag Sign 0 degrees: negative  Lag Sign 90 degrees: negative  Lift Off Sign: negative  Belly Press: negative  Active Compression Test ([place]'s Sign): negative  Yergason's Test: negative  Speed's Test: negative  Anterior Drawer Test: 1+   Posterior Drawer Test: 1+  Relocation 90 degrees: negative  Relocation > 90 degrees: negative  Bear Hug: negative  Moving Valgus: negative  Jerk Test: negative    Other   Sensation: normal    Left Shoulder Exam     Inspection/Observation   Swelling: absent  Bruising: absent  Scars: absent  Deformity: absent  Scapular Winging: absent  Scapular Dyskinesia: negative  Atrophy: absent    Tenderness   The patient is experiencing no tenderness.     Range of Motion   Active abduction:  90 normal   Passive abduction:   100 normal   Extension:  0 normal   Forward Flexion:  180 normal   Forward Elevation: 180 normal  Adduction: 40 normal  External Rotation 0 degrees:  50 normal   External Rotation 90 degrees: 90 normal  Internal rotation 0 degrees:  T8 normal   Internal rotation 90 degrees:  30 normal     Tests & Signs   Apprehension: negative  Cross arm: negative  Drop arm: negative  Storm test: negative  Impingement: negative  Sulcus: absent  Anterosuperior Escape: negative  Lag Sign 0 degrees: negative  Lag Sign 90 degrees: negative  Lift Off Sign: negative  Belly Press: negative  Active Compression test ([place]'s Sign): negative  Yergasons's Test: negative  Speed's Test: negative  Anterior Drawer Test: 1+  Posterior Drawer Test: 1+  Relocation 90 degrees: negative  Relocation > 90 degrees: negative  Bear Hug: negative  Moving Valgus: negative  Jerk Test: negative    Other   Sensation: normal       Muscle Strength   Right Upper Extremity   Shoulder Abduction: 5/5   Shoulder Internal Rotation: 5/5   Shoulder External Rotation: 5/5   Supraspinatus: 5/5/5   Subscapularis: 5/5/5   Biceps: 5/5/5   Left Upper Extremity  Shoulder Abduction: 5/5   Shoulder Internal Rotation: 5/5   Shoulder External Rotation: 5/5   Supraspinatus: 5/5/5   Subscapularis: 5/5/5   Biceps: 5/5/5   Right Lower Extremity   Hip Abduction: 5/5   Quadriceps:  4/5   Hamstrin/5   Left Lower Extremity   Hip Abduction: 5/5   Quadriceps:  5/5   Hamstrin/5     Reflexes     Left Side  Biceps:  2+  Triceps:  2+  Brachioradialis:  2+  Quadriceps:  2+  Achilles:  2+    Right Side   Biceps:  2+  Triceps:  2+  Brachioradialis:  2+  Quadriceps:  2+  Achilles:  2+    Vascular Exam     Right Pulses  Dorsalis Pedis:      2+  Posterior Tibial:      2+  Radial:                    2+      Left Pulses  Dorsalis Pedis:      2+  Posterior Tibial:      2+  Radial:                    2+      Capillary Refill  Right Hand: normal capillary refill  Left Hand: normal capillary  refill    Edema  Right Lower Leg: absent  Left Lower Leg: absent    Imaging (8-17-20):  Narrative & Impression     EXAMINATION:  XR KNEE ORTHO BILAT WITH FLEXION     CLINICAL HISTORY:  Pain in right knee     TECHNIQUE:  AP standing of both knees, PA flexion standing views of both knees, and Merchant views of both knees were performed.  Lateral views of both knees were also performed.     COMPARISON:  May 13, 2020.     FINDINGS:  Increased sclerosis distal right femur and adjacent tibia presumably postoperative.  Some hypertrophic new bone about the anterior aspect of the left tibia.  No significant effusions.  Mild narrowing at the femoral tibial joints.  Narrowing at the patellofemoral joints.     Impression:     Stable postoperative changes above.           Assessment:       Encounter Diagnoses   Name Primary?    Left shoulder pain, unspecified chronicity     Right knee pain, unspecified chronicity Yes    Chronic pain of right knee     Chondromalacia, right knee     Chondromalacia of right patella     Chondromalacia of right knee     Derangement of left knee     Internal derangement of right knee           Plan:       1. IKDC, SF-12 and KOOS was not filled out today in clinic.     RTC in 3 months with Dr. Cooper Ramos Patient will fill out IKDC, SF-12 and KOOS and bilateral knee series on return.    2. No refills needed    3. Continue PT. Will write for order today for [facility] patient questionnaires have been collected today.

## 2020-08-17 ENCOUNTER — HOSPITAL ENCOUNTER (OUTPATIENT)
Dept: RADIOLOGY | Facility: HOSPITAL | Age: 45
Discharge: HOME | End: 2020-08-17
Attending: ORTHOPAEDIC SURGERY
Payer: MEDICAID

## 2020-08-17 ENCOUNTER — OFFICE VISIT (OUTPATIENT)
Dept: SPORTS MEDICINE | Facility: CLINIC | Age: 45
End: 2020-08-17
Payer: MEDICAID

## 2020-08-17 VITALS
SYSTOLIC BLOOD PRESSURE: 113 MMHG | HEART RATE: 70 BPM | WEIGHT: 146 LBS | BODY MASS INDEX: 26.87 KG/M2 | DIASTOLIC BLOOD PRESSURE: 75 MMHG | HEIGHT: 62 IN

## 2020-08-17 DIAGNOSIS — M25.512 LEFT SHOULDER PAIN, UNSPECIFIED CHRONICITY: ICD-10-CM

## 2020-08-17 DIAGNOSIS — M94.261 CHONDROMALACIA OF RIGHT KNEE: ICD-10-CM

## 2020-08-17 DIAGNOSIS — G89.29 CHRONIC PAIN OF RIGHT KNEE: ICD-10-CM

## 2020-08-17 DIAGNOSIS — M25.561 CHRONIC PAIN OF RIGHT KNEE: ICD-10-CM

## 2020-08-17 DIAGNOSIS — M23.92 DERANGEMENT OF LEFT KNEE: ICD-10-CM

## 2020-08-17 DIAGNOSIS — M23.91 INTERNAL DERANGEMENT OF RIGHT KNEE: ICD-10-CM

## 2020-08-17 DIAGNOSIS — M94.261 CHONDROMALACIA, RIGHT KNEE: ICD-10-CM

## 2020-08-17 DIAGNOSIS — M22.41 CHONDROMALACIA OF RIGHT PATELLA: ICD-10-CM

## 2020-08-17 DIAGNOSIS — M25.561 RIGHT KNEE PAIN, UNSPECIFIED CHRONICITY: ICD-10-CM

## 2020-08-17 DIAGNOSIS — M25.561 RIGHT KNEE PAIN, UNSPECIFIED CHRONICITY: Primary | ICD-10-CM

## 2020-08-17 PROCEDURE — 73564 XR KNEE ORTHO BILAT WITH FLEXION: ICD-10-PCS | Mod: 26,50,, | Performed by: RADIOLOGY

## 2020-08-17 PROCEDURE — 99999 PR PBB SHADOW E&M-EST. PATIENT-LVL IV: CPT | Mod: PBBFAC,,, | Performed by: ORTHOPAEDIC SURGERY

## 2020-08-17 PROCEDURE — 99999 PR PBB SHADOW E&M-EST. PATIENT-LVL IV: ICD-10-PCS | Mod: PBBFAC,,, | Performed by: ORTHOPAEDIC SURGERY

## 2020-08-17 PROCEDURE — 99214 OFFICE O/P EST MOD 30 MIN: CPT | Mod: PBBFAC,25 | Performed by: ORTHOPAEDIC SURGERY

## 2020-08-17 PROCEDURE — 99214 PR OFFICE/OUTPT VISIT, EST, LEVL IV, 30-39 MIN: ICD-10-PCS | Mod: S$PBB,,, | Performed by: ORTHOPAEDIC SURGERY

## 2020-08-17 PROCEDURE — 73564 X-RAY EXAM KNEE 4 OR MORE: CPT | Mod: 26,50,, | Performed by: RADIOLOGY

## 2020-08-17 PROCEDURE — 73564 X-RAY EXAM KNEE 4 OR MORE: CPT | Mod: TC,50

## 2020-08-17 PROCEDURE — 99214 OFFICE O/P EST MOD 30 MIN: CPT | Mod: S$PBB,,, | Performed by: ORTHOPAEDIC SURGERY

## 2020-11-16 ENCOUNTER — OFFICE VISIT (OUTPATIENT)
Dept: SPORTS MEDICINE | Facility: CLINIC | Age: 45
End: 2020-11-16
Payer: COMMERCIAL

## 2020-11-16 ENCOUNTER — HOSPITAL ENCOUNTER (OUTPATIENT)
Dept: RADIOLOGY | Facility: HOSPITAL | Age: 45
Discharge: HOME | End: 2020-11-16
Attending: ORTHOPAEDIC SURGERY
Payer: COMMERCIAL

## 2020-11-16 VITALS
SYSTOLIC BLOOD PRESSURE: 115 MMHG | HEIGHT: 62 IN | BODY MASS INDEX: 26.68 KG/M2 | HEART RATE: 70 BPM | DIASTOLIC BLOOD PRESSURE: 65 MMHG | WEIGHT: 145 LBS

## 2020-11-16 DIAGNOSIS — M25.512 CHRONIC LEFT SHOULDER PAIN: ICD-10-CM

## 2020-11-16 DIAGNOSIS — M22.41 CHONDROMALACIA OF RIGHT PATELLA: ICD-10-CM

## 2020-11-16 DIAGNOSIS — G89.29 CHRONIC LEFT SHOULDER PAIN: ICD-10-CM

## 2020-11-16 DIAGNOSIS — M25.562 PAIN IN BOTH KNEES, UNSPECIFIED CHRONICITY: Primary | ICD-10-CM

## 2020-11-16 DIAGNOSIS — Z98.890 S/P ARTHROSCOPY OF KNEE: ICD-10-CM

## 2020-11-16 DIAGNOSIS — G89.29 CHRONIC PAIN OF RIGHT KNEE: ICD-10-CM

## 2020-11-16 DIAGNOSIS — M25.561 CHRONIC PAIN OF RIGHT KNEE: ICD-10-CM

## 2020-11-16 DIAGNOSIS — M25.561 PAIN IN BOTH KNEES, UNSPECIFIED CHRONICITY: Primary | ICD-10-CM

## 2020-11-16 DIAGNOSIS — M25.562 PAIN IN BOTH KNEES, UNSPECIFIED CHRONICITY: ICD-10-CM

## 2020-11-16 DIAGNOSIS — M25.561 PAIN IN BOTH KNEES, UNSPECIFIED CHRONICITY: ICD-10-CM

## 2020-11-16 PROCEDURE — 73564 XR KNEE ORTHO BILAT WITH FLEXION: ICD-10-PCS | Mod: 26,,, | Performed by: RADIOLOGY

## 2020-11-16 PROCEDURE — 99213 OFFICE O/P EST LOW 20 MIN: CPT | Mod: PBBFAC,25 | Performed by: ORTHOPAEDIC SURGERY

## 2020-11-16 PROCEDURE — 99214 OFFICE O/P EST MOD 30 MIN: CPT | Mod: S$GLB,,, | Performed by: ORTHOPAEDIC SURGERY

## 2020-11-16 PROCEDURE — 73564 X-RAY EXAM KNEE 4 OR MORE: CPT | Mod: TC,50

## 2020-11-16 PROCEDURE — 99999 PR PBB SHADOW E&M-EST. PATIENT-LVL III: ICD-10-PCS | Mod: PBBFAC,,, | Performed by: ORTHOPAEDIC SURGERY

## 2020-11-16 PROCEDURE — 99214 PR OFFICE/OUTPT VISIT, EST, LEVL IV, 30-39 MIN: ICD-10-PCS | Mod: S$GLB,,, | Performed by: ORTHOPAEDIC SURGERY

## 2020-11-16 PROCEDURE — 99999 PR PBB SHADOW E&M-EST. PATIENT-LVL III: CPT | Mod: PBBFAC,,, | Performed by: ORTHOPAEDIC SURGERY

## 2020-11-16 PROCEDURE — 73564 X-RAY EXAM KNEE 4 OR MORE: CPT | Mod: 26,,, | Performed by: RADIOLOGY

## 2020-11-16 NOTE — PROGRESS NOTES
Subjective:          Chief Complaint: Chey Mcneill is a 45 y.o. female who had concerns including Pain of the Right Knee.    Pt presents for 7 month post-op evaluation.  She is doing very well and she has no complaints. She has stopped therapy and continues the exercises at home. She denies pain. She is not taking any pain medications. She denies fevers/chills.     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 Right  Femoral Subchondroplasty, 95975  2.  Right  Tibial Subchondroplasty, 08523  3.  Right  Arthroscopy, debridement/shaving of articular cartilage (chondroplasty) 04467  4.  Right  Arthroscopy, knee, for removal of loose body or foreign body 41022  5.  Right  Arthroscopy, knee, synovectomy, limited 79179                    Review of Systems   Constitution: Negative. Negative for chills, fever and night sweats.   HENT: Negative.  Negative for congestion, hearing loss and sore throat.    Eyes: Negative.  Negative for blurred vision, discharge, double vision and visual disturbance.   Cardiovascular: Negative.  Negative for chest pain, leg swelling, palpitations and syncope.   Respiratory: Negative.  Negative for cough and shortness of breath.    Endocrine: Negative.  Negative for cold intolerance, heat intolerance and polyuria.   Hematologic/Lymphatic: Negative.  Negative for bleeding problem.   Skin: Negative.  Negative for dry skin and rash.   Musculoskeletal: Negative for back pain, joint pain, joint swelling, muscle cramps and muscle weakness.   Gastrointestinal: Negative.   Negative for abdominal pain, melena, nausea and vomiting.   Genitourinary: Negative.  Negative for hematuria.   Neurological: Negative.  Negative for focal weakness, loss of balance, numbness and paresthesias.   Psychiatric/Behavioral: Negative.  Negative for altered mental status.   Allergic/Immunologic: Negative.    All other systems reviewed and are negative.                  Objective:        General: Chey is well-developed, well-nourished, appears stated age, in no acute distress, alert and oriented to time, place and person.     General    Nursing note and vitals reviewed.  Constitutional: She is oriented to person, place, and time. She appears well-developed and well-nourished. No distress.   HENT:   Head: Normocephalic and atraumatic.   Nose: Nose normal.   Mouth/Throat: No oropharyngeal exudate.   Eyes: Conjunctivae and EOM are normal. Pupils are equal, round, and reactive to light. Right eye exhibits no discharge. Left eye exhibits no discharge.   Neck: Normal range of motion. Neck supple.   Cardiovascular: Normal rate, regular rhythm and intact distal pulses.    Pulmonary/Chest: Effort normal and breath sounds normal. No respiratory distress.   Abdominal: Soft. Bowel sounds are normal. She exhibits no distension. There is no abdominal tenderness.   Neurological: She is alert and oriented to person, place, and time. She has normal reflexes. She displays normal reflexes. No cranial nerve deficit. Coordination normal.   Psychiatric: She has a normal mood and affect. Her behavior is normal. Judgment and thought content normal.     General Musculoskeletal Exam   Gait: antalgic       Right Knee Exam     Inspection   Erythema: absent  Scars: present  Swelling: absent  Effusion: absent  Deformity: absent  Bruising: absent    Crepitus   The patient has crepitus of the patella (mild to moderate).    Range of Motion   Extension:  0 normal   Flexion: normal Right knee flexion: 135.     Tests   Meniscus   Leela:   Medial - negative Lateral - negative  Ligament Examination Lachman: normal (-1 to 2mm) PCL-Posterior Drawer: normal (0 to 2mm)     MCL - Valgus: normal (0 to 2mm)  LCL - Varus: normalPivot Shift: normal (Equal)Reverse Pivot Shift: normal (Equal)Dial Test at 30 degrees: normal (< 5 degrees)Dial Test at 90 degrees: normal (< 5 degrees)  Posterior Sag Test: negative  Posterolateral Corner: unstable (>15 degrees difference)  Patella   Patellar apprehension: negative  Passive Patellar Tilt: neutral  Patellar Tracking: normal  Patellar Glide (quadrants): Lateral - 1   Medial - 2  Q-Angle at 90 degrees: normal  Patellar Grind: negative  J-Sign: none    Other   Meniscal Cyst: absent  Popliteal (Baker's) Cyst: absent  Sensation: normal    Comments:      Knee Incisions clean/dry/intact  No sign of infection  Compartments soft  Neurovascular status intact in extremity  No fat pad tenderness      Left Knee Exam     Inspection   Erythema: absent  Scars: present  Swelling: absent  Effusion: absent  Deformity: absent  Bruising: absent    Tenderness   The patient is experiencing no tenderness.     Range of Motion   Extension:  0 normal   Flexion: normal Left knee flexion: 135.     Tests   Meniscus   Leela:  Medial - negative Lateral - negative  Stability Lachman: normal (-1 to 2mm) PCL-Posterior Drawer: normal (0 to 2mm)  MCL - Valgus: normal (0 to 2mm)  LCL - Varus: normal (0 to 2mm)Pivot Shift: normal (Equal)Reverse Pivot Shift: normal (Equal)Dial Test at 30 degrees: normal (< 5 degrees)Dial Test at 90 degrees: normal (< 5 degrees)  Posterior Sag Test: negative  Posterolateral Corner: unstable (>15 degrees difference)  Patella   Patellar apprehension: negative  Passive Patellar Tilt: neutral  Patellar Tracking: normal  Patellar Glide (Quadrants): Lateral - 1 Medial - 2  Q-Angle at 90 degrees: normal  Patellar Grind: negative  J-Sign: J sign absent    Other   Meniscal Cyst: absent  Popliteal (Baker's) Cyst: absent  Sensation:  normal    Right Hip Exam     Tests   Raymond: negative  Left Hip Exam     Tests   Raymond: negative      Right Shoulder Exam     Inspection/Observation   Swelling: absent  Bruising: absent  Scars: absent  Deformity: absent  Scapular Winging: absent  Scapular Dyskinesia: negative  Atrophy: absent    Tenderness   The patient is experiencing no tenderness.    Range of Motion   Active abduction:  90 normal   Passive abduction:  100 normal   Extension:  0 normal   Forward Flexion:  180 normal   Forward Elevation: 180 normal  Adduction: 40 normal  External Rotation 0 degrees:  50 normal   External Rotation 90 degrees: 90 normal  Internal rotation 0 degrees:  T8 normal   Internal rotation 90 degrees:  30 normal     Tests & Signs   Apprehension: negative  Cross arm: negative  Drop arm: negative  Storm test: negative  Impingement: negative  Sulcus: absent  Anterosuperior Escape: negative  Lag Sign 0 degrees: negative  Lag Sign 90 degrees: negative  Lift Off Sign: negative  Belly Press: negative  Active Compression Test ([place]'s Sign): negative  Yergason's Test: negative  Speed's Test: negative  Anterior Drawer Test: 1+   Posterior Drawer Test: 1+  Relocation 90 degrees: negative  Relocation > 90 degrees: negative  Bear Hug: negative  Moving Valgus: negative  Jerk Test: negative    Other   Sensation: normal    Left Shoulder Exam     Inspection/Observation   Swelling: absent  Bruising: absent  Scars: absent  Deformity: absent  Scapular Winging: absent  Scapular Dyskinesia: negative  Atrophy: absent    Tenderness   The patient is experiencing no tenderness.     Range of Motion   Active abduction:  90 normal   Passive abduction:  100 normal   Extension:  0 normal   Forward Flexion:  180 normal   Forward Elevation: 180 normal  Adduction: 40 normal  External Rotation 0 degrees:  50 normal   External Rotation 90 degrees: 90 normal  Internal rotation 0 degrees:  T8 normal   Internal rotation 90 degrees:  30 normal     Tests & Signs    Apprehension: negative  Cross arm: negative  Drop arm: negative  Storm test: negative  Impingement: negative  Sulcus: absent  Anterosuperior Escape: negative  Lag Sign 0 degrees: negative  Lag Sign 90 degrees: negative  Lift Off Sign: negative  Belly Press: negative  Active Compression test ([place]'s Sign): negative  Yergasons's Test: negative  Speed's Test: negative  Anterior Drawer Test: 1+  Posterior Drawer Test: 1+  Relocation 90 degrees: negative  Relocation > 90 degrees: negative  Bear Hug: negative  Moving Valgus: negative  Jerk Test: negative    Other   Sensation: normal       Muscle Strength   Right Upper Extremity   Shoulder Abduction: 5/5   Shoulder Internal Rotation: 5/5   Shoulder External Rotation: 5/5   Supraspinatus: 5/5   Subscapularis: 5/5   Biceps: 5/5   Left Upper Extremity  Shoulder Abduction: 5/5   Shoulder Internal Rotation: 5/5   Shoulder External Rotation: 5/5   Supraspinatus: 5/5   Subscapularis: 5/5   Biceps: 5/5   Right Lower Extremity   Hip Abduction: 5/5   Quadriceps:  5/5   Hamstrin/5   Left Lower Extremity   Hip Abduction: 5/5   Quadriceps:  5/5   Hamstrin/5     Reflexes     Left Side  Biceps:  2+  Triceps:  2+  Brachioradialis:  2+  Achilles:  2+  Quadriceps:  2+    Right Side   Biceps:  2+  Triceps:  2+  Brachioradialis:  2+  Achilles:  2+  Quadriceps:  2+    Vascular Exam     Right Pulses  Dorsalis Pedis:      2+  Posterior Tibial:      2+  Radial:                    2+      Left Pulses  Dorsalis Pedis:      2+  Posterior Tibial:      2+  Radial:                    2+      Capillary Refill  Right Hand: normal capillary refill  Left Hand: normal capillary refill    Edema  Right Lower Leg: absent  Left Lower Leg: absent    Imaging (20):  Narrative & Impression     EXAMINATION:  XR KNEE ORTHO BILAT WITH FLEXION     CLINICAL HISTORY:  Pain in right knee     TECHNIQUE:  AP standing of both knees, PA flexion standing views of both knees, and Merchant views of both  knees were performed.  Lateral views of both knees were also performed.     COMPARISON:  May 13, 2020.     FINDINGS:  Increased sclerosis distal right femur and adjacent tibia presumably postoperative.  Some hypertrophic new bone about the anterior aspect of the left tibia.  No significant effusions.  Mild narrowing at the femoral tibial joints.  Narrowing at the patellofemoral joints.     Impression:     Stable postoperative changes above.     Images of the R knee were obtained today and demonstrated:   Well healed OCA grafts patella and medial femoral region; incorporation excellent with maintained Calcium phosphate      Assessment:       Encounter Diagnoses   Name Primary?    Pain in both knees, unspecified chronicity Yes    Chronic pain of right knee     Chondromalacia of right patella     S/P arthroscopy of knee     Chronic left shoulder pain           Plan:       1. IKDC, SF-12 and KOOS was filled out today in clinic.     RTC in 6 months with Dr. Cooper Ramos Patient will fill out IKDC, SF-12 and KOOS on return.    2. No refills needed    3. Continue PT exercises on own.            [facility] patient questionnaires have been collected today.

## 2021-03-15 ENCOUNTER — OFFICE VISIT (OUTPATIENT)
Dept: URGENT CARE | Facility: CLINIC | Age: 46
End: 2021-03-15
Payer: MEDICAID

## 2021-03-15 VITALS
HEART RATE: 72 BPM | DIASTOLIC BLOOD PRESSURE: 83 MMHG | RESPIRATION RATE: 16 BRPM | OXYGEN SATURATION: 100 % | TEMPERATURE: 98 F | WEIGHT: 145 LBS | BODY MASS INDEX: 26.52 KG/M2 | SYSTOLIC BLOOD PRESSURE: 122 MMHG

## 2021-03-15 DIAGNOSIS — N30.01 ACUTE CYSTITIS WITH HEMATURIA: Primary | ICD-10-CM

## 2021-03-15 DIAGNOSIS — R30.0 DYSURIA: ICD-10-CM

## 2021-03-15 LAB
BILIRUB UR QL STRIP: NEGATIVE
GLUCOSE UR QL STRIP: NEGATIVE
KETONES UR QL STRIP: NEGATIVE
LEUKOCYTE ESTERASE UR QL STRIP: NEGATIVE
PH, POC UA: 5 (ref 5–8)
POC BLOOD, URINE: POSITIVE
POC NITRATES, URINE: NEGATIVE
PROT UR QL STRIP: NEGATIVE
SP GR UR STRIP: 1 (ref 1–1.03)
UROBILINOGEN UR STRIP-ACNC: NORMAL (ref 0.1–1.1)

## 2021-03-15 PROCEDURE — 81003 POCT URINALYSIS, DIPSTICK, AUTOMATED, W/O SCOPE: ICD-10-PCS | Mod: QW,S$GLB,, | Performed by: PHYSICIAN ASSISTANT

## 2021-03-15 PROCEDURE — 99214 PR OFFICE/OUTPT VISIT, EST, LEVL IV, 30-39 MIN: ICD-10-PCS | Mod: 25,S$GLB,, | Performed by: PHYSICIAN ASSISTANT

## 2021-03-15 PROCEDURE — 81003 URINALYSIS AUTO W/O SCOPE: CPT | Mod: QW,S$GLB,, | Performed by: PHYSICIAN ASSISTANT

## 2021-03-15 PROCEDURE — 99214 OFFICE O/P EST MOD 30 MIN: CPT | Mod: 25,S$GLB,, | Performed by: PHYSICIAN ASSISTANT

## 2021-03-15 RX ORDER — NITROFURANTOIN 25; 75 MG/1; MG/1
100 CAPSULE ORAL 2 TIMES DAILY
Qty: 14 CAPSULE | Refills: 0 | Status: SHIPPED | OUTPATIENT
Start: 2021-03-15 | End: 2021-03-22

## 2021-03-15 RX ORDER — PHENAZOPYRIDINE HYDROCHLORIDE 200 MG/1
200 TABLET, FILM COATED ORAL 3 TIMES DAILY PRN
Qty: 20 TABLET | Refills: 0 | Status: SHIPPED | OUTPATIENT
Start: 2021-03-15 | End: 2021-03-17

## 2021-03-20 ENCOUNTER — TELEPHONE (OUTPATIENT)
Dept: URGENT CARE | Facility: CLINIC | Age: 46
End: 2021-03-20

## 2021-03-20 LAB
BACTERIA UR CULT: ABNORMAL
BACTERIA UR CULT: ABNORMAL
OTHER ANTIBIOTIC SUSC ISLT: ABNORMAL

## 2021-05-06 ENCOUNTER — PATIENT MESSAGE (OUTPATIENT)
Dept: RESEARCH | Facility: HOSPITAL | Age: 46
End: 2021-05-06

## 2021-12-02 ENCOUNTER — OFFICE VISIT (OUTPATIENT)
Dept: URGENT CARE | Facility: CLINIC | Age: 46
End: 2021-12-02
Payer: MEDICAID

## 2021-12-02 VITALS
HEART RATE: 86 BPM | HEIGHT: 62 IN | WEIGHT: 145 LBS | OXYGEN SATURATION: 97 % | BODY MASS INDEX: 26.68 KG/M2 | SYSTOLIC BLOOD PRESSURE: 116 MMHG | TEMPERATURE: 99 F | DIASTOLIC BLOOD PRESSURE: 86 MMHG | RESPIRATION RATE: 16 BRPM

## 2021-12-02 DIAGNOSIS — N30.01 ACUTE CYSTITIS WITH HEMATURIA: Primary | ICD-10-CM

## 2021-12-02 DIAGNOSIS — R30.0 DYSURIA: ICD-10-CM

## 2021-12-02 LAB
BILIRUB UR QL STRIP: POSITIVE
GLUCOSE UR QL STRIP: NEGATIVE
KETONES UR QL STRIP: NEGATIVE
LEUKOCYTE ESTERASE UR QL STRIP: POSITIVE
PH, POC UA: 5.5 (ref 5–8)
POC BLOOD, URINE: POSITIVE
POC NITRATES, URINE: NEGATIVE
PROT UR QL STRIP: POSITIVE
SP GR UR STRIP: 1.02 (ref 1–1.03)
UROBILINOGEN UR STRIP-ACNC: NORMAL (ref 0.1–1.1)

## 2021-12-02 PROCEDURE — 81003 URINALYSIS AUTO W/O SCOPE: CPT | Mod: QW,S$GLB,, | Performed by: PHYSICIAN ASSISTANT

## 2021-12-02 PROCEDURE — 87077 CULTURE AEROBIC IDENTIFY: CPT | Performed by: PHYSICIAN ASSISTANT

## 2021-12-02 PROCEDURE — 99213 OFFICE O/P EST LOW 20 MIN: CPT | Mod: 25,S$GLB,, | Performed by: PHYSICIAN ASSISTANT

## 2021-12-02 PROCEDURE — 87088 URINE BACTERIA CULTURE: CPT | Performed by: PHYSICIAN ASSISTANT

## 2021-12-02 PROCEDURE — 99213 PR OFFICE/OUTPT VISIT, EST, LEVL III, 20-29 MIN: ICD-10-PCS | Mod: 25,S$GLB,, | Performed by: PHYSICIAN ASSISTANT

## 2021-12-02 PROCEDURE — 87086 URINE CULTURE/COLONY COUNT: CPT | Performed by: PHYSICIAN ASSISTANT

## 2021-12-02 PROCEDURE — 81003 POCT URINALYSIS, DIPSTICK, AUTOMATED, W/O SCOPE: ICD-10-PCS | Mod: QW,S$GLB,, | Performed by: PHYSICIAN ASSISTANT

## 2021-12-02 PROCEDURE — 87186 SC STD MICRODIL/AGAR DIL: CPT | Performed by: PHYSICIAN ASSISTANT

## 2021-12-02 RX ORDER — NITROFURANTOIN 25; 75 MG/1; MG/1
100 CAPSULE ORAL 2 TIMES DAILY
Qty: 10 CAPSULE | Refills: 0 | Status: SHIPPED | OUTPATIENT
Start: 2021-12-02 | End: 2021-12-06 | Stop reason: ALTCHOICE

## 2021-12-02 RX ORDER — PHENAZOPYRIDINE HYDROCHLORIDE 200 MG/1
200 TABLET, FILM COATED ORAL 3 TIMES DAILY PRN
Qty: 20 TABLET | Refills: 0 | Status: SHIPPED | OUTPATIENT
Start: 2021-12-02 | End: 2021-12-04

## 2021-12-06 LAB — BACTERIA UR CULT: ABNORMAL

## 2021-12-07 ENCOUNTER — TELEPHONE (OUTPATIENT)
Dept: URGENT CARE | Facility: CLINIC | Age: 46
End: 2021-12-07
Payer: MEDICAID

## 2022-01-16 ENCOUNTER — OFFICE VISIT (OUTPATIENT)
Dept: URGENT CARE | Facility: CLINIC | Age: 47
End: 2022-01-16
Payer: MEDICAID

## 2022-01-16 VITALS
OXYGEN SATURATION: 97 % | BODY MASS INDEX: 25.76 KG/M2 | SYSTOLIC BLOOD PRESSURE: 108 MMHG | HEIGHT: 62 IN | DIASTOLIC BLOOD PRESSURE: 72 MMHG | TEMPERATURE: 98 F | WEIGHT: 140 LBS | RESPIRATION RATE: 16 BRPM | HEART RATE: 78 BPM

## 2022-01-16 DIAGNOSIS — U07.1 COVID-19: ICD-10-CM

## 2022-01-16 DIAGNOSIS — J02.9 SORE THROAT: Primary | ICD-10-CM

## 2022-01-16 LAB
CTP QC/QA: YES
SARS-COV-2 RDRP RESP QL NAA+PROBE: POSITIVE

## 2022-01-16 PROCEDURE — 3008F PR BODY MASS INDEX (BMI) DOCUMENTED: ICD-10-PCS | Mod: CPTII,S$GLB,, | Performed by: EMERGENCY MEDICINE

## 2022-01-16 PROCEDURE — 3074F SYST BP LT 130 MM HG: CPT | Mod: CPTII,S$GLB,, | Performed by: EMERGENCY MEDICINE

## 2022-01-16 PROCEDURE — 1160F PR REVIEW ALL MEDS BY PRESCRIBER/CLIN PHARMACIST DOCUMENTED: ICD-10-PCS | Mod: CPTII,S$GLB,, | Performed by: EMERGENCY MEDICINE

## 2022-01-16 PROCEDURE — 1159F MED LIST DOCD IN RCRD: CPT | Mod: CPTII,S$GLB,, | Performed by: EMERGENCY MEDICINE

## 2022-01-16 PROCEDURE — 1159F PR MEDICATION LIST DOCUMENTED IN MEDICAL RECORD: ICD-10-PCS | Mod: CPTII,S$GLB,, | Performed by: EMERGENCY MEDICINE

## 2022-01-16 PROCEDURE — 1160F RVW MEDS BY RX/DR IN RCRD: CPT | Mod: CPTII,S$GLB,, | Performed by: EMERGENCY MEDICINE

## 2022-01-16 PROCEDURE — 3074F PR MOST RECENT SYSTOLIC BLOOD PRESSURE < 130 MM HG: ICD-10-PCS | Mod: CPTII,S$GLB,, | Performed by: EMERGENCY MEDICINE

## 2022-01-16 PROCEDURE — 3078F PR MOST RECENT DIASTOLIC BLOOD PRESSURE < 80 MM HG: ICD-10-PCS | Mod: CPTII,S$GLB,, | Performed by: EMERGENCY MEDICINE

## 2022-01-16 PROCEDURE — U0002 COVID-19 LAB TEST NON-CDC: HCPCS | Mod: QW,CR,S$GLB, | Performed by: EMERGENCY MEDICINE

## 2022-01-16 PROCEDURE — 3008F BODY MASS INDEX DOCD: CPT | Mod: CPTII,S$GLB,, | Performed by: EMERGENCY MEDICINE

## 2022-01-16 PROCEDURE — 99214 PR OFFICE/OUTPT VISIT, EST, LEVL IV, 30-39 MIN: ICD-10-PCS | Mod: S$GLB,,, | Performed by: EMERGENCY MEDICINE

## 2022-01-16 PROCEDURE — 3078F DIAST BP <80 MM HG: CPT | Mod: CPTII,S$GLB,, | Performed by: EMERGENCY MEDICINE

## 2022-01-16 PROCEDURE — 99214 OFFICE O/P EST MOD 30 MIN: CPT | Mod: S$GLB,,, | Performed by: EMERGENCY MEDICINE

## 2022-01-16 PROCEDURE — U0002: ICD-10-PCS | Mod: QW,CR,S$GLB, | Performed by: EMERGENCY MEDICINE

## 2022-01-16 NOTE — LETTER
[address] [address], [address] ? [place] [address] ? Phone [phone] ? Fax [phone]           Return to Work/School    Patient: Chey Mcneill  YOB: 1975   Date: 01/16/2022      To Whom It May Concern:     Chey Mcneill was in contact with/seen in my office on 01/16/2022. COVID-19 is present in our communities across the [facility]. Not all patients are eligible or appropriate to be tested. In this situation, your employee meets the following criteria:     Chey Mcneill has met the criteria for COVID-19 testing and has a POSITIVE result. She can return to work once they are asymptomatic for 24 hours without the use of fever reducing medications AND at least five days from the start of symptoms (or from the first positive result if they have no symptoms).      If you have any questions or concerns, or if I can be of further assistance, please do not hesitate to contact me.     Sincerely,    Rashawn Zamora MD

## 2022-01-16 NOTE — PROGRESS NOTES
"Subjective:       Patient ID: Chey Mcneill is a 46 y.o. female.    Vitals:  height is 5' 2" (1.575 m) and weight is 63.5 kg (140 lb). Her oral temperature is 98.1 °F (36.7 °C). Her blood pressure is 108/72 and her pulse is 78. Her respiration is 16 and oxygen saturation is 97%.     Chief Complaint: Sore Throat    Pt presents today with c/o sore throat, fever, and headache. X's yesterday. Pt has taken OTC meds with mild relief. Pt is NOT covid vaccinated. PT would prefer a written prescription if needed.    Sore Throat   This is a new problem. The current episode started yesterday. The problem has been unchanged. Neither side of throat is experiencing more pain than the other. The maximum temperature recorded prior to her arrival was 101 - 101.9 F. Associated symptoms include congestion, coughing, headaches, a hoarse voice and trouble swallowing. She has tried acetaminophen for the symptoms. The treatment provided mild relief.       HENT: Positive for congestion, sore throat and trouble swallowing.    Respiratory: Positive for cough.    Neurological: Positive for headaches.       Objective:      Physical Exam   Constitutional: She is oriented to person, place, and time. She appears well-developed and well-nourished. She is cooperative.  Non-toxic appearance. She does not have a sickly appearance. She does not appear ill. No distress.   HENT:   Head: Normocephalic and atraumatic.   Ears:   Right Ear: Hearing and external ear normal.   Left Ear: Hearing and external ear normal.   Nose: Nose normal. No mucosal edema, rhinorrhea or nasal deformity. No epistaxis. Right sinus exhibits no maxillary sinus tenderness and no frontal sinus tenderness. Left sinus exhibits no maxillary sinus tenderness and no frontal sinus tenderness.   Mouth/Throat: Uvula is midline, oropharynx is clear and moist and mucous membranes are normal. Mucous membranes are moist. No trismus in the jaw. Normal dentition. No uvula swelling. No " oropharyngeal exudate, posterior oropharyngeal edema or posterior oropharyngeal erythema. Oropharynx is clear.   Eyes: Conjunctivae and lids are normal. No scleral icterus.   Neck: Trachea normal and phonation normal. Neck supple. No edema present. No erythema present. No neck rigidity present.   Cardiovascular: Normal rate, regular rhythm, normal heart sounds, intact distal pulses and normal pulses.   Pulmonary/Chest: Effort normal and breath sounds normal. No respiratory distress. She has no decreased breath sounds. She has no wheezes. She has no rhonchi. She has no rales.   Abdominal: Normal appearance.   Musculoskeletal: Normal range of motion.         General: No deformity or edema. Normal range of motion.   Neurological: She is alert and oriented to person, place, and time. She exhibits normal muscle tone. Coordination normal.   Skin: Skin is warm, dry, intact, not diaphoretic and not pale.   Psychiatric: She has a normal mood and affect. Her speech is normal and behavior is normal. Judgment and thought content normal. Cognition and memory  Nursing note and vitals reviewed.    Results for orders placed or performed in visit on 01/16/22   POCT COVID-19 Rapid Screening   Result Value Ref Range    POC Rapid COVID Positive (A) Negative     Acceptable Yes            Assessment:       1. Sore throat    2. COVID-19          Plan:         Sore throat  -     POCT COVID-19 Rapid Screening    COVID-19

## 2022-01-16 NOTE — PATIENT INSTRUCTIONS
Patient Education       COVID-19 Discharge Instructions   About this topic   Coronavirus disease 2019 is also known as COVID-19. It is a viral illness that infects the lungs. It is caused by a virus called SARS-associated coronavirus (SARS-CoV-2).  The signs of COVID-19 most often start a few days after you have been infected. In some people, it takes longer to show signs. Others never show signs of the infection. You may have a cough, fever, shaking chills and it may be hard to breathe. You may be very tired, have muscle aches, a headache or sore throat. Some people have an upset stomach or loose stools. Others lose their sense of smell or taste. You may not have these signs all the time and they may come and go while you are sick.  The virus spreads easily through droplets when you talk, sneeze, or cough. You can pass the virus to others when you are talking close together, singing, hugging, sharing food, or shaking hands. Doctors believe the germs also survive on surfaces like tables, door handles, and telephones. However, this is not a common way that COVID-19 spreads. Doctors believe you can also spread the infection even if you dont have any symptoms, but they do not know how that happens. This is why getting vaccinated is one of the best ways to keep you healthy and slow the spread of the virus.  Some people have a mild case of COVID-19 and are able to stay at home and away from others until they feel better. Others may need to be in the hospital if they are very sick. Some people with COVID-19 can have some symptoms for weeks or months. People with COVID-19 must isolate themselves. You can start to be around others when your doctor says it is safe to do so.       What care is needed at home?   · Ask your doctor what you need to do when you go home. Make sure you ask questions if you do not understand what the doctor says.  · Drink lots of water, juice, or broth to replace fluids lost from a fever.  · You  may use cool mist humidifiers to help ease congestion and coughing.  · Use 2 to 3 pillows to prop yourself up when you lie down to make it easier to breathe and sleep.  · Do not smoke and do not drink beer, wine, and mixed drinks (alcohol).  · To lower the chance of passing the infection to others, get a COVID-19 vaccine after your infection has resolved.  · If you have not been fully vaccinated:  ? Wear a mask over your mouth and nose if you are around others who are not sick. Cloth masks work best if they have more than one layer of fabric.  ? Wash your hands often.  ? Stay home in a separate room, if possible, away from others. Only go out to get medical care.  ? Use a separate bathroom if possible.  ? Do not make food for others.  What follow-up care is needed?   · Your doctor may ask you to make visits to the office to check on your progress. Be sure to keep these visits. Make sure you wear a mask at these visits.  · If you can, tell the staff you have COVID-19 ahead of time so they can take extra care to stop the disease from spreading.  · It may take a few weeks before your health returns to normal.  What drugs may be needed?   The doctor may order drugs to:  · Help with breathing  · Help with fever  · Help with swelling in your airways and lungs  · Control coughing  · Ease a sore throat  · Help a runny or stuffy nose  Will physical activity be limited?   You may have to limit your physical activity. Talk to your doctor about the right amount of activity for you. If you have been very sick with COVID-19, it can take some time to get your strength back.  Will there be any other care needed?   Doctors do not know how long you can pass the virus on to others after you are sick. This is why it is important to stay in a separate room, if possible, when you are sick. For now, doctors are giving general guidelines for you to follow after you have been sick. Before you go around other people, you should:  · Be fever  free for 24 hours without taking any drugs to lower the fever  · Have no symptoms of cough or shortness of breath  · Wait at least 10 days after first having symptoms or your first positive test, and you need to be symptom free as above. Some experts suggest waiting 20 days if you have had a more severe infection.  Talk with your doctor about getting a COVID-19 vaccine.  What problems could happen?   · Fluid loss. This is dehydration.  · Short-term or long-term lung damage  · Heart problems  · Death  When do I need to call the doctor?   · You are having so much trouble breathing that you can only say one or two words at a time.  · You need to sit upright at all times to be able to breathe and/or cannot lie down.  · You are very confused or cannot stay awake.  · Your lips or skin start to turn blue or grey.  · You think you might be having a medical emergency. Some examples of medical emergencies are:  ? Severe chest pain.  ? Not able to speak or move normally.  · You have trouble breathing when talking or sitting still.  · You have new shortness of breath.  · You become weak or dizzy.  · You have very dark urine or do not pass urine for more than 8 hours.  · You have new or worsening COVID-19 symptoms like:  ? Fever  ? Cough  ? Feeling very tired  ? Shaking chills  ? Headache  ? Trouble swallowing  ? Throwing up  ? Loose stools  ? Reddish purple spots on your fingers or toes  Teach Back: Helping You Understand   The Teach Back Method helps you understand the information we are giving you. After you talk with the staff, tell them in your own words what you learned. This helps to make sure the staff has described each thing clearly. It also helps to explain things that may have been confusing. Before going home, make sure you can do these:  · I can tell you about my condition.  · I can tell you what may help ease my breathing.  · I can tell you what I can do to help avoid passing the infection to others.  · I can tell  you what I will do if I have trouble breathing; feel sleepy or confused; or my fingertips, fingernails, skin, or lips are blue.  Where can I learn more?   Centers for Disease Control and Prevention  https://www.cdc.gov/coronavirus/2019-ncov/about/index.html   Centers for Disease Control and Prevention  https://www.cdc.gov/coronavirus/2019-ncov/hcp/disposition-in-home-patients.html   World Health Organization  https://www.who.int/news-room/q-a-detail/w-r-pinzzsjlwvics   Last Reviewed Date   2021-10-05  Consumer Information Use and Disclaimer   This information is not specific medical advice and does not replace information you receive from your health care provider. This is only a brief summary of general information. It does NOT include all information about conditions, illnesses, injuries, tests, procedures, treatments, therapies, discharge instructions or life-style choices that may apply to you. You must talk with your health care provider for complete information about your health and treatment options. This information should not be used to decide whether or not to accept your health care providers advice, instructions or recommendations. Only your health care provider has the knowledge and training to provide advice that is right for you.  Copyright   Copyright © 2021 [employer] and its affiliates and/or licensors. All rights reserved.

## 2022-07-08 ENCOUNTER — PATIENT MESSAGE (OUTPATIENT)
Dept: ADMINISTRATIVE | Facility: OTHER | Age: 47
End: 2022-07-08
Payer: MEDICAID

## 2022-07-08 ENCOUNTER — TELEPHONE (OUTPATIENT)
Dept: ORTHOPEDICS | Facility: CLINIC | Age: 47
End: 2022-07-08
Payer: MEDICAID

## 2022-07-08 ENCOUNTER — HOSPITAL ENCOUNTER (OUTPATIENT)
Dept: RADIOLOGY | Facility: HOSPITAL | Age: 47
Discharge: HOME | End: 2022-07-08
Attending: PHYSICIAN ASSISTANT
Payer: MEDICAID

## 2022-07-08 ENCOUNTER — TELEPHONE (OUTPATIENT)
Dept: SPORTS MEDICINE | Facility: CLINIC | Age: 47
End: 2022-07-08
Payer: MEDICAID

## 2022-07-08 DIAGNOSIS — M25.561 CHRONIC PAIN OF RIGHT KNEE: Primary | ICD-10-CM

## 2022-07-08 DIAGNOSIS — G89.29 CHRONIC PAIN OF RIGHT KNEE: ICD-10-CM

## 2022-07-08 DIAGNOSIS — M25.561 CHRONIC PAIN OF RIGHT KNEE: ICD-10-CM

## 2022-07-08 DIAGNOSIS — M23.91 INTERNAL DERANGEMENT OF RIGHT KNEE: ICD-10-CM

## 2022-07-08 DIAGNOSIS — G89.29 CHRONIC PAIN OF RIGHT KNEE: Primary | ICD-10-CM

## 2022-07-08 DIAGNOSIS — S82.141A TIBIAL PLATEAU FRACTURE, RIGHT, CLOSED, INITIAL ENCOUNTER: Primary | ICD-10-CM

## 2022-07-08 PROCEDURE — 73700 CT KNEE WITHOUT CONTRAST RIGHT: ICD-10-PCS | Mod: 26,RT,, | Performed by: RADIOLOGY

## 2022-07-08 PROCEDURE — 73700 CT LOWER EXTREMITY W/O DYE: CPT | Mod: TC,RT

## 2022-07-08 PROCEDURE — 73700 CT LOWER EXTREMITY W/O DYE: CPT | Mod: 26,RT,, | Performed by: RADIOLOGY

## 2022-07-08 NOTE — TELEPHONE ENCOUNTER
----- Message from Tamiko Duncan sent at 7/8/2022 10:27 AM CDT -----  Contact: Patient [phone]  Pt states she was seen at the [facility] ER for right tibia plateau fracture with possible displacement. Please call and advise.

## 2022-07-08 NOTE — PROGRESS NOTES
Called patient and left voicemail for her to return my call regarding her recent knee injury. We need to know the laterality so we can order a CT scan and schedule her an appt with Dr. Ramos on Monday.

## 2022-07-11 ENCOUNTER — ANESTHESIA EVENT (OUTPATIENT)
Dept: SURGERY | Facility: HOSPITAL | Age: 47
End: 2022-07-11
Payer: MEDICAID

## 2022-07-11 ENCOUNTER — OFFICE VISIT (OUTPATIENT)
Dept: SPORTS MEDICINE | Facility: CLINIC | Age: 47
End: 2022-07-11
Payer: MEDICAID

## 2022-07-11 ENCOUNTER — PATIENT MESSAGE (OUTPATIENT)
Dept: SPORTS MEDICINE | Facility: CLINIC | Age: 47
End: 2022-07-11

## 2022-07-11 VITALS — HEIGHT: 62 IN | BODY MASS INDEX: 25.76 KG/M2 | WEIGHT: 140 LBS

## 2022-07-11 VITALS — BODY MASS INDEX: 25.61 KG/M2 | HEIGHT: 62 IN

## 2022-07-11 DIAGNOSIS — S82.121A CLOSED FRACTURE OF LATERAL PORTION OF RIGHT TIBIAL PLATEAU, INITIAL ENCOUNTER: Primary | ICD-10-CM

## 2022-07-11 DIAGNOSIS — S82.141A TIBIAL PLATEAU FRACTURE, RIGHT, CLOSED, INITIAL ENCOUNTER: Primary | ICD-10-CM

## 2022-07-11 DIAGNOSIS — S82.121A CLOSED FRACTURE OF LATERAL PORTION OF RIGHT TIBIAL PLATEAU: ICD-10-CM

## 2022-07-11 PROCEDURE — 3008F PR BODY MASS INDEX (BMI) DOCUMENTED: ICD-10-PCS | Mod: CPTII,,, | Performed by: PHYSICIAN ASSISTANT

## 2022-07-11 PROCEDURE — 3008F BODY MASS INDEX DOCD: CPT | Mod: CPTII,,, | Performed by: PHYSICIAN ASSISTANT

## 2022-07-11 PROCEDURE — 99213 OFFICE O/P EST LOW 20 MIN: CPT | Mod: PBBFAC,27 | Performed by: ORTHOPAEDIC SURGERY

## 2022-07-11 PROCEDURE — 99499 UNLISTED E&M SERVICE: CPT | Mod: S$PBB,,, | Performed by: PHYSICIAN ASSISTANT

## 2022-07-11 PROCEDURE — 3008F BODY MASS INDEX DOCD: CPT | Mod: CPTII,,, | Performed by: ORTHOPAEDIC SURGERY

## 2022-07-11 PROCEDURE — 99214 OFFICE O/P EST MOD 30 MIN: CPT | Mod: S$PBB,,, | Performed by: ORTHOPAEDIC SURGERY

## 2022-07-11 PROCEDURE — 1159F PR MEDICATION LIST DOCUMENTED IN MEDICAL RECORD: ICD-10-PCS | Mod: CPTII,,, | Performed by: PHYSICIAN ASSISTANT

## 2022-07-11 PROCEDURE — 99213 OFFICE O/P EST LOW 20 MIN: CPT | Mod: PBBFAC | Performed by: PHYSICIAN ASSISTANT

## 2022-07-11 PROCEDURE — 1160F PR REVIEW ALL MEDS BY PRESCRIBER/CLIN PHARMACIST DOCUMENTED: ICD-10-PCS | Mod: CPTII,,, | Performed by: ORTHOPAEDIC SURGERY

## 2022-07-11 PROCEDURE — 1160F RVW MEDS BY RX/DR IN RCRD: CPT | Mod: CPTII,,, | Performed by: ORTHOPAEDIC SURGERY

## 2022-07-11 PROCEDURE — 99999 PR PBB SHADOW E&M-EST. PATIENT-LVL III: CPT | Mod: PBBFAC,,, | Performed by: ORTHOPAEDIC SURGERY

## 2022-07-11 PROCEDURE — 1159F MED LIST DOCD IN RCRD: CPT | Mod: CPTII,,, | Performed by: PHYSICIAN ASSISTANT

## 2022-07-11 PROCEDURE — 99214 PR OFFICE/OUTPT VISIT, EST, LEVL IV, 30-39 MIN: ICD-10-PCS | Mod: S$PBB,,, | Performed by: ORTHOPAEDIC SURGERY

## 2022-07-11 PROCEDURE — 99999 PR PBB SHADOW E&M-EST. PATIENT-LVL III: ICD-10-PCS | Mod: PBBFAC,,, | Performed by: PHYSICIAN ASSISTANT

## 2022-07-11 PROCEDURE — 99999 PR PBB SHADOW E&M-EST. PATIENT-LVL III: CPT | Mod: PBBFAC,,, | Performed by: PHYSICIAN ASSISTANT

## 2022-07-11 PROCEDURE — 1159F MED LIST DOCD IN RCRD: CPT | Mod: CPTII,,, | Performed by: ORTHOPAEDIC SURGERY

## 2022-07-11 PROCEDURE — 3008F PR BODY MASS INDEX (BMI) DOCUMENTED: ICD-10-PCS | Mod: CPTII,,, | Performed by: ORTHOPAEDIC SURGERY

## 2022-07-11 PROCEDURE — 99499 NO LOS: ICD-10-PCS | Mod: S$PBB,,, | Performed by: PHYSICIAN ASSISTANT

## 2022-07-11 PROCEDURE — 1160F RVW MEDS BY RX/DR IN RCRD: CPT | Mod: CPTII,,, | Performed by: PHYSICIAN ASSISTANT

## 2022-07-11 PROCEDURE — 99999 PR PBB SHADOW E&M-EST. PATIENT-LVL III: ICD-10-PCS | Mod: PBBFAC,,, | Performed by: ORTHOPAEDIC SURGERY

## 2022-07-11 PROCEDURE — 1159F PR MEDICATION LIST DOCUMENTED IN MEDICAL RECORD: ICD-10-PCS | Mod: CPTII,,, | Performed by: ORTHOPAEDIC SURGERY

## 2022-07-11 PROCEDURE — 1160F PR REVIEW ALL MEDS BY PRESCRIBER/CLIN PHARMACIST DOCUMENTED: ICD-10-PCS | Mod: CPTII,,, | Performed by: PHYSICIAN ASSISTANT

## 2022-07-11 RX ORDER — METHOCARBAMOL 500 MG/1
500 TABLET, FILM COATED ORAL 3 TIMES DAILY
Qty: 30 TABLET | Refills: 0 | Status: SHIPPED | OUTPATIENT
Start: 2022-07-11 | End: 2022-07-22

## 2022-07-11 RX ORDER — ASPIRIN 325 MG
325 TABLET, DELAYED RELEASE (ENTERIC COATED) ORAL DAILY
Qty: 42 TABLET | Refills: 0 | Status: SHIPPED | OUTPATIENT
Start: 2022-07-11 | End: 2022-10-03

## 2022-07-11 RX ORDER — ROPIVACAINE/EPI/CLONIDINE/KET 2.46-0.005
SYRINGE (ML) INJECTION ONCE
Status: CANCELLED | OUTPATIENT
Start: 2022-07-11 | End: 2022-07-11

## 2022-07-11 RX ORDER — SODIUM CHLORIDE 9 MG/ML
INJECTION, SOLUTION INTRAVENOUS CONTINUOUS
Status: CANCELLED | OUTPATIENT
Start: 2022-07-11

## 2022-07-11 RX ORDER — OXYCODONE AND ACETAMINOPHEN 10; 325 MG/1; MG/1
1 TABLET ORAL EVERY 6 HOURS PRN
Qty: 28 TABLET | Refills: 0 | Status: SHIPPED | OUTPATIENT
Start: 2022-07-11 | End: 2022-07-18 | Stop reason: SDUPTHER

## 2022-07-11 RX ORDER — PROMETHAZINE HYDROCHLORIDE 25 MG/1
25 TABLET ORAL EVERY 6 HOURS PRN
Qty: 30 TABLET | Refills: 0 | Status: SHIPPED | OUTPATIENT
Start: 2022-07-11 | End: 2022-08-10

## 2022-07-11 NOTE — H&P
Chey Mcneill  is here for a completion of her perioperative paperwork. she  Is scheduled to undergo Right knee  1. Open reduction internal fixation tibial plateau fracture 45369  2. Arthroscopic synovectomy 38458  3. Arthroscopic chondroplasty 04621  4. Lysis of adhesions 70305   on 7/12/22.  She is a healthy individual and does not need clearance for this procedure.     NO DERMABOND. STAPLES AND AQUACEL.    NEEDS RAPID COVID TEST ON MORNING OF SURGERY.    Risks, indications and benefits of the surgical procedure were discussed with the patient. All questions with regard to surgery, rehab, expected return to functional activities, activities of daily living and recreational endeavors were answered to her satisfaction.    Patient was informed and understands the risks of surgery are greater for patients with a current condition or history of heart disease, obesity, clotting disorders, recurrent infections, steroid use, current or past smoking, and factors such as sedentary lifestyle and noncompliance with medications, therapy or follow-up. The degree of the increased risk is hard to estimate with any degree of precision.    Once no other questions were asked, a brief history and physical exam was then performed.    PAST MEDICAL HISTORY:   Past Medical History:   Diagnosis Date    PONV (postoperative nausea and vomiting)     Ruptured triceps tendon 7/25/2012     PAST SURGICAL HISTORY:   Past Surgical History:   Procedure Laterality Date    ABDOMINAL SURGERY      lap band    ARTHROSCOPY OF KNEE  3/16/2020    Procedure: ARTHROSCOPY, KNEE;  Surgeon: Cooper Ramos MD;  Location: [facility] OR;  Service: Orthopedics;;    BACK SURGERY      ELBOW FRACTURE SURGERY      HYSTERECTOMY  2001    KNEE ARTHROSCOPY Left     16 sx's    SHOULDER SURGERY      SUBCHONDROPLASTY Right 3/16/2020    Procedure: SUBCHONDROPLASTY-femur;  Surgeon: Cooper Ramos MD;  Location: [facility] OR;  Service: Orthopedics;  Laterality: Right;   SUBCHONDROPLASTY-femur  Regional w/Catheter,Adductor,GEO 50cc     FAMILY HISTORY:   Family History   Problem Relation Age of Onset    Lupus Mother     No Known Problems Father      SOCIAL HISTORY:   Social History     Socioeconomic History    Marital status:    Tobacco Use    Smoking status: Never Smoker    Smokeless tobacco: Never Used   Substance and Sexual Activity    Alcohol use: Yes     Comment: occas    Drug use: No       MEDICATIONS:   Current Outpatient Medications:     hydrocortisone 2.5 % cream, Apply topically 2 (two) times daily., Disp: 1 Tube, Rfl: 0    ondansetron (ZOFRAN-ODT) 4 MG TbDL, Take 2 tablets (8 mg total) by mouth every 8 (eight) hours as needed (nausea)., Disp: 8 tablet, Rfl: 1  ALLERGIES:   Review of patient's allergies indicates:   Allergen Reactions    Adhesive Dermatitis     dermabond caused severe blistering    Benzoin tincture plain Hives, Itching and Other (See Comments)     Significant blistering    Morphine Nausea And Vomiting and Other (See Comments)     headaches    Oxycontin [oxycodone] Hives and Itching     Patient states can take Percocet.    Sulfa (sulfonamide antibiotics) Hives    Chlorhexidine     Dermabond [tissue glues] Blisters     Dermabond prineo    Fenoprofen Hives    Celebrex [celecoxib] Itching       Review of Systems   Constitution: Negative. Negative for chills, fever and night sweats.   HENT: Negative for congestion and headaches.    Eyes: Negative for blurred vision, left vision loss and right vision loss.   Cardiovascular: Negative for chest pain and syncope.   Respiratory: Negative for cough and shortness of breath.    Endocrine: Negative for polydipsia, polyphagia and polyuria.   Hematologic/Lymphatic: Negative for bleeding problem. Does not bruise/bleed easily.   Skin: Negative for dry skin, itching and rash.   Musculoskeletal: Negative for falls and muscle weakness.   Gastrointestinal: Negative for abdominal pain and bowel  incontinence.   Genitourinary: Negative for bladder incontinence and nocturia.   Neurological: Negative for disturbances in coordination, loss of balance and seizures.   Psychiatric/Behavioral: Negative for depression. The patient does not have insomnia.    Allergic/Immunologic: Negative for hives and persistent infections.     PHYSICAL EXAM:  GEN: A&Ox3, WD WN NAD  HEENT: WNL  CHEST: CTAB, no W/R/R  HEART: RRR, no M/R/G   ABD: Soft, NT ND, BS x4 QUADS  MS: Refer to previous note for detailed MS exam  NEURO: CN II-XII intact       The surgical consent was then reviewed with the patient, who agreed with all the contents of the consent form and it was signed. she was then given the [facility] surgery packet to bring with her to [facility] for the anesthesia portion of her perioperative paperwork.     PHYSICAL THERAPY:  She was also instructed regarding physical therapy and will begin POD # 1-3 at [facility] with Bj Mason.    POST OP CARE:instructions were reviewed including care of the wound and dressing after surgery and when she can shower.     PAIN MANAGEMENT: Chey Mcneill was also given a pain management regime, which includes the TENS unit given to her by Yamini Falcon   along with the education required for its use. She was also instructed regarding the Polar ice unit that will be in place after surgery and her postoperative pain medications.     PAIN MEDICATION:  Percocet 10/325mg 1 po q 4-6 hours prn pain  Phenergan 25 mg one p.o. q.4-6 hours p.r.n. nausea and vomiting.  Robaxin 500mg up to three times daily as needed for muscle spasms  ASA 325mg once a day x 6 weeks post op  Allergy to Celebrex.  POST OP MEDS TO BE DELIVERED BEDSIDE AT [facility].      Patient denies history of seizures.     Patient was instructed to buy and take:  Aspirin 325mg daily x 6 weeks for DVT prophylaxis starting on the evening after surgery.  Patient will also use bilateral TEDs on lower extremities, SCDs during surgery, and  early ambulation post-op. If the patient was previously taking 81mg baby aspirin, they were told to not take it will using the above stated aspirin and to restart the 81mg aspirin after completion of the aspirin dose.       Patient was also told to buy over the counter Prilosec medication and take it once daily for GI protection as long as they are taking NSAIDs or Aspirin.    DVT prophylaxis was discussed with the patient today including risk factors for developing DVTs and history of DVTs. The patient was asked if any specific recommendations were given from the doctor/s that did pre-operative surgical clearance.      Patient was asked if they were taking or using OCP pills or devices. If they answered yes, then they were instructed to stop using OCPs at this pre-operative appointment until 2 months post-op to help prevent DVT development. They understand that there are other forms of birth control that do not involve hormones. They expressed understanding that ignoring/not following this instruction could result in a DVT which could turn into a deadly pulmonary embolism.      The patient was told that narcotic pain medications may make them drowsy and instructions were given to not sign legal documents, drive or operate heavy machinery, cars, or equipment while under the influence of narcotic medications.     As there were no other questions to be asked, she was given my business card along with Cooper Ramos MD business card if she has any questions or concerns prior to surgery or in the postop period.

## 2022-07-11 NOTE — H&P (VIEW-ONLY)
Chey Mcneill  is here for a completion of her perioperative paperwork. she  Is scheduled to undergo Right knee  1. Open reduction internal fixation tibial plateau fracture 36367  2. Arthroscopic synovectomy 49794  3. Arthroscopic chondroplasty 03295  4. Lysis of adhesions 09380   on 7/12/22.  She is a healthy individual and does not need clearance for this procedure.     NO DERMABOND. STAPLES AND AQUACEL.    NEEDS RAPID COVID TEST ON MORNING OF SURGERY.    Risks, indications and benefits of the surgical procedure were discussed with the patient. All questions with regard to surgery, rehab, expected return to functional activities, activities of daily living and recreational endeavors were answered to her satisfaction.    Patient was informed and understands the risks of surgery are greater for patients with a current condition or history of heart disease, obesity, clotting disorders, recurrent infections, steroid use, current or past smoking, and factors such as sedentary lifestyle and noncompliance with medications, therapy or follow-up. The degree of the increased risk is hard to estimate with any degree of precision.    Once no other questions were asked, a brief history and physical exam was then performed.    PAST MEDICAL HISTORY:   Past Medical History:   Diagnosis Date    PONV (postoperative nausea and vomiting)     Ruptured triceps tendon 7/25/2012     PAST SURGICAL HISTORY:   Past Surgical History:   Procedure Laterality Date    ABDOMINAL SURGERY      lap band    ARTHROSCOPY OF KNEE  3/16/2020    Procedure: ARTHROSCOPY, KNEE;  Surgeon: Cooper Ramos MD;  Location: [facility] OR;  Service: Orthopedics;;    BACK SURGERY      ELBOW FRACTURE SURGERY      HYSTERECTOMY  2001    KNEE ARTHROSCOPY Left     16 sx's    SHOULDER SURGERY      SUBCHONDROPLASTY Right 3/16/2020    Procedure: SUBCHONDROPLASTY-femur;  Surgeon: Cooper Ramos MD;  Location: [facility] OR;  Service: Orthopedics;  Laterality: Right;   SUBCHONDROPLASTY-femur  Regional w/Catheter,Adductor,GEO 50cc     FAMILY HISTORY:   Family History   Problem Relation Age of Onset    Lupus Mother     No Known Problems Father      SOCIAL HISTORY:   Social History     Socioeconomic History    Marital status:    Tobacco Use    Smoking status: Never Smoker    Smokeless tobacco: Never Used   Substance and Sexual Activity    Alcohol use: Yes     Comment: occas    Drug use: No       MEDICATIONS:   Current Outpatient Medications:     hydrocortisone 2.5 % cream, Apply topically 2 (two) times daily., Disp: 1 Tube, Rfl: 0    ondansetron (ZOFRAN-ODT) 4 MG TbDL, Take 2 tablets (8 mg total) by mouth every 8 (eight) hours as needed (nausea)., Disp: 8 tablet, Rfl: 1  ALLERGIES:   Review of patient's allergies indicates:   Allergen Reactions    Adhesive Dermatitis     dermabond caused severe blistering    Benzoin tincture plain Hives, Itching and Other (See Comments)     Significant blistering    Morphine Nausea And Vomiting and Other (See Comments)     headaches    Oxycontin [oxycodone] Hives and Itching     Patient states can take Percocet.    Sulfa (sulfonamide antibiotics) Hives    Chlorhexidine     Dermabond [tissue glues] Blisters     Dermabond prineo    Fenoprofen Hives    Celebrex [celecoxib] Itching       Review of Systems   Constitution: Negative. Negative for chills, fever and night sweats.   HENT: Negative for congestion and headaches.    Eyes: Negative for blurred vision, left vision loss and right vision loss.   Cardiovascular: Negative for chest pain and syncope.   Respiratory: Negative for cough and shortness of breath.    Endocrine: Negative for polydipsia, polyphagia and polyuria.   Hematologic/Lymphatic: Negative for bleeding problem. Does not bruise/bleed easily.   Skin: Negative for dry skin, itching and rash.   Musculoskeletal: Negative for falls and muscle weakness.   Gastrointestinal: Negative for abdominal pain and bowel  incontinence.   Genitourinary: Negative for bladder incontinence and nocturia.   Neurological: Negative for disturbances in coordination, loss of balance and seizures.   Psychiatric/Behavioral: Negative for depression. The patient does not have insomnia.    Allergic/Immunologic: Negative for hives and persistent infections.     PHYSICAL EXAM:  GEN: A&Ox3, WD WN NAD  HEENT: WNL  CHEST: CTAB, no W/R/R  HEART: RRR, no M/R/G   ABD: Soft, NT ND, BS x4 QUADS  MS: Refer to previous note for detailed MS exam  NEURO: CN II-XII intact       The surgical consent was then reviewed with the patient, who agreed with all the contents of the consent form and it was signed. she was then given the [facility] surgery packet to bring with her to [facility] for the anesthesia portion of her perioperative paperwork.     PHYSICAL THERAPY:  She was also instructed regarding physical therapy and will begin POD # 1-3 at [facility] with Bj Mason.    POST OP CARE:instructions were reviewed including care of the wound and dressing after surgery and when she can shower.     PAIN MANAGEMENT: Chey Mcneill was also given a pain management regime, which includes the TENS unit given to her by Yamini Falcon   along with the education required for its use. She was also instructed regarding the Polar ice unit that will be in place after surgery and her postoperative pain medications.     PAIN MEDICATION:  Percocet 10/325mg 1 po q 4-6 hours prn pain  Phenergan 25 mg one p.o. q.4-6 hours p.r.n. nausea and vomiting.  Robaxin 500mg up to three times daily as needed for muscle spasms  ASA 325mg once a day x 6 weeks post op  Allergy to Celebrex.  POST OP MEDS TO BE DELIVERED BEDSIDE AT [place].      Patient denies history of seizures.     Patient was instructed to buy and take:  Aspirin 325mg daily x 6 weeks for DVT prophylaxis starting on the evening after surgery.  Patient will also use bilateral TEDs on lower extremities, SCDs during surgery, and  early ambulation post-op. If the patient was previously taking 81mg baby aspirin, they were told to not take it will using the above stated aspirin and to restart the 81mg aspirin after completion of the aspirin dose.       Patient was also told to buy over the counter Prilosec medication and take it once daily for GI protection as long as they are taking NSAIDs or Aspirin.    DVT prophylaxis was discussed with the patient today including risk factors for developing DVTs and history of DVTs. The patient was asked if any specific recommendations were given from the doctor/s that did pre-operative surgical clearance.      Patient was asked if they were taking or using OCP pills or devices. If they answered yes, then they were instructed to stop using OCPs at this pre-operative appointment until 2 months post-op to help prevent DVT development. They understand that there are other forms of birth control that do not involve hormones. They expressed understanding that ignoring/not following this instruction could result in a DVT which could turn into a deadly pulmonary embolism.      The patient was told that narcotic pain medications may make them drowsy and instructions were given to not sign legal documents, drive or operate heavy machinery, cars, or equipment while under the influence of narcotic medications.     As there were no other questions to be asked, she was given my business card along with Cooper Ramos MD business card if she has any questions or concerns prior to surgery or in the postop period.

## 2022-07-11 NOTE — PROGRESS NOTES
Subjective:          Chief Complaint: Chey Mcneill is a 47 y.o. female who had concerns including Pain and Follow-up of the Right Knee.    47yoF well known to us, presents today after being hit on the outside of her right knee by a large dog on 7/8 and sustained a valgus force and a fall. She was unable to bear weight after the injury. She was seen in the ED and found to have a closed tibial plateau fracture. Denies numbness/tingling. No increasing pain. No pain in other areas.      Review of Systems   Constitutional: Negative for fever and night sweats.   HENT: Negative for hearing loss.    Eyes: Negative for blurred vision and visual disturbance.   Cardiovascular: Negative for chest pain and leg swelling.   Respiratory: Negative for shortness of breath.    Endocrine: Negative for polyuria.   Hematologic/Lymphatic: Negative for bleeding problem.   Skin: Negative for rash.   Musculoskeletal: Negative for back pain, joint pain, joint swelling, muscle cramps and muscle weakness.   Gastrointestinal: Negative for melena.   Genitourinary: Negative for hematuria.   Neurological: Negative for loss of balance, numbness and paresthesias.   Psychiatric/Behavioral: Negative for altered mental status.            Other  Was the patient's HEIGHT measured or patient reported?: Patient Reported  Was the patient's WEIGHT measured or patient reported?: Measured      Objective:        General: Chey is well-developed, well-nourished, appears stated age, in no acute distress, alert and oriented to time, place and person.     General    Vitals reviewed.  Constitutional: She is oriented to person, place, and time. She appears well-developed and well-nourished. No distress.   HENT:   Mouth/Throat: No oropharyngeal exudate.   Eyes: Right eye exhibits no discharge. Left eye exhibits no discharge.   Pulmonary/Chest: Effort normal and breath sounds normal. No respiratory distress.   Neurological: She is alert and oriented to person,  place, and time. She has normal reflexes. No cranial nerve deficit. Coordination normal.   Psychiatric: She has a normal mood and affect. Her behavior is normal. Judgment and thought content normal.     General Musculoskeletal Exam   Gait: normal       Right Knee Exam     Inspection   Erythema: absent  Scars: present  Swelling: present  Effusion: present  Deformity: absent  Bruising: absent    Tenderness   The patient is tender to palpation of the lateral joint line.    Range of Motion   Extension: 0   Flexion: 80     Tests   Meniscus   Leela:  Medial - negative Lateral - negative  Ligament Examination Lachman: normal (-1 to 2mm) PCL-Posterior Drawer: normal (0 to 2mm)     MCL - Valgus: normal (0 to 2mm)  LCL - Varus: normalPivot Shift: normal (Equal)Reverse Pivot Shift: normal (Equal)Dial Test at 30 degrees: normal (< 5 degrees)Dial Test at 90 degrees: normal (< 5 degrees)  Posterior Sag Test: negative  Posterolateral Corner: stable  Patella   Patellar apprehension: negative  Passive Patellar Tilt: neutral  Patellar Tracking: normal  Patellar Glide (quadrants): Lateral - 1   Medial - 2  Q-Angle at 90 degrees: normal  Patellar Grind: negative  J-Sign: none    Other   Meniscal Cyst: absent  Popliteal (Baker's) Cyst: absent  Sensation: normal    Comments:  Compartments soft and compressible  No pain with passive movement of toes/ankle  Pulses easily palpable    Left Knee Exam     Inspection   Erythema: absent  Scars: present  Swelling: absent  Effusion: absent  Deformity: absent  Bruising: absent    Tenderness   The patient is experiencing no tenderness.     Range of Motion   Extension: 0   Flexion: 140     Tests   Meniscus   Leela:  Medial - negative Lateral - negative  Stability Lachman: normal (-1 to 2mm) PCL-Posterior Drawer: normal (0 to 2mm)  MCL - Valgus: normal (0 to 2mm)  LCL - Varus: normal (0 to 2mm)Pivot Shift: normal (Equal)Reverse Pivot Shift: normal (Equal)Dial Test at 30 degrees: normal (< 5  degrees)Dial Test at 90 degrees: normal (< 5 degrees)  Posterior Sag Test: negative  Posterolateral Corner: stable  Patella   Patellar apprehension: negative  Passive Patellar Tilt: neutral  Patellar Tracking: normal  Patellar Glide (Quadrants): Lateral - 1 Medial - 2  Q-Angle at 90 degrees: normal  Patellar Grind: negative  J-Sign: J sign absent    Other   Meniscal Cyst: absent  Popliteal (Baker's) Cyst: absent  Sensation: normal    Right Hip Exam     Tests   Raymond: negative  Left Hip Exam     Tests   Raymond: negative          Muscle Strength   Right Lower Extremity   Hip Abduction: 5/5   Quadriceps:  5/5   Hamstrin/5     Reflexes     Left Side  Achilles:  2+  Quadriceps:  2+    Right Side   Achilles:  2+  Quadriceps:  2+    Vascular Exam     Right Pulses  Dorsalis Pedis:      2+  Posterior Tibial:      2+        Left Pulses  Dorsalis Pedis:      2+  Posterior Tibial:      2+          Radiographic Findings:    CT Knee Without Contrast Right  Narrative: EXAMINATION:  CT KNEE WITHOUT CONTRAST RIGHT    CLINICAL HISTORY:  Knee pain, chronic, positive xray (Age >= 5y);  Pain in right knee    TECHNIQUE:  CT right knee performed without contrast.  Coronal and sagittal reformats provided.    COMPARISON:  02/10/2020    FINDINGS:  Postsurgical changes of prior tibial tubercle osteoplasty, patellar and medial femoral condyle osteochondral allograft transplantation and subsequent medial femoral condyle and medial tibial plateau subchondroplasty.    There is a comminuted fracture of the lateral tibial plateau with up to 4 mm depression.    ACL and PCL are unremarkable.  MCL and LCL complex are unremarkable.  There is moderate loss of medial tibiofemoral cartilage space.  Extensor mechanism is maintained.    There is a large lipohemarthrosis.  Impression: 1. Lateral tibial plateau fracture with comminution, depression and large associated lipohemarthrosis.  2. Additional findings above.    Electronically signed by: Mikhail  MD Aliza  Date:    07/08/2022  Time:    17:10         These findings were discussed and reviewed with the patient.            Assessment:       Encounter Diagnosis   Name Primary?    Tibial plateau fracture, right, closed, initial encounter Yes          Plan:       1. IKDC, SF-12 and KOOS was filled out today in clinic.     RTC after surgery for post op appointment with Mid-level provider Patient will fill out IKDC, SF-12 and KOOS on return.    2. Medications: Refills of the following Rx were sent to patients preferred Pharmacy:  No Refills Needed Today    3. Physical Therapy: Continue/Begin: Begin after surgery with Bj Mason    4. HEP: N/A    5. Procedures/Procedural Planning:   We reviewed with Chey today, the pathology and natural history of her diagnosis. We have discussed a variety of treatment options including medications, physical therapy and other alternative treatments. I also explained the indications, risks and benefits of surgery. After discussion, Chey decided to proceed with surgery. The decision was made to go forward with:     Right knee    1. Open reduction internal fixation tibial plateau fracture 85918  2. Arthroscopic synovectomy 38812  3. Arthroscopic chondroplasty 78137  4. Lysis of adhesions 15464    The details of the surgical procedure were explained, including the location of probable incisions and a description of likely hardware and/or grafts to be used.  The patient understands the likely convalescence after surgery.  Also, we have thoroughly discussed the risks, benefits and alternatives to surgery, including, but not limited to, the risk of infection, joint stiffness, blood clot (including DVT and/or pulmonary embolus), neurologic and vascular injury.  It was explained that, if tissue has been repaired or reconstructed, there is a chance of failure, which may require further management.      All of the patient's questions were answered and informed consent was obtained.  The patient will contact us if they have any questions or concerns in the interim.      6. DME: [employer]    7. Work/Sport Status: Out of work    8. Visit Summary:                                [facility] patient questionnaires have been collected today.

## 2022-07-12 ENCOUNTER — ANESTHESIA (OUTPATIENT)
Dept: SURGERY | Facility: HOSPITAL | Age: 47
End: 2022-07-12
Payer: MEDICAID

## 2022-07-12 ENCOUNTER — HOSPITAL ENCOUNTER (OUTPATIENT)
Facility: HOSPITAL | Age: 47
Discharge: HOME | End: 2022-07-13
Attending: ORTHOPAEDIC SURGERY | Admitting: ORTHOPAEDIC SURGERY
Payer: MEDICAID

## 2022-07-12 DIAGNOSIS — S82.141A TIBIAL PLATEAU FRACTURE, RIGHT, CLOSED, INITIAL ENCOUNTER: Primary | ICD-10-CM

## 2022-07-12 DIAGNOSIS — S82.121A CLOSED FRACTURE OF LATERAL PORTION OF RIGHT TIBIAL PLATEAU: ICD-10-CM

## 2022-07-12 DIAGNOSIS — Z98.890 S/P ARTHROSCOPY OF KNEE: ICD-10-CM

## 2022-07-12 DIAGNOSIS — M94.269 TIBIAL PLATEAU CHONDROMALACIA: ICD-10-CM

## 2022-07-12 DIAGNOSIS — S82.121A CLOSED FRACTURE OF LATERAL PORTION OF RIGHT TIBIAL PLATEAU, INITIAL ENCOUNTER: ICD-10-CM

## 2022-07-12 LAB
CTP QC/QA: YES
SARS-COV-2 AG RESP QL IA.RAPID: NEGATIVE

## 2022-07-12 PROCEDURE — C1713 ANCHOR/SCREW BN/BN,TIS/BN: HCPCS | Performed by: ORTHOPAEDIC SURGERY

## 2022-07-12 PROCEDURE — 63600175 PHARM REV CODE 636 W HCPCS: Performed by: NURSE ANESTHETIST, CERTIFIED REGISTERED

## 2022-07-12 PROCEDURE — 27200651 HC AIRWAY, LMA: Performed by: ANESTHESIOLOGY

## 2022-07-12 PROCEDURE — 25000003 PHARM REV CODE 250: Performed by: STUDENT IN AN ORGANIZED HEALTH CARE EDUCATION/TRAINING PROGRAM

## 2022-07-12 PROCEDURE — 63600175 PHARM REV CODE 636 W HCPCS: Performed by: ANESTHESIOLOGY

## 2022-07-12 PROCEDURE — 25000003 PHARM REV CODE 250: Performed by: NURSE ANESTHETIST, CERTIFIED REGISTERED

## 2022-07-12 PROCEDURE — 29877 ARTHRS KNEE SURG DBRDMT/SHVG: CPT | Mod: 51,RT,, | Performed by: ORTHOPAEDIC SURGERY

## 2022-07-12 PROCEDURE — 63600175 PHARM REV CODE 636 W HCPCS: Performed by: STUDENT IN AN ORGANIZED HEALTH CARE EDUCATION/TRAINING PROGRAM

## 2022-07-12 PROCEDURE — 64450 RIGHT POPLITEAL CATHETER: ICD-10-PCS | Mod: 59,RT,, | Performed by: ANESTHESIOLOGY

## 2022-07-12 PROCEDURE — 76942 RIGHT POPLITEAL CATHETER: ICD-10-PCS | Mod: 26,,, | Performed by: ANESTHESIOLOGY

## 2022-07-12 PROCEDURE — 76942 ECHO GUIDE FOR BIOPSY: CPT | Performed by: STUDENT IN AN ORGANIZED HEALTH CARE EDUCATION/TRAINING PROGRAM

## 2022-07-12 PROCEDURE — 36000710: Performed by: ORTHOPAEDIC SURGERY

## 2022-07-12 PROCEDURE — C1769 GUIDE WIRE: HCPCS | Performed by: ORTHOPAEDIC SURGERY

## 2022-07-12 PROCEDURE — 29877 PR KNEE SCOPE,SHAVE ARTICULAR CART: ICD-10-PCS | Mod: 51,RT,, | Performed by: ORTHOPAEDIC SURGERY

## 2022-07-12 PROCEDURE — 94761 N-INVAS EAR/PLS OXIMETRY MLT: CPT

## 2022-07-12 PROCEDURE — D9220A PRA ANESTHESIA: ICD-10-PCS | Mod: CRNA,,, | Performed by: NURSE ANESTHETIST, CERTIFIED REGISTERED

## 2022-07-12 PROCEDURE — 94799 UNLISTED PULMONARY SVC/PX: CPT

## 2022-07-12 PROCEDURE — 76942 ECHO GUIDE FOR BIOPSY: CPT | Mod: 26,,, | Performed by: ANESTHESIOLOGY

## 2022-07-12 PROCEDURE — 63600175 PHARM REV CODE 636 W HCPCS: Performed by: ORTHOPAEDIC SURGERY

## 2022-07-12 PROCEDURE — 37000008 HC ANESTHESIA 1ST 15 MINUTES: Performed by: ORTHOPAEDIC SURGERY

## 2022-07-12 PROCEDURE — 71000033 HC RECOVERY, INTIAL HOUR: Performed by: ORTHOPAEDIC SURGERY

## 2022-07-12 PROCEDURE — 27800903 OPTIME MED/SURG SUP & DEVICES OTHER IMPLANTS: Performed by: ORTHOPAEDIC SURGERY

## 2022-07-12 PROCEDURE — 64447 RIGHT ADDUCTOR SINGLE SHOT: ICD-10-PCS | Mod: 59,RT,, | Performed by: ANESTHESIOLOGY

## 2022-07-12 PROCEDURE — 36000711: Performed by: ORTHOPAEDIC SURGERY

## 2022-07-12 PROCEDURE — D9220A PRA ANESTHESIA: ICD-10-PCS | Mod: ANES,,, | Performed by: ANESTHESIOLOGY

## 2022-07-12 PROCEDURE — 25000003 PHARM REV CODE 250: Performed by: ORTHOPAEDIC SURGERY

## 2022-07-12 PROCEDURE — 71000039 HC RECOVERY, EACH ADD'L HOUR: Performed by: ORTHOPAEDIC SURGERY

## 2022-07-12 PROCEDURE — 27535 TREAT KNEE FRACTURE: CPT | Mod: RT,,, | Performed by: ORTHOPAEDIC SURGERY

## 2022-07-12 PROCEDURE — D9220A PRA ANESTHESIA: Mod: ANES,,, | Performed by: ANESTHESIOLOGY

## 2022-07-12 PROCEDURE — 64450 NJX AA&/STRD OTHER PN/BRANCH: CPT | Mod: 59,RT,, | Performed by: ANESTHESIOLOGY

## 2022-07-12 PROCEDURE — 27535 PR OPEN TX TIBIAL FRACTURE PROXIMAL UNICONDYLAR: ICD-10-PCS | Mod: RT,,, | Performed by: ORTHOPAEDIC SURGERY

## 2022-07-12 PROCEDURE — D9220A PRA ANESTHESIA: Mod: CRNA,,, | Performed by: NURSE ANESTHETIST, CERTIFIED REGISTERED

## 2022-07-12 PROCEDURE — 25000003 PHARM REV CODE 250: Performed by: ANESTHESIOLOGY

## 2022-07-12 PROCEDURE — 01392 ANES OPN PX UPR TIB FIB&/PAT: CPT | Performed by: ORTHOPAEDIC SURGERY

## 2022-07-12 PROCEDURE — 25000003 PHARM REV CODE 250: Performed by: PHYSICIAN ASSISTANT

## 2022-07-12 PROCEDURE — 37000009 HC ANESTHESIA EA ADD 15 MINS: Performed by: ORTHOPAEDIC SURGERY

## 2022-07-12 PROCEDURE — 99900035 HC TECH TIME PER 15 MIN (STAT)

## 2022-07-12 PROCEDURE — 63600175 PHARM REV CODE 636 W HCPCS: Performed by: PHYSICIAN ASSISTANT

## 2022-07-12 PROCEDURE — 64447 NJX AA&/STRD FEMORAL NRV IMG: CPT | Mod: 59,RT,, | Performed by: ANESTHESIOLOGY

## 2022-07-12 PROCEDURE — 27201423 OPTIME MED/SURG SUP & DEVICES STERILE SUPPLY: Performed by: ORTHOPAEDIC SURGERY

## 2022-07-12 PROCEDURE — 64447 NJX AA&/STRD FEMORAL NRV IMG: CPT | Performed by: STUDENT IN AN ORGANIZED HEALTH CARE EDUCATION/TRAINING PROGRAM

## 2022-07-12 DEVICE — CHIPS CANCELLOUS 30CC: Type: IMPLANTABLE DEVICE | Site: KNEE | Status: FUNCTIONAL

## 2022-07-12 DEVICE — SCREW CANNULATED LOCKING 5.0MM: Type: IMPLANTABLE DEVICE | Site: TIBIA | Status: FUNCTIONAL

## 2022-07-12 DEVICE — SCREW CORTEX 4.5 30M: Type: IMPLANTABLE DEVICE | Site: TIBIA | Status: FUNCTIONAL

## 2022-07-12 DEVICE — IMPLANTABLE DEVICE: Type: IMPLANTABLE DEVICE | Site: TIBIA | Status: FUNCTIONAL

## 2022-07-12 DEVICE — SCREW 5.0MM X 70 CANN LOCKING: Type: IMPLANTABLE DEVICE | Site: TIBIA | Status: FUNCTIONAL

## 2022-07-12 DEVICE — SCREW CORTEX 4.5 32M: Type: IMPLANTABLE DEVICE | Site: TIBIA | Status: FUNCTIONAL

## 2022-07-12 RX ORDER — CEFAZOLIN SODIUM 1 G/3ML
2 INJECTION, POWDER, FOR SOLUTION INTRAMUSCULAR; INTRAVENOUS
Status: DISCONTINUED | OUTPATIENT
Start: 2022-07-12 | End: 2022-07-12 | Stop reason: HOSPADM

## 2022-07-12 RX ORDER — DEXAMETHASONE SODIUM PHOSPHATE 4 MG/ML
INJECTION, SOLUTION INTRA-ARTICULAR; INTRALESIONAL; INTRAMUSCULAR; INTRAVENOUS; SOFT TISSUE
Status: DISCONTINUED | OUTPATIENT
Start: 2022-07-12 | End: 2022-07-12

## 2022-07-12 RX ORDER — LIDOCAINE HYDROCHLORIDE 20 MG/ML
INJECTION INTRAVENOUS
Status: DISCONTINUED | OUTPATIENT
Start: 2022-07-12 | End: 2022-07-12

## 2022-07-12 RX ORDER — MIDAZOLAM HYDROCHLORIDE 1 MG/ML
.5-4 INJECTION INTRAMUSCULAR; INTRAVENOUS
Status: DISCONTINUED | OUTPATIENT
Start: 2022-07-12 | End: 2022-07-12

## 2022-07-12 RX ORDER — ROPIVACAINE/EPI/CLONIDINE/KET 2.46-0.005
SYRINGE (ML) INJECTION ONCE
Status: DISCONTINUED | OUTPATIENT
Start: 2022-07-12 | End: 2022-07-12 | Stop reason: HOSPADM

## 2022-07-12 RX ORDER — ROPIVACAINE HYDROCHLORIDE 5 MG/ML
INJECTION, SOLUTION EPIDURAL; INFILTRATION; PERINEURAL
Status: COMPLETED | OUTPATIENT
Start: 2022-07-12 | End: 2022-07-12

## 2022-07-12 RX ORDER — DEXMEDETOMIDINE HYDROCHLORIDE 100 UG/ML
INJECTION, SOLUTION INTRAVENOUS
Status: DISCONTINUED | OUTPATIENT
Start: 2022-07-12 | End: 2022-07-12

## 2022-07-12 RX ORDER — ONDANSETRON 2 MG/ML
4 INJECTION INTRAMUSCULAR; INTRAVENOUS EVERY 12 HOURS PRN
Status: DISCONTINUED | OUTPATIENT
Start: 2022-07-12 | End: 2022-07-13 | Stop reason: HOSPADM

## 2022-07-12 RX ORDER — ROPIVACAINE HYDROCHLORIDE 2 MG/ML
INJECTION, SOLUTION EPIDURAL; INFILTRATION; PERINEURAL CONTINUOUS
Status: DISCONTINUED | OUTPATIENT
Start: 2022-07-12 | End: 2022-07-13 | Stop reason: HOSPADM

## 2022-07-12 RX ORDER — BUPIVACAINE HYDROCHLORIDE 2.5 MG/ML
INJECTION, SOLUTION EPIDURAL; INFILTRATION; INTRACAUDAL
Status: DISCONTINUED | OUTPATIENT
Start: 2022-07-12 | End: 2022-07-12 | Stop reason: HOSPADM

## 2022-07-12 RX ORDER — ROPIVACAINE HYDROCHLORIDE 2 MG/ML
INJECTION, SOLUTION EPIDURAL; INFILTRATION; PERINEURAL CONTINUOUS
Status: DISCONTINUED | OUTPATIENT
Start: 2022-07-12 | End: 2022-07-12

## 2022-07-12 RX ORDER — SODIUM CHLORIDE 9 MG/ML
INJECTION, SOLUTION INTRAVENOUS CONTINUOUS
Status: DISCONTINUED | OUTPATIENT
Start: 2022-07-12 | End: 2022-07-13 | Stop reason: HOSPADM

## 2022-07-12 RX ORDER — FAMOTIDINE 10 MG/ML
INJECTION INTRAVENOUS
Status: DISCONTINUED | OUTPATIENT
Start: 2022-07-12 | End: 2022-07-12

## 2022-07-12 RX ORDER — ACETAMINOPHEN 500 MG
1000 TABLET ORAL ONCE
Status: COMPLETED | OUTPATIENT
Start: 2022-07-12 | End: 2022-07-12

## 2022-07-12 RX ORDER — NALOXONE HCL 0.4 MG/ML
VIAL (ML) INJECTION
Status: DISCONTINUED | OUTPATIENT
Start: 2022-07-12 | End: 2022-07-12

## 2022-07-12 RX ORDER — MORPHINE SULFATE 2 MG/ML
2 INJECTION, SOLUTION INTRAMUSCULAR; INTRAVENOUS EVERY 10 MIN PRN
Status: DISCONTINUED | OUTPATIENT
Start: 2022-07-12 | End: 2022-07-12

## 2022-07-12 RX ORDER — CARBOXYMETHYLCELLULOSE SODIUM 10 MG/ML
GEL OPHTHALMIC
Status: DISCONTINUED | OUTPATIENT
Start: 2022-07-12 | End: 2022-07-12

## 2022-07-12 RX ORDER — TRAMADOL HYDROCHLORIDE 50 MG/1
50 TABLET ORAL EVERY 6 HOURS PRN
Status: DISCONTINUED | OUTPATIENT
Start: 2022-07-12 | End: 2022-07-13 | Stop reason: HOSPADM

## 2022-07-12 RX ORDER — PROMETHAZINE HYDROCHLORIDE 25 MG/1
25 TABLET ORAL EVERY 6 HOURS PRN
Status: DISCONTINUED | OUTPATIENT
Start: 2022-07-12 | End: 2022-07-13 | Stop reason: HOSPADM

## 2022-07-12 RX ORDER — PROPOFOL 10 MG/ML
VIAL (ML) INTRAVENOUS
Status: DISCONTINUED | OUTPATIENT
Start: 2022-07-12 | End: 2022-07-12

## 2022-07-12 RX ORDER — ONDANSETRON 2 MG/ML
INJECTION INTRAMUSCULAR; INTRAVENOUS
Status: DISCONTINUED | OUTPATIENT
Start: 2022-07-12 | End: 2022-07-12

## 2022-07-12 RX ORDER — KETAMINE HCL IN 0.9 % NACL 50 MG/5 ML
SYRINGE (ML) INTRAVENOUS
Status: DISCONTINUED | OUTPATIENT
Start: 2022-07-12 | End: 2022-07-12

## 2022-07-12 RX ORDER — ASPIRIN 325 MG
325 TABLET, DELAYED RELEASE (ENTERIC COATED) ORAL DAILY
Status: DISCONTINUED | OUTPATIENT
Start: 2022-07-13 | End: 2022-07-13 | Stop reason: HOSPADM

## 2022-07-12 RX ORDER — FENTANYL CITRATE 50 UG/ML
25-200 INJECTION, SOLUTION INTRAMUSCULAR; INTRAVENOUS
Status: DISCONTINUED | OUTPATIENT
Start: 2022-07-12 | End: 2022-07-12

## 2022-07-12 RX ORDER — PROPOFOL 10 MG/ML
VIAL (ML) INTRAVENOUS CONTINUOUS PRN
Status: DISCONTINUED | OUTPATIENT
Start: 2022-07-12 | End: 2022-07-12

## 2022-07-12 RX ORDER — OXYCODONE HYDROCHLORIDE 5 MG/1
10 TABLET ORAL EVERY 4 HOURS PRN
Status: DISCONTINUED | OUTPATIENT
Start: 2022-07-12 | End: 2022-07-12

## 2022-07-12 RX ORDER — EPINEPHRINE 1 MG/ML
INJECTION INTRAMUSCULAR; INTRAVENOUS; SUBCUTANEOUS
Status: DISCONTINUED | OUTPATIENT
Start: 2022-07-12 | End: 2022-07-12 | Stop reason: HOSPADM

## 2022-07-12 RX ORDER — OXYCODONE HYDROCHLORIDE 5 MG/1
5 TABLET ORAL EVERY 4 HOURS PRN
Status: DISCONTINUED | OUTPATIENT
Start: 2022-07-12 | End: 2022-07-13 | Stop reason: HOSPADM

## 2022-07-12 RX ORDER — METHOCARBAMOL 500 MG/1
500 TABLET, FILM COATED ORAL 3 TIMES DAILY
Status: DISCONTINUED | OUTPATIENT
Start: 2022-07-12 | End: 2022-07-13 | Stop reason: HOSPADM

## 2022-07-12 RX ORDER — TRAMADOL HYDROCHLORIDE 50 MG/1
100 TABLET ORAL EVERY 6 HOURS PRN
Status: DISCONTINUED | OUTPATIENT
Start: 2022-07-12 | End: 2022-07-12

## 2022-07-12 RX ADMIN — ACETAMINOPHEN 1000 MG: 500 TABLET ORAL at 11:07

## 2022-07-12 RX ADMIN — FENTANYL CITRATE 100 MCG: 50 INJECTION INTRAMUSCULAR; INTRAVENOUS at 01:07

## 2022-07-12 RX ADMIN — NALXONE HYDROCHLORIDE 200 MCG: 0.4 INJECTION INTRAMUSCULAR; INTRAVENOUS; SUBCUTANEOUS at 06:07

## 2022-07-12 RX ADMIN — PROPOFOL 200 MG: 10 INJECTION, EMULSION INTRAVENOUS at 02:07

## 2022-07-12 RX ADMIN — CEFAZOLIN 2 G: 330 INJECTION, POWDER, FOR SOLUTION INTRAMUSCULAR; INTRAVENOUS at 02:07

## 2022-07-12 RX ADMIN — METHOCARBAMOL 500 MG: 500 TABLET ORAL at 09:07

## 2022-07-12 RX ADMIN — PROPOFOL 150 MCG/KG/MIN: 10 INJECTION, EMULSION INTRAVENOUS at 02:07

## 2022-07-12 RX ADMIN — SODIUM CHLORIDE, SODIUM GLUCONATE, SODIUM ACETATE, POTASSIUM CHLORIDE, MAGNESIUM CHLORIDE, SODIUM PHOSPHATE, DIBASIC, AND POTASSIUM PHOSPHATE: .53; .5; .37; .037; .03; .012; .00082 INJECTION, SOLUTION INTRAVENOUS at 02:07

## 2022-07-12 RX ADMIN — SODIUM CHLORIDE: 0.9 INJECTION, SOLUTION INTRAVENOUS at 11:07

## 2022-07-12 RX ADMIN — MIDAZOLAM 2 MG: 1 INJECTION INTRAMUSCULAR; INTRAVENOUS at 01:07

## 2022-07-12 RX ADMIN — ROPIVACAINE HYDROCHLORIDE 10 ML: 5 INJECTION, SOLUTION EPIDURAL; INFILTRATION; PERINEURAL at 12:07

## 2022-07-12 RX ADMIN — FENTANYL CITRATE 50 MCG: 50 INJECTION INTRAMUSCULAR; INTRAVENOUS at 04:07

## 2022-07-12 RX ADMIN — Medication: at 07:07

## 2022-07-12 RX ADMIN — PROPOFOL 75 MCG/KG/MIN: 10 INJECTION, EMULSION INTRAVENOUS at 01:07

## 2022-07-12 RX ADMIN — FENTANYL CITRATE 50 MCG: 50 INJECTION INTRAMUSCULAR; INTRAVENOUS at 03:07

## 2022-07-12 RX ADMIN — FENTANYL CITRATE 50 MCG: 50 INJECTION INTRAMUSCULAR; INTRAVENOUS at 01:07

## 2022-07-12 RX ADMIN — FAMOTIDINE 20 MG: 10 INJECTION, SOLUTION INTRAVENOUS at 02:07

## 2022-07-12 RX ADMIN — LIDOCAINE HYDROCHLORIDE 100 MG: 20 INJECTION, SOLUTION INTRAVENOUS at 02:07

## 2022-07-12 RX ADMIN — DEXMEDETOMIDINE HYDROCHLORIDE 12 MCG: 100 INJECTION, SOLUTION, CONCENTRATE INTRAVENOUS at 02:07

## 2022-07-12 RX ADMIN — TRAMADOL HYDROCHLORIDE 50 MG: 50 TABLET, COATED ORAL at 11:07

## 2022-07-12 RX ADMIN — Medication 20 MG: at 02:07

## 2022-07-12 RX ADMIN — DEXAMETHASONE SODIUM PHOSPHATE 8 MG: 4 INJECTION, SOLUTION INTRAMUSCULAR; INTRAVENOUS at 02:07

## 2022-07-12 RX ADMIN — ONDANSETRON 4 MG: 2 INJECTION INTRAMUSCULAR; INTRAVENOUS at 04:07

## 2022-07-12 RX ADMIN — Medication 30 MG: at 02:07

## 2022-07-12 RX ADMIN — DEXMEDETOMIDINE HYDROCHLORIDE 8 MCG: 100 INJECTION, SOLUTION, CONCENTRATE INTRAVENOUS at 02:07

## 2022-07-12 RX ADMIN — CARBOXYMETHYLCELLULOSE SODIUM 4 DROP: 10 GEL OPHTHALMIC at 02:07

## 2022-07-12 NOTE — ANESTHESIA PROCEDURE NOTES
Right Popliteal Catheter    Patient location during procedure: pre-op   Block not for primary anesthetic.  Reason for block: at surgeon's request and post-op pain management   Post-op Pain Location: Right leg pain   Start time: 7/12/2022 12:25 PM  Timeout: 7/12/2022 12:24 PM   End time: 7/12/2022 12:45 PM    Staffing  Authorizing Provider: Piotr Moses MD  Performing Provider: Tiara Melchor MD    Preanesthetic Checklist  Completed: patient identified, IV checked, site marked, risks and benefits discussed, surgical consent, monitors and equipment checked, pre-op evaluation and timeout performed  Peripheral Block  Patient position: supine  Prep: ChloraPrep and site prepped and draped  Patient monitoring: heart rate, cardiac monitor, continuous pulse ox, continuous capnometry and frequent blood pressure checks  Block type: popliteal  Laterality: right  Injection technique: continuous  Needle  Needle type: Tuohy   Needle gauge: 18 G  Needle length: 3.5 in  Needle localization: anatomical landmarks and ultrasound guidance  Catheter type: non-stimulating  Catheter size: 20 G  Test dose: lidocaine 1.5% with Epi 1-to-200,000 and negative   -ultrasound image captured on disc.  Assessment  Injection assessment: negative aspiration, negative parasthesia and local visualized surrounding nerve  Paresthesia pain: none  Heart rate change: no  Slow fractionated injection: yes  Pain Tolerance: comfortable throughout block      Additional Notes  Right popliteal block performed and perineural catheter placed under ultrasound guidance, while maintaining sterile technique throughout procedure.  Administered 5 cc of lido/epi test dose following catheter placement.  Negative aspiration and visualization of local spread confirmed with ultrasound.   VSS. Patient tolerated well. No immediate complications.  [facility] RN present and monitored throughout procedure. See RN flowsheet.

## 2022-07-12 NOTE — TRANSFER OF CARE
"Anesthesia Transfer of Care Note    Patient: Chey Mcneill    Procedure(s) Performed: Procedure(s) (LRB):  ORIF, FRACTURE, TIBIAL PLATEAU (Right)  ARTHROSCOPY, KNEE, WITH CHONDROPLASTY (Right)    Patient location: PACU    Anesthesia Type: general    Transport from OR: Transported from OR on 6-10 L/min O2 by face mask with adequate spontaneous ventilation    Post pain: adequate analgesia    Post assessment: no apparent anesthetic complications    Post vital signs: stable    Level of consciousness: sedated    Nausea/Vomiting: no nausea/vomiting    Complications: none    Transfer of care protocol was followed      Last vitals:   Visit Vitals  BP (!) 92/52 (BP Location: Left arm, Patient Position: Sitting)   Pulse 61   Temp 36.8 °C (98.2 °F) (Oral)   Resp 16   Ht 5' 2" (1.575 m)   Wt 59 kg (130 lb)   SpO2 100%   Breastfeeding No   BMI 23.78 kg/m²     "

## 2022-07-12 NOTE — PATIENT INSTRUCTIONS
[address], [place]                                                                                          [phone]                   Postoperative Instructions for Knee Surgery                 Your Surgery Included:   Open               Arthroscopic   [x] ORIF tibial plateau fracture       [] Diagnostic           [] ACL     [] PCL     [] MCL     [] PLLC      [] Synovectomy / Plica Removal [] Meniscal Cartilage Repair / Transplantation      [] Lysis of Adhesions / Manipulation [] Articular Cartilage Repair      [] Interval Release           [] Cartimax       [] OATS   [] JULIO      [] Meniscectomy           [] Osteochondral Allograft      [] Meniscal Cartilage Repair  [] Patellar Realignment       [x] Debridement / Chondroplasty         [] Lateral Release   [] Ligament Repair      [] Articular Cartilage Repair          [] Extensor Mechanism             []   Microfracture  []  OATS         []  Cartilage Biopsy [] Tendon Repair          [] Ligament Reconstruction          [] Patella                  [] Quadriceps             []   ACL    []   PCL  [] High Tibial Osteotomy       [] Subchondroplasty  [] Joint Replacement         [] Amniox Arthrocentesis           [] Unicompartmental   [] Patellofemoral       [] Distal Femoral Osteotomy                 Call our office ([phone]) immediately or message through [facility] if you experience any of the following:       Excessive bleeding or pus like drainage at the incision site       Uncontrollable pain not relieved by pain medication       Excessive swelling or redness at the incision site       Fever above 101.5 degrees not controlled with Tylenol or Motrin       Shortness of Breath or severe calf pain       Any foul odor or blistering from the surgery site    FOR EMERGENCIES: [facility] is the best way to contact us. If on the weekend, page the  at [phone] who will direct your call appropriately. If your procedure  is a total joint replacement, please call the number on your wristband.    1.   Multimodal Pain Management: A cold therapy cuff, pain medications, anti-inflammatories, local injections, TENs unit, and in some cases, regional anesthesia injections are used to manage your post-operative pain. The decision to use each of these options is based on their risks and benefits.     Medications: You were given one or more of the following medication prescriptions during your preoperative appointment. Follow the instructions on the bottles.     Narcotic Medication (usually Percocet, Roxicodone, or Norco): Begin taking the medication before your knee starts to hurt. Some patients do not like to take any medication, but if you wait until your pain is severe before taking, you will be very uncomfortable for several hours waiting for the narcotic to work. Always take with food.     Nausea / Vomiting: For this issue, we prescribe Phenergan or Zofran, use this medication as directed as needed for nausea.     Cold Therapy: You may have been sent home with an ice wrap. The cold can provide short-term pain relief, and limits swelling by reducing blood flow to the injured area. Apply the cold gel pack for 20 minutes and then take it off for 20 minutes. Use throughout the day, as needed to help relieve pain and control swelling.     Regional Anesthesia Injections (Blocks): You may have been given a regional nerve block/catheter either before or after surgery. This may make your entire leg numb for 2-5 days.                           * Proceed with caution when bearing weight on your leg.     2.   Diet: Eat a bland diet for the first day after surgery. Progress your diet as tolerated. Constipation may occur with Narcotic usage. We recommend Colace 100mg twice a day while taking narcotics.    3.   Activity: Limit your activity during the first 48 hours, keep your leg elevated with pillows under your heel. After the first 48 hours at home,  increase your activity level based on your symptoms.    4.   Dressing: (b) The soft, bulky dressing will be removed on the 3rd day after surgery. The Aquacel (tan, long adhesive bandage) will remain on until the 1st post operative appointment. Place waterproof bandages at this time. Keep wounds as dry as possible for first 2 weeks. It is normal for some blood to be seen on the dressings. It is also normal for you to see apparent bruising on the skin around your incisions. If you are concerned by the drainage or the appearance of your wound site, you can send a picture via [facility].    5.   Shower: (b) You may shower on the 3rd day after surgery. The Aquacel bandage is to be covered with saran wrap before showering. Do not get bandage wet. You may see a small incision with a suture. Place waterproof bandages  prior to shower. It is recommended to use Saran wrap before showering. Do not submerge limb in any water for 4 weeks or incisions completely healed.    6.   Knee Brace: You may have been sent home in a hinged knee brace. Your brace is set at 10 to 100 degrees of motion. Wear the brace for 6 weeks, LOCKED in full extension when walking, you will need to wear this brace at all time unless instructed otherwise. You may unlock at rest or for exercises.    7.   Your procedure did not require a Continuous Passive Motion (CPM) device.    8.   Weight Bearing: You may have been sent home with crutches. If instructed (see below), use these crutches at all times unless at complete rest.      [x] Non-weight bearing for     6 weeks (you may touch your toes to the floor)      After 6 weeks, advance to 25% weight bearing for one week and progress by 25% each week until full.          9.  Knee Exercises: Begin these exercises the first day after surgery in order to help you regain your motion and strength. You may do the following marked exercises:     [x] Quad Sets - Begin activating your quadriceps muscle by driving your      "     knee downward into full knee extension while seated on a table or bed   with a towel rolled and propped under your heel     [x] Straight Leg Raise (SLR) - While ramón your quadriceps muscle, lift     your fully extended leg to the level of your non-operative knee (as shown)     [x] Heel Slides - With the knee straight, slide your heel slowly toward your   buttocks, hold at the endpoint for 10-15 seconds, then slowly straighten     [x] Ankle pumps - With your knee straight, move your ankle in a "pumping"    fashion to activate your calf and leg muscles      10.  Physical Therapy: Physical therapy is an essential component to your recovery from surgery. Your physical therapy will start in 3 days.    FIRST POSTOPERATIVE VISIT: As scheduled.       "

## 2022-07-12 NOTE — BRIEF OP NOTE
[facility] ([facility])  Brief Operative Note    Surgery Date: 7/12/2022     Surgeon(s) and Role:     * Cooper Ramos MD - Primary    Assisting Surgeon:   Nathanael Weber MD    Pre-op Diagnosis:  Tibial plateau fracture, right, closed, initial encounter [S82.141A]  Internal derangement of right knee [M23.91]    Post-op Diagnosis:  Post-Op Diagnosis Codes:     * Tibial plateau fracture, right, closed, initial encounter [S82.141A]     * Internal derangement of right knee [M23.91]    Procedure(s) (LRB):  ORIF, FRACTURE, TIBIAL PLATEAU (Right)  ARTHROSCOPY, KNEE, WITH CHONDROPLASTY (Right)    Anesthesia: General    Operative Findings: fx    Estimated Blood Loss: 50 mL         Specimens:   Specimen (24h ago, onward)            None            Discharge Note    OUTCOME: Patient tolerated treatment/procedure well without complication and is now ready for discharge.    DISPOSITION: Home or Self Care    FINAL DIAGNOSIS:  Tibial plateau fracture, right, closed, initial encounter    FOLLOWUP: In clinic    DISCHARGE INSTRUCTIONS:    Discharge Procedure Orders   Diet general     Call MD for:  temperature >100.4     Call MD for:  persistent nausea and vomiting     Call MD for:  severe uncontrolled pain     Call MD for:  difficulty breathing, headache or visual disturbances     Call MD for:  redness, tenderness, or signs of infection (pain, swelling, redness, odor or green/yellow discharge around incision site)     Call MD for:  hives     Call MD for:  persistent dizziness or light-headedness

## 2022-07-12 NOTE — ANESTHESIA PROCEDURE NOTES
Right Adductor single shot    Patient location during procedure: pre-op   Block not for primary anesthetic.  Reason for block: at surgeon's request and post-op pain management   Post-op Pain Location: Right leg pain   Start time: 7/12/2022 12:25 PM  Timeout: 7/12/2022 12:24 PM   End time: 7/12/2022 12:45 PM    Staffing  Authorizing Provider: Piotr Moses MD  Performing Provider: Tiara Melchor MD    Preanesthetic Checklist  Completed: patient identified, IV checked, site marked, risks and benefits discussed, surgical consent, monitors and equipment checked, pre-op evaluation and timeout performed  Peripheral Block  Patient position: supine  Prep: ChloraPrep  Patient monitoring: heart rate, cardiac monitor, continuous pulse ox, continuous capnometry and frequent blood pressure checks  Block type: adductor canal  Laterality: right  Injection technique: single shot  Needle  Needle type: Stimuplex   Needle gauge: 20 G  Needle length: 4 in  Needle localization: anatomical landmarks and ultrasound guidance   -ultrasound image captured on disc.  Assessment  Injection assessment: negative aspiration, negative parasthesia and local visualized surrounding nerve  Paresthesia pain: none  Heart rate change: no  Slow fractionated injection: yes  Pain Tolerance: comfortable throughout block and no complaints  Medications:    Medications: ropivacaine (NAROPIN) injection 0.5% - Perineural   10 mL - 7/12/2022 12:28:00 PM    Additional Notes  Right adductor single shot block performed under ultrasound guidance.  Administered 20cc of 0.25% ropivacaine with epi 1:300k and additives.  Negative aspiration and visualization of local spread confirmed with ultrasound.   VSS. Patient tolerated well. No immediate complications.  [facility] RN present and monitored throughout procedure. See RN flowsheet.

## 2022-07-12 NOTE — ANESTHESIA PREPROCEDURE EVALUATION
[facility]  Anesthesia Pre-Operative Evaluation         Patient Name: Chey Mcneill  YOB: 1975  MRN: [id number]    SUBJECTIVE:     Pre-operative evaluation for Procedure(s) (LRB):  ORIF, FRACTURE, TIBIA (Right)  SYNOVECTOMY, KNEE (Right)  ARTHROSCOPY, KNEE, WITH CHONDROPLASTY (Right)  ARTHROSCOPY, KNEE (Right)     07/12/2022    Chey Mcneill is a 47 y.o. female w/ a significant PMHx of multiple orthopedic surgeries including b/l knees and right shoulder, chronic cervical radiculopathy, and PONV.      Patient now presents for the above procedure(s).      LDA: None documented.      Prev airway:   Placement Date: 03/16/20; Placement Time: 0801 (created via procedure documentation); Mask Ventilation:  (RSI); Intubated: Postinduction; Airway Device Size: 7.0; Cuff Inflation: Minimal occlusive pressure; Complications: None; Removal Date: 03/16/20;  Removal Time: 0835      Drips:    sodium chloride 0.9%          Patient Active Problem List   Diagnosis    Chronic pain of right knee    Shoulder pain    Neck pain    Cervical radiculopathy, chronic    Type 2 superior labrum extending from anterior to posterior (SLAP) lesion of left shoulder    Internal derangement of right knee    Chondromalacia of right knee    Derangement of left knee    Right knee pain    Chondromalacia, right knee    Chondromalacia of right patella    Tear of MCL (medial collateral ligament) of knee, right, initial encounter    Old tear of medial meniscus of right knee    Right tennis elbow    Tibial plateau chondromalacia    Pain in both knees    S/P arthroscopy of knee       Review of patient's allergies indicates:   Allergen Reactions    Adhesive Dermatitis     dermabond caused severe blistering    Benzoin tincture plain Hives, Itching and Other (See Comments)     Significant  blistering    Morphine Nausea And Vomiting and Other (See Comments)     headaches    Oxycontin [oxycodone] Hives and Itching     Patient states can take Percocet.    Sulfa (sulfonamide antibiotics) Hives    Dermabond [tissue glues] Blisters     Dermabond prineo    Fenoprofen Hives    Celebrex [celecoxib] Itching       Current Outpatient Medications:    Current Facility-Administered Medications:     0.9%  NaCl infusion, , Intravenous, Continuous, Jennifer Dunlap PA-C    acetaminophen tablet 1,000 mg, 1,000 mg, Oral, Once, Tiara Melchor MD    ceFAZolin injection 2 g, 2 g, Intravenous, On Call Procedure, Jennifer Dunlap PA-C    fentaNYL 50 mcg/mL injection  mcg,  mcg, Intravenous, PRN, Tiara Melchor MD    midazolam (VERSED) 1 mg/mL injection 0.5-4 mg, 0.5-4 mg, Intravenous, PRN, Tiara Melchor MD    ROPIvacaine-epi-clonid-ketorol (GEO) 2.46-0.005- 0.0008-0.3mg/mL solution Syrg, , Intra-articular, Once, Jennifer Dunlap PA-C    Past Surgical History:   Procedure Laterality Date    ABDOMINAL SURGERY      lap band    ARTHROSCOPY OF KNEE  3/16/2020    Procedure: ARTHROSCOPY, KNEE;  Surgeon: Cooper Ramos MD;  Location: [facility] OR;  Service: Orthopedics;;    BACK SURGERY      ELBOW FRACTURE SURGERY      HYSTERECTOMY  2001    KNEE ARTHROSCOPY Left     16 sx's    SHOULDER SURGERY      SUBCHONDROPLASTY Right 3/16/2020    Procedure: SUBCHONDROPLASTY-femur;  Surgeon: Cooper Ramos MD;  Location: [facility] OR;  Service: Orthopedics;  Laterality: Right;  SUBCHONDROPLASTY-femur  Regional w/Catheter,Adductor,GEO 50cc       Social History     Socioeconomic History    Marital status:    Tobacco Use    Smoking status: Never Smoker    Smokeless tobacco: Never Used   Substance and Sexual Activity    Alcohol use: Yes     Comment: occas    Drug use: No       OBJECTIVE:     Vital Signs Range (Last 24H):         Significant Labs:  Lab Results   Component Value Date    WBC 6.20 07/23/2017    HGB 11.8  (L) 2017    HCT 35.4 (L) 2017     2017    CHOL 181 2010    TRIG 114 2010    HDL 58 2010    ALT 18 2017    AST 19 2017     2017    K 3.6 2017     2017    CREATININE 0.65 2017    BUN 10 2017    CO2 24 2017    TSH 0.976 2017    INR 1.0 10/06/2015       Microbiology Results (last 7 days)     ** No results found for the last 168 hours. **          Diagnostic Studies:    EKG:   Results for orders placed or performed during the hospital encounter of 05/15/17   EKG 12-lead    Collection Time: 17  9:33 PM    [facility]                                                                                  Test Date:    2017  Pat Name:     TERESA ZAMORA            Department:     Patient ID:   [id number]                  Room:           Gender:       Female                   Technician:   MB  :          1975               Requested By:   Order Number:                          Reading MD:   Nicholas Cardoso MD                                   Measurements  Intervals                              Axis            Rate:         114                      P:            77  CT:           122                      QRS:          50  QRSD:         72                       T:            -66  QT:           318                                      QTc:          438                                                                 Interpretive Statements  Sinus tachycardia  Nonspecific ST and T wave abnormality  Abnormal ECG  No previous ECG available for comparison    Electronically Signed On 2017 8:12:45 CDT by Nicholas Cardoso MD         2D ECHO:  TTE:  No results found for this or any previous visit.    ALESIA:  No results found for this or any previous visit.    ASSESSMENT/PLAN:           Pre-op Assessment    I have reviewed the Patient Summary Reports.     I have  reviewed the Nursing Notes. I have reviewed the NPO Status.   I have reviewed the Medications.     Review of Systems  Anesthesia Hx:  No previous Anesthesia Hx of Anesthetic complications PONV History of prior surgery of interest to airway management or planning: Previous anesthesia: General May 2017 Dr Ramos [facility] with general anesthesia.  Procedure performed at an [facility]. Denies Family Hx of Anesthesia complications.  Personal Hx of Anesthesia complications, Post-Operative Nausea/Vomiting   Social:  Non-Smoker, Social Alcohol Use    Hematology/Oncology:  Hematology Normal   Oncology Normal     EENT/Dental:EENT/Dental Normal   Cardiovascular:  Cardiovascular Normal     Pulmonary:  Pulmonary Normal    Renal/:  Renal/ Normal     Hepatic/GI:  Hepatic/GI Normal    Musculoskeletal:  Musculoskeletal Normal    Neurological:  Neurology Normal    Endocrine:  Endocrine Normal    Dermatological:  Skin Normal    Psych:  Psychiatric Normal           Physical Exam  General: Well nourished, Cooperative, Alert and Oriented    Airway:  Mallampati: II   Mouth Opening: Normal  TM Distance: Normal  Tongue: Normal  Neck ROM: Normal ROM    Dental:  Intact    Chest/Lungs:  Normal Respiratory Rate    Heart:  Rate: Normal  Rhythm: Regular Rhythm        Anesthesia Plan  Type of Anesthesia, risks & benefits discussed:    Anesthesia Type: Gen ETT, Regional  Intra-op Monitoring Plan: Standard ASA Monitors  Post Op Pain Control Plan: multimodal analgesia, IV/PO Opioids PRN and peripheral nerve block  Induction:  IV  Airway Plan: Direct, Post-Induction  Informed Consent: Informed consent signed with the Patient and all parties understand the risks and agree with anesthesia plan.  All questions answered.   ASA Score: 2  Day of Surgery Review of History & Physical: H&P Update referred to the surgeon/provider.    Ready For Surgery From Anesthesia Perspective.     .

## 2022-07-12 NOTE — ANESTHESIA PROCEDURE NOTES
Intubation    Date/Time: 7/12/2022 2:20 PM  Performed by: Marine Robertson CRNA  Authorized by: Piotr Moses MD     Intubation:     Induction:  Intravenous    Intubated:  Postinduction    Mask Ventilation:  Easy mask    Attempts:  1    Attempted By:  CRNA    Method of Intubation:  Fast track LMA    Difficult Airway Encountered?: No      Complications:  None    Airway Device:  Supraglottic airway/LMA    Airway Device Size:  3.5    Secured at:  The lips    Placement Verified By:  Capnometry    Complicating Factors:  None    Findings Post-Intubation:  BS equal bilateral and atraumatic/condition of teeth unchanged

## 2022-07-12 NOTE — PROGRESS NOTES
After approximately 1.5 hours in PACU the patient remained significantly sedated, but HD stable and breathing adequately.  Of note she had significant preoperative anxiety and required large doses of benzodiazapine and opioids for anxiolysis preoperatively and during her regional nerve block. She also received multiple sedatives during the surgery including precedex, ketamine and additional doses of fentanyl.     I titrated in naloxone 200 mcg in divided doses. She became more alert but was still very drowsy. Therefore, I feel the effect is from polypharmacy rather than just 1 agent. After an additional 30-45 minutes she was more awake and was following commands but still very sedated.     She awake but sedated. She follows commands. Her gross motor exam is non-focal.    Based on these findings we will admit her for observation overnight for prolonged emergence from anesthesia and sedation.    I updated the patient's daughter and friend. The Ortho resident on call was contacted as well.    The patient has currently not displayed strong dorsi/plantar flexion of the RLE although she does move all extremities spontaneously and under command. Therefore, we will not start her sciatic PNC until she can clearly dorsi/plantar flex.    Of note her medication list in Epic is not up to date. On review of her LA , she does have recent Rx for suboxone and ativan 0.5 mg.    Report will be given to overnight NP.    VIVIEN Castro MD  Staff Anesthesiologist  [facility] Acute Pain Service

## 2022-07-13 ENCOUNTER — PATIENT MESSAGE (OUTPATIENT)
Dept: SPORTS MEDICINE | Facility: CLINIC | Age: 47
End: 2022-07-13
Payer: MEDICAID

## 2022-07-13 VITALS
WEIGHT: 130 LBS | OXYGEN SATURATION: 98 % | HEART RATE: 87 BPM | DIASTOLIC BLOOD PRESSURE: 74 MMHG | SYSTOLIC BLOOD PRESSURE: 109 MMHG | HEIGHT: 62 IN | TEMPERATURE: 98 F | RESPIRATION RATE: 16 BRPM | BODY MASS INDEX: 23.92 KG/M2

## 2022-07-13 PROCEDURE — 25000003 PHARM REV CODE 250: Performed by: ANESTHESIOLOGY

## 2022-07-13 PROCEDURE — 99900035 HC TECH TIME PER 15 MIN (STAT)

## 2022-07-13 PROCEDURE — 94761 N-INVAS EAR/PLS OXIMETRY MLT: CPT

## 2022-07-13 RX ADMIN — METHOCARBAMOL 500 MG: 500 TABLET ORAL at 09:07

## 2022-07-13 RX ADMIN — ASPIRIN 325 MG: 325 TABLET, COATED ORAL at 09:07

## 2022-07-13 RX ADMIN — TRAMADOL HYDROCHLORIDE 50 MG: 50 TABLET, COATED ORAL at 05:07

## 2022-07-13 NOTE — DISCHARGE SUMMARY
[place] [facility] (Hospital)  Discharge Note  Short Stay    Procedure(s) (LRB):  ORIF, FRACTURE, TIBIAL PLATEAU (Right)  ARTHROSCOPY, KNEE, WITH CHONDROPLASTY (Right)    OUTCOME: Patient tolerated treatment/procedure well without complication and is now ready for discharge.    DISPOSITION: Home or Self Care    FINAL DIAGNOSIS:  Tibial plateau fracture, right, closed, initial encounter    FOLLOWUP: In clinic    DISCHARGE INSTRUCTIONS:    Discharge Procedure Orders   Diet general     Diet general     Call MD for:  temperature >100.4     Call MD for:  persistent nausea and vomiting     Call MD for:  severe uncontrolled pain     Call MD for:  difficulty breathing, headache or visual disturbances     Call MD for:  redness, tenderness, or signs of infection (pain, swelling, redness, odor or green/yellow discharge around incision site)     Call MD for:  hives     Call MD for:  persistent dizziness or light-headedness     Call MD for:  temperature >100.4     Call MD for:  persistent nausea and vomiting     Call MD for:  severe uncontrolled pain     Call MD for:  difficulty breathing, headache or visual disturbances     Call MD for:  redness, tenderness, or signs of infection (pain, swelling, redness, odor or green/yellow discharge around incision site)     Call MD for:  hives     Call MD for:  persistent dizziness or light-headedness        TIME SPENT ON DISCHARGE: 20 minutes

## 2022-07-13 NOTE — NURSING TRANSFER
Nursing Transfer Note      7/12/2022     Reason patient is being transferred: Extended care    Transfer from PACU to  312    Transfer via stretcher    Transfer with Personal belongings, [place]    Transported by KAY Johnson    Medicines sent: Ropivracaine    Any special needs or follow-up needed: none    Chart send with patient: YES     Notified: Inpatient Suites    Patient reassessed at: 1927    Upon arrival to floor: transferred to bed

## 2022-07-13 NOTE — NURSING
[facility] teaching completed with patient and daughter, both verbalized understanding. Instructed to remove on day 5, 7/17 at 12 noon or when pump alarms infusion complete. Demonstrated and explained how to remove catheter, both verbalized understanding. All questions answered, [facility] pump contract signed, home care pamphlet, home care supplies provided to pt. Instructed to contact anesthesion with number provided for any questions relating to pain or side effects. Instructed to call [facility] for pump related questions.    AVS reviewed with patient and family, both verbalized understanding. PIV removed, cath tip intact, bleeding controlled, gauze dressing applied. Patient left unit in stable condition, via w/c with transportation

## 2022-07-13 NOTE — PROGRESS NOTES
[facility] Progress Note    HPI  Chey Mcneill is a 47 y.o., female, s/p ORIF of right tibial plateau fx    Interval history      Surgery:  Procedure(s) (LRB):  ORIF, FRACTURE, TIBIAL PLATEAU (Right)  ARTHROSCOPY, KNEE, WITH CHONDROPLASTY (Right)    Post Op Day #: 1    Catheter type: Perineural Adductor Canal placed     Infusion type: Ropivacaine 0.2%  Nimbus not turned on    Problem List:    Active Hospital Problems    Diagnosis  POA    *Tibial plateau fracture, right, closed, initial encounter [S82.141A]  Yes      Resolved Hospital Problems   No resolved problems to display.       Subjective:       General appearance of alert, oriented, no complaints   Pain with rest: 1    Numbers   Pain with movement: 1    Numbers   Side Effects    1. Pruritis No    2. Nausea No    3. Motor Blockade No, unable to dorsal flex ankle    4. Sedation No, 1=awake and alert    Schedule Medications:    aspirin  325 mg Oral Daily    methocarbamoL  500 mg Oral TID        Continuous Infusions:   sodium chloride 0.9% 75 mL/hr at 07/12/22 1123    ropivacaine (PF) 2 mg/ml (0.2%)          PRN Medications:  ondansetron, oxyCODONE, promethazine, traMADoL       Antibiotics:  Antibiotics (From admission, onward)            None             Objective:     Catheter site clean, dry, intact          Vital Signs (Most Recent):  Temp: 97.4 °F (36.3 °C) (07/13/22 0407)  Pulse: 69 (07/13/22 0407)  Resp: 15 (07/13/22 0522)  BP: 120/70 (07/13/22 0407)  SpO2: 99 % (07/13/22 0407) Vital Signs Range (Last 24H):  Temp:  [97.1 °F (36.2 °C)-98.2 °F (36.8 °C)]   Pulse:  [47-96]   Resp:  [12-20]   BP: ()/(51-77)   SpO2:  [96 %-100 %]          I & O (Last 24H):    Intake/Output Summary (Last 24 hours) at 7/13/2022 0538  Last data filed at 7/12/2022 2326  Gross per 24 hour   Intake 2300 ml   Output 650 ml   Net 1650 ml       Physical Exam:    GA: Alert, comfortable, no acute distress.   Pulmonary: Clear to  auscultation A/P/L. No wheezing, crackles, or rhonchi.  Cardiac: RRR S1 & S2 w/o rubs/murmurs/gallops.   Abdominal:Bowel sounds present. No tenderness to palpation or distension. No appreciable hepatosplenomegaly.   Skin: No jaundice, rashes, or visible lesions.         Laboratory:  CBC: No results for input(s): WBC, RBC, HGB, HCT, PLT, MCV, MCH, MCHC in the last 72 hours.    BMP: No results for input(s): NA, K, CO2, CL, BUN, CREATININE, GLU, MG, PHOS, CALCIUM in the last 72 hours.    No results for input(s): PT, INR, PROTIME, APTT in the last 72 hours.            Assessment:         Pain control adequate    Plan:     1) Pain:    Adductor canal perineural catheter in place but not on/infusing   2) Right foot drop present.  Continue to monitor.    3) FEN/GI: Tolerating regular diet   5) Dispo: Pt working well with PT/OT. Case management and SW for placement. Plan for discharge POD #1.        Evaluator Caitlin Spear NP

## 2022-07-13 NOTE — PROGRESS NOTES
[facility])  Orthopedics  Progress Note    Patient Name: Chey Mcneill  MRN: [id number]  Admission Date: 7/12/2022  Hospital Length of Stay: 0 days  Attending Provider: Cooper Ramos MD  Primary Care Provider: Johnson Tao MD  Follow-up For: Procedure(s) (LRB):  ORIF, FRACTURE, TIBIAL PLATEAU (Right)  ARTHROSCOPY, KNEE, WITH CHONDROPLASTY (Right)    Post-Operative Day: 1 Day Post-Op  Subjective:     Principal Problem:Tibial plateau fracture, right, closed, initial encounter    Principal Orthopedic Problem: s/p ORIF R tibial plateau    Interval History:   Patient admitted overnight due to some drowsiness and pain control. Had some weakness to dorsiflexion that resolved when her block wore off but has since returned after a re bolus of her block. Pain well controlled currently. Mild tingling to dorsum of foot.     Review of patient's allergies indicates:   Allergen Reactions    Adhesive Dermatitis     dermabond caused severe blistering    Benzoin tincture plain Hives, Itching and Other (See Comments)     Significant blistering    Morphine Nausea And Vomiting and Other (See Comments)     headaches    Oxycontin [oxycodone] Hives and Itching     Patient states can take Percocet.    Sulfa (sulfonamide antibiotics) Hives    Dermabond [tissue glues] Blisters     Dermabond prineo    Fenoprofen Hives    Celebrex [celecoxib] Itching       Current Facility-Administered Medications   Medication    0.9%  NaCl infusion    aspirin EC tablet 325 mg    methocarbamoL tablet 500 mg    ondansetron injection 4 mg    oxyCODONE immediate release tablet 5 mg    promethazine tablet 25 mg    ropivacaine 0.2% [facility] PainPRO Pump infusion 500 ML    traMADoL tablet 50 mg     Objective:     Vital Signs (Most Recent):  Temp: 97.4 °F (36.3 °C) (07/13/22 0800)  Pulse: 74 (07/13/22 0800)  Resp: 18 (07/13/22 0800)  BP: (!) 104/59 (07/13/22 0800)  SpO2: 100 % (07/13/22 0800) Vital Signs (24h Range):  Temp:  " [97.1 °F (36.2 °C)-98.2 °F (36.8 °C)] 97.4 °F (36.3 °C)  Pulse:  [47-96] 74  Resp:  [12-20] 18  SpO2:  [96 %-100 %] 100 %  BP: ()/(51-77) 104/59     Weight: 59 kg (130 lb)  Height: 5' 2" (157.5 cm)  Body mass index is 23.78 kg/m².      Intake/Output Summary (Last 24 hours) at 7/13/2022 0942  Last data filed at 7/13/2022 0337  Gross per 24 hour   Intake 2300 ml   Output 1250 ml   Net 1050 ml       Ortho/SPM Exam   Awake, alert, nad  Resting comfortably  RLE dressings c/d/i  Brace in place  Compartments soft and compressible  Pulses palpable  No pain with passive stretch of toes/ankle  Fires ehl/fhl/fdl/gastroc. Weakness to TA/peroneals, present since re bolus of block, intact prior to re bolus per patient and nursing staff.   Mild decreased sensation over dorsum of foot, intact remainder    Significant Labs: All pertinent labs within the past 24 hours have been reviewed.    Significant Imaging: I have reviewed and interpreted all pertinent imaging results/findings.    Assessment/Plan:     D/c home this AM if pain remains controlled. Weakness likely directly related to block given resolution of weakness as block wears off and return of weakness with re bolus.         Active Diagnoses:    Diagnosis Date Noted POA    PRINCIPAL PROBLEM:  Tibial plateau fracture, right, closed, initial encounter [S82.141A] 07/12/2022 Yes      Problems Resolved During this Admission:         Nathanael Weber MD  Orthopedics  [facility] ([facility])  "

## 2022-07-13 NOTE — ANESTHESIA POSTPROCEDURE EVALUATION
Anesthesia Post Evaluation    Patient: Chey Mcneill    Procedure(s) Performed: Procedure(s) (LRB):  ORIF, FRACTURE, TIBIAL PLATEAU (Right)  ARTHROSCOPY, KNEE, WITH CHONDROPLASTY (Right)    Final Anesthesia Type: general      Patient location during evaluation: PACU  Patient participation: Yes- Able to Participate  Level of consciousness: awake and alert  Post-procedure vital signs: reviewed and stable  Pain management: adequate  Airway patency: patent    PONV status at discharge: No PONV  Anesthetic complications: yes  Perioperative Events: use of sedation/narcotic reversal agents, delayed emergence and prolonged PACU stay - patient condition      Corazon-operative Events Comments: See separate Progress Note  Cardiovascular status: blood pressure returned to baseline  Respiratory status: unassisted and spontaneous ventilation  Hydration status: euvolemic  Follow-up not needed.          Vitals Value Taken Time   /73 07/12/22 1917   Temp 36.4 °C (97.5 °F) 07/12/22 1707   Pulse 74 07/12/22 1927   Resp 20 07/12/22 1924   SpO2 100 % 07/12/22 1927   Vitals shown include unvalidated device data.      No case tracking events are documented in the log.      Pain/Annie Score: Pain Rating Prior to Med Admin: 3 (7/12/2022  7:00 PM)  Annie Score: 8 (7/12/2022  6:45 PM)

## 2022-07-13 NOTE — OP NOTE
[facility])  Operative Note      Date of Procedure: 7/12/2022     Procedure:   PROCEDURES(S) PERFORMED:   1. Right  Open Reduction and Internal Fixation Lateral Tibial Plateau Fracture  2.  Right  Arthroscopy, knee, synovectomy, limited 53374  3.  Right  Arthroscopy, debridement/shaving of articular cartilage (chondroplasty) 93071    Surgeon(s) and Role:     * Cooper Ramos MD - Primary    Assisting Surgeon:   MD Lien Montana SMA    Pre-Operative Diagnosis: Tibial plateau fracture, right, closed, initial encounter [S82.141A]  Internal derangement of right knee [M23.91]    Post-Operative Diagnosis: Post-Op Diagnosis Codes:     * Tibial plateau fracture, right, closed, initial encounter [S82.141A]     * Internal derangement of right knee [M23.91]    Anesthesia: General / Adductor block single shot / Popliteal with catheter postoperatively    Operative Findings (including complications, if any): Lateral tibial plateau fracture with underlying chondromalacia    Description of Technical Procedures:   DATE OF PROCEDURE: 7/12/2022    ATTENDING SURGEON: Surgeon(s) and Role:     * Cooper Ramos MD - Primary    Assistants:  MD Lien Montana  [facility]       PREOPERATIVE DIAGNOSIS:  Right  Chondromalacia, 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 Right  Open Reduction and Internal Fixation Lateral Tibial Plateau Fracture  2.  Right  Arthroscopy, knee, synovectomy, limited 16372  3.  Right  Arthroscopy, debridement/shaving of articular cartilage (chondroplasty) 74451      ANESTHESIA: Adductor block single shot, popliteal with catheter, postoperatively  Local anesthetic: 50 cc GEO    FLUIDS IN THE CASE: 1000  ml    ESTIMATED BLOOD LOSS: less than 100 mL    URINE OUTPUT: 0 ml    COMPLICATIONS: none    CONDITION ON RETURN TO RECOVERY ROOM: Good      Findings:     ARTICULAR CARTILAGE LESION(S):  Medial Femoral Condyle: ICRS Grade 2      Size: 3.0 x 3.0 cm  Medial tibial plateau: ICRS Grade 3      Size: 2.0 x 2.5 cm        Lateral Femoral Condyle: ICRS Grade 2      Size: 2.5 x 2.5 cm  Lateral tibial plateau: ICRS Grade 4B      Size: 2.5 x 3.0 cm   Lateral Step-off Tibial plateau fracture      Patellar surface: ICRS Grade 2      Size: 2.0 x 2.0 cm  Trochlear groove: ICRS Grade 3      Size: 3.0 x 3.0 cm      EXAMINATION UNDER ANESTHESIA:   Extension 10 degrees  Flexion 70 degrees  Lachman Maneuver:  Negative  Anterior Drawer: Negative  Pivot Shift: Negative  Posterior Drawer:  Negative  Varus stability @ 30 degrees: 1+  Valgus stability @ 30 degrees: 2+  Patellar glide:1 quadrant lateral, 2 quadrant medial      Meniscal status:  Medial meniscus:   Intact  Tear location:  none    Lateral meniscus:   Intact  Tear location:  none         Expand All Collapse All       IMPLANTS UTILIZED: Synthes 6- hole plate, 4.5 proximal tibial plateau plate and screws  Proximal 5.0 x 70 (2), 75(1), 60(1) locking screws  Distal 4.5 x 36 mm (2) and 4.5 x 32 mm bicortical screws.    GRAFT SOURCE:    MTF cancellous bone chips.    INDICATIONS FOR OPERATIVE PROCEDURE: Chey Mcneill is a 47 y.o.  female with history of right knee pain and pathology. The patient's history and physical examination findings consistent with the procedure performed. He noted significant problems in the area of concern with problems on activities of daily living and aggressive use of the right leg. As a result of these problems and problems with overall activity level, the patient was deemed to be an appropriate candidate for operative intervention. Nonoperative versus operative options were discussed. The risks and benefits were discussed with the patient. The patient  acknowledged understanding and wished to proceed with operative intervention. Informed consent was obtained prior to the procedure. Details of the surgical procedure were explained, including incisions and probable rehabilitation course. The patient understands the likely length of convalescence after surgery; and we have explained the risks, benefits, and alternatives of surgery. Reasonable expectations and potential complications were discussed and acknowledged, including but not limited to infection, bleeding, blood clots, (DVT and/or PE), nerve injury, retear, instability, continued pain and stiffness. It was also explained that there was a chance of failure which may require further management. The patient agreed and understood and wished to proceed.       DESCRIPTION OF PROCEDURE: The patient was brought into the Operating Room and placed in supine position. Upon application of Regional w/o Catheter  adductor single shot block in the preoperative holding area, the patient underwent Regional w/ catheter  popliteal and then used General / LMA to stabilize the airway. The patient was given the appropriate dose of antibiotics based on body weight. Timeout was utilized to verify the side as the operative side. Both upper extremities were placed in comfortable position. Examination under anesthesia was performed. The nonoperative leg was carefully padded along the heel and peroneal nerve regions and then placed into a well padded well leg lomeli. The operative leg was then placed into an arthroscopic leg lomeli with a tourniquet applied proximally.  No bump was placed under the hip on the operative side. The operative leg was prepped and draped in a sterile fashion with ChloraPrep material.    We injected 0.5% ropivacaine mixture into the anterolateral and anteromedial aspect of the knee with application of 15 mL per portal site. A #11 blade was used to make the arthroscopic portals. Blunt trocar and sheath were  inserted into the intercondylar notch and then subsequently into the suprapatellar pouch. This patellofemoral joint was visualized, demonstrating normal lateral patellar tilt, normal patellar subluxation. The patellar tracked midline with flexion and extension. Arthroscopic pictures were obtained. There was articular cartilage damage was present in the patellofemoral compartment as noted in the Findings section of this operative report. The lesion if present was treated with arthroscopic chondroplasty technique removing all irregular edges and flaps of articular cartilage at the lesion.    Attention was turned to the intercondylar notch where the anterior cruciate ligament (ACL) and posterior cruciate ligament (PCL) structures were visualized. Visualization demonstrated an intact ACL and an intact PCL. Probe analysis revealed no signs of occult pathology within the ligamentous structures.     Attention was then turned to the lateral compartment. The patient demonstrated an intact lateral meniscus with probe analysis demonstrating no occult tears or pathology Arthroscopic instrumentation was used to veify no tear and pictures obtained. articular cartilage damage was present in the lateral compartment as noted in the Findings section of this operative report. The lesion if present was treated withlater conversion to open procedure for ORIF lateral tibial plateau.    Attention was then turned to the medial compartment. The patient demonstrated an intact medial meniscus with probe analysis demonstrating no occult tears or pathology Arthroscopic instrumentation was used to  veify no tear and pictures obtained. There was above findings; The lesion if present was treated with arthroscopic chondroplasty technique removing all irregular edges and flaps of articular cartilage at the lesion.    Arthroscopic limited synovectomy was performed in the anterior medial and lateral regions of the knee.     Conversion to open  "procedure making a midline incision and exposing the proximal lateral tibial plateau and fracture; the fracture site was opened and while visualizing the lateral plateau we used an osteotome to elevate the lateral "die-punch" segment. MTF cancellous chips were then placed into the area to maintain the elevation. The above Synthes plate was then applied laterally and the large reduction clamps used to  Close down the coronal split in the fracture. The plate position was evaluated on flouroscopic images and was excellent on AP/Lateral views. We then pinned the plate proximally and distally and then placed the above screw sizes to fixate the plate and fracture simultaneously. Final flouroscopic images were obtained demonstrating an excellent reduction. Irrigation was performed, local was placed into the area and we then closed the release tibialis anterior fascia with # 1 vicryl sutures while subcutaneous tissues were closed with inverted 3-0 vicryl sutures and skin closed with staples. An aquacel bandage was applied.  Final arthroscopic pictures were obtained. Fluid was extravasated from the joint.  tendon. Xeroform was applied along with application of sterile electrodes proximally and distally, gauze, ABD pads,cast padding, long-leg MEMO hose stocking and cooling unit. The patient's knee was placed into a hinged immobilizer, which was locked in -10 degrees extension and allowed the flexion to 120 degrees. The patient was then allowed to recover from anesthesia.  General was removed. The patient was taken to Recovery Room in  Good condition. At the completion case, all instrument and sponge counts were correct.    NOTE: I was present and scrubbed for the key portions of the procedure.    PHYSICAL THERAPY:  The patient should begin physical therapy on postoperative   day # 3 and will be advanced to outpatient therapy as soon as   Possible following discharge.  Weight bearing:non weight bearing  right leg  Range of " Motion:Full normal motion symmetric to opposite side    No CPM required due to procedure performed    Additional exercises to be performed are:   limited weight on leg for 6-8 weeks.     Immediate specific exercises should include:   Gait program should include the above stated weightbearing; in addition: protected gait following symptoms of pain and swelling    Immobilizer if present should be locked @ -10 degrees with gait and allowed to flex to 120 degrees at rest.     Discharge summary:  The patient was discharged to home in Good  Follow-up as scheduled preoperatively.    Medication(s): Refer to Discharge Medication List         Resume preoperative diet as tolerated    Activity per outpatient discharge instruction sheet      Significant Surgical Tasks Conducted by the Assistant(s), if Applicable: preparation and closure    Estimated Blood Loss (EBL): 50 mL           Implants:   Implant Name Type Inv. Item Serial No.  Lot No. LRB No. Used Action   [id number]  CHIPS CANCELLOUS 30CC [id number]  Right 1 Implanted   GUIDEWIRE 2.5 - [id number]  GUIDEWIRE 2.5  SYNTHES  Right 4 Implanted and Explanted   PLATE LOCKED TIBIA PROX 6 HOLE - [id number]  PLATE LOCKED TIBIA PROX 6 HOLE  SYNTHES  Right 1 Implanted   SCREW CANNULATED LOCKING 5.0MM - [id number]  SCREW CANNULATED LOCKING 5.0MM  SYNTHES  Right 1 Implanted   SCREW 5.0MM X 70 MARV LOCKING - [id number]  SCREW 5.0MM X 70 MARV LOCKING  SYNTHES  Right 2 Implanted   SCREW BONE MARV ST 5X60MM - [id number]  SCREW BONE MARV ST 5X60MM  [employer]  Right 1 Implanted   SCREW CORTEX 4.5 36M - [id number]  SCREW CORTEX 4.5 36M  SYNTHES  Right 1 Implanted   SCREW CORTEX 4.5 36M - [id number]  SCREW CORTEX 4.5 36M  SYNTHES  Right 1 Implanted and Explanted   SCREW CORTEX 4.5 30M - [id number]  SCREW CORTEX 4.5 30M  [employer]ES  Right 1 Implanted   SCREW CORTEX 4.5 32M - [id number]  SCREW CORTEX 4.5 32M  SYNTHES  Right 1 Implanted       Specimens:   Specimen (24h ago,  onward)            None                  Condition: Good    Disposition: PACU - hemodynamically stable.    Attestation: I was present and scrubbed for the key portions of the procedure.    Discharge Note    OUTCOME: Patient tolerated treatment/procedure well without complication and is now ready for discharge.    DISPOSITION: Admitted as an Inpatient    FINAL DIAGNOSIS:  Tibial plateau fracture, right, closed, initial encounter    FOLLOWUP: In clinic    DISCHARGE INSTRUCTIONS:    Discharge Procedure Orders   Diet general     Call MD for:  temperature >100.4     Call MD for:  persistent nausea and vomiting     Call MD for:  severe uncontrolled pain     Call MD for:  difficulty breathing, headache or visual disturbances     Call MD for:  redness, tenderness, or signs of infection (pain, swelling, redness, odor or green/yellow discharge around incision site)     Call MD for:  hives     Call MD for:  persistent dizziness or light-headedness

## 2022-07-13 NOTE — PLAN OF CARE
Problem: Adult Inpatient Plan of Care  Goal: Plan of Care Review  Outcome: Ongoing, Progressing  Flowsheets (Taken 7/13/2022 0414)  Plan of Care Reviewed With: patient  Goal: Patient-Specific Goal (Individualized)  Outcome: Ongoing, Progressing  Flowsheets (Taken 7/13/2022 0414)  Anxieties, Fears or Concerns: Generalized  Individualized Care Needs: Keep patient and family updated on Plan of Care  Patient-Specific Goals (Include Timeframe): Pain management     Problem: Pain Acute  Goal: Acceptable Pain Control and Functional Ability  Outcome: Ongoing, Progressing  Intervention: Develop Pain Management Plan  Flowsheets (Taken 7/13/2022 0419)  Pain Management Interventions:   breathing exercises utilized   care clustered   cold applied   pain management plan reviewed with patient/caregiver   quiet environment facilitated   relaxation techniques promoted     Problem: Fall Injury Risk  Goal: Absence of Fall and Fall-Related Injury  Outcome: Ongoing, Progressing  Intervention: Identify and Manage Contributors  Flowsheets (Taken 7/13/2022 0419)  Self-Care Promotion: BADL personal objects within reach  Medication Review/Management:   medications reviewed   high-risk medications identified

## 2022-07-13 NOTE — NURSING
Received patient from PACU via stretcher, accompanied by PACU nurse. Patient appears drowsy, but is able to answer questions and follow commands. Unable to plantar flex and dorsiflex RLE.Denies numbness and tingling to RLE.In no apparent distress. Denies pain. Patient oriented to room/unit/Plan of Care. Call light within reach, use encouraged. Patient verbalizes understanding. Dr. Castro at bedside. Will continue to monitor.

## 2022-07-15 ENCOUNTER — PATIENT MESSAGE (OUTPATIENT)
Dept: SPORTS MEDICINE | Facility: CLINIC | Age: 47
End: 2022-07-15
Payer: MEDICAID

## 2022-07-15 NOTE — ANESTHESIA POST-OP PAIN MANAGEMENT
Acute Pain Service Progress Note    7/15/2022 1032    Received call from patient reporting some skin irritation around the edges of the her right popliteal nerve catheter dressing that began this morning. Patient was able to send in picture via [facility] portal. Skin irritation is limited to her right thigh where the excess catheter has been secured with tegaderm bandages. Patient reports the insertion site of nerve catheter remains free of any irritation or redness. No open wounds or blisters noted. Discussed options with patient of removing the nerve catheter and discontinuing Nimbus infusion today versus leaving in and monitoring skin for worsening irritation. Patient currently rates pain as very minimal, requiring minimal opioids. She is very pleased with the nerve block and would like the option to keep the infusion as long as she can if possible. She agreed to continue monitoring her skin condition and if the redness or irritation begins to worsen, she plans to contact anesthesia team and discontinue infusion if necessary. All questions answered. Otherwise patient doing very well this morning. Team will continue to follow up.

## 2022-07-15 NOTE — ADDENDUM NOTE
Addendum  created 07/15/22 1111 by Konstantin Benoit RN    Allergies reviewed, Clinical Note Signed

## 2022-07-16 NOTE — CARE UPDATE
"Contacted patient today regarding [facility] follow-up. Patient reports pain catheter infusion was completed today and catheter was removed with blue tip confirmed intact. States skin irritation to the area remains, but has not worsened compared to yesterday. Notes increased pain to the medial aspect of her right ankle, described as "bone pain" and "soreness". Discussed likelihood of increased sensitivity since local anesthesia is now wearing off. Has been utilizing oral pain meds as needed with moderate relief. Encouraged to continue monitoring and call anesthesia if any questions or concerns regarding nerve block or pain pump.      Yelin Lee [facility]  07/16/2022  negative      "

## 2022-07-16 NOTE — ADDENDUM NOTE
Addendum  created 07/16/22 1723 by Tiara Melchor MD    Clinical Note Signed, Intraprocedure Event edited

## 2022-07-19 ENCOUNTER — PATIENT MESSAGE (OUTPATIENT)
Dept: SPORTS MEDICINE | Facility: CLINIC | Age: 47
End: 2022-07-19
Payer: MEDICAID

## 2022-07-22 ENCOUNTER — PATIENT MESSAGE (OUTPATIENT)
Dept: SPORTS MEDICINE | Facility: CLINIC | Age: 47
End: 2022-07-22
Payer: MEDICAID

## 2022-07-22 DIAGNOSIS — S82.121D CLOSED FRACTURE OF LATERAL PORTION OF RIGHT TIBIAL PLATEAU WITH ROUTINE HEALING, SUBSEQUENT ENCOUNTER: ICD-10-CM

## 2022-07-25 ENCOUNTER — PATIENT MESSAGE (OUTPATIENT)
Dept: SPORTS MEDICINE | Facility: CLINIC | Age: 47
End: 2022-07-25
Payer: MEDICAID

## 2022-07-25 RX ORDER — METHOCARBAMOL 500 MG/1
500 TABLET, FILM COATED ORAL 4 TIMES DAILY
Qty: 40 TABLET | Refills: 0 | Status: SHIPPED | OUTPATIENT
Start: 2022-07-25 | End: 2022-08-04

## 2022-07-25 RX ORDER — OXYCODONE AND ACETAMINOPHEN 10; 325 MG/1; MG/1
1 TABLET ORAL EVERY 6 HOURS PRN
Qty: 28 TABLET | Refills: 0 | Status: SHIPPED | OUTPATIENT
Start: 2022-07-25 | End: 2022-07-29 | Stop reason: SDUPTHER

## 2022-07-27 ENCOUNTER — HOSPITAL ENCOUNTER (OUTPATIENT)
Dept: RADIOLOGY | Facility: HOSPITAL | Age: 47
Discharge: HOME | End: 2022-07-27
Attending: PHYSICIAN ASSISTANT
Payer: MEDICAID

## 2022-07-27 ENCOUNTER — OFFICE VISIT (OUTPATIENT)
Dept: SPORTS MEDICINE | Facility: CLINIC | Age: 47
End: 2022-07-27
Payer: MEDICAID

## 2022-07-27 VITALS
BODY MASS INDEX: 23.96 KG/M2 | HEART RATE: 68 BPM | WEIGHT: 131 LBS | DIASTOLIC BLOOD PRESSURE: 71 MMHG | SYSTOLIC BLOOD PRESSURE: 112 MMHG

## 2022-07-27 DIAGNOSIS — Z98.890 H/O RIGHT KNEE SURGERY: Primary | ICD-10-CM

## 2022-07-27 DIAGNOSIS — Z09 SURGERY FOLLOW-UP EXAMINATION: ICD-10-CM

## 2022-07-27 DIAGNOSIS — Z98.890 H/O RIGHT KNEE SURGERY: ICD-10-CM

## 2022-07-27 PROCEDURE — 99024 PR POST-OP FOLLOW-UP VISIT: ICD-10-PCS | Mod: ,,, | Performed by: PHYSICIAN ASSISTANT

## 2022-07-27 PROCEDURE — 3008F PR BODY MASS INDEX (BMI) DOCUMENTED: ICD-10-PCS | Mod: CPTII,,, | Performed by: PHYSICIAN ASSISTANT

## 2022-07-27 PROCEDURE — 3078F DIAST BP <80 MM HG: CPT | Mod: CPTII,,, | Performed by: PHYSICIAN ASSISTANT

## 2022-07-27 PROCEDURE — 73560 XR KNEE 1 OR 2 VIEW RIGHT: ICD-10-PCS | Mod: 26,RT,, | Performed by: RADIOLOGY

## 2022-07-27 PROCEDURE — 99999 PR PBB SHADOW E&M-EST. PATIENT-LVL III: CPT | Mod: PBBFAC,,, | Performed by: PHYSICIAN ASSISTANT

## 2022-07-27 PROCEDURE — 99999 PR PBB SHADOW E&M-EST. PATIENT-LVL III: ICD-10-PCS | Mod: PBBFAC,,, | Performed by: PHYSICIAN ASSISTANT

## 2022-07-27 PROCEDURE — 1160F PR REVIEW ALL MEDS BY PRESCRIBER/CLIN PHARMACIST DOCUMENTED: ICD-10-PCS | Mod: CPTII,,, | Performed by: PHYSICIAN ASSISTANT

## 2022-07-27 PROCEDURE — 3074F PR MOST RECENT SYSTOLIC BLOOD PRESSURE < 130 MM HG: ICD-10-PCS | Mod: CPTII,,, | Performed by: PHYSICIAN ASSISTANT

## 2022-07-27 PROCEDURE — 99213 OFFICE O/P EST LOW 20 MIN: CPT | Mod: PBBFAC | Performed by: PHYSICIAN ASSISTANT

## 2022-07-27 PROCEDURE — 3078F PR MOST RECENT DIASTOLIC BLOOD PRESSURE < 80 MM HG: ICD-10-PCS | Mod: CPTII,,, | Performed by: PHYSICIAN ASSISTANT

## 2022-07-27 PROCEDURE — 73560 X-RAY EXAM OF KNEE 1 OR 2: CPT | Mod: 26,RT,, | Performed by: RADIOLOGY

## 2022-07-27 PROCEDURE — 99024 POSTOP FOLLOW-UP VISIT: CPT | Mod: ,,, | Performed by: PHYSICIAN ASSISTANT

## 2022-07-27 PROCEDURE — 3008F BODY MASS INDEX DOCD: CPT | Mod: CPTII,,, | Performed by: PHYSICIAN ASSISTANT

## 2022-07-27 PROCEDURE — 1159F MED LIST DOCD IN RCRD: CPT | Mod: CPTII,,, | Performed by: PHYSICIAN ASSISTANT

## 2022-07-27 PROCEDURE — 1160F RVW MEDS BY RX/DR IN RCRD: CPT | Mod: CPTII,,, | Performed by: PHYSICIAN ASSISTANT

## 2022-07-27 PROCEDURE — 1159F PR MEDICATION LIST DOCUMENTED IN MEDICAL RECORD: ICD-10-PCS | Mod: CPTII,,, | Performed by: PHYSICIAN ASSISTANT

## 2022-07-27 PROCEDURE — 73560 X-RAY EXAM OF KNEE 1 OR 2: CPT | Mod: TC,RT

## 2022-07-27 PROCEDURE — 3074F SYST BP LT 130 MM HG: CPT | Mod: CPTII,,, | Performed by: PHYSICIAN ASSISTANT

## 2022-07-27 NOTE — PROGRESS NOTES
Subjective:          Chief Complaint: Chey Mcneill is a 47 y.o. female who had concerns including Post-op Evaluation of the Right Knee.    Pt presents for 2 week post-op evaluation s/p procedure below. Pt has no complaints at this time. She is doing PT and progressing as scheduled. Pt is taking pain meds as needed. Denies fever, chills, discharge from wound site, and N/V.    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 Right  Open Reduction and Internal Fixation Lateral Tibial Plateau Fracture  2.  Right  Arthroscopy, knee, synovectomy, limited 58354  3.  Right  Arthroscopy, debridement/shaving of articular cartilage (chondroplasty) 49237      Review of Systems   Constitutional: Negative for chills and fever.   HENT: Negative for congestion and sore throat.    Eyes: Negative for discharge and double vision.   Cardiovascular: Negative for chest pain, palpitations and syncope.   Respiratory: Negative for cough and shortness of breath.    Endocrine: Negative for cold intolerance and heat intolerance.   Skin: Negative for dry skin and rash.   Musculoskeletal: Positive for joint pain, joint swelling and stiffness.   Gastrointestinal: Negative for abdominal pain, nausea and vomiting.   Neurological: Negative for focal weakness, numbness and paresthesias.                   Objective:        General: Chey is well-developed, well-nourished, appears stated age, in no acute distress, alert and oriented to time, place and person.      General    Nursing note and vitals reviewed.  Constitutional: She is oriented to person, place, and time. She appears well-developed and well-nourished. No distress.   HENT:   Head: Normocephalic and atraumatic.   Nose: Nose normal.   Eyes: Conjunctivae and EOM are normal.   Neck: Neck supple.   Cardiovascular: Normal rate, regular rhythm and intact distal pulses.    Pulmonary/Chest: Effort normal and breath sounds normal. No respiratory distress.   Abdominal: Soft. Bowel sounds are normal. She exhibits no distension. There is no abdominal tenderness.   Neurological: She is alert and oriented to person, place, and time. She has normal reflexes.   Psychiatric: She has a normal mood and affect. Her behavior is normal. Thought content normal.     General Musculoskeletal Exam   Gait: antalgic       Right Knee Exam     Inspection   Erythema: absent  Scars: present  Swelling: present  Effusion: absent  Deformity: absent  Bruising: absent    Tenderness   The patient is tender to palpation of the condyle.    Range of Motion   Extension: 0   Flexion: 130     Other   Sensation: normal    Comments:  Incision clean/dry/intact  No sign of infection  Mild swelling  Compartments soft  Neurovascular status intact in extremity      Left Knee Exam     Range of Motion   Extension: 0   Flexion: 130     Other   Sensation: normal    Vascular Exam     Right Pulses  Dorsalis Pedis:      2+  Posterior Tibial:      2+        Left Pulses  Dorsalis Pedis:      2+  Posterior Tibial:      2+        Edema  Right Lower Leg: absent  Left Lower Leg: absent    Radiographic Findings 07/27/2022:    Postoperative changes of open reduction internal fixation of comminuted depressed lateral tibial plateau fracture with lateral screw-plate construct utilizing multiple medullary and cortical screws.  No evidence of hardware loosening or screw fracture.  Persistent mild cortical step-off at the lateral tibial plateau.    Xrays of the right knee were  ordered and reviewed by me today. These findings were discussed and reviewed with the patient.        Assessment:       Encounter Diagnoses   Name Primary?    H/O right knee surgery Yes    Surgery follow-up examination           Plan:       1. IKDC, SF-12 and KOOS was not filled out today in clinic.     RTC in 1 week for staple removal  RTC in 4 weeks with Dr. Cooper Ramos. Patient will fill out IKDC, SF-12 and KOOS on return.    2. Provided patient with operative note.  3. Removed sutures and every other staple.  4. Continue to shower with covering incisions.  5. Continue  mg once daily and Celebrex 200 mg BID for 4 wks.  6. Continue PT per protocol.    All of the patient's questions were answered and the patient will contact us if they have any questions or concerns in the interim.            Patient questionnaires may have been collected.

## 2022-07-29 ENCOUNTER — TELEPHONE (OUTPATIENT)
Dept: SPORTS MEDICINE | Facility: CLINIC | Age: 47
End: 2022-07-29
Payer: MEDICAID

## 2022-07-29 ENCOUNTER — PATIENT MESSAGE (OUTPATIENT)
Dept: SPORTS MEDICINE | Facility: CLINIC | Age: 47
End: 2022-07-29
Payer: MEDICAID

## 2022-07-29 DIAGNOSIS — S82.121D CLOSED FRACTURE OF LATERAL PORTION OF RIGHT TIBIAL PLATEAU WITH ROUTINE HEALING, SUBSEQUENT ENCOUNTER: ICD-10-CM

## 2022-07-29 RX ORDER — OXYCODONE AND ACETAMINOPHEN 10; 325 MG/1; MG/1
1 TABLET ORAL EVERY 6 HOURS PRN
Qty: 28 TABLET | Refills: 0 | Status: SHIPPED | OUTPATIENT
Start: 2022-07-29 | End: 2023-01-18

## 2022-07-29 RX ORDER — OXYCODONE AND ACETAMINOPHEN 10; 325 MG/1; MG/1
1 TABLET ORAL EVERY 6 HOURS PRN
Qty: 28 TABLET | Refills: 0 | Status: SHIPPED | OUTPATIENT
Start: 2022-07-29 | End: 2022-07-29

## 2022-07-29 NOTE — TELEPHONE ENCOUNTER
Pharmacy states they did not receive percocet prescription sent in on Monday. Spoke with patient and she said they got the Robaxin but not the Percocet. Asked fellow to please send in new script for patient     Lien Santa   Clinical Assistant to Dr. Cooper Ramos      ----- Message from Maria Luisa lEliott sent at 7/29/2022  4:00 PM CDT -----  Regarding: self  .Type: Patient Call Back    Who called: self     What is the request in detail: states the pharm still have not received the rx for percocet since Monday. Please call.     Can the clinic reply by [facility]? No     Would the patient rather a call back or a response via [facility]?  Call     Best call back number: .[phone]      Additional Information: .  [facility] - [facility] [address] [address] [address]  Phone: [phone] Fax: [phone]

## 2022-08-03 ENCOUNTER — TELEPHONE (OUTPATIENT)
Dept: SPORTS MEDICINE | Facility: CLINIC | Age: 47
End: 2022-08-03

## 2022-08-03 ENCOUNTER — OFFICE VISIT (OUTPATIENT)
Dept: SPORTS MEDICINE | Facility: CLINIC | Age: 47
End: 2022-08-03
Payer: MEDICAID

## 2022-08-03 VITALS
HEIGHT: 62 IN | WEIGHT: 131 LBS | DIASTOLIC BLOOD PRESSURE: 68 MMHG | BODY MASS INDEX: 24.11 KG/M2 | SYSTOLIC BLOOD PRESSURE: 119 MMHG

## 2022-08-03 DIAGNOSIS — Z09 SURGERY FOLLOW-UP EXAMINATION: ICD-10-CM

## 2022-08-03 DIAGNOSIS — S82.121D CLOSED FRACTURE OF LATERAL PORTION OF RIGHT TIBIAL PLATEAU WITH ROUTINE HEALING, SUBSEQUENT ENCOUNTER: Primary | ICD-10-CM

## 2022-08-03 PROCEDURE — 1159F PR MEDICATION LIST DOCUMENTED IN MEDICAL RECORD: ICD-10-PCS | Mod: CPTII,,, | Performed by: PHYSICIAN ASSISTANT

## 2022-08-03 PROCEDURE — 3078F DIAST BP <80 MM HG: CPT | Mod: CPTII,,, | Performed by: PHYSICIAN ASSISTANT

## 2022-08-03 PROCEDURE — 3074F PR MOST RECENT SYSTOLIC BLOOD PRESSURE < 130 MM HG: ICD-10-PCS | Mod: CPTII,,, | Performed by: PHYSICIAN ASSISTANT

## 2022-08-03 PROCEDURE — 3078F PR MOST RECENT DIASTOLIC BLOOD PRESSURE < 80 MM HG: ICD-10-PCS | Mod: CPTII,,, | Performed by: PHYSICIAN ASSISTANT

## 2022-08-03 PROCEDURE — 99024 POSTOP FOLLOW-UP VISIT: CPT | Mod: ,,, | Performed by: PHYSICIAN ASSISTANT

## 2022-08-03 PROCEDURE — 99999 PR PBB SHADOW E&M-EST. PATIENT-LVL III: ICD-10-PCS | Mod: PBBFAC,,, | Performed by: PHYSICIAN ASSISTANT

## 2022-08-03 PROCEDURE — 3008F BODY MASS INDEX DOCD: CPT | Mod: CPTII,,, | Performed by: PHYSICIAN ASSISTANT

## 2022-08-03 PROCEDURE — 3008F PR BODY MASS INDEX (BMI) DOCUMENTED: ICD-10-PCS | Mod: CPTII,,, | Performed by: PHYSICIAN ASSISTANT

## 2022-08-03 PROCEDURE — 3074F SYST BP LT 130 MM HG: CPT | Mod: CPTII,,, | Performed by: PHYSICIAN ASSISTANT

## 2022-08-03 PROCEDURE — 99024 PR POST-OP FOLLOW-UP VISIT: ICD-10-PCS | Mod: ,,, | Performed by: PHYSICIAN ASSISTANT

## 2022-08-03 PROCEDURE — 99213 OFFICE O/P EST LOW 20 MIN: CPT | Mod: PBBFAC | Performed by: PHYSICIAN ASSISTANT

## 2022-08-03 PROCEDURE — 99999 PR PBB SHADOW E&M-EST. PATIENT-LVL III: CPT | Mod: PBBFAC,,, | Performed by: PHYSICIAN ASSISTANT

## 2022-08-03 PROCEDURE — 1160F PR REVIEW ALL MEDS BY PRESCRIBER/CLIN PHARMACIST DOCUMENTED: ICD-10-PCS | Mod: CPTII,,, | Performed by: PHYSICIAN ASSISTANT

## 2022-08-03 PROCEDURE — 1159F MED LIST DOCD IN RCRD: CPT | Mod: CPTII,,, | Performed by: PHYSICIAN ASSISTANT

## 2022-08-03 PROCEDURE — 1160F RVW MEDS BY RX/DR IN RCRD: CPT | Mod: CPTII,,, | Performed by: PHYSICIAN ASSISTANT

## 2022-08-03 RX ORDER — CLINDAMYCIN HYDROCHLORIDE 300 MG/1
300 CAPSULE ORAL EVERY 6 HOURS
Qty: 28 CAPSULE | Refills: 0 | Status: SHIPPED | OUTPATIENT
Start: 2022-08-03 | End: 2022-08-10

## 2022-08-03 NOTE — PROGRESS NOTES
Subjective:          Chief Complaint: Chey Mcneill is a 47 y.o. female who had concerns including Pain of the Right Knee.    Pt presents for 3 week post-op evaluation s/p procedure below and staple removal. Pt has no complaints at this time. She is doing PT and progressing as scheduled. Pt is taking pain meds as needed. Denies fever, chills, discharge from wound site, and N/V.    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 Right  Open Reduction and Internal Fixation Lateral Tibial Plateau Fracture  2.  Right  Arthroscopy, knee, synovectomy, limited 28130  3.  Right  Arthroscopy, debridement/shaving of articular cartilage (chondroplasty) 89585    Pain  Associated symptoms include joint swelling. Pertinent negatives include no abdominal pain, chest pain, chills, congestion, coughing, fever, nausea, numbness, rash, sore throat or vomiting.       Review of Systems   Constitutional: Negative for chills and fever.   HENT: Negative for congestion and sore throat.    Eyes: Negative for discharge and double vision.   Cardiovascular: Negative for chest pain, palpitations and syncope.   Respiratory: Negative for cough and shortness of breath.    Endocrine: Negative for cold intolerance and heat intolerance.   Skin: Negative for dry skin and rash.   Musculoskeletal: Positive for joint pain, joint swelling and stiffness.   Gastrointestinal: Negative for abdominal pain, nausea and vomiting.   Neurological: Negative for focal weakness,  numbness and paresthesias.       Pain Related Questions  Over the past 3 days, what was your average pain during activity? (I.e. running, jogging, walking, climbing stairs, getting dressed, ect.): 4  Over the past 3 days, what was your highest pain level?: 4  Over the past 3 days, what was your lowest pain level? : 4    Other  How many nights a week are you awakened by your affected body part?: 1  Was the patient's HEIGHT measured or patient reported?: Patient Reported  Was the patient's WEIGHT measured or patient reported?: Measured      Objective:        General: Chey is well-developed, well-nourished, appears stated age, in no acute distress, alert and oriented to time, place and person.     General    Nursing note and vitals reviewed.  Constitutional: She is oriented to person, place, and time. She appears well-developed and well-nourished. No distress.   HENT:   Head: Normocephalic and atraumatic.   Nose: Nose normal.   Eyes: Conjunctivae and EOM are normal.   Neck: Neck supple.   Cardiovascular: Normal rate, regular rhythm and intact distal pulses.    Pulmonary/Chest: Effort normal and breath sounds normal. No respiratory distress.   Abdominal: Soft. Bowel sounds are normal. She exhibits no distension. There is no abdominal tenderness.   Neurological: She is alert and oriented to person, place, and time. She has normal reflexes.   Psychiatric: She has a normal mood and affect. Her behavior is normal. Thought content normal.     General Musculoskeletal Exam   Gait: antalgic       Right Knee Exam     Inspection   Erythema: absent  Scars: present  Swelling: present  Effusion: absent  Deformity: absent  Bruising: absent    Tenderness   The patient is tender to palpation of the condyle.    Range of Motion   Extension: 0   Flexion: 130     Other   Sensation: normal    Comments:  Incision clean/dry/intact  No sign of infection  Mild swelling  Compartments soft  Neurovascular status intact in extremity      Left  Knee Exam     Range of Motion   Extension: 0   Flexion: 130     Other   Sensation: normal    Vascular Exam     Right Pulses  Dorsalis Pedis:      2+  Posterior Tibial:      2+        Left Pulses  Dorsalis Pedis:      2+  Posterior Tibial:      2+        Edema  Right Lower Leg: absent  Left Lower Leg: absent    Radiographic Findings:    Postoperative changes of open reduction internal fixation of comminuted depressed lateral tibial plateau fracture with lateral screw-plate construct utilizing multiple medullary and cortical screws.  No evidence of hardware loosening or screw fracture.  Persistent mild cortical step-off at the lateral tibial plateau.    Xrays of the right knee were reviewed by me today. These findings were discussed and reviewed with the patient.              Assessment:       Encounter Diagnosis   Name Primary?    Closed fracture of lateral portion of right tibial plateau with routine healing, subsequent encounter Yes          Plan:       Wound site evaluated. The wound was painted with iodine, debrided of foreign material. Xeroform and medipore applied. The patient is alerted to watch for any signs of infection (redness, pus, pain, increased swelling or fever) and call if such occurs. Home wound care instructions along with extra bandages are provided.       1. IKDC, SF-12 and KOOS was not filled out today in clinic.     RTC in 4 weeks with Dr. Cooper Ramos. Patient will fill out IKDC, SF-12 and KOOS on return.    2. Provided patient with operative note.  3. Removed sutures and every other staple.  4. Continue to shower with covering incisions.  5. Continue  mg once daily and Celebrex 200 mg BID for 4 wks.  6. Sulfa allergy, Prophylactic Clindamycin 300 mg q 6 hrs for 7 days  7. Continue PT per protocol.    All of the patient's questions were answered and the patient will contact us if they have any questions or concerns in the interim.            Patient questionnaires may have been  collected.

## 2022-08-03 NOTE — TELEPHONE ENCOUNTER
Called patient and LVM to say that her prescription would be changing because she was allergic to the one that was initially prescribed.

## 2022-08-07 ENCOUNTER — PATIENT MESSAGE (OUTPATIENT)
Dept: SPORTS MEDICINE | Facility: CLINIC | Age: 47
End: 2022-08-07
Payer: MEDICAID

## 2022-08-07 DIAGNOSIS — S82.121D CLOSED FRACTURE OF LATERAL PORTION OF RIGHT TIBIAL PLATEAU WITH ROUTINE HEALING, SUBSEQUENT ENCOUNTER: Primary | ICD-10-CM

## 2022-08-08 RX ORDER — METHOCARBAMOL 500 MG/1
500 TABLET, FILM COATED ORAL 3 TIMES DAILY
Qty: 30 TABLET | Refills: 2 | Status: SHIPPED | OUTPATIENT
Start: 2022-08-08 | End: 2022-08-24

## 2022-08-23 NOTE — PROGRESS NOTES
Subjective:          Chief Complaint: Chey Mcneill is a 47 y.o. female who had concerns including Pain of the Right Knee.    Swati presents to our clinic for post operative evaluation of the below procedures, she is now 6 weeks post operative. She is ambulating with the use of 2 crutches and wearing Tscope brace. She is currently in physical therapy with Bj Mason at [facility] and progressing well.     Pt presents for 3 week post-op evaluation s/p procedure below and staple removal. Pt has no complaints at this time. She is doing PT and progressing as scheduled. Pt is taking pain meds as needed. Denies fever, chills, discharge from wound site, and N/V.    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 Right  Open Reduction and Internal Fixation Lateral Tibial Plateau Fracture  2.  Right  Arthroscopy, knee, synovectomy, limited 78687  3.  Right  Arthroscopy, debridement/shaving of articular cartilage (chondroplasty) 93291    Pain  Associated symptoms include joint swelling. Pertinent negatives include no abdominal pain, chest pain, chills, congestion, coughing, fever, nausea, numbness, rash, sore throat or vomiting.       Review of Systems   Constitutional: Negative for chills and fever.   HENT: Negative for congestion and sore throat.    Eyes: Negative for discharge and double vision.   Cardiovascular: Negative for chest pain, palpitations and syncope.   Respiratory: Negative for cough and shortness of breath.    Endocrine:  Negative for cold intolerance and heat intolerance.   Skin: Negative for dry skin and rash.   Musculoskeletal: Positive for joint pain, joint swelling and stiffness.   Gastrointestinal: Negative for abdominal pain, nausea and vomiting.   Neurological: Negative for focal weakness, numbness and paresthesias.       Pain Related Questions  Over the past 3 days, what was your average pain during activity? (I.e. running, jogging, walking, climbing stairs, getting dressed, ect.): 8  Over the past 3 days, what was your highest pain level?: 8  Over the past 3 days, what was your lowest pain level? : 3    Other  How many nights a week are you awakened by your affected body part?: 0  Was the patient's HEIGHT measured or patient reported?: Patient Reported  Was the patient's WEIGHT measured or patient reported?: Measured      Objective:        General: Chey is well-developed, well-nourished, appears stated age, in no acute distress, alert and oriented to time, place and person.     General    Nursing note and vitals reviewed.  Constitutional: She is oriented to person, place, and time. She appears well-developed and well-nourished. No distress.   HENT:   Head: Normocephalic and atraumatic.   Nose: Nose normal.   Mouth/Throat: No oropharyngeal exudate.   Eyes: Conjunctivae and EOM are normal. Right eye exhibits no discharge. Left eye exhibits no discharge.   Neck: Neck supple.   Cardiovascular: Normal rate, regular rhythm and intact distal pulses.    Pulmonary/Chest: Effort normal and breath sounds normal. No respiratory distress.   Abdominal: Soft. Bowel sounds are normal. She exhibits no distension. There is no abdominal tenderness.   Neurological: She is alert and oriented to person, place, and time. She has normal reflexes. No cranial nerve deficit. Coordination normal.   Psychiatric: She has a normal mood and affect. Her behavior is normal. Judgment and thought content normal.     General Musculoskeletal Exam   Gait:  antalgic       Right Knee Exam     Inspection   Erythema: absent  Scars: present  Swelling: present  Effusion: absent  Deformity: absent  Bruising: absent    Tenderness   The patient is tender to palpation of the condyle.    Range of Motion   Extension: 0   Flexion: 90     Tests   Meniscus   Leela:  Medial - negative Lateral - negative  Ligament Examination Lachman: normal (-1 to 2mm) PCL-Posterior Drawer: normal (0 to 2mm)     MCL - Valgus: normal (0 to 2mm)  LCL - Varus: normalPivot Shift: normal (Equal)Reverse Pivot Shift: normal (Equal)Dial Test at 30 degrees: normal (< 5 degrees)Dial Test at 90 degrees: normal (< 5 degrees)  Posterior Sag Test: negative  Posterolateral Corner: stable  Patella   Patellar apprehension: negative  Passive Patellar Tilt: neutral  Patellar Tracking: normal  Patellar Glide (quadrants): Lateral - 1   Medial - 2  Q-Angle at 90 degrees: normal  Patellar Grind: negative  J-Sign: none    Other   Meniscal Cyst: absent  Popliteal (Baker's) Cyst: absent  Sensation: normal    Comments:  Incision clean/dry/intact  No sign of infection  Mild swelling  Compartments soft  Neurovascular status intact in extremity      Left Knee Exam     Inspection   Erythema: absent  Scars: absent  Swelling: absent  Effusion: absent  Deformity: absent  Bruising: absent    Tenderness   The patient is experiencing no tenderness.     Range of Motion   Extension: 0   Flexion: 130     Tests   Meniscus   Leela:  Medial - negative Lateral - negative  Stability Lachman: normal (-1 to 2mm) PCL-Posterior Drawer: normal (0 to 2mm)  MCL - Valgus: normal (0 to 2mm)  LCL - Varus: normal (0 to 2mm)Pivot Shift: normal (Equal)Reverse Pivot Shift: normal (Equal)Dial Test at 30 degrees: normal (< 5 degrees)Dial Test at 90 degrees: normal (< 5 degrees)  Posterior Sag Test: negative  Posterolateral Corner: stable  Patella   Patellar apprehension: negative  Passive Patellar Tilt: neutral  Patellar Tracking: normal  Patellar Glide  (Quadrants): Lateral - 1 Medial - 2  Q-Angle at 90 degrees: normal  Patellar Grind: negative  J-Sign: J sign absent    Other   Meniscal Cyst: absent  Popliteal (Baker's) Cyst: absent  Sensation: normal    Right Hip Exam     Tests   Raymond: negative  Left Hip Exam     Tests   Raymond: negative          Muscle Strength   Right Lower Extremity   Hip Abduction: 5/5   Quadriceps:  4/5   Hamstrin/5     Reflexes     Left Side  Achilles:  2+  Quadriceps:  2+    Right Side   Achilles:  2+  Quadriceps:  2+    Vascular Exam     Right Pulses  Dorsalis Pedis:      2+  Posterior Tibial:      2+        Left Pulses  Dorsalis Pedis:      2+  Posterior Tibial:      2+        Edema  Right Lower Leg: absent  Left Lower Leg: absent    Radiographic Findings:    Postoperative changes of open reduction internal fixation of comminuted depressed lateral tibial plateau fracture with lateral screw-plate construct utilizing multiple medullary and cortical screws.  No evidence of hardware loosening or screw fracture.  Persistent mild cortical step-off at the lateral tibial plateau.    Xrays of the right knee were reviewed by me today. These findings were discussed and reviewed with the patient.            Assessment:       Encounter Diagnoses   Name Primary?    Right knee pain, unspecified chronicity Yes    Closed fracture of lateral portion of right tibial plateau, initial encounter     Internal derangement of right knee           Plan:       1. IKDC, SF-12 and KOOS was filled out today in clinic.     RTC in 6 weeks with Dr. Cooper Ramos Patient will fill out IKDC, SF-12 and KOOS on return.    2. Medications: Refills of the following Rx were sent to patients preferred Pharmacy:  methocarbamoL (ROBAXIN) 500 MG Tab    3. Physical Therapy: Continue/Begin: Continue at [facility] with Bj Mason     4. HEP: N/A  HEP 54988 - Cooper Ramos, instructed and demonstrated a patellofemoral HEP. The patient then demonstrated understanding of exercises and  proper technique. This program was performed for 20 minutes.     5. Procedures/Procedural Planning:   N/A    6. DME: Lateral     7. Work/Sport Status: Toe-touch to 25% PWB x 2 weeks, 25-50% PWB 2-4 weeks, Advance to full WBAT  Full ROM as tolerated  Discontinue immobilizer at rest  Advance from immobilizer to lateral  with above WB plan    8. Visit Summary: Patient is overall doing well, she can begin to progress with physical therapy.                  Patient questionnaires may have been collected.

## 2022-08-24 ENCOUNTER — OFFICE VISIT (OUTPATIENT)
Dept: SPORTS MEDICINE | Facility: CLINIC | Age: 47
End: 2022-08-24
Payer: MEDICAID

## 2022-08-24 ENCOUNTER — HOSPITAL ENCOUNTER (OUTPATIENT)
Dept: RADIOLOGY | Facility: HOSPITAL | Age: 47
Discharge: HOME | End: 2022-08-24
Attending: ORTHOPAEDIC SURGERY
Payer: MEDICAID

## 2022-08-24 VITALS
HEIGHT: 62 IN | SYSTOLIC BLOOD PRESSURE: 101 MMHG | BODY MASS INDEX: 24.11 KG/M2 | DIASTOLIC BLOOD PRESSURE: 70 MMHG | HEART RATE: 69 BPM | WEIGHT: 131 LBS

## 2022-08-24 DIAGNOSIS — M25.561 RIGHT KNEE PAIN, UNSPECIFIED CHRONICITY: Primary | ICD-10-CM

## 2022-08-24 DIAGNOSIS — M23.91 INTERNAL DERANGEMENT OF RIGHT KNEE: ICD-10-CM

## 2022-08-24 DIAGNOSIS — M25.561 RIGHT KNEE PAIN, UNSPECIFIED CHRONICITY: ICD-10-CM

## 2022-08-24 DIAGNOSIS — S82.121A CLOSED FRACTURE OF LATERAL PORTION OF RIGHT TIBIAL PLATEAU, INITIAL ENCOUNTER: ICD-10-CM

## 2022-08-24 PROCEDURE — 3008F PR BODY MASS INDEX (BMI) DOCUMENTED: ICD-10-PCS | Mod: CPTII,,, | Performed by: ORTHOPAEDIC SURGERY

## 2022-08-24 PROCEDURE — 99999 PR PBB SHADOW E&M-EST. PATIENT-LVL III: ICD-10-PCS | Mod: PBBFAC,,, | Performed by: ORTHOPAEDIC SURGERY

## 2022-08-24 PROCEDURE — 99024 PR POST-OP FOLLOW-UP VISIT: ICD-10-PCS | Mod: ,,, | Performed by: ORTHOPAEDIC SURGERY

## 2022-08-24 PROCEDURE — 97110 PR THERAPEUTIC EXERCISES: ICD-10-PCS | Mod: RT,,, | Performed by: ORTHOPAEDIC SURGERY

## 2022-08-24 PROCEDURE — 1159F PR MEDICATION LIST DOCUMENTED IN MEDICAL RECORD: ICD-10-PCS | Mod: CPTII,,, | Performed by: ORTHOPAEDIC SURGERY

## 2022-08-24 PROCEDURE — 3074F SYST BP LT 130 MM HG: CPT | Mod: CPTII,,, | Performed by: ORTHOPAEDIC SURGERY

## 2022-08-24 PROCEDURE — 99999 PR PBB SHADOW E&M-EST. PATIENT-LVL III: CPT | Mod: PBBFAC,,, | Performed by: ORTHOPAEDIC SURGERY

## 2022-08-24 PROCEDURE — 3078F PR MOST RECENT DIASTOLIC BLOOD PRESSURE < 80 MM HG: ICD-10-PCS | Mod: CPTII,,, | Performed by: ORTHOPAEDIC SURGERY

## 2022-08-24 PROCEDURE — 97110 THERAPEUTIC EXERCISES: CPT | Mod: RT,,, | Performed by: ORTHOPAEDIC SURGERY

## 2022-08-24 PROCEDURE — 99213 OFFICE O/P EST LOW 20 MIN: CPT | Mod: PBBFAC | Performed by: ORTHOPAEDIC SURGERY

## 2022-08-24 PROCEDURE — 73564 XR KNEE ORTHO BILAT WITH FLEXION: ICD-10-PCS | Mod: 26,50,, | Performed by: RADIOLOGY

## 2022-08-24 PROCEDURE — 3008F BODY MASS INDEX DOCD: CPT | Mod: CPTII,,, | Performed by: ORTHOPAEDIC SURGERY

## 2022-08-24 PROCEDURE — 73564 X-RAY EXAM KNEE 4 OR MORE: CPT | Mod: TC,50

## 2022-08-24 PROCEDURE — 3074F PR MOST RECENT SYSTOLIC BLOOD PRESSURE < 130 MM HG: ICD-10-PCS | Mod: CPTII,,, | Performed by: ORTHOPAEDIC SURGERY

## 2022-08-24 PROCEDURE — 73564 X-RAY EXAM KNEE 4 OR MORE: CPT | Mod: 26,50,, | Performed by: RADIOLOGY

## 2022-08-24 PROCEDURE — 3078F DIAST BP <80 MM HG: CPT | Mod: CPTII,,, | Performed by: ORTHOPAEDIC SURGERY

## 2022-08-24 PROCEDURE — 99024 POSTOP FOLLOW-UP VISIT: CPT | Mod: ,,, | Performed by: ORTHOPAEDIC SURGERY

## 2022-08-24 PROCEDURE — 1159F MED LIST DOCD IN RCRD: CPT | Mod: CPTII,,, | Performed by: ORTHOPAEDIC SURGERY

## 2022-08-24 RX ORDER — METHOCARBAMOL 500 MG/1
500 TABLET, FILM COATED ORAL 3 TIMES DAILY
Qty: 90 TABLET | Refills: 3 | Status: SHIPPED | OUTPATIENT
Start: 2022-08-24 | End: 2022-09-23

## 2022-08-24 NOTE — PATIENT INSTRUCTIONS
Toe-touch to 25% PWB x 2 weeks, 25-50% PWB 2-4 weeks, Advance to full WBAT  Full ROM as tolerated  Discontinue immobilizer at rest  Advance from immobilizer to lateral  with above WB plan

## 2022-09-08 ENCOUNTER — PATIENT MESSAGE (OUTPATIENT)
Dept: SPORTS MEDICINE | Facility: CLINIC | Age: 47
End: 2022-09-08
Payer: MEDICAID

## 2022-09-26 NOTE — PROGRESS NOTES
Subjective:          Chief Complaint: Chey Mcneill is a 47 y.o. female who had concerns including Pain of the Right Knee. 12 weeks s/p ORIF R tibial plateau fracture. Progressing well with PT.Has been weightbearing for the last 2 weeks in her  brace     Swati comes to clinic for postoperative evaluation.     Swati presents to our clinic for post operative evaluation of the below procedures, she is now 6 weeks post operative. She is ambulating with the use of 2 crutches and wearing Tscope brace. She is currently in physical therapy with Bj Mason at [facility] and progressing well.     Pt presents for 3 week post-op evaluation s/p procedure below and staple removal. Pt has no complaints at this time. She is doing PT and progressing as scheduled. Pt is taking pain meds as needed. Denies fever, chills, discharge from wound site, and N/V.    DATE OF PROCEDURE: 7/12/2022     ATTENDING SURGEON: Surgeon(s) and Role:     * Cooper Ramos MD - Primary     Assistants:  MD Lien Montana  [facility]        PREOPERATIVE DIAGNOSIS:  Right  Chondromalacia, patella M22.40, Chondromalacia, (excludes patella) M94.29, Fracture, Tibia plateau S82.143A and Synovitis M65.9     POSTOPERATIVE DIAGNOSIS:   Right  Chondromalacia, patella M22.40, Chondromalacia, (excludes patella) M94.29, Fracture, Tibia plateau S82.143A and Synovitis M65.9     PROCEDURES(S) PERFORMED:   1. Right  Open Reduction and Internal Fixation Lateral Tibial Plateau Fracture  2.  Right  Arthroscopy, knee, synovectomy, limited 50336  3.  Right  Arthroscopy, debridement/shaving of articular cartilage (chondroplasty) 23007    Pain  Associated symptoms include joint swelling. Pertinent negatives include no abdominal pain, chest pain, chills, congestion, coughing, fever, nausea, numbness, rash, sore throat or vomiting.     Review of Systems   Constitutional: Negative for chills and fever.   HENT:  Negative for congestion and sore throat.     Eyes:  Negative for discharge and double vision.   Cardiovascular:  Negative for chest pain, palpitations and syncope.   Respiratory:  Negative for cough and shortness of breath.    Endocrine: Negative for cold intolerance and heat intolerance.   Skin:  Negative for dry skin and rash.   Musculoskeletal:  Positive for joint pain, joint swelling and stiffness.   Gastrointestinal:  Negative for abdominal pain, nausea and vomiting.   Neurological:  Negative for focal weakness, numbness and paresthesias.     Pain Related Questions  Over the past 3 days, what was your average pain during activity? (I.e. running, jogging, walking, climbing stairs, getting dressed, ect.): 6  Over the past 3 days, what was your highest pain level?: 6  Over the past 3 days, what was your lowest pain level? : 6    Other  Was the patient's HEIGHT measured or patient reported?: Patient Reported  Was the patient's WEIGHT measured or patient reported?: Measured      Objective:        General: Chey is well-developed, well-nourished, appears stated age, in no acute distress, alert and oriented to time, place and person.     General    Nursing note and vitals reviewed.  Constitutional: She is oriented to person, place, and time. She appears well-developed and well-nourished. No distress.   HENT:   Head: Normocephalic and atraumatic.   Nose: Nose normal.   Mouth/Throat: No oropharyngeal exudate.   Eyes: Conjunctivae and EOM are normal. Right eye exhibits no discharge. Left eye exhibits no discharge.   Neck: Neck supple.   Cardiovascular:  Normal rate, regular rhythm and intact distal pulses.            Pulmonary/Chest: Effort normal and breath sounds normal. No respiratory distress.   Abdominal: Soft. Bowel sounds are normal. She exhibits no distension. There is no abdominal tenderness.   Neurological: She is alert and oriented to person, place, and time. She has normal reflexes. No cranial nerve deficit. Coordination normal.   Psychiatric: She  has a normal mood and affect. Her behavior is normal. Judgment and thought content normal.     General Musculoskeletal Exam   Gait: antalgic       Right Knee Exam     Inspection   Erythema: absent  Scars: present  Swelling: present  Effusion: absent  Deformity: absent  Bruising: absent    Range of Motion   Extension:  5 abnormal   Flexion:  120 abnormal     Tests   Meniscus   Leela:  Medial - negative Lateral - negative  Ligament Examination   Lachman: normal (-1 to 2mm)   PCL-Posterior Drawer: normal (0 to 2mm)     MCL - Valgus: normal (0 to 2mm)  LCL - Varus: normal  Pivot Shift: normal (Equal)  Reverse Pivot Shift: normal (Equal)  Dial Test at 30 degrees: normal (< 5 degrees)  Dial Test at 90 degrees: normal (< 5 degrees)  Posterior Sag Test: negative  Posterolateral Corner: stable  Patella   Patellar apprehension: negative  Passive Patellar Tilt: neutral  Patellar Tracking: normal  Patellar Glide (quadrants): Lateral - 1   Medial - 2  Q-Angle at 90 degrees: normal  Patellar Grind: negative  J-Sign: none    Other   Meniscal Cyst: absent  Popliteal (Baker's) Cyst: absent  Sensation: normal    Comments:  Incision clean/dry/intact  No sign of infection  Mild swelling  Compartments soft  Neurovascular status intact in extremity      Left Knee Exam     Inspection   Erythema: absent  Scars: present  Swelling: absent  Effusion: absent  Deformity: absent  Bruising: absent    Tenderness   The patient is experiencing no tenderness.     Range of Motion   Extension:  5 abnormal   Flexion:  140     Tests   Meniscus   Leela:  Medial - negative Lateral - negative  Stability   Lachman: normal (-1 to 2mm)   PCL-Posterior Drawer: normal (0 to 2mm)  MCL - Valgus: normal (0 to 2mm)  LCL - Varus: normal (0 to 2mm)  Pivot Shift: normal (Equal)  Reverse Pivot Shift: normal (Equal)  Dial Test at 30 degrees: normal (< 5 degrees)  Dial Test at 90 degrees: normal (< 5 degrees)  Posterior Sag Test: negative  Posterolateral Corner:  stable  Patella   Patellar apprehension: negative  Passive Patellar Tilt: neutral  Patellar Tracking: normal  Patellar Glide (Quadrants): Lateral - 1 Medial - 2  Q-Angle at 90 degrees: normal  Patellar Grind: negative  J-Sign: J sign absent    Other   Meniscal Cyst: absent  Popliteal (Baker's) Cyst: absent  Sensation: normal    Right Hip Exam     Tests   Raymond: negative  Left Hip Exam     Tests   Raymond: negative          Muscle Strength   Right Lower Extremity   Hip Abduction: 5/5   Quadriceps:  4/5   Hamstrin/5   Left Lower Extremity   Hip Abduction: 5/5   Quadriceps:  5/5   Hamstrin/5     Reflexes     Left Side  Achilles:  2+  Quadriceps:  2+    Right Side   Achilles:  2+  Quadriceps:  2+    Vascular Exam     Right Pulses  Dorsalis Pedis:      2+  Posterior Tibial:      2+        Left Pulses  Dorsalis Pedis:      2+  Posterior Tibial:      2+        Edema  Right Lower Leg: absent  Left Lower Leg: absent  Radiographic Findings:    Postoperative changes of open reduction internal fixation of comminuted depressed lateral tibial plateau fracture with lateral screw-plate construct utilizing multiple medullary and cortical screws.  No evidence of hardware loosening or screw fracture.  Persistent mild cortical step-off at the lateral tibial plateau.    Xrays of the right knee were reviewed by me today. These findings were discussed and reviewed with the patient.    X-Ray Knee 3 View Right  Narrative: EXAMINATION:  XR KNEE 3 VIEW RIGHT    TECHNIQUE:  Three views of the right knee were obtained, with AP, lateral, and patellar projections submitted.    COMPARISON:  Comparison is made to 2022, 2022, and 2020, with reference also made to a CT examination of 2022.    FINDINGS:  Postoperative findings are again identified relating to prior internal fixation of the lateral tibial plateau fracture documented on the CT examination of 2022, a laterally positioned plate/screw construct in the  proximal tibia again noted.  Sclerosis in the medial tibial plateau and medial femoral condyle are again observed, and and appear unchanged since the 11/16/2020 exam.  Some narrowing of the medial aspect of the patellofemoral joint is again noted, with some minimal patellar spurring.  No evidence of more recent fracture, lytic destructive process, or hardware failure.  There is a persistent small joint effusion.  Impression: No significant detrimental interval change since 08/24/2022 is appreciated.    Electronically signed by: Tavon Cruz MD  Date:    10/03/2022  Time:    10:14              Assessment:       Encounter Diagnoses   Name Primary?    Right knee pain, unspecified chronicity Yes    Closed fracture of right tibial plateau with routine healing, subsequent encounter     Tibial plateau chondromalacia     Chondromalacia of right knee     Chondromalacia of right patella             Plan:       1. RTC in 3 months with Dr. Cooper Ramos. IKDC, SF-12 and KOOS was filled out today in clinic. Patient will fill out IKDC, SF-12 and KOOS on return.    2. Medications: Refills of the following Rx were sent to patients preferred Pharmacy:  No Refills Needed Today    3. Physical [facility]: [facility]    4. HEP: N/A    5. Procedures/Procedural Planning:   N/A  Can consider visco injections in future if she continues with medial sided pain in the R knee     6. DME: Lateral     7. Work/Sport Status: Can be WBAT, ROM as tolerated.  brace when ambulating, bike, elliptical training encouraged     8. Visit Summary: Follow up in 3 months                        Patient questionnaires may have been collected.

## 2022-10-03 ENCOUNTER — HOSPITAL ENCOUNTER (OUTPATIENT)
Dept: RADIOLOGY | Facility: HOSPITAL | Age: 47
Discharge: HOME | End: 2022-10-03
Attending: ORTHOPAEDIC SURGERY
Payer: MEDICAID

## 2022-10-03 ENCOUNTER — OFFICE VISIT (OUTPATIENT)
Dept: SPORTS MEDICINE | Facility: CLINIC | Age: 47
End: 2022-10-03
Payer: MEDICAID

## 2022-10-03 VITALS — BODY MASS INDEX: 23.96 KG/M2 | HEIGHT: 62 IN

## 2022-10-03 DIAGNOSIS — M22.41 CHONDROMALACIA OF RIGHT PATELLA: ICD-10-CM

## 2022-10-03 DIAGNOSIS — M94.269 TIBIAL PLATEAU CHONDROMALACIA: ICD-10-CM

## 2022-10-03 DIAGNOSIS — M25.561 RIGHT KNEE PAIN, UNSPECIFIED CHRONICITY: ICD-10-CM

## 2022-10-03 DIAGNOSIS — S82.141D CLOSED FRACTURE OF RIGHT TIBIAL PLATEAU WITH ROUTINE HEALING, SUBSEQUENT ENCOUNTER: ICD-10-CM

## 2022-10-03 DIAGNOSIS — M94.261 CHONDROMALACIA OF RIGHT KNEE: ICD-10-CM

## 2022-10-03 DIAGNOSIS — M25.561 RIGHT KNEE PAIN, UNSPECIFIED CHRONICITY: Primary | ICD-10-CM

## 2022-10-03 PROCEDURE — 99024 POSTOP FOLLOW-UP VISIT: CPT | Mod: ,,, | Performed by: ORTHOPAEDIC SURGERY

## 2022-10-03 PROCEDURE — 3008F BODY MASS INDEX DOCD: CPT | Mod: CPTII,,, | Performed by: ORTHOPAEDIC SURGERY

## 2022-10-03 PROCEDURE — 1159F MED LIST DOCD IN RCRD: CPT | Mod: CPTII,,, | Performed by: ORTHOPAEDIC SURGERY

## 2022-10-03 PROCEDURE — 1159F PR MEDICATION LIST DOCUMENTED IN MEDICAL RECORD: ICD-10-PCS | Mod: CPTII,,, | Performed by: ORTHOPAEDIC SURGERY

## 2022-10-03 PROCEDURE — 73562 X-RAY EXAM OF KNEE 3: CPT | Mod: TC,RT

## 2022-10-03 PROCEDURE — 3008F PR BODY MASS INDEX (BMI) DOCUMENTED: ICD-10-PCS | Mod: CPTII,,, | Performed by: ORTHOPAEDIC SURGERY

## 2022-10-03 PROCEDURE — 99024 PR POST-OP FOLLOW-UP VISIT: ICD-10-PCS | Mod: ,,, | Performed by: ORTHOPAEDIC SURGERY

## 2022-10-03 PROCEDURE — 99212 OFFICE O/P EST SF 10 MIN: CPT | Mod: PBBFAC | Performed by: ORTHOPAEDIC SURGERY

## 2022-10-03 PROCEDURE — 99999 PR PBB SHADOW E&M-EST. PATIENT-LVL II: ICD-10-PCS | Mod: PBBFAC,,, | Performed by: ORTHOPAEDIC SURGERY

## 2022-10-03 PROCEDURE — 99999 PR PBB SHADOW E&M-EST. PATIENT-LVL II: CPT | Mod: PBBFAC,,, | Performed by: ORTHOPAEDIC SURGERY

## 2022-10-03 PROCEDURE — 73562 X-RAY EXAM OF KNEE 3: CPT | Mod: 26,RT,, | Performed by: RADIOLOGY

## 2022-10-03 PROCEDURE — 73562 XR KNEE 3 VIEW RIGHT: ICD-10-PCS | Mod: 26,RT,, | Performed by: RADIOLOGY

## 2022-10-03 RX ORDER — METHOCARBAMOL 500 MG/1
500 TABLET, FILM COATED ORAL 4 TIMES DAILY
COMMUNITY

## 2022-12-28 ENCOUNTER — TELEPHONE (OUTPATIENT)
Dept: SPORTS MEDICINE | Facility: CLINIC | Age: 47
End: 2022-12-28
Payer: COMMERCIAL

## 2022-12-28 NOTE — TELEPHONE ENCOUNTER
"Chey Mcneill is a patient of Dr. Cooper Ramos and was telephoned today to complete a "Patient Questionnaire". A telephone message was left for patient asking that she return my call.  "

## 2023-01-06 NOTE — PROGRESS NOTES
Subjective:          Chief Complaint: Chey Mcneill is a 47 y.o. female who had concerns including Pain of the Right Knee. 12 weeks s/p ORIF R tibial plateau fracture. Progressing well with PT.Has been weightbearing for the last 2 weeks in her  brace     Swati comes to clinic for postoperative evaluation.     Swati presents to our clinic for post operative evaluation of the below procedures, she is now 6 weeks post operative. She is ambulating with the use of 2 crutches and wearing Tscope brace. She is currently in physical therapy with Bj Mason at [facility] and progressing well.     Pt presents for 3 week post-op evaluation s/p procedure below and staple removal. Pt has no complaints at this time. She is doing PT and progressing as scheduled. Pt is taking pain meds as needed. Denies fever, chills, discharge from wound site, and N/V.    DATE OF PROCEDURE: 7/12/2022     ATTENDING SURGEON: Surgeon(s) and Role:     * Cooper Ramos MD - Primary     Assistants:  MD Lien Montana  [facility]        PREOPERATIVE DIAGNOSIS:  Right  Chondromalacia, patella M22.40, Chondromalacia, (excludes patella) M94.29, Fracture, Tibia plateau S82.143A and Synovitis M65.9     POSTOPERATIVE DIAGNOSIS:   Right  Chondromalacia, patella M22.40, Chondromalacia, (excludes patella) M94.29, Fracture, Tibia plateau S82.143A and Synovitis M65.9     PROCEDURES(S) PERFORMED:   1. Right  Open Reduction and Internal Fixation Lateral Tibial Plateau Fracture  2.  Right  Arthroscopy, knee, synovectomy, limited 50428  3.  Right  Arthroscopy, debridement/shaving of articular cartilage (chondroplasty) 10046    Pain  Associated symptoms include joint swelling. Pertinent negatives include no abdominal pain, chest pain, chills, congestion, coughing, fever, nausea, numbness, rash, sore throat or vomiting.     Review of Systems   Constitutional: Negative for chills and fever.   HENT:  Negative for congestion and sore throat.     Eyes:  Negative for discharge and double vision.   Cardiovascular:  Negative for chest pain, palpitations and syncope.   Respiratory:  Negative for cough and shortness of breath.    Endocrine: Negative for cold intolerance and heat intolerance.   Skin:  Negative for dry skin and rash.   Musculoskeletal:  Positive for joint pain, joint swelling and stiffness.   Gastrointestinal:  Negative for abdominal pain, nausea and vomiting.   Neurological:  Negative for focal weakness, numbness and paresthesias.     Pain Related Questions  Over the past 3 days, what was your average pain during activity? (I.e. running, jogging, walking, climbing stairs, getting dressed, ect.): 4  Over the past 3 days, what was your highest pain level?: 4  Over the past 3 days, what was your lowest pain level? : 4    Other  Was the patient's HEIGHT measured or patient reported?: Patient Reported  Was the patient's WEIGHT measured or patient reported?: Measured      Objective:        General: Chey is well-developed, well-nourished, appears stated age, in no acute distress, alert and oriented to time, place and person.     General    Nursing note and vitals reviewed.  Constitutional: She is oriented to person, place, and time. She appears well-developed and well-nourished. No distress.   HENT:   Head: Normocephalic and atraumatic.   Nose: Nose normal.   Mouth/Throat: No oropharyngeal exudate.   Eyes: Conjunctivae and EOM are normal. Right eye exhibits no discharge. Left eye exhibits no discharge.   Neck: Neck supple.   Cardiovascular:  Normal rate, regular rhythm and intact distal pulses.            Pulmonary/Chest: Effort normal and breath sounds normal. No respiratory distress.   Abdominal: Soft. Bowel sounds are normal. She exhibits no distension. There is no abdominal tenderness.   Neurological: She is alert and oriented to person, place, and time. She has normal reflexes. No cranial nerve deficit. Coordination normal.   Psychiatric: She  has a normal mood and affect. Her behavior is normal. Judgment and thought content normal.     General Musculoskeletal Exam   Gait: antalgic       Right Knee Exam     Inspection   Erythema: absent  Scars: present  Swelling: present  Effusion: absent  Deformity: absent  Bruising: absent    Range of Motion   Extension:  5 abnormal   Flexion:  120 abnormal     Tests   Meniscus   Leela:  Medial - negative Lateral - negative  Ligament Examination   Lachman: normal (-1 to 2mm)   PCL-Posterior Drawer: normal (0 to 2mm)     MCL - Valgus: normal (0 to 2mm)  LCL - Varus: normal  Pivot Shift: normal (Equal)  Reverse Pivot Shift: normal (Equal)  Dial Test at 30 degrees: normal (< 5 degrees)  Dial Test at 90 degrees: normal (< 5 degrees)  Posterior Sag Test: negative  Posterolateral Corner: stable  Patella   Patellar apprehension: negative  Passive Patellar Tilt: neutral  Patellar Tracking: normal  Patellar Glide (quadrants): Lateral - 1   Medial - 2  Q-Angle at 90 degrees: normal  Patellar Grind: negative  J-Sign: none    Other   Meniscal Cyst: absent  Popliteal (Baker's) Cyst: absent  Sensation: normal    Comments:  Incision clean/dry/intact  No sign of infection  Mild swelling  Compartments soft  Neurovascular status intact in extremity      Left Knee Exam     Inspection   Erythema: absent  Scars: present  Swelling: absent  Effusion: absent  Deformity: absent  Bruising: absent    Tenderness   The patient is experiencing no tenderness.     Range of Motion   Extension:  5 abnormal   Flexion:  140     Tests   Meniscus   Leela:  Medial - negative Lateral - negative  Stability   Lachman: normal (-1 to 2mm)   PCL-Posterior Drawer: normal (0 to 2mm)  MCL - Valgus: normal (0 to 2mm)  LCL - Varus: normal (0 to 2mm)  Pivot Shift: normal (Equal)  Reverse Pivot Shift: normal (Equal)  Dial Test at 30 degrees: normal (< 5 degrees)  Dial Test at 90 degrees: normal (< 5 degrees)  Posterior Sag Test: negative  Posterolateral Corner:  stable  Patella   Patellar apprehension: negative  Passive Patellar Tilt: neutral  Patellar Tracking: normal  Patellar Glide (Quadrants): Lateral - 1 Medial - 2  Q-Angle at 90 degrees: normal  Patellar Grind: negative  J-Sign: J sign absent    Other   Meniscal Cyst: absent  Popliteal (Baker's) Cyst: absent  Sensation: normal    Right Hip Exam     Tests   Raymond: negative  Left Hip Exam     Tests   Raymond: negative          Muscle Strength   Right Lower Extremity   Hip Abduction: 5/5   Quadriceps:  4/5   Hamstrin/5   Left Lower Extremity   Hip Abduction: 5/5   Quadriceps:  5/5   Hamstrin/5     Reflexes     Left Side  Achilles:  2+  Quadriceps:  2+    Right Side   Achilles:  2+  Quadriceps:  2+    Vascular Exam     Right Pulses  Dorsalis Pedis:      2+  Posterior Tibial:      2+        Left Pulses  Dorsalis Pedis:      2+  Posterior Tibial:      2+        Edema  Right Lower Leg: absent  Left Lower Leg: absent  Radiographic Findings:    Postoperative changes of open reduction internal fixation of comminuted depressed lateral tibial plateau fracture with lateral screw-plate construct utilizing multiple medullary and cortical screws.  No evidence of hardware loosening or screw fracture.  Persistent mild cortical step-off at the lateral tibial plateau.    Xrays of the right knee were reviewed by me today. These findings were discussed and reviewed with the patient.    X-ray Knee Ortho Bilateral with Flexion  Narrative: EXAMINATION:  XR KNEE ORTHO BILAT WITH FLEXION    CLINICAL HISTORY:  Pain in right knee    TECHNIQUE:  AP standing of both knees, PA flexion standing views of both knees, and Merchant views of both knees were performed.  Lateral views of both knees were also performed.    COMPARISON:  Knee radiographs 10/03/2022 and 2022; CT knee 2022    FINDINGS:  Right knee:    Postoperative changes from ORIF of the proximal tibia with a lateral plate and screw construct.  Unchanged alignment.  No  hardware complication.  Fracture lines are not visualized suggesting healing.    Postoperative changes from remote cartilage implantation of the patella.  Unchanged sclerosis in the medial femoral condyle and medial tibial plateau.    No new fracture or dislocation.  Mild-moderate tricompartmental degenerative changes, most pronounced in the medial compartment.  Small knee joint effusion.    Left knee:    Postoperative changes from presumed tibial tubercle plasty.  No fracture or dislocation.  Mild degenerative changes of the medial and patellofemoral compartments.  Chondrocalcinosis is present.  No significant joint effusion.  Impression: Posttraumatic, postoperative, and degenerative changes as described.  No significant changes from prior.    Electronically signed by: Jared Nye  Date:    01/09/2023  Time:    09:47                Assessment:       Encounter Diagnoses   Name Primary?    Right knee pain, unspecified chronicity Yes    Tear of MCL (medial collateral ligament) of knee, right, initial encounter     Tibial plateau chondromalacia               Plan:       1. RTC in 3 months with Dr. Cooper Ramos. IKDC, SF-12 and KOOS was filled out today in clinic. Patient will fill out IKDC, SF-12 and KOOS on return.    2. Medications: Refills of the following Rx were sent to patients preferred Pharmacy:  No Refills Needed Today    3. Physical Therapy: Continue/Begin: Continue at [facility]    4. HEP: N/A    5. Procedures/Procedural Planning:   N/A    6. DME: N/A    7. Work/Sport Status: No interval change    8. Visit Summary: Doing well                             Patient questionnaires may have been collected.

## 2023-01-08 ENCOUNTER — PATIENT MESSAGE (OUTPATIENT)
Dept: ADMINISTRATIVE | Facility: OTHER | Age: 48
End: 2023-01-08
Payer: COMMERCIAL

## 2023-01-09 ENCOUNTER — OFFICE VISIT (OUTPATIENT)
Dept: SPORTS MEDICINE | Facility: CLINIC | Age: 48
End: 2023-01-09
Payer: MEDICAID

## 2023-01-09 ENCOUNTER — HOSPITAL ENCOUNTER (OUTPATIENT)
Dept: RADIOLOGY | Facility: HOSPITAL | Age: 48
Discharge: HOME | End: 2023-01-09
Attending: ORTHOPAEDIC SURGERY
Payer: MEDICAID

## 2023-01-09 VITALS
SYSTOLIC BLOOD PRESSURE: 106 MMHG | HEART RATE: 71 BPM | HEIGHT: 62 IN | DIASTOLIC BLOOD PRESSURE: 74 MMHG | BODY MASS INDEX: 23.96 KG/M2

## 2023-01-09 DIAGNOSIS — S83.411A TEAR OF MCL (MEDIAL COLLATERAL LIGAMENT) OF KNEE, RIGHT, INITIAL ENCOUNTER: ICD-10-CM

## 2023-01-09 DIAGNOSIS — M25.561 RIGHT KNEE PAIN, UNSPECIFIED CHRONICITY: Primary | ICD-10-CM

## 2023-01-09 DIAGNOSIS — M94.269 TIBIAL PLATEAU CHONDROMALACIA: ICD-10-CM

## 2023-01-09 DIAGNOSIS — M25.561 RIGHT KNEE PAIN, UNSPECIFIED CHRONICITY: ICD-10-CM

## 2023-01-09 PROCEDURE — 3078F DIAST BP <80 MM HG: CPT | Mod: CPTII,,, | Performed by: ORTHOPAEDIC SURGERY

## 2023-01-09 PROCEDURE — 99214 PR OFFICE/OUTPT VISIT, EST, LEVL IV, 30-39 MIN: ICD-10-PCS | Mod: S$PBB,,, | Performed by: ORTHOPAEDIC SURGERY

## 2023-01-09 PROCEDURE — 1159F PR MEDICATION LIST DOCUMENTED IN MEDICAL RECORD: ICD-10-PCS | Mod: CPTII,,, | Performed by: ORTHOPAEDIC SURGERY

## 2023-01-09 PROCEDURE — 3008F PR BODY MASS INDEX (BMI) DOCUMENTED: ICD-10-PCS | Mod: CPTII,,, | Performed by: ORTHOPAEDIC SURGERY

## 2023-01-09 PROCEDURE — 3078F PR MOST RECENT DIASTOLIC BLOOD PRESSURE < 80 MM HG: ICD-10-PCS | Mod: CPTII,,, | Performed by: ORTHOPAEDIC SURGERY

## 2023-01-09 PROCEDURE — 99999 PR PBB SHADOW E&M-EST. PATIENT-LVL III: CPT | Mod: PBBFAC,,, | Performed by: ORTHOPAEDIC SURGERY

## 2023-01-09 PROCEDURE — 73564 X-RAY EXAM KNEE 4 OR MORE: CPT | Mod: 26,50,, | Performed by: INTERNAL MEDICINE

## 2023-01-09 PROCEDURE — 99213 OFFICE O/P EST LOW 20 MIN: CPT | Mod: PBBFAC | Performed by: ORTHOPAEDIC SURGERY

## 2023-01-09 PROCEDURE — 99999 PR PBB SHADOW E&M-EST. PATIENT-LVL III: ICD-10-PCS | Mod: PBBFAC,,, | Performed by: ORTHOPAEDIC SURGERY

## 2023-01-09 PROCEDURE — 1159F MED LIST DOCD IN RCRD: CPT | Mod: CPTII,,, | Performed by: ORTHOPAEDIC SURGERY

## 2023-01-09 PROCEDURE — 73564 X-RAY EXAM KNEE 4 OR MORE: CPT | Mod: TC,50

## 2023-01-09 PROCEDURE — 99214 OFFICE O/P EST MOD 30 MIN: CPT | Mod: S$PBB,,, | Performed by: ORTHOPAEDIC SURGERY

## 2023-01-09 PROCEDURE — 73564 XR KNEE ORTHO BILAT WITH FLEXION: ICD-10-PCS | Mod: 26,50,, | Performed by: INTERNAL MEDICINE

## 2023-01-09 PROCEDURE — 3008F BODY MASS INDEX DOCD: CPT | Mod: CPTII,,, | Performed by: ORTHOPAEDIC SURGERY

## 2023-01-09 PROCEDURE — 3074F PR MOST RECENT SYSTOLIC BLOOD PRESSURE < 130 MM HG: ICD-10-PCS | Mod: CPTII,,, | Performed by: ORTHOPAEDIC SURGERY

## 2023-01-09 PROCEDURE — 3074F SYST BP LT 130 MM HG: CPT | Mod: CPTII,,, | Performed by: ORTHOPAEDIC SURGERY

## 2023-01-09 RX ORDER — LEVOTHYROXINE SODIUM 75 UG/1
75 TABLET ORAL EVERY MORNING
COMMUNITY
Start: 2022-12-21 | End: 2023-08-07

## 2023-01-11 ENCOUNTER — PATIENT MESSAGE (OUTPATIENT)
Dept: SPORTS MEDICINE | Facility: CLINIC | Age: 48
End: 2023-01-11
Payer: COMMERCIAL

## 2023-01-11 DIAGNOSIS — S82.141D CLOSED FRACTURE OF RIGHT TIBIAL PLATEAU WITH ROUTINE HEALING, SUBSEQUENT ENCOUNTER: Primary | ICD-10-CM

## 2023-01-30 ENCOUNTER — TELEPHONE (OUTPATIENT)
Dept: SPORTS MEDICINE | Facility: CLINIC | Age: 48
End: 2023-01-30
Payer: COMMERCIAL

## 2023-04-05 ENCOUNTER — TELEPHONE (OUTPATIENT)
Dept: SPORTS MEDICINE | Facility: CLINIC | Age: 48
End: 2023-04-05
Payer: MEDICAID

## 2023-04-05 NOTE — TELEPHONE ENCOUNTER
Spoke to the patient regarding her appointment on 4/17 with Dr. Ramos. While on the call I informed the patient that Dr. Ramos will be out of the office until June due to an emergency. I offered the patient an appointment with Jennifer Dunlap PA-C on 4/11. The patient accepted the appointment with Fabi on 4/11.

## 2023-04-11 ENCOUNTER — OFFICE VISIT (OUTPATIENT)
Dept: SPORTS MEDICINE | Facility: CLINIC | Age: 48
End: 2023-04-11
Payer: MEDICAID

## 2023-04-11 ENCOUNTER — HOSPITAL ENCOUNTER (OUTPATIENT)
Dept: RADIOLOGY | Facility: HOSPITAL | Age: 48
Discharge: HOME | End: 2023-04-11
Attending: PHYSICIAN ASSISTANT
Payer: MEDICAID

## 2023-04-11 VITALS
DIASTOLIC BLOOD PRESSURE: 74 MMHG | WEIGHT: 164 LBS | BODY MASS INDEX: 30.18 KG/M2 | HEIGHT: 62 IN | HEART RATE: 73 BPM | SYSTOLIC BLOOD PRESSURE: 115 MMHG

## 2023-04-11 DIAGNOSIS — M25.561 RIGHT KNEE PAIN, UNSPECIFIED CHRONICITY: ICD-10-CM

## 2023-04-11 DIAGNOSIS — M22.41 CHONDROMALACIA, PATELLA, RIGHT: ICD-10-CM

## 2023-04-11 DIAGNOSIS — G89.29 CHRONIC PAIN OF RIGHT KNEE: Primary | ICD-10-CM

## 2023-04-11 DIAGNOSIS — M25.561 CHRONIC PAIN OF RIGHT KNEE: Primary | ICD-10-CM

## 2023-04-11 DIAGNOSIS — M94.261 CHONDROMALACIA OF RIGHT KNEE: ICD-10-CM

## 2023-04-11 PROCEDURE — 1160F PR REVIEW ALL MEDS BY PRESCRIBER/CLIN PHARMACIST DOCUMENTED: ICD-10-PCS | Mod: CPTII,,, | Performed by: PHYSICIAN ASSISTANT

## 2023-04-11 PROCEDURE — 3074F SYST BP LT 130 MM HG: CPT | Mod: CPTII,,, | Performed by: PHYSICIAN ASSISTANT

## 2023-04-11 PROCEDURE — 99213 OFFICE O/P EST LOW 20 MIN: CPT | Mod: PBBFAC | Performed by: PHYSICIAN ASSISTANT

## 2023-04-11 PROCEDURE — 73564 XR KNEE ORTHO BILAT WITH FLEXION: ICD-10-PCS | Mod: 26,50,, | Performed by: RADIOLOGY

## 2023-04-11 PROCEDURE — 1159F PR MEDICATION LIST DOCUMENTED IN MEDICAL RECORD: ICD-10-PCS | Mod: CPTII,,, | Performed by: PHYSICIAN ASSISTANT

## 2023-04-11 PROCEDURE — 3074F PR MOST RECENT SYSTOLIC BLOOD PRESSURE < 130 MM HG: ICD-10-PCS | Mod: CPTII,,, | Performed by: PHYSICIAN ASSISTANT

## 2023-04-11 PROCEDURE — 3008F PR BODY MASS INDEX (BMI) DOCUMENTED: ICD-10-PCS | Mod: CPTII,,, | Performed by: PHYSICIAN ASSISTANT

## 2023-04-11 PROCEDURE — 99214 OFFICE O/P EST MOD 30 MIN: CPT | Mod: S$PBB,,, | Performed by: PHYSICIAN ASSISTANT

## 2023-04-11 PROCEDURE — 99999 PR PBB SHADOW E&M-EST. PATIENT-LVL III: ICD-10-PCS | Mod: PBBFAC,,, | Performed by: PHYSICIAN ASSISTANT

## 2023-04-11 PROCEDURE — 3078F DIAST BP <80 MM HG: CPT | Mod: CPTII,,, | Performed by: PHYSICIAN ASSISTANT

## 2023-04-11 PROCEDURE — 73564 X-RAY EXAM KNEE 4 OR MORE: CPT | Mod: TC,50

## 2023-04-11 PROCEDURE — 3078F PR MOST RECENT DIASTOLIC BLOOD PRESSURE < 80 MM HG: ICD-10-PCS | Mod: CPTII,,, | Performed by: PHYSICIAN ASSISTANT

## 2023-04-11 PROCEDURE — 99214 PR OFFICE/OUTPT VISIT, EST, LEVL IV, 30-39 MIN: ICD-10-PCS | Mod: S$PBB,,, | Performed by: PHYSICIAN ASSISTANT

## 2023-04-11 PROCEDURE — 3008F BODY MASS INDEX DOCD: CPT | Mod: CPTII,,, | Performed by: PHYSICIAN ASSISTANT

## 2023-04-11 PROCEDURE — 1159F MED LIST DOCD IN RCRD: CPT | Mod: CPTII,,, | Performed by: PHYSICIAN ASSISTANT

## 2023-04-11 PROCEDURE — 1160F RVW MEDS BY RX/DR IN RCRD: CPT | Mod: CPTII,,, | Performed by: PHYSICIAN ASSISTANT

## 2023-04-11 PROCEDURE — 73564 X-RAY EXAM KNEE 4 OR MORE: CPT | Mod: 26,50,, | Performed by: RADIOLOGY

## 2023-04-11 PROCEDURE — 99999 PR PBB SHADOW E&M-EST. PATIENT-LVL III: CPT | Mod: PBBFAC,,, | Performed by: PHYSICIAN ASSISTANT

## 2023-04-11 NOTE — PROGRESS NOTES
Subjective:          Chief Complaint: Chey Mcneill is a 48 y.o. female who had concerns including Post-op Evaluation of the Right Knee.     Swati comes to clinic for 8 months post op evaluation of right knee. Pain increased last week without a NEAL. She has been doing well until last week. Pain is predominantly located along the lateral aspect of the right knee. Pain today is 2/10. She has been taking robaxin as needed for pain. She reports increased swelling with increased activity. She is currently in physical therapy with Bj Mason at [facility]. She wears a lateral  brace but she reports blistering on her skin where the brace touches her. Denies any mechanical symptoms or instability in her right knee.        DATE OF PROCEDURE: 7/12/2022     ATTENDING SURGEON: Surgeon(s) and Role:     * Cooper Ramos MD - Primary     Assistants:  MD Lien Montana  [facility]       PREOPERATIVE DIAGNOSIS:  Right  Chondromalacia, patella M22.40, Chondromalacia, (excludes patella) M94.29, Fracture, Tibia plateau S82.143A and Synovitis M65.9     POSTOPERATIVE DIAGNOSIS:   Right  Chondromalacia, patella M22.40, Chondromalacia, (excludes patella) M94.29, Fracture, Tibia plateau S82.143A and Synovitis M65.9     PROCEDURES(S) PERFORMED:   1. Right  Open Reduction and Internal Fixation Lateral Tibial Plateau Fracture  2.  Right  Arthroscopy, knee, synovectomy, limited 20075  3.  Right  Arthroscopy, debridement/shaving of articular cartilage (chondroplasty) 78565    Pain  Associated symptoms include joint swelling. Pertinent negatives include no abdominal pain, chest pain, chills, congestion, coughing, fever, nausea, numbness, rash, sore throat or vomiting.     Review of Systems   Constitutional: Negative for chills and fever.   HENT:  Negative for congestion and sore throat.    Eyes:  Negative for discharge and double vision.   Cardiovascular:  Negative for chest pain, palpitations and syncope.    Respiratory:  Negative for cough and shortness of breath.    Endocrine: Negative for cold intolerance and heat intolerance.   Skin:  Negative for dry skin and rash.   Musculoskeletal:  Positive for joint pain, joint swelling and stiffness.   Gastrointestinal:  Negative for abdominal pain, nausea and vomiting.   Neurological:  Negative for focal weakness, numbness and paresthesias.                 Objective:        General: Chey is well-developed, well-nourished, appears stated age, in no acute distress, alert and oriented to time, place and person.     General    Nursing note and vitals reviewed.  Constitutional: She is oriented to person, place, and time. She appears well-developed and well-nourished. No distress.   HENT:   Head: Normocephalic and atraumatic.   Nose: Nose normal.   Mouth/Throat: No oropharyngeal exudate.   Eyes: Conjunctivae and EOM are normal. Right eye exhibits no discharge. Left eye exhibits no discharge.   Neck: Neck supple.   Cardiovascular:  Normal rate, regular rhythm and intact distal pulses.            Pulmonary/Chest: Effort normal and breath sounds normal. No respiratory distress.   Abdominal: Soft. Bowel sounds are normal. She exhibits no distension. There is no abdominal tenderness.   Neurological: She is alert and oriented to person, place, and time. She has normal reflexes. No cranial nerve deficit. Coordination normal.   Psychiatric: She has a normal mood and affect. Her behavior is normal. Judgment and thought content normal.     General Musculoskeletal Exam   Gait: antalgic       Right Knee Exam     Inspection   Erythema: absent  Scars: present  Swelling: present  Effusion: absent  Deformity: absent  Bruising: absent    Tenderness   The patient is tender to palpation of the patella and lateral joint line.    Crepitus   The patient has crepitus of the lateral joint line.    Range of Motion   Extension:  5 abnormal   Flexion:  130 normal     Tests   Meniscus   Leela:  Medial  - negative Lateral - negative  Ligament Examination   Lachman: normal (-1 to 2mm)   PCL-Posterior Drawer: normal (0 to 2mm)     MCL - Valgus: normal (0 to 2mm)  LCL - Varus: normal  Pivot Shift: normal (Equal)  Reverse Pivot Shift: normal (Equal)  Posterior Sag Test: negative  Posterolateral Corner: stable  Patella   Patellar apprehension: negative  Passive Patellar Tilt: neutral  Patellar Tracking: normal  Patellar Glide (quadrants): Lateral - 1   Medial - 2  Q-Angle at 90 degrees: normal  Patellar Grind: negative  J-Sign: none    Other   Meniscal Cyst: absent  Popliteal (Baker's) Cyst: absent  Sensation: normal    Comments:  Incisions well heaed    Left Knee Exam     Inspection   Erythema: absent  Scars: present  Swelling: absent  Effusion: absent  Deformity: absent  Bruising: absent    Tenderness   The patient tender to palpation of the medial joint line and lateral joint line.    Range of Motion   Extension:  5 abnormal   Flexion:  140 normal     Tests   Meniscus   Leela:  Medial - negative Lateral - negative  Stability   Lachman: normal (-1 to 2mm)   PCL-Posterior Drawer: normal (0 to 2mm)  MCL - Valgus: normal (0 to 2mm)  LCL - Varus: normal (0 to 2mm)  Pivot Shift: normal (Equal)  Reverse Pivot Shift: normal (Equal)  Posterior Sag Test: negative  Posterolateral Corner: stable  Patella   Patellar apprehension: negative  Passive Patellar Tilt: neutral  Patellar Tracking: normal  Patellar Glide (Quadrants): Lateral - 1 Medial - 2  Q-Angle at 90 degrees: normal  Patellar Grind: negative  J-Sign: J sign absent    Other   Meniscal Cyst: absent  Popliteal (Baker's) Cyst: absent  Sensation: normal    Right Hip Exam     Tests   Raymond: negative  Left Hip Exam     Tests   Raymond: negative          Muscle Strength   Right Lower Extremity   Hip Abduction: 5/5   Quadriceps:  5/5   Hamstrin/5   Left Lower Extremity   Hip Abduction: 5/5   Quadriceps:  5/5   Hamstrin/5     Reflexes     Left Side  Achilles:   2+  Quadriceps:  2+    Right Side   Achilles:  2+  Quadriceps:  2+    Vascular Exam     Right Pulses  Dorsalis Pedis:      2+  Posterior Tibial:      2+        Left Pulses  Dorsalis Pedis:      2+  Posterior Tibial:      2+        Edema  Right Lower Leg: absent  Left Lower Leg: absent      RADIOGRAPHS:  Bilateral knees: 4/11/23  FINDINGS:  Postoperative changes with plate plate and screw construct in the proximal right tibia remain in satisfactory position.  No joint effusion can be seen.  Postoperative changes are also seen in the left tibia with no joint effusion.  Some changes of chondrocalcinosis are present the left.         Assessment:       Encounter Diagnoses   Name Primary?    Chronic pain of right knee Yes    Chondromalacia, patella, right     Chondromalacia of right knee               Plan:       1. RTC in 3 months with Dr. Cooper Ramos for 1 year post op right knee. IKDC, SF-12 and KOOS was filled out today in clinic. Patient will fill out IKDC, SF-12 and KOOS on return. Bilateral knee series on return.    2. Continue Robaxin as needed.    3. Physical Therapy: Continue/Begin: Continue at [facility]    4. DME: 69843 - Yamini, performed a custom orthotic / brace adjustment, fitting and training with the patient. The patient demonstrated understanding and proper care. This was performed for 5 minutes. Reparel sleeve to apply under  brace due to blistering of skin from brace    5. Work/Sport Status: No interval change- Patient recently changed jobs.                                 Patient questionnaires may have been collected.

## 2023-06-05 ENCOUNTER — PATIENT MESSAGE (OUTPATIENT)
Dept: SPORTS MEDICINE | Facility: CLINIC | Age: 48
End: 2023-06-05
Payer: MEDICAID

## 2023-07-11 ENCOUNTER — TELEPHONE (OUTPATIENT)
Dept: SPORTS MEDICINE | Facility: CLINIC | Age: 48
End: 2023-07-11
Payer: MEDICAID

## 2023-07-11 ENCOUNTER — PATIENT MESSAGE (OUTPATIENT)
Dept: SPORTS MEDICINE | Facility: CLINIC | Age: 48
End: 2023-07-11
Payer: MEDICAID

## 2023-07-11 NOTE — TELEPHONE ENCOUNTER
Left voicemail for patient stating Dr. Ramos will be out of clinic on Wednesday 7/12 and we will have to get her rescheduled. Left a portal message as well.

## 2023-08-03 NOTE — PROGRESS NOTES
Subjective:          Chief Complaint: Chey Mcneill is a 48 y.o. female who had concerns including Post-op Evaluation of the Right Knee.     Swati comes to clinic for 12 months post op evaluation of right knee. She reports intermittent swelling, exacerbated by more activity. She is still going to PT once per week. She denies any pain. Occasionally will take advill when it flares up, and sometimes takes Robaxin but not everyday.       DATE OF PROCEDURE: 7/12/2022     ATTENDING SURGEON: Surgeon(s) and Role:     * Cooper Ramos MD - Primary     Assistants:  MD Lien Montana  [facility]       PREOPERATIVE DIAGNOSIS:  Right  Chondromalacia, patella M22.40, Chondromalacia, (excludes patella) M94.29, Fracture, Tibia plateau S82.143A and Synovitis M65.9     POSTOPERATIVE DIAGNOSIS:   Right  Chondromalacia, patella M22.40, Chondromalacia, (excludes patella) M94.29, Fracture, Tibia plateau S82.143A and Synovitis M65.9     PROCEDURES(S) PERFORMED:   1. Right  Open Reduction and Internal Fixation Lateral Tibial Plateau Fracture  2.  Right  Arthroscopy, knee, synovectomy, limited 43041  3.  Right  Arthroscopy, debridement/shaving of articular cartilage (chondroplasty) 47010    Pain  Associated symptoms include joint swelling. Pertinent negatives include no abdominal pain, chest pain, chills, congestion, coughing, fever, nausea, numbness, rash, sore throat or vomiting.       Review of Systems   Constitutional: Negative for chills and fever.   HENT:  Negative for congestion and sore throat.    Eyes:  Negative for discharge and double vision.   Cardiovascular:  Negative for chest pain, palpitations and syncope.   Respiratory:  Negative for cough and shortness of breath.    Endocrine: Negative for cold intolerance and heat intolerance.   Skin:  Negative for dry skin and rash.   Musculoskeletal:  Positive for joint pain, joint swelling and stiffness.   Gastrointestinal:  Negative for abdominal pain, nausea and  vomiting.   Neurological:  Negative for focal weakness, numbness and paresthesias.       Pain Related Questions  Over the past 3 days, what was your average pain during activity? (I.e. running, jogging, walking, climbing stairs, getting dressed, ect.): 3  Over the past 3 days, what was your highest pain level?: 6  Over the past 3 days, what was your lowest pain level? : 1    Other  How many nights a week are you awakened by your affected body part?: 1  Was the patient's HEIGHT measured or patient reported?: Patient Reported  Was the patient's WEIGHT measured or patient reported?: Measured      Objective:        General: Chey is well-developed, well-nourished, appears stated age, in no acute distress, alert and oriented to time, place and person.     General    Nursing note and vitals reviewed.  Constitutional: She is oriented to person, place, and time. She appears well-developed and well-nourished. No distress.   HENT:   Head: Normocephalic and atraumatic.   Nose: Nose normal.   Mouth/Throat: No oropharyngeal exudate.   Eyes: Conjunctivae and EOM are normal. Right eye exhibits no discharge. Left eye exhibits no discharge.   Neck: Neck supple.   Cardiovascular:  Normal rate, regular rhythm and intact distal pulses.            Pulmonary/Chest: Effort normal and breath sounds normal. No respiratory distress.   Abdominal: Soft. Bowel sounds are normal. She exhibits no distension. There is no abdominal tenderness.   Neurological: She is alert and oriented to person, place, and time. She has normal reflexes. No cranial nerve deficit. Coordination normal.   Psychiatric: She has a normal mood and affect. Her behavior is normal. Judgment and thought content normal.     General Musculoskeletal Exam   Gait: normal       Right Knee Exam     Inspection   Erythema: absent  Scars: present  Swelling: absent  Effusion: absent  Deformity: absent  Bruising: absent    Tenderness   The patient is tender to palpation of the medial  joint line.    Crepitus   Right knee crepitus location: mild.    Range of Motion   Extension:  10 abnormal   Flexion:  130 normal     Tests   Meniscus   Leela:  Medial - negative Lateral - negative  Ligament Examination   Lachman: normal (-1 to 2mm)   PCL-Posterior Drawer: normal (0 to 2mm)     MCL - Valgus: normal (0 to 2mm)  LCL - Varus: normal  Pivot Shift: normal (Equal)  Reverse Pivot Shift: normal (Equal)  Dial Test at 30 degrees: normal (< 5 degrees)  Dial Test at 90 degrees: normal (< 5 degrees)  Posterior Sag Test: negative  Posterolateral Corner: stable  Patella   Patellar apprehension: negative  Passive Patellar Tilt: neutral  Patellar Tracking: normal  Patellar Glide (quadrants): Lateral - 1   Medial - 2  Q-Angle at 90 degrees: normal  Patellar Grind: negative  J-Sign: none    Other   Meniscal Cyst: absent  Popliteal (Baker's) Cyst: absent  Sensation: normal    Comments:  Incisions well heaed    Left Knee Exam     Inspection   Erythema: absent  Scars: present  Swelling: absent  Effusion: absent  Deformity: absent  Bruising: absent    Tenderness   The patient tender to palpation of the medial joint line.    Crepitus   The patient has crepitus of the patella (mild).    Range of Motion   Extension:  10 abnormal   Flexion:  140 normal     Tests   Meniscus   Leela:  Medial - negative Lateral - negative  Stability   Lachman: normal (-1 to 2mm)   PCL-Posterior Drawer: normal (0 to 2mm)  MCL - Valgus: normal (0 to 2mm)  LCL - Varus: normal (0 to 2mm)  Pivot Shift: normal (Equal)  Reverse Pivot Shift: normal (Equal)  Dial Test at 30 degrees: normal (< 5 degrees)  Dial Test at 90 degrees: normal (< 5 degrees)  Posterior Sag Test: negative  Posterolateral Corner: stable  Patella   Patellar apprehension: negative  Passive Patellar Tilt: neutral  Patellar Tracking: normal  Patellar Glide (Quadrants): Lateral - 1 Medial - 2  Q-Angle at 90 degrees: normal  Patellar Grind: negative  J-Sign: J sign absent    Other    Meniscal Cyst: absent  Popliteal (Baker's) Cyst: absent  Sensation: normal    Comments:  Old Incisions well healed    Right Hip Exam     Tests   Raymond: negative  Left Hip Exam     Tests   Raymond: negative          Muscle Strength   Right Lower Extremity   Hip Abduction: 5/5   Quadriceps:  5/5   Hamstrin/5   Left Lower Extremity   Hip Abduction: 5/5   Quadriceps:  5/5   Hamstrin/5     Reflexes     Left Side  Achilles:  2+  Quadriceps:  2+    Right Side   Achilles:  2+  Quadriceps:  2+    Vascular Exam     Right Pulses  Dorsalis Pedis:      2+  Posterior Tibial:      2+        Left Pulses  Dorsalis Pedis:      2+  Posterior Tibial:      2+        Edema  Right Lower Leg: absent  Left Lower Leg: absent        RADIOGRAPHS:  X-ray Knee Ortho Bilateral with Flexion  EXAMINATION:  XR KNEE ORTHO BILAT WITH FLEXION    CLINICAL HISTORY:  Pain in right knee    TECHNIQUE:  AP standing of both knees, PA flexion standing views of both knees, and Merchant views of both knees were performed.  Lateral views of both knees were also performed.    COMPARISON:  2023    FINDINGS:  Patient has had previous is surgery on the right tibia with screws in place and a lateral plate there is also been previous surgery on the left and proximal tibia.  Joint spaces are maintained.  Persistent sclerosis in the medial femoral condyle is again seen.  No joint effusion is seen on either side.    MPRESSION:    See above    Electronically signed by: Fabio Tse MD  Date:    2023  Time:    08:34            Assessment:       Encounter Diagnoses   Name Primary?    Right knee pain, unspecified chronicity Yes    Chondromalacia, right knee     Chondromalacia of right knee     Chondromalacia of right patella     Internal derangement of right knee     Post-traumatic osteoarthritis of both knees                 Plan:       1. RTC in 3 weeks with Dr. Cooper Ramos bilateral Synvisc one injections; IKDC, SF-12 and KOOS was filled out today  in clinic. Patient will fill out IKDC, SF-12 and KOOS on return.    2. Medications: Refills of the following Rx were sent to patients preferred Pharmacy:  Meloxicam 15mg Tab    3. Physical Therapy: Continue/Begin: [facility] at [facility]    4. HEP: N/A    5. Procedures/Procedural Planning:   Prior authorization for bilateral Knee Synvisc-One to be completed the next several weeks placed today.  We have discussed a variety of treatment options including medications, injections, physical therapy and other alternative treatments. Patient's pain is refractory to long-term use of PO/topical NSAIDs, physical therapy 6+ weeks, aerobic exercise, weight-loss, walking aids, visco-supplementation, and multiple corticosteroid injections, and the current treatment is the only effective treatment recommended at this time. Also recommending to continue HEP.  Patient agrees with treatment plan. Radiographs show osteoarthritis of bilateral knees with Kellgren Jm scale of 2  N/A    6. DME: N/A    7. Work/Sport Status: no interval change    8. Visit Summary: Doing great but authorization placed for bilateral Synvisc injections                                       Patient questionnaires may have been collected.

## 2023-08-07 ENCOUNTER — HOSPITAL ENCOUNTER (OUTPATIENT)
Dept: RADIOLOGY | Facility: HOSPITAL | Age: 48
Discharge: HOME | End: 2023-08-07
Attending: ORTHOPAEDIC SURGERY
Payer: MEDICAID

## 2023-08-07 ENCOUNTER — OFFICE VISIT (OUTPATIENT)
Dept: SPORTS MEDICINE | Facility: CLINIC | Age: 48
End: 2023-08-07
Payer: MEDICAID

## 2023-08-07 VITALS
HEIGHT: 62 IN | WEIGHT: 155.56 LBS | TEMPERATURE: 98 F | SYSTOLIC BLOOD PRESSURE: 96 MMHG | DIASTOLIC BLOOD PRESSURE: 64 MMHG | BODY MASS INDEX: 28.63 KG/M2

## 2023-08-07 DIAGNOSIS — M94.261 CHONDROMALACIA OF RIGHT KNEE: ICD-10-CM

## 2023-08-07 DIAGNOSIS — M23.91 INTERNAL DERANGEMENT OF RIGHT KNEE: ICD-10-CM

## 2023-08-07 DIAGNOSIS — M22.41 CHONDROMALACIA OF RIGHT PATELLA: ICD-10-CM

## 2023-08-07 DIAGNOSIS — M17.2 POST-TRAUMATIC OSTEOARTHRITIS OF BOTH KNEES: ICD-10-CM

## 2023-08-07 DIAGNOSIS — M25.561 RIGHT KNEE PAIN, UNSPECIFIED CHRONICITY: ICD-10-CM

## 2023-08-07 DIAGNOSIS — M94.261 CHONDROMALACIA, RIGHT KNEE: ICD-10-CM

## 2023-08-07 DIAGNOSIS — M25.561 RIGHT KNEE PAIN, UNSPECIFIED CHRONICITY: Primary | ICD-10-CM

## 2023-08-07 PROCEDURE — 99213 OFFICE O/P EST LOW 20 MIN: CPT | Mod: PBBFAC | Performed by: ORTHOPAEDIC SURGERY

## 2023-08-07 PROCEDURE — 3074F PR MOST RECENT SYSTOLIC BLOOD PRESSURE < 130 MM HG: ICD-10-PCS | Mod: CPTII,,, | Performed by: ORTHOPAEDIC SURGERY

## 2023-08-07 PROCEDURE — 99214 PR OFFICE/OUTPT VISIT, EST, LEVL IV, 30-39 MIN: ICD-10-PCS | Mod: S$PBB,,, | Performed by: ORTHOPAEDIC SURGERY

## 2023-08-07 PROCEDURE — 3078F DIAST BP <80 MM HG: CPT | Mod: CPTII,,, | Performed by: ORTHOPAEDIC SURGERY

## 2023-08-07 PROCEDURE — 1159F MED LIST DOCD IN RCRD: CPT | Mod: CPTII,,, | Performed by: ORTHOPAEDIC SURGERY

## 2023-08-07 PROCEDURE — 99214 OFFICE O/P EST MOD 30 MIN: CPT | Mod: S$PBB,,, | Performed by: ORTHOPAEDIC SURGERY

## 2023-08-07 PROCEDURE — 3008F PR BODY MASS INDEX (BMI) DOCUMENTED: ICD-10-PCS | Mod: CPTII,,, | Performed by: ORTHOPAEDIC SURGERY

## 2023-08-07 PROCEDURE — 1159F PR MEDICATION LIST DOCUMENTED IN MEDICAL RECORD: ICD-10-PCS | Mod: CPTII,,, | Performed by: ORTHOPAEDIC SURGERY

## 2023-08-07 PROCEDURE — 3074F SYST BP LT 130 MM HG: CPT | Mod: CPTII,,, | Performed by: ORTHOPAEDIC SURGERY

## 2023-08-07 PROCEDURE — 73564 X-RAY EXAM KNEE 4 OR MORE: CPT | Mod: TC,50

## 2023-08-07 PROCEDURE — 1160F PR REVIEW ALL MEDS BY PRESCRIBER/CLIN PHARMACIST DOCUMENTED: ICD-10-PCS | Mod: CPTII,,, | Performed by: ORTHOPAEDIC SURGERY

## 2023-08-07 PROCEDURE — 73564 X-RAY EXAM KNEE 4 OR MORE: CPT | Mod: 26,50,, | Performed by: RADIOLOGY

## 2023-08-07 PROCEDURE — 99999 PR PBB SHADOW E&M-EST. PATIENT-LVL III: CPT | Mod: PBBFAC,,, | Performed by: ORTHOPAEDIC SURGERY

## 2023-08-07 PROCEDURE — 3078F PR MOST RECENT DIASTOLIC BLOOD PRESSURE < 80 MM HG: ICD-10-PCS | Mod: CPTII,,, | Performed by: ORTHOPAEDIC SURGERY

## 2023-08-07 PROCEDURE — 99999 PR PBB SHADOW E&M-EST. PATIENT-LVL III: ICD-10-PCS | Mod: PBBFAC,,, | Performed by: ORTHOPAEDIC SURGERY

## 2023-08-07 PROCEDURE — 73564 XR KNEE ORTHO BILAT WITH FLEXION: ICD-10-PCS | Mod: 26,50,, | Performed by: RADIOLOGY

## 2023-08-07 PROCEDURE — 3008F BODY MASS INDEX DOCD: CPT | Mod: CPTII,,, | Performed by: ORTHOPAEDIC SURGERY

## 2023-08-07 PROCEDURE — 1160F RVW MEDS BY RX/DR IN RCRD: CPT | Mod: CPTII,,, | Performed by: ORTHOPAEDIC SURGERY

## 2023-08-07 RX ORDER — MELOXICAM 15 MG/1
15 TABLET ORAL DAILY
Qty: 30 TABLET | Refills: 2 | Status: SHIPPED | OUTPATIENT
Start: 2023-08-07 | End: 2024-02-05

## 2023-08-07 RX ORDER — METHOCARBAMOL 500 MG/1
500 TABLET, FILM COATED ORAL 3 TIMES DAILY
Qty: 90 TABLET | Refills: 3 | Status: SHIPPED | OUTPATIENT
Start: 2023-08-07 | End: 2023-12-05

## 2024-02-05 ENCOUNTER — HOSPITAL ENCOUNTER (OUTPATIENT)
Dept: RADIOLOGY | Facility: HOSPITAL | Age: 49
Discharge: HOME | End: 2024-02-05
Attending: ORTHOPAEDIC SURGERY
Payer: MEDICAID

## 2024-02-05 ENCOUNTER — OFFICE VISIT (OUTPATIENT)
Dept: SPORTS MEDICINE | Facility: CLINIC | Age: 49
End: 2024-02-05
Payer: MEDICAID

## 2024-02-05 VITALS
SYSTOLIC BLOOD PRESSURE: 96 MMHG | HEART RATE: 70 BPM | HEIGHT: 62 IN | DIASTOLIC BLOOD PRESSURE: 65 MMHG | WEIGHT: 154.13 LBS | BODY MASS INDEX: 28.36 KG/M2

## 2024-02-05 DIAGNOSIS — M25.561 RIGHT KNEE PAIN, UNSPECIFIED CHRONICITY: ICD-10-CM

## 2024-02-05 DIAGNOSIS — M25.522 LEFT ELBOW PAIN: ICD-10-CM

## 2024-02-05 DIAGNOSIS — M25.561 RIGHT KNEE PAIN, UNSPECIFIED CHRONICITY: Primary | ICD-10-CM

## 2024-02-05 DIAGNOSIS — M94.261 CHONDROMALACIA, RIGHT KNEE: ICD-10-CM

## 2024-02-05 DIAGNOSIS — S82.141D CLOSED FRACTURE OF RIGHT TIBIAL PLATEAU WITH ROUTINE HEALING: ICD-10-CM

## 2024-02-05 DIAGNOSIS — M17.2 POST-TRAUMATIC OSTEOARTHRITIS OF BOTH KNEES: ICD-10-CM

## 2024-02-05 DIAGNOSIS — M23.91 INTERNAL DERANGEMENT OF RIGHT KNEE: ICD-10-CM

## 2024-02-05 DIAGNOSIS — M22.41 CHONDROMALACIA OF RIGHT PATELLA: ICD-10-CM

## 2024-02-05 PROCEDURE — 3078F DIAST BP <80 MM HG: CPT | Mod: CPTII,,, | Performed by: ORTHOPAEDIC SURGERY

## 2024-02-05 PROCEDURE — 73564 X-RAY EXAM KNEE 4 OR MORE: CPT | Mod: 26,50,, | Performed by: RADIOLOGY

## 2024-02-05 PROCEDURE — 3074F SYST BP LT 130 MM HG: CPT | Mod: CPTII,,, | Performed by: ORTHOPAEDIC SURGERY

## 2024-02-05 PROCEDURE — 3008F BODY MASS INDEX DOCD: CPT | Mod: CPTII,,, | Performed by: ORTHOPAEDIC SURGERY

## 2024-02-05 PROCEDURE — 99214 OFFICE O/P EST MOD 30 MIN: CPT | Mod: S$PBB,,, | Performed by: ORTHOPAEDIC SURGERY

## 2024-02-05 PROCEDURE — 1160F RVW MEDS BY RX/DR IN RCRD: CPT | Mod: CPTII,,, | Performed by: ORTHOPAEDIC SURGERY

## 2024-02-05 PROCEDURE — 99213 OFFICE O/P EST LOW 20 MIN: CPT | Mod: PBBFAC | Performed by: ORTHOPAEDIC SURGERY

## 2024-02-05 PROCEDURE — 73564 X-RAY EXAM KNEE 4 OR MORE: CPT | Mod: TC,50

## 2024-02-05 PROCEDURE — 73080 X-RAY EXAM OF ELBOW: CPT | Mod: 26,LT,, | Performed by: RADIOLOGY

## 2024-02-05 PROCEDURE — 99999 PR PBB SHADOW E&M-EST. PATIENT-LVL III: CPT | Mod: PBBFAC,,, | Performed by: ORTHOPAEDIC SURGERY

## 2024-02-05 PROCEDURE — 1159F MED LIST DOCD IN RCRD: CPT | Mod: CPTII,,, | Performed by: ORTHOPAEDIC SURGERY

## 2024-02-05 PROCEDURE — 73080 X-RAY EXAM OF ELBOW: CPT | Mod: TC,LT

## 2024-02-05 RX ORDER — MELOXICAM 15 MG/1
15 TABLET ORAL DAILY
Qty: 30 TABLET | Refills: 2 | Status: SHIPPED | OUTPATIENT
Start: 2024-02-05

## 2024-02-05 NOTE — PROGRESS NOTES
Subjective:          Chief Complaint: Chey Mcneill is a 48 y.o. female who had concerns including Pain of the Right Knee.     Swati comes to clinic for 1.5 years post op evaluation of right knee. She reports intermittent swelling, exacerbated by more activity. She is still going to PT once per week with Bj. She denies any pain. Occasionally will take advill when it flares up, and sometimes takes Robaxin but not everyday. Left elbow pain noted after previous arthroscopy of the left elbow 15 years ago.      DATE OF PROCEDURE: 7/12/2022     ATTENDING SURGEON: Surgeon(s) and Role:     * Cooper Ramos MD - Primary     Assistants:  MD Lien [place]  [facility]       PREOPERATIVE DIAGNOSIS:  Right  Chondromalacia, patella M22.40, Chondromalacia, (excludes patella) M94.29, Fracture, Tibia plateau S82.143A and Synovitis M65.9     POSTOPERATIVE DIAGNOSIS:   Right  Chondromalacia, patella M22.40, Chondromalacia, (excludes patella) M94.29, Fracture, Tibia plateau S82.143A and Synovitis M65.9     PROCEDURES(S) PERFORMED:   1. Right  Open Reduction and Internal Fixation Lateral Tibial Plateau Fracture  2.  Right  Arthroscopy, knee, synovectomy, limited 26119  3.  Right  Arthroscopy, debridement/shaving of articular cartilage (chondroplasty) 45254    Pain  Associated symptoms include joint swelling. Pertinent negatives include no abdominal pain, chest pain, chills, congestion, coughing, fever, nausea, numbness, rash, sore throat or vomiting.       Review of Systems   Constitutional: Negative for chills, fever and night sweats.   HENT:  Negative for congestion, hearing loss and sore throat.    Eyes:  Negative for blurred vision, discharge, double vision and visual disturbance.   Cardiovascular:  Negative for chest pain, leg swelling, palpitations and syncope.   Respiratory:  Negative for cough and shortness of breath.    Endocrine: Negative for cold intolerance, heat intolerance and polyuria.    Hematologic/Lymphatic: Negative for bleeding problem.   Skin:  Negative for dry skin and rash.   Musculoskeletal:  Positive for joint pain, joint swelling and stiffness. Negative for back pain, muscle cramps and muscle weakness.   Gastrointestinal:  Negative for abdominal pain, melena, nausea and vomiting.   Genitourinary:  Negative for hematuria.   Neurological:  Negative for focal weakness, loss of balance, numbness and paresthesias.   Psychiatric/Behavioral:  Negative for altered mental status.        Pain Related Questions  Over the past 3 days, what was your highest pain level?: 4  Over the past 3 days, what was your lowest pain level? : 4    Other  How many nights a week are you awakened by your affected body part?: 2  Was the patient's HEIGHT measured or patient reported?: Patient Reported  Was the patient's WEIGHT measured or patient reported?: Measured      Objective:        General: Chey is well-developed, well-nourished, appears stated age, in no acute distress, alert and oriented to time, place and person.     General    Nursing note and vitals reviewed.  Constitutional: She is oriented to person, place, and time. She appears well-developed and well-nourished. No distress.   HENT:   Head: Normocephalic and atraumatic.   Right Ear: External ear normal.   Left Ear: External ear normal.   Nose: Nose normal.   Mouth/Throat: No oropharyngeal exudate.   Eyes: Conjunctivae and EOM are normal. Pupils are equal, round, and reactive to light. Right eye exhibits no discharge. Left eye exhibits no discharge.   Neck: Neck supple.   Cardiovascular:  Normal rate, regular rhythm and intact distal pulses.            Pulmonary/Chest: Effort normal and breath sounds normal. No respiratory distress.   Abdominal: Soft. Bowel sounds are normal. She exhibits no distension. There is no abdominal tenderness.   Neurological: She is alert and oriented to person, place, and time. She has normal reflexes. No cranial nerve  deficit. She exhibits normal muscle tone. Coordination normal.   Psychiatric: She has a normal mood and affect. Her behavior is normal. Judgment and thought content normal.     General Musculoskeletal Exam   Gait: normal       Right Knee Exam     Inspection   Erythema: absent  Scars: present  Swelling: absent  Effusion: absent  Deformity: absent  Bruising: absent    Tenderness   The patient is experiencing no tenderness.     Crepitus   Right knee crepitus location: moderate.    Range of Motion   Extension:  10 abnormal   Flexion:  140 normal     Tests   Meniscus   Leela:  Medial - negative Lateral - negative  Ligament Examination   Lachman: normal (-1 to 2mm)   PCL-Posterior Drawer: normal (0 to 2mm)     MCL - Valgus: normal (0 to 2mm)  LCL - Varus: normal  Pivot Shift: normal (Equal)  Reverse Pivot Shift: normal (Equal)  Dial Test at 30 degrees: normal (< 5 degrees)  Dial Test at 90 degrees: normal (< 5 degrees)  Posterior Sag Test: negative  Posterolateral Corner: stable  Patella   Patellar apprehension: negative  Passive Patellar Tilt: neutral  Patellar Tracking: normal  Patellar Glide (quadrants): Lateral - 1   Medial - 2  Q-Angle at 90 degrees: normal  Patellar Grind: negative  J-Sign: none    Other   Meniscal Cyst: absent  Popliteal (Baker's) Cyst: absent  Sensation: normal    Comments:  Incisions well heaed    Left Knee Exam     Inspection   Erythema: absent  Scars: present  Swelling: absent  Effusion: absent  Deformity: absent  Bruising: absent    Tenderness   The patient is experiencing no tenderness.     Crepitus   The patient has crepitus of the patella (mild to moderate).    Range of Motion   Extension:  10 abnormal   Flexion:  140 normal     Tests   Meniscus   Leela:  Medial - negative Lateral - negative  Stability   Lachman: normal (-1 to 2mm)   PCL-Posterior Drawer: normal (0 to 2mm)  MCL - Valgus: normal (0 to 2mm)  LCL - Varus: normal (0 to 2mm)  Pivot Shift: normal (Equal)  Reverse Pivot  Shift: normal (Equal)  Dial Test at 30 degrees: normal (< 5 degrees)  Dial Test at 90 degrees: normal (< 5 degrees)  Posterior Sag Test: negative  Posterolateral Corner: stable  Patella   Patellar apprehension: negative  Passive Patellar Tilt: neutral  Patellar Tracking: normal  Patellar Glide (Quadrants): Lateral - 1 Medial - 2  Q-Angle at 90 degrees: normal  Patellar Grind: negative  J-Sign: J sign absent    Other   Meniscal Cyst: absent  Popliteal (Baker's) Cyst: absent  Sensation: normal    Comments:  Old Incisions well healed    Right Hip Exam     Tests   Raymond: negative  Left Hip Exam     Tests   Raymond: negative          Right Hand/Wrist Exam     Inspection   Scars: Wrist - absent   Effusion: Wrist - absent   Bruising: Wrist - absent   Deformity: Wrist - deformity     Range of Motion     Wrist   Extension:  50 normal   Flexion:  70 normal   Abduction: 20 normal  Adduction: 35 normal    Tests   Phalens Sign: negative  Tinel's sign (median nerve): negative  Finkelstein's test: negative  Carpal Tunnel Compression Test: negative  Cubital Tunnel Compression Test: negative  LT Stability: negative  Ewing's Test: negative      Other     Neuorologic Exam    Median Distribution: normal  Ulnar Distribution: normal  Radial Distribution: normal      Left Hand/Wrist Exam     Inspection   Scars: Wrist - absent   Effusion: Wrist - absent   Bruising: Wrist - absent   Deformity: Wrist - absent     Range of Motion     Wrist   Extension:  50 normal   Flexion:  70 normal   Abduction: 20 normal  Adduction: 35 normal    Tests   Phalens Sign: negative  Tinel's sign (median nerve): negative  Finkelstein's test: negative  Carpal Tunnel Compression Test: negative  Cubital Tunnel Compression Test: negative  LT Stability: negative  Ewing's Test: negative      Other     Sensory Exam  Median Distribution: normal  Ulnar Distribution: normal  Radial Distribution: normal      Right Elbow Exam     Inspection   Scars: absent  Effusion:  absent  Bruising: absent  Deformity: absent  Atrophy: absent    Range of Motion   Extension:  0 normal   Flexion:  130 normal   Pronation:  normal   Supination:  80 normal     Tests   Varus: negative  Valgus: negative  Tinel's sign (cubital tunnel): negative  Tennis Elbow: negative  Golfer's Elbow: negative  Radial Capitellar Grind: negative    Other   Sensation: normal      Left Elbow Exam     Inspection   Scars: present  Effusion: absent  Bruising: absent  Deformity: absent  Atrophy: absent    Pain   The patient exhibits pain of the olecranon    Swelling   The patient is swollen on the olecranon    Tenderness   The patient is tender to palpation of the lateral epicondyle and olecranon fossa.     Range of Motion   Extension:  0 normal   Flexion:  140 normal   Pronation:  normal Left elbow pronation: 90.  Supination:  80 normal     Tests   Varus: negative  Tinel's sign (cubital tunnel): negative  Tennis Elbow: negative  Golfer's Elbow: negative  Radial Capitellar Grind: negative    Other   Sensation: normal        Muscle Strength   Right Upper Extremity   Wrist extension: 5/5   Wrist flexion: 5/5   : 5/5   Pinch Mechanism: 5/5  Elbow Pronation:  5/5   Elbow Supination:  5/5   Elbow Extension: 5/5  Elbow Flexion: 5/5  Intrinsics: 5/5  EPL (Extensor Pollicis Longus): 5/5  Left Upper Extremity  Wrist extension: 5/5   Wrist flexion: 5/5   :  5/5   Pinch Mechanism: 5/5  Elbow Pronation:  5/5   Elbow Supination:  5/5   Elbow Extension: 5/5  Elbow Flexion: 5/5  Intrinsics: 5/5  EPL (Extensor Pollicis Longus): 5/5  Right Lower Extremity   Hip Abduction: 5/5   Quadriceps:  5/5   Hamstrin/5   Left Lower Extremity   Hip Abduction: 5/5   Quadriceps:  5/5   Hamstrin/5     Reflexes     Left Side  Achilles:  2+  Quadriceps:  2+    Right Side   Achilles:  2+  Quadriceps:  2+    Vascular Exam     Right Pulses  Dorsalis Pedis:      2+  Posterior Tibial:      2+  Radial:                    2+      Left  Pulses  Dorsalis Pedis:      2+  Posterior Tibial:      2+  Radial:                    2+      Capillary Refill  Right Hand: normal capillary refill  Left Hand: normal capillary refill  Right Derick's Test: no rapid refill no slow refill  Left Derick's Test: no rapid refill no slow refill        Edema  Right Forearm: absent  Right Lower Leg: absent  Left Forearm: absent  Left Lower Leg: absent        Radiographic Findings:    X-ray Knee Ortho Bilateral with Flexion  Narrative: EXAMINATION:  XR KNEE ORTHO BILAT WITH FLEXION    CLINICAL HISTORY:  Pain in right knee    TECHNIQUE:  AP standing of both knees, PA flexion standing views of both knees, and Merchant views of both knees were performed.  Lateral views of both knees were also performed.    COMPARISON:  August 7, 2023    FINDINGS:  Right knee:    No acute fractures.  Stable postoperative changes of from ORIF of the proximal tibia with lateral plate and screw construct, unchanged.  Unchanged areas of increased bone density-sclerosis involving the medial femoral condyle and medial tibial plateau.  Some stable periarticular spurring about tibiofemoral and patellofemoral compartments as well as mild narrowing of the medial compartment.  No suprapatellar bursal effusion or prepatellar soft tissue swelling.  Right knee findings unchanged to earlier exam.    Left knee:    No acute fractures.  Stable of postoperative changes from presumed tibial tubercle plasty.  No significant narrowing of the medial or lateral tibiofemoral or patellofemoral articulations.  Stable periarticular spurring and the tibiofemoral chondrocalcinosis changes.  No suprapatellar bursal effusion or prepatellar soft tissue swelling.  Left knee findings unchanged to earlier exam.  Impression: As above    Electronically signed by: Jamar Lazo  Date:    02/05/2024  Time:    08:46    Radiographs ordered and reviewed today in clinic of the left elbow demonstrates  concern of chondral damage at olecranon  .      These findings were discussed and reviewed with the patient.          Assessment:       Encounter Diagnoses   Name Primary?    Right knee pain, unspecified chronicity Yes    Chondromalacia, right knee     Chondromalacia of right patella     Internal derangement of right knee     Post-traumatic osteoarthritis of both knees     Left elbow pain     Closed fracture of right tibial plateau with routine healing                   Plan:       1. RTC in 2-3 weeks with Dr. Cooper Ramos bilateral Synvisc one injections; IKDC, SF-12 and KOOS was filled out today in clinic. Patient will fill out IKDC, SF-12 and KOOS on return.    2. Medications: Refills of the following Rx were sent to patients preferred Pharmacy:  meloxicam    3. Physical Therapy: Continue/Begin: Continue at  [facility]     4. HEP: N/A    5. Procedures/Procedural Planning:   Given extreme dysfunction and discomfort, and non-diagnostic x-ray imaging, we will obtain MRI imaging for further evaluation of the left elbow chondral damage.     6. DME: Lateral     7. Work/Sport Status: works for non-profit     8. Visit Summary: Patient doing well, we are happy with her progress. Recommend wearing lateral  for any weight bearing activity   Authorization placed for bilateral knees return for injections. Medically necessary for bilateral knee arthritis and has failed PT for the knees.  Radiographs left elbow and MRI left elbow ordered to evaluate the left elbow olecranon region.                                         Patient questionnaires may have been collected.

## 2024-02-05 NOTE — PATIENT INSTRUCTIONS
DESCRIPTION  Synvisc-One (hylan G-F 20) is an elastoviscous high molecular weight fluid containing hylan A and hylan B polymers produced from chicken anthony. Hylans are derivatives of hyaluronan (sodium hyaluronate). Hylan G-F 20 is unique in that the hyaluronan is chemically cross linked. Hyaluronan is a long-chain polymer containing repeating disaccharide units of Zo-[id number]-acetylglucosamine.     INDICATIONS FOR USE  Synvisc-One is indicated for the treatment of pain in osteoarthritis (OA) of the knee in patients who have failed to respond adequately to conservative nonpharmacologic therapy and simple analgesics, e.g., acetaminophen.     Who is a candidate for Synvisc-One  Patients with knee osteoarthritis, who have tried diet, exercise and over-the-counter pain medication but still have pain, should talk to their doctor to see if Synvisc-One is right for them.     How Synvisc-One is administered  Synvisc-One is a single injection. It's a simple, in-office procedure that only takes a few minutes.     What you can expect following a Synvisc-One knee injection Synvisc-One can provide up to six months of osteoarthritis knee pain relief. Everyone responds differently, but in a medical study* patients experienced relief starting one month after the injection.     After the injection, you can resume normal day-to-day activities, but you should avoid any strenuous activities for about 48 hours.     Contraindication  Do not administer to patients with known hypersensitivity (allergy) to hyaluronan (sodium hyaluronate) preparations.   Do not inject Synvisc-One in the knees of patients having knee joint infections or skin diseases or infections in the area of theinjection site.    What are possible side effects?  Synvisc may occur short-term pain, swelling at the injection site and / or the appearance of synovial exudate after injection. In some cases, exudation may be significant and cause more prolonged pain.      Important Safety Information  Before trying Synvisc-One or SYNVISC, tell your doctor if you are allergic to products from birds - such as feathers, eggs or poultry - or if your leg is swollen or infected. Synvisc-One and SYNVISC are only for injection into the knee, performed by a doctor or other qualified health care professional. Synvisc-One and SYNVISC have not been tested to show pain relief in joints other than the knee. Talk to your doctor before resuming strenuous weight-bearing activities after treatment. Synvisc-One and SYNVISC have not been tested in children, pregnant women or women who are nursing. You should tell your doctor if you think you are pregnant or if you are nursing a child. The side effects most commonly seen when Synvisc-One or SYNVISC is injected into the knee were pain, swelling and/or fluid buildup in or around the knee. Cases where the swelling is extensive or painful should be discussed with your doctor. Allergic reactions such as rash and hives have been reported rarely.

## 2024-02-20 ENCOUNTER — HOSPITAL ENCOUNTER (OUTPATIENT)
Dept: RADIOLOGY | Facility: HOSPITAL | Age: 49
Discharge: HOME | End: 2024-02-20
Attending: ORTHOPAEDIC SURGERY
Payer: MEDICAID

## 2024-02-20 DIAGNOSIS — M25.522 LEFT ELBOW PAIN: ICD-10-CM

## 2024-02-20 PROCEDURE — 73221 MRI JOINT UPR EXTREM W/O DYE: CPT | Mod: 26,LT,, | Performed by: RADIOLOGY

## 2024-02-20 PROCEDURE — 73221 MRI JOINT UPR EXTREM W/O DYE: CPT | Mod: TC,PO,LT

## (undated) DEVICE — SUT MONOCRYL 4-0 PS-2

## (undated) DEVICE — UNDERGLOVES BIOGEL PI SZ 7 LF

## (undated) DEVICE — PAD ELECTRODE STER 1.5X3

## (undated) DEVICE — BLADE 4.2MM PREBENT ULTRACUT

## (undated) DEVICE — TOURNIQUET SB QC DP 34X4IN

## (undated) DEVICE — DRAPE PLASTIC U 60X72

## (undated) DEVICE — UNDERGLOVES BIOGEL PI SIZE 7.5

## (undated) DEVICE — ELECTRODE 90 DEGREE ANGLE

## (undated) DEVICE — SUPPORT SLING SHOT II MEDIUM

## (undated) DEVICE — GLOVE BIOGEL SKINSENSE PI 7.0

## (undated) DEVICE — BANDAGE ESMARK ELASTIC ST 4X9

## (undated) DEVICE — PAD CAST SPECIALIST STRL 6

## (undated) DEVICE — DRAPE SURG W/TWL 17 5/8X23

## (undated) DEVICE — DRESSING GAUZE XEROFORM 5X9

## (undated) DEVICE — PAD COLD THERAPY KNEE WRAP ON

## (undated) DEVICE — GLOVE BIOGEL SKINSENSE PI 8.5

## (undated) DEVICE — NDL SPINAL 18GX3.5 SPINOCAN

## (undated) DEVICE — SUT MCRYL PLUS 4-0 PS2 27IN

## (undated) DEVICE — SUT ETHILON 4-0 BLK MONO

## (undated) DEVICE — SEE MEDLINE ITEM 157150

## (undated) DEVICE — SYR 30CC LUER LOCK

## (undated) DEVICE — SEE MEDLINE ITEM 152530

## (undated) DEVICE — SEE MEDLINE ITEM 152529

## (undated) DEVICE — SUT VICRYL PLUS 2-0 CT1 18

## (undated) DEVICE — UNDERGLOVES BIOGEL PI SIZE 8.5

## (undated) DEVICE — DRAPE STERI INSTRUMENT 1018

## (undated) DEVICE — PAD UNDERPAD 30X30

## (undated) DEVICE — APPLICATOR CHLORAPREP ORN 26ML

## (undated) DEVICE — TOURNIQUET SB QC SP 34X4IN

## (undated) DEVICE — DRAPE STERI U-SHAPED 47X51IN

## (undated) DEVICE — BIT DRILL QC STRL SS 3.2X145

## (undated) DEVICE — GOWN POLY REINF BRTH SLV 3XL

## (undated) DEVICE — PUMP COLD THERAPY

## (undated) DEVICE — SOL 9P NACL IRR PIC IL

## (undated) DEVICE — SEE MEDLINE ITEM 157131

## (undated) DEVICE — PAD ABD 8X10 STERILE

## (undated) DEVICE — PAD KNEE POLAR XL

## (undated) DEVICE — DRESSING XEROFORM 1X8IN

## (undated) DEVICE — SEE MEDLINE ITEM 157160

## (undated) DEVICE — CONTAINER SPECIMEN STRL 4OZ

## (undated) DEVICE — CLOSURE SKIN STERI STRIP 1/2X4

## (undated) DEVICE — WRAP KNEE ACCU THERM GEL PACK

## (undated) DEVICE — NDL 22GA X1 1/2 REG BEVEL

## (undated) DEVICE — Device

## (undated) DEVICE — ELECTRODE REM PLYHSV RETURN 9

## (undated) DEVICE — DRESSING XEROFORM FOIL PK 1X8

## (undated) DEVICE — STOCKING COMPRESSION

## (undated) DEVICE — SUT VICRYL+ 1 CT1 18IN

## (undated) DEVICE — SOL IRR NACL .9% 3000ML

## (undated) DEVICE — GAUZE SPONGE 4X4 12PLY

## (undated) DEVICE — SEE MEDLINE ITEM 152622

## (undated) DEVICE — POSITIONER IV ARMBOARD FOAM

## (undated) DEVICE — SET DECANTER MEDICHOICE

## (undated) DEVICE — DRAPE C ARM 42 X 120 10/BX

## (undated) DEVICE — SEE MEDLINE ITEM 157216

## (undated) DEVICE — NDL SAFETY 22G X 1.5 ECLIPSE

## (undated) DEVICE — GOWN B1 X-LG X-LONG

## (undated) DEVICE — PADDING CAST 6IN(OR)

## (undated) DEVICE — BANDAGE MATRIX HK LOOP 6IN 5YD

## (undated) DEVICE — SYR IRRIGATION BULB STER 60ML

## (undated) DEVICE — SUT ETHILON 3-0 PS2 18 BLK

## (undated) DEVICE — SHAVER ULTRAFFR 4.2MM

## (undated) DEVICE — SPONGE LAP 18X18 PREWASHED

## (undated) DEVICE — NDL SUREFIRE SCORPION RC

## (undated) DEVICE — BLADE SHAVER 4.5 6/BX

## (undated) DEVICE — GLOVE BIOGEL SKINSENSE PI 7.5

## (undated) DEVICE — MARKER SKIN STND TIP BLUE BARR

## (undated) DEVICE — DRAPE C-ARMOR EQUIPMENT COVER

## (undated) DEVICE — DRAPE INCISE IOBAN 2 23X17IN

## (undated) DEVICE — SEE MEDLINE ITEM 146298

## (undated) DEVICE — SEE MEDLINE ITEM 157169

## (undated) DEVICE — CANNULA HEX FLEX 7 X 85

## (undated) DEVICE — BANDAGE MATRIX HK LOOP 4IN 5YD

## (undated) DEVICE — BIT DRILL QCK-CONN 4.3MMX180MM

## (undated) DEVICE — TUBE SET INFLOW/OUTFLOW

## (undated) DEVICE — SEE MEDLINE ITEM 146313

## (undated) DEVICE — SLING MEDIUM

## (undated) DEVICE — GAUZE SPONGE 4'X4 12 PLY

## (undated) DEVICE — PAD SHOULDER CARE POLAR

## (undated) DEVICE — ELECTRODE

## (undated) DEVICE — DRAPE ARTHSCP T ORTHOMAX POUCH

## (undated) DEVICE — SEE MEDLINE ITEM 157166

## (undated) DEVICE — NDL MAYO CAT 1/2 CIR #5

## (undated) DEVICE — GOWN POLY REINF X-LONG XL

## (undated) DEVICE — BLADE MEDIUM LONG 9MM X 31MM

## (undated) DEVICE — KIT TRIMANO

## (undated) DEVICE — BNDG COFLEX FOAM LF2 ST 6X5YD

## (undated) DEVICE — NDL HYPO SAFETY 22 X 1 1/2

## (undated) DEVICE — GUIDEWIRE 2.5
Type: IMPLANTABLE DEVICE | Site: TIBIA | Status: NON-FUNCTIONAL
Removed: 2022-07-12

## (undated) DEVICE — BRACE KNEE T SCOPE PREMIER

## (undated) DEVICE — KIT FIBRIN SEALANT EVICEL 5 ML

## (undated) DEVICE — SUT VICRYL PLUS 3-0 SH 18IN

## (undated) DEVICE — KIT TRIMANO CHIN

## (undated) DEVICE — SCREW CORTEX 4.5 36M
Type: IMPLANTABLE DEVICE | Site: TIBIA | Status: NON-FUNCTIONAL
Removed: 2022-07-12

## (undated) DEVICE — UNDERGLOVE BIOGEL PI SZ 6.5 LF

## (undated) DEVICE — GLOVE BIOGEL SKINSENSE PI 6.5

## (undated) DEVICE — TAPE SURG MEDIPORE 6X72IN